# Patient Record
Sex: MALE | Race: WHITE | NOT HISPANIC OR LATINO | Employment: UNEMPLOYED | URBAN - METROPOLITAN AREA
[De-identification: names, ages, dates, MRNs, and addresses within clinical notes are randomized per-mention and may not be internally consistent; named-entity substitution may affect disease eponyms.]

---

## 2019-12-30 ENCOUNTER — HOSPITAL ENCOUNTER (INPATIENT)
Facility: CLINIC | Age: 29
LOS: 3 days | Discharge: HOME | DRG: 638 | End: 2020-01-02
Attending: EMERGENCY MEDICINE | Admitting: INTERNAL MEDICINE
Payer: MEDICARE

## 2019-12-30 DIAGNOSIS — I47.10 PAROXYSMAL SUPRAVENTRICULAR TACHYCARDIA (H): ICD-10-CM

## 2019-12-30 DIAGNOSIS — E10.10 DIABETIC KETOACIDOSIS WITHOUT COMA ASSOCIATED WITH TYPE 1 DIABETES MELLITUS (H): ICD-10-CM

## 2019-12-30 DIAGNOSIS — E86.0 DEHYDRATION: ICD-10-CM

## 2019-12-30 DIAGNOSIS — I47.10 SVT (SUPRAVENTRICULAR TACHYCARDIA) (H): Primary | ICD-10-CM

## 2019-12-30 DIAGNOSIS — E83.39 HYPERPHOSPHATEMIA: ICD-10-CM

## 2019-12-30 LAB
ALBUMIN SERPL-MCNC: 4.4 G/DL (ref 3.4–5)
ALBUMIN SERPL-MCNC: 4.7 G/DL (ref 3.4–5)
ALP SERPL-CCNC: 127 U/L (ref 40–150)
ALP SERPL-CCNC: 131 U/L (ref 40–150)
ALT SERPL W P-5'-P-CCNC: 24 U/L (ref 0–70)
ALT SERPL W P-5'-P-CCNC: 25 U/L (ref 0–70)
ANION GAP SERPL CALCULATED.3IONS-SCNC: 21 MMOL/L (ref 3–14)
ANION GAP SERPL CALCULATED.3IONS-SCNC: 24 MMOL/L (ref 3–14)
AST SERPL W P-5'-P-CCNC: 12 U/L (ref 0–45)
AST SERPL W P-5'-P-CCNC: 25 U/L (ref 0–45)
BASOPHILS # BLD AUTO: 0 10E9/L (ref 0–0.2)
BASOPHILS # BLD AUTO: 0.1 10E9/L (ref 0–0.2)
BASOPHILS NFR BLD AUTO: 0.1 %
BASOPHILS NFR BLD AUTO: 0.2 %
BILIRUB DIRECT SERPL-MCNC: 0.1 MG/DL (ref 0–0.2)
BILIRUB DIRECT SERPL-MCNC: 0.1 MG/DL (ref 0–0.2)
BILIRUB SERPL-MCNC: 0.5 MG/DL (ref 0.2–1.3)
BILIRUB SERPL-MCNC: 0.8 MG/DL (ref 0.2–1.3)
BUN SERPL-MCNC: 25 MG/DL (ref 7–30)
BUN SERPL-MCNC: 25 MG/DL (ref 7–30)
CALCIUM SERPL-MCNC: 10.2 MG/DL (ref 8.5–10.1)
CALCIUM SERPL-MCNC: 9.3 MG/DL (ref 8.5–10.1)
CHLORIDE SERPL-SCNC: 101 MMOL/L (ref 94–109)
CHLORIDE SERPL-SCNC: 107 MMOL/L (ref 94–109)
CO2 SERPL-SCNC: 9 MMOL/L (ref 20–32)
CO2 SERPL-SCNC: 9 MMOL/L (ref 20–32)
CREAT SERPL-MCNC: 0.61 MG/DL (ref 0.66–1.25)
CREAT SERPL-MCNC: 0.62 MG/DL (ref 0.66–1.25)
DIFFERENTIAL METHOD BLD: ABNORMAL
DIFFERENTIAL METHOD BLD: ABNORMAL
EOSINOPHIL # BLD AUTO: 0.1 10E9/L (ref 0–0.7)
EOSINOPHIL # BLD AUTO: 0.1 10E9/L (ref 0–0.7)
EOSINOPHIL NFR BLD AUTO: 0.4 %
EOSINOPHIL NFR BLD AUTO: 0.4 %
ERYTHROCYTE [DISTWIDTH] IN BLOOD BY AUTOMATED COUNT: 13.9 % (ref 10–15)
ERYTHROCYTE [DISTWIDTH] IN BLOOD BY AUTOMATED COUNT: 14 % (ref 10–15)
GFR SERPL CREATININE-BSD FRML MDRD: >90 ML/MIN/{1.73_M2}
GFR SERPL CREATININE-BSD FRML MDRD: >90 ML/MIN/{1.73_M2}
GLUCOSE BLDC GLUCOMTR-MCNC: 198 MG/DL (ref 70–99)
GLUCOSE BLDC GLUCOMTR-MCNC: 265 MG/DL (ref 70–99)
GLUCOSE BLDC GLUCOMTR-MCNC: 288 MG/DL (ref 70–99)
GLUCOSE BLDC GLUCOMTR-MCNC: 400 MG/DL (ref 70–99)
GLUCOSE BLDC GLUCOMTR-MCNC: 446 MG/DL (ref 70–99)
GLUCOSE BLDC GLUCOMTR-MCNC: 472 MG/DL (ref 70–99)
GLUCOSE BLDC GLUCOMTR-MCNC: 490 MG/DL (ref 70–99)
GLUCOSE BLDC GLUCOMTR-MCNC: 505 MG/DL (ref 70–99)
GLUCOSE BLDC GLUCOMTR-MCNC: 522 MG/DL (ref 70–99)
GLUCOSE BLDC GLUCOMTR-MCNC: 566 MG/DL (ref 70–99)
GLUCOSE SERPL-MCNC: 479 MG/DL (ref 70–99)
GLUCOSE SERPL-MCNC: 633 MG/DL (ref 70–99)
HBA1C MFR BLD: 12.8 % (ref 0–5.6)
HCT VFR BLD AUTO: 46.1 % (ref 40–53)
HCT VFR BLD AUTO: 48.7 % (ref 40–53)
HGB BLD-MCNC: 15 G/DL (ref 13.3–17.7)
HGB BLD-MCNC: 15.5 G/DL (ref 13.3–17.7)
IMM GRANULOCYTES # BLD: 0.3 10E9/L (ref 0–0.4)
IMM GRANULOCYTES # BLD: 0.3 10E9/L (ref 0–0.4)
IMM GRANULOCYTES NFR BLD: 1 %
IMM GRANULOCYTES NFR BLD: 1.3 %
INTERPRETATION ECG - MUSE: NORMAL
KETONES BLD-SCNC: 4.8 MMOL/L (ref 0–0.6)
KETONES BLD-SCNC: 4.9 MMOL/L (ref 0–0.6)
KETONES BLD-SCNC: 5.3 MMOL/L (ref 0–0.6)
LACTATE BLD-SCNC: 3.3 MMOL/L (ref 0.7–2)
LIPASE SERPL-CCNC: 48 U/L (ref 73–393)
LYMPHOCYTES # BLD AUTO: 1.6 10E9/L (ref 0.8–5.3)
LYMPHOCYTES # BLD AUTO: 2.1 10E9/L (ref 0.8–5.3)
LYMPHOCYTES NFR BLD AUTO: 6.4 %
LYMPHOCYTES NFR BLD AUTO: 8.2 %
MAGNESIUM SERPL-MCNC: 2.4 MG/DL (ref 1.6–2.3)
MAGNESIUM SERPL-MCNC: 2.6 MG/DL (ref 1.6–2.3)
MCH RBC QN AUTO: 26.7 PG (ref 26.5–33)
MCH RBC QN AUTO: 26.7 PG (ref 26.5–33)
MCHC RBC AUTO-ENTMCNC: 31.8 G/DL (ref 31.5–36.5)
MCHC RBC AUTO-ENTMCNC: 32.5 G/DL (ref 31.5–36.5)
MCV RBC AUTO: 82 FL (ref 78–100)
MCV RBC AUTO: 84 FL (ref 78–100)
MONOCYTES # BLD AUTO: 1.9 10E9/L (ref 0–1.3)
MONOCYTES # BLD AUTO: 2.3 10E9/L (ref 0–1.3)
MONOCYTES NFR BLD AUTO: 7.4 %
MONOCYTES NFR BLD AUTO: 9.1 %
NEUTROPHILS # BLD AUTO: 20.6 10E9/L (ref 1.6–8.3)
NEUTROPHILS # BLD AUTO: 21.3 10E9/L (ref 1.6–8.3)
NEUTROPHILS NFR BLD AUTO: 82.6 %
NEUTROPHILS NFR BLD AUTO: 82.9 %
NRBC # BLD AUTO: 0 10*3/UL
NRBC # BLD AUTO: 0 10*3/UL
NRBC BLD AUTO-RTO: 0 /100
NRBC BLD AUTO-RTO: 0 /100
OSMOLALITY SERPL: 333 MMOL/KG (ref 275–295)
OSMOLALITY SERPL: 334 MMOL/KG (ref 275–295)
PHOSPHATE SERPL-MCNC: 4.8 MG/DL (ref 2.5–4.5)
PHOSPHATE SERPL-MCNC: 7.2 MG/DL (ref 2.5–4.5)
PLATELET # BLD AUTO: 263 10E9/L (ref 150–450)
PLATELET # BLD AUTO: 320 10E9/L (ref 150–450)
POTASSIUM SERPL-SCNC: 4.6 MMOL/L (ref 3.4–5.3)
POTASSIUM SERPL-SCNC: 5 MMOL/L (ref 3.4–5.3)
PROT SERPL-MCNC: 7.8 G/DL (ref 6.8–8.8)
PROT SERPL-MCNC: 8.2 G/DL (ref 6.8–8.8)
RBC # BLD AUTO: 5.61 10E12/L (ref 4.4–5.9)
RBC # BLD AUTO: 5.81 10E12/L (ref 4.4–5.9)
SODIUM SERPL-SCNC: 134 MMOL/L (ref 133–144)
SODIUM SERPL-SCNC: 137 MMOL/L (ref 133–144)
WBC # BLD AUTO: 24.9 10E9/L (ref 4–11)
WBC # BLD AUTO: 25.7 10E9/L (ref 4–11)

## 2019-12-30 PROCEDURE — 83930 ASSAY OF BLOOD OSMOLALITY: CPT | Performed by: EMERGENCY MEDICINE

## 2019-12-30 PROCEDURE — 83735 ASSAY OF MAGNESIUM: CPT | Performed by: INTERNAL MEDICINE

## 2019-12-30 PROCEDURE — 83605 ASSAY OF LACTIC ACID: CPT | Performed by: INTERNAL MEDICINE

## 2019-12-30 PROCEDURE — 12000047 ZZH R&B IMCU

## 2019-12-30 PROCEDURE — 83036 HEMOGLOBIN GLYCOSYLATED A1C: CPT | Performed by: EMERGENCY MEDICINE

## 2019-12-30 PROCEDURE — 82010 KETONE BODYS QUAN: CPT | Performed by: EMERGENCY MEDICINE

## 2019-12-30 PROCEDURE — 99291 CRITICAL CARE FIRST HOUR: CPT

## 2019-12-30 PROCEDURE — 96375 TX/PRO/DX INJ NEW DRUG ADDON: CPT

## 2019-12-30 PROCEDURE — 25000128 H RX IP 250 OP 636: Performed by: EMERGENCY MEDICINE

## 2019-12-30 PROCEDURE — 40000141 ZZH STATISTIC PERIPHERAL IV START W/O US GUIDANCE

## 2019-12-30 PROCEDURE — 25800030 ZZH RX IP 258 OP 636: Performed by: EMERGENCY MEDICINE

## 2019-12-30 PROCEDURE — 96360 HYDRATION IV INFUSION INIT: CPT | Mod: 59

## 2019-12-30 PROCEDURE — 12000000 ZZH R&B MED SURG/OB

## 2019-12-30 PROCEDURE — 85025 COMPLETE CBC W/AUTO DIFF WBC: CPT | Performed by: EMERGENCY MEDICINE

## 2019-12-30 PROCEDURE — 84100 ASSAY OF PHOSPHORUS: CPT | Performed by: INTERNAL MEDICINE

## 2019-12-30 PROCEDURE — 80076 HEPATIC FUNCTION PANEL: CPT | Performed by: EMERGENCY MEDICINE

## 2019-12-30 PROCEDURE — 99223 1ST HOSP IP/OBS HIGH 75: CPT | Mod: AI | Performed by: INTERNAL MEDICINE

## 2019-12-30 PROCEDURE — 87040 BLOOD CULTURE FOR BACTERIA: CPT | Performed by: INTERNAL MEDICINE

## 2019-12-30 PROCEDURE — 83735 ASSAY OF MAGNESIUM: CPT | Performed by: EMERGENCY MEDICINE

## 2019-12-30 PROCEDURE — 25000128 H RX IP 250 OP 636: Performed by: INTERNAL MEDICINE

## 2019-12-30 PROCEDURE — 93010 ELECTROCARDIOGRAM REPORT: CPT | Performed by: INTERNAL MEDICINE

## 2019-12-30 PROCEDURE — 83690 ASSAY OF LIPASE: CPT | Performed by: EMERGENCY MEDICINE

## 2019-12-30 PROCEDURE — 83930 ASSAY OF BLOOD OSMOLALITY: CPT | Performed by: INTERNAL MEDICINE

## 2019-12-30 PROCEDURE — 96361 HYDRATE IV INFUSION ADD-ON: CPT

## 2019-12-30 PROCEDURE — 36415 COLL VENOUS BLD VENIPUNCTURE: CPT | Performed by: INTERNAL MEDICINE

## 2019-12-30 PROCEDURE — 93005 ELECTROCARDIOGRAM TRACING: CPT

## 2019-12-30 PROCEDURE — 85025 COMPLETE CBC W/AUTO DIFF WBC: CPT | Performed by: INTERNAL MEDICINE

## 2019-12-30 PROCEDURE — 80048 BASIC METABOLIC PNL TOTAL CA: CPT | Performed by: INTERNAL MEDICINE

## 2019-12-30 PROCEDURE — 25800030 ZZH RX IP 258 OP 636: Performed by: INTERNAL MEDICINE

## 2019-12-30 PROCEDURE — 82010 KETONE BODYS QUAN: CPT | Performed by: INTERNAL MEDICINE

## 2019-12-30 PROCEDURE — 25000131 ZZH RX MED GY IP 250 OP 636 PS 637: Mod: GY | Performed by: EMERGENCY MEDICINE

## 2019-12-30 PROCEDURE — 00000146 ZZHCL STATISTIC GLUCOSE BY METER IP

## 2019-12-30 PROCEDURE — 25000131 ZZH RX MED GY IP 250 OP 636 PS 637: Mod: GY | Performed by: INTERNAL MEDICINE

## 2019-12-30 PROCEDURE — 84100 ASSAY OF PHOSPHORUS: CPT | Performed by: EMERGENCY MEDICINE

## 2019-12-30 PROCEDURE — 80076 HEPATIC FUNCTION PANEL: CPT | Performed by: INTERNAL MEDICINE

## 2019-12-30 PROCEDURE — 96376 TX/PRO/DX INJ SAME DRUG ADON: CPT

## 2019-12-30 PROCEDURE — 80048 BASIC METABOLIC PNL TOTAL CA: CPT | Performed by: EMERGENCY MEDICINE

## 2019-12-30 PROCEDURE — 99292 CRITICAL CARE ADDL 30 MIN: CPT

## 2019-12-30 PROCEDURE — 96365 THER/PROPH/DIAG IV INF INIT: CPT

## 2019-12-30 RX ORDER — PROCHLORPERAZINE MALEATE 10 MG
10 TABLET ORAL EVERY 6 HOURS PRN
Status: DISCONTINUED | OUTPATIENT
Start: 2019-12-30 | End: 2020-01-02 | Stop reason: HOSPADM

## 2019-12-30 RX ORDER — DEXTROSE MONOHYDRATE 25 G/50ML
25-50 INJECTION, SOLUTION INTRAVENOUS
Status: DISCONTINUED | OUTPATIENT
Start: 2019-12-30 | End: 2019-12-31

## 2019-12-30 RX ORDER — SODIUM CHLORIDE AND POTASSIUM CHLORIDE 150; 450 MG/100ML; MG/100ML
INJECTION, SOLUTION INTRAVENOUS CONTINUOUS
Status: DISCONTINUED | OUTPATIENT
Start: 2019-12-30 | End: 2019-12-31

## 2019-12-30 RX ORDER — HYDROMORPHONE HYDROCHLORIDE 1 MG/ML
.3-.5 INJECTION, SOLUTION INTRAMUSCULAR; INTRAVENOUS; SUBCUTANEOUS EVERY 4 HOURS PRN
Status: DISCONTINUED | OUTPATIENT
Start: 2019-12-30 | End: 2020-01-02 | Stop reason: HOSPADM

## 2019-12-30 RX ORDER — ONDANSETRON 4 MG/1
4 TABLET, ORALLY DISINTEGRATING ORAL EVERY 6 HOURS PRN
Status: DISCONTINUED | OUTPATIENT
Start: 2019-12-30 | End: 2020-01-02 | Stop reason: HOSPADM

## 2019-12-30 RX ORDER — OLANZAPINE 10 MG/1
10 TABLET ORAL AT BEDTIME
Status: ON HOLD | COMMUNITY
End: 2020-01-22

## 2019-12-30 RX ORDER — ONDANSETRON 2 MG/ML
4 INJECTION INTRAMUSCULAR; INTRAVENOUS EVERY 6 HOURS PRN
Status: DISCONTINUED | OUTPATIENT
Start: 2019-12-30 | End: 2020-01-02 | Stop reason: HOSPADM

## 2019-12-30 RX ORDER — ACETAMINOPHEN 325 MG/1
650 TABLET ORAL EVERY 4 HOURS PRN
Status: DISCONTINUED | OUTPATIENT
Start: 2019-12-30 | End: 2020-01-02 | Stop reason: HOSPADM

## 2019-12-30 RX ORDER — NALOXONE HYDROCHLORIDE 0.4 MG/ML
.1-.4 INJECTION, SOLUTION INTRAMUSCULAR; INTRAVENOUS; SUBCUTANEOUS
Status: DISCONTINUED | OUTPATIENT
Start: 2019-12-30 | End: 2020-01-02 | Stop reason: HOSPADM

## 2019-12-30 RX ORDER — PROCHLORPERAZINE 25 MG
25 SUPPOSITORY, RECTAL RECTAL EVERY 12 HOURS PRN
Status: DISCONTINUED | OUTPATIENT
Start: 2019-12-30 | End: 2020-01-02 | Stop reason: HOSPADM

## 2019-12-30 RX ORDER — NICOTINE POLACRILEX 4 MG
15-30 LOZENGE BUCCAL
Status: DISCONTINUED | OUTPATIENT
Start: 2019-12-30 | End: 2019-12-31

## 2019-12-30 RX ORDER — FENTANYL CITRATE 50 UG/ML
25 INJECTION, SOLUTION INTRAMUSCULAR; INTRAVENOUS ONCE
Status: COMPLETED | OUTPATIENT
Start: 2019-12-30 | End: 2019-12-30

## 2019-12-30 RX ORDER — FAMOTIDINE 20 MG/1
20 TABLET, FILM COATED ORAL 2 TIMES DAILY
COMMUNITY
End: 2020-01-21

## 2019-12-30 RX ORDER — DEXTROSE MONOHYDRATE, SODIUM CHLORIDE, AND POTASSIUM CHLORIDE 50; 1.49; 4.5 G/1000ML; G/1000ML; G/1000ML
INJECTION, SOLUTION INTRAVENOUS CONTINUOUS
Status: DISCONTINUED | OUTPATIENT
Start: 2019-12-30 | End: 2019-12-31

## 2019-12-30 RX ORDER — NITROGLYCERIN 0.4 MG/1
0.4 TABLET SUBLINGUAL EVERY 5 MIN PRN
Status: DISCONTINUED | OUTPATIENT
Start: 2019-12-30 | End: 2020-01-02 | Stop reason: HOSPADM

## 2019-12-30 RX ORDER — POLYETHYLENE GLYCOL 3350 17 G/17G
17 POWDER, FOR SOLUTION ORAL DAILY PRN
Status: DISCONTINUED | OUTPATIENT
Start: 2019-12-30 | End: 2020-01-02 | Stop reason: HOSPADM

## 2019-12-30 RX ORDER — ONDANSETRON 2 MG/ML
4 INJECTION INTRAMUSCULAR; INTRAVENOUS EVERY 30 MIN PRN
Status: DISCONTINUED | OUTPATIENT
Start: 2019-12-30 | End: 2019-12-30

## 2019-12-30 RX ORDER — LIDOCAINE 40 MG/G
CREAM TOPICAL
Status: DISCONTINUED | OUTPATIENT
Start: 2019-12-30 | End: 2020-01-02 | Stop reason: HOSPADM

## 2019-12-30 RX ADMIN — SODIUM CHLORIDE: 9 INJECTION, SOLUTION INTRAVENOUS at 18:00

## 2019-12-30 RX ADMIN — SODIUM CHLORIDE: 9 INJECTION, SOLUTION INTRAVENOUS at 22:13

## 2019-12-30 RX ADMIN — POTASSIUM CHLORIDE, DEXTROSE MONOHYDRATE AND SODIUM CHLORIDE: 150; 5; 450 INJECTION, SOLUTION INTRAVENOUS at 19:45

## 2019-12-30 RX ADMIN — FENTANYL CITRATE 25 MCG: 50 INJECTION INTRAMUSCULAR; INTRAVENOUS at 15:23

## 2019-12-30 RX ADMIN — POTASSIUM CHLORIDE AND SODIUM CHLORIDE: 450; 150 INJECTION, SOLUTION INTRAVENOUS at 18:54

## 2019-12-30 RX ADMIN — HYDROMORPHONE HYDROCHLORIDE 0.5 MG: 1 INJECTION, SOLUTION INTRAMUSCULAR; INTRAVENOUS; SUBCUTANEOUS at 22:58

## 2019-12-30 RX ADMIN — SODIUM CHLORIDE 5.5 UNITS/HR: 9 INJECTION, SOLUTION INTRAVENOUS at 17:09

## 2019-12-30 RX ADMIN — ONDANSETRON 4 MG: 2 INJECTION INTRAMUSCULAR; INTRAVENOUS at 15:10

## 2019-12-30 RX ADMIN — SODIUM CHLORIDE: 9 INJECTION, SOLUTION INTRAVENOUS at 20:11

## 2019-12-30 RX ADMIN — SODIUM CHLORIDE 1000 ML: 9 INJECTION, SOLUTION INTRAVENOUS at 18:39

## 2019-12-30 RX ADMIN — ONDANSETRON 4 MG: 2 INJECTION INTRAMUSCULAR; INTRAVENOUS at 18:19

## 2019-12-30 RX ADMIN — SODIUM CHLORIDE: 9 INJECTION, SOLUTION INTRAVENOUS at 20:16

## 2019-12-30 RX ADMIN — SODIUM CHLORIDE 1000 ML: 9 INJECTION, SOLUTION INTRAVENOUS at 14:06

## 2019-12-30 RX ADMIN — PROCHLORPERAZINE EDISYLATE 10 MG: 5 INJECTION INTRAMUSCULAR; INTRAVENOUS at 20:11

## 2019-12-30 RX ADMIN — SODIUM CHLORIDE: 9 INJECTION, SOLUTION INTRAVENOUS at 23:05

## 2019-12-30 RX ADMIN — SODIUM CHLORIDE 10 UNITS: 9 INJECTION, SOLUTION INTRAVENOUS at 15:06

## 2019-12-30 RX ADMIN — HYDROMORPHONE HYDROCHLORIDE 0.5 MG: 1 INJECTION, SOLUTION INTRAMUSCULAR; INTRAVENOUS; SUBCUTANEOUS at 18:40

## 2019-12-30 ASSESSMENT — ENCOUNTER SYMPTOMS
VOMITING: 1
FATIGUE: 1
ABDOMINAL PAIN: 1

## 2019-12-30 ASSESSMENT — ACTIVITIES OF DAILY LIVING (ADL): ADLS_ACUITY_SCORE: 12

## 2019-12-30 NOTE — ED NOTES
DATE:  12/30/2019   TIME OF RECEIPT FROM LAB:  2:18 PM  LAB TEST:  Ketone: 4.8   LAB VALUE:  Ketone: 4.8   RESULTS GIVEN WITH READ-BACK TO (PROVIDER):  Kaur Gonzales MD  TIME LAB VALUE REPORTED TO PROVIDER:   2:18 PM

## 2019-12-30 NOTE — ED NOTES
Verbal report called to SHANNEN Cadena. [place] [facility] [place]

## 2019-12-30 NOTE — ED TRIAGE NOTES
Pt arrives via EMS for evaluation of hyperglycemia. Pt comes from extended stay. Per EMS, pt has had abdominal pain and vomiting for several days. Per EMS, pt had not taken insulin since last evening. Per EMS, blood sugar en route was 518. Pt reports severe abdominal pain.

## 2019-12-30 NOTE — ED NOTES
"Call light answered. Pt. stated \"it's happening again, I'm having a panic attack. I passed out, I don't know where I am.\" RN notified.  "

## 2019-12-30 NOTE — PHARMACY-ADMISSION MEDICATION HISTORY
Pharmacy Medication History  Admission medication history interview status for the 12/30/2019  admission is complete. See EPIC admission navigator for prior to admission medications     Medication history sources: Patient and Care Everywhere  Medication history source reliability: Moderate  Adherence assessment: Unknown    Significant changes made to the medication list:  Lantus increased from 35 to 38 units at bedtime and sliding scale.    Additional medication history information:   Unable to verify when last doses of medication were taken.    Medication reconciliation completed by provider prior to medication history? No    Time spent in this activity: 30 minutes      Prior to Admission medications    Medication Sig Last Dose Taking? Auth Provider   famotidine (PEPCID) 20 MG tablet Take 20 mg by mouth 2 times daily unknown Yes Unknown, Entered By History   insulin aspart (NOVOLOG FLEXPEN) 100 UNIT/ML pen Inject 5 Units Subcutaneous 4 times daily (with meals and nightly)  unknown Yes Unknown, Entered By History   insulin glargine (LANTUS PEN) 100 UNIT/ML pen Inject 38 Units Subcutaneous At Bedtime  unknown Yes Unknown, Entered By History   OLANZapine (ZYPREXA) 10 MG tablet Take 10 mg by mouth At Bedtime unknown Yes Unknown, Entered By History

## 2019-12-30 NOTE — ED PROVIDER NOTES
History     Chief Complaint:  Hyperglycemia    HPI   Reji Monahan is a 29 year old male with diabetes and schizophrenia who presents with hyperglycemia. The patient has a history of 9 episodes DKA in the past 6 months, his last episode being 12/18/19. He states that his current symptoms of fatigue, urinary frequency, abdominal pain, and vomiting feel similar to this. The patient states that he has been taking his Novolog as directed with no diet change, but suspects he is under-prescribed. The patient is staying in a hotel right now. He confirms marijuana use but denies methamphetamines.      Allergies:   No Known Allergies     Medications:    Zyprexa  Reglan  Novolog  Lantus  Pepcid    Past Medical History:    Diabetes with gastroparesis and neuropathy  Schizophrenia  ADD  Asthma  Dyslipidemia  Depression   History of DKA  History of MRSA  Homelessness  Asperger's disorder  SVT    Past Surgical History:    Upper left arm wound I&D    Family History:    Father - stroke  Mother - ovarian cancer, alcohol/ drug, bipolar, hypertension   Brother - diabetes, pulmonary disease, alcohol/ drug    Social History:  Tobacco use: former 9 year smoker  Negative for alcohol use.  Drug use: marijuana  Marital Status:  Single [1]    Review of Systems   Constitutional: Positive for fatigue.   Gastrointestinal: Positive for abdominal pain and vomiting.   All other systems reviewed and are negative.      Physical Exam     Patient Vitals for the past 24 hrs:   BP Temp Temp src Pulse Heart Rate Resp SpO2   12/30/19 1900 106/73 -- -- 128 135 22 100 %   12/30/19 1855 106/73 -- -- -- 133 22 100 %   12/30/19 1840 104/81 -- -- -- 146 25 --   12/30/19 1739 105/69 -- -- -- 136 27 100 %   12/30/19 1730 116/72 -- -- 135 131 15 99 %   12/30/19 1715 97/83 -- -- 134 134 16 100 %   12/30/19 1700 103/56 -- -- 134 134 18 100 %   12/30/19 1645 110/74 -- -- 129 -- -- --   12/30/19 1600 106/78 -- -- 145 -- -- 99 %   12/30/19 1545 109/71 -- -- 140 --  -- 100 %   12/30/19 1530 110/60 -- -- 132 -- -- 100 %   12/30/19 1515 111/70 -- -- 137 -- -- 100 %   12/30/19 1500 104/63 -- -- 140 -- -- 99 %   12/30/19 1445 114/64 -- -- 131 -- -- 100 %   12/30/19 1430 115/69 -- -- 135 -- -- 100 %   12/30/19 1415 106/72 -- -- -- -- -- 100 %   12/30/19 1400 91/71 -- -- 121 -- -- 100 %   12/30/19 1345 112/74 -- -- 131 -- -- 100 %   12/30/19 1338 108/73 97.7  F (36.5  C) Oral 135 -- 30 99 %        Physical Exam    Nursing note and vitals reviewed.    Constitutional:  Appears uncomfortable.  Moaning.   HENT:    Nose normal.  No discharge.      Oropharynx is dry     Poor dentition. Few sores on face with scabs.  Eyes:    Conjunctivae are normal without injection.     Pupils are equal.  Lymph:  No enlarged or tender cervical or submandibular lymph nodes.   Cardiovascular:  Tachycardic, regular rhythm with normal S1 and S2.      Normal heart sounds and peripheral pulses 2+ and equal.       No murmur or leona.  Pulmonary:  Very tachypneic, Kussmaul type breathing.     No stridor.     No wheezes. No rales.     GI:    Soft. No mass.      Generalized abdominal pain with some guarding, no distention.     No rebound.   Musculoskeletal:  Normal range of motion. No extremity deformity.     No edema and no tenderness.    Neurological:   Alert and oriented. No focal weakness.     Exhibits good muscle tone. Coordination normal.      GCS eye subscore is 4. GCS verbal subscore is 5.      GCS motor subscore is 6.   Skin:    Skin is warm and dry. No rash noted. No diaphoresis.      No erythema. No pallor.  Some scabbed lesions on his face.  Psychiatric:   Behavior is normal.  Patient anxious and uncomfortable.  Thought processes are normal.      Emergency Department Course     ECG:  Indication: hyperglycemia  Time: 1406  Vent. Rate 140 bpm. HI interval 140. QRS duration 76. QT/QTc 276/421. P-R-T axis 76 79 40.  Sinus tachycardia. Possible left atrial enlargement. Septal infarct, age undetermined.  Abnormal ECG. Read time: 1406     Laboratory:  Laboratory findings were communicated with the patient who voiced understanding of the findings.    CBC: WBC 24.9 (H), HGB 15.5,    BMP: carbon dioxide 9 (L), anion gap 24 (H), glucose 633 (H), creatinine 0.62 (L) o/w WNL     1739 Glucose by meter: 490 (H)  1657 Glucose by meter: 472 (H)  1608 Glucose by meter: 522 (H)  1530 Glucose by meter: 505 (H)  1419 Glucose by meter: 566 (H)    Hemoglobin A1c: 12.8 (H)  Phosphorus: 7.2 (H)  Magnesium: 2.6 (H)  Hepatic panel: WNL  Lipase: 48 (L)  Osmolality: 334 (H)  Ketone Beta-Hydroxybutyrate Quantitative: 4.8 (H)    Interventions:  1406 NS 1 L IV  1506 Regular insulin 10 units IV  1510 Zofran 4 mg IV  1523 Fentanyl 25 mcg IV  1709 Regular insulin 5.5 units/ hr IV     Emergency Department Course:  Past medical records, nursing notes, and vitals reviewed.    1405 I performed an exam of the patient as documented above.     EKG obtained in the ED, see results above.   IV was inserted and blood was drawn for laboratory testing, results above.    Patient rechecked and updated.      1618: I consulted with Dr. Soni of the hospitalist services. He was in agreement to accept the patient for admission.    Findings and plan explained to the Patient who consents to admission. Discussed the patient with Dr. Soni, who will admit the patient to a [facility] bed for further monitoring, evaluation, and treatment.    Impression & Plan     Medical Decision Making:  Reji Monahan is a 29 year old male who presents to the emergency department today with hyperglycemia and DKA.  He has been getting sicker the last few days.  No reports of fever.  An IV was placed with some difficulty, a liter of normal saline is ordered, labs obtained.  His phosphorus is high at 7.2, magnesium elevated at 2.6.  Anion gap is up to 24 with a low bicarb of 9, potassium 5.0 and sodium 134.  Renal function is normal.  Ketones are elevated at 4.8 confirming diabetic  ketoacidosis.  His glucose is 633 with a white count of 24.9.  I believe this is elevated due to his diabetic ketoacidosis and some vomiting.  He was given 25 mcg of fentanyl IV for the pain and 10 units of regular insulin was given initially.  He was then started on an insulin drip.  His blood sugar has come down in the ER to the 500s initially and then 490.  I talked with Dr. Soni and he will be admitted to an [facility] bed for further management of his DKA.  He will need frequent labs including potassiums and blood sugars along with more IV fluids as he is severely dehydrated.    Critical care time minus procedures: 60 minutes      Discharge Diagnosis:    ICD-10-CM    1. Diabetic ketoacidosis without coma associated with type 1 diabetes mellitus (H) E10.10    2. Dehydration E86.0    3. Hyperphosphatemia E83.39        Disposition:  Admitted to [facility], DKA protocol, IV fluids.    Scribe Disclosure:  Elizabeth MARTIN, am serving as a scribe at 3:09 PM on 12/30/2019 to document services personally performed by Kaur Gonzales MD based on my observations and the provider's statements to me.       Kaur Gonzales MD  12/30/19 1955

## 2019-12-30 NOTE — ED NOTES
Bed: ED14  Expected date:   Expected time:   Means of arrival:   Comments:  516  29 M type 1 diabetic/ 1545

## 2019-12-30 NOTE — PROGRESS NOTES
RECEIVING UNIT ED HANDOFF REVIEW    ED Nurse Handoff Report was reviewed by: Tammi Hopkins RN on December 30, 2019 at 5:27 PM

## 2019-12-30 NOTE — ED NOTES
DATE:  12/30/2019   TIME OF RECEIPT FROM LAB:  2:49 PM  LAB TEST:    Carbon Dioxide: 9  Glucose: 633  LAB VALUE:  See above   RESULTS GIVEN WITH READ-BACK TO (PROVIDER):  Kaur Gonzales MD  TIME LAB VALUE REPORTED TO PROVIDER:   2:50 PM

## 2019-12-31 LAB
ANION GAP SERPL CALCULATED.3IONS-SCNC: 5 MMOL/L (ref 3–14)
ANION GAP SERPL CALCULATED.3IONS-SCNC: 7 MMOL/L (ref 3–14)
ANION GAP SERPL CALCULATED.3IONS-SCNC: 8 MMOL/L (ref 3–14)
BASOPHILS # BLD AUTO: 0 10E9/L (ref 0–0.2)
BASOPHILS NFR BLD AUTO: 0.1 %
BUN SERPL-MCNC: 15 MG/DL (ref 7–30)
BUN SERPL-MCNC: 16 MG/DL (ref 7–30)
BUN SERPL-MCNC: 22 MG/DL (ref 7–30)
BUN SERPL-MCNC: 23 MG/DL (ref 7–30)
BUN SERPL-MCNC: 27 MG/DL (ref 7–30)
CALCIUM SERPL-MCNC: 7.9 MG/DL (ref 8.5–10.1)
CALCIUM SERPL-MCNC: 8 MG/DL (ref 8.5–10.1)
CALCIUM SERPL-MCNC: 8 MG/DL (ref 8.5–10.1)
CALCIUM SERPL-MCNC: 8.4 MG/DL (ref 8.5–10.1)
CALCIUM SERPL-MCNC: 8.5 MG/DL (ref 8.5–10.1)
CHLORIDE SERPL-SCNC: 109 MMOL/L (ref 94–109)
CHLORIDE SERPL-SCNC: 109 MMOL/L (ref 94–109)
CHLORIDE SERPL-SCNC: 110 MMOL/L (ref 94–109)
CHLORIDE SERPL-SCNC: 111 MMOL/L (ref 94–109)
CHLORIDE SERPL-SCNC: 111 MMOL/L (ref 94–109)
CO2 SERPL-SCNC: 17 MMOL/L (ref 20–32)
CO2 SERPL-SCNC: 18 MMOL/L (ref 20–32)
CO2 SERPL-SCNC: 20 MMOL/L (ref 20–32)
CO2 SERPL-SCNC: 20 MMOL/L (ref 20–32)
CO2 SERPL-SCNC: 23 MMOL/L (ref 20–32)
CREAT SERPL-MCNC: 0.45 MG/DL (ref 0.66–1.25)
CREAT SERPL-MCNC: 0.46 MG/DL (ref 0.66–1.25)
CREAT SERPL-MCNC: 0.48 MG/DL (ref 0.66–1.25)
CREAT SERPL-MCNC: 0.5 MG/DL (ref 0.66–1.25)
CREAT SERPL-MCNC: 0.52 MG/DL (ref 0.66–1.25)
CRP SERPL-MCNC: 17.7 MG/L (ref 0–8)
DIFFERENTIAL METHOD BLD: ABNORMAL
EOSINOPHIL # BLD AUTO: 0.1 10E9/L (ref 0–0.7)
EOSINOPHIL NFR BLD AUTO: 0.5 %
ERYTHROCYTE [DISTWIDTH] IN BLOOD BY AUTOMATED COUNT: 13.8 % (ref 10–15)
ERYTHROCYTE [DISTWIDTH] IN BLOOD BY AUTOMATED COUNT: 13.9 % (ref 10–15)
GFR SERPL CREATININE-BSD FRML MDRD: >90 ML/MIN/{1.73_M2}
GLUCOSE BLDC GLUCOMTR-MCNC: 120 MG/DL (ref 70–99)
GLUCOSE BLDC GLUCOMTR-MCNC: 133 MG/DL (ref 70–99)
GLUCOSE BLDC GLUCOMTR-MCNC: 146 MG/DL (ref 70–99)
GLUCOSE BLDC GLUCOMTR-MCNC: 159 MG/DL (ref 70–99)
GLUCOSE BLDC GLUCOMTR-MCNC: 162 MG/DL (ref 70–99)
GLUCOSE BLDC GLUCOMTR-MCNC: 163 MG/DL (ref 70–99)
GLUCOSE BLDC GLUCOMTR-MCNC: 171 MG/DL (ref 70–99)
GLUCOSE BLDC GLUCOMTR-MCNC: 172 MG/DL (ref 70–99)
GLUCOSE BLDC GLUCOMTR-MCNC: 176 MG/DL (ref 70–99)
GLUCOSE BLDC GLUCOMTR-MCNC: 180 MG/DL (ref 70–99)
GLUCOSE BLDC GLUCOMTR-MCNC: 182 MG/DL (ref 70–99)
GLUCOSE BLDC GLUCOMTR-MCNC: 186 MG/DL (ref 70–99)
GLUCOSE BLDC GLUCOMTR-MCNC: 189 MG/DL (ref 70–99)
GLUCOSE BLDC GLUCOMTR-MCNC: 189 MG/DL (ref 70–99)
GLUCOSE BLDC GLUCOMTR-MCNC: 190 MG/DL (ref 70–99)
GLUCOSE BLDC GLUCOMTR-MCNC: 194 MG/DL (ref 70–99)
GLUCOSE BLDC GLUCOMTR-MCNC: 208 MG/DL (ref 70–99)
GLUCOSE BLDC GLUCOMTR-MCNC: 212 MG/DL (ref 70–99)
GLUCOSE BLDC GLUCOMTR-MCNC: 224 MG/DL (ref 70–99)
GLUCOSE BLDC GLUCOMTR-MCNC: 230 MG/DL (ref 70–99)
GLUCOSE BLDC GLUCOMTR-MCNC: 250 MG/DL (ref 70–99)
GLUCOSE BLDC GLUCOMTR-MCNC: 78 MG/DL (ref 70–99)
GLUCOSE BLDC GLUCOMTR-MCNC: 89 MG/DL (ref 70–99)
GLUCOSE SERPL-MCNC: 174 MG/DL (ref 70–99)
GLUCOSE SERPL-MCNC: 185 MG/DL (ref 70–99)
GLUCOSE SERPL-MCNC: 197 MG/DL (ref 70–99)
GLUCOSE SERPL-MCNC: 211 MG/DL (ref 70–99)
GLUCOSE SERPL-MCNC: 223 MG/DL (ref 70–99)
HCT VFR BLD AUTO: 36.3 % (ref 40–53)
HCT VFR BLD AUTO: 40 % (ref 40–53)
HGB BLD-MCNC: 12 G/DL (ref 13.3–17.7)
HGB BLD-MCNC: 13.3 G/DL (ref 13.3–17.7)
IMM GRANULOCYTES # BLD: 0.1 10E9/L (ref 0–0.4)
IMM GRANULOCYTES NFR BLD: 0.3 %
KETONES BLD-SCNC: 0.1 MMOL/L (ref 0–0.6)
KETONES BLD-SCNC: 0.3 MMOL/L (ref 0–0.6)
KETONES BLD-SCNC: 0.7 MMOL/L (ref 0–0.6)
KETONES BLD-SCNC: 1.1 MMOL/L (ref 0–0.6)
KETONES BLD-SCNC: 1.3 MMOL/L (ref 0–0.6)
KETONES BLD-SCNC: 1.4 MMOL/L (ref 0–0.6)
KETONES BLD-SCNC: 1.5 MMOL/L (ref 0–0.6)
KETONES BLD-SCNC: 1.8 MMOL/L (ref 0–0.6)
KETONES BLD-SCNC: 3 MMOL/L (ref 0–0.6)
KETONES BLD-SCNC: 4.3 MMOL/L (ref 0–0.6)
LACTATE BLD-SCNC: 0.5 MMOL/L (ref 0.7–2)
LYMPHOCYTES # BLD AUTO: 1.4 10E9/L (ref 0.8–5.3)
LYMPHOCYTES NFR BLD AUTO: 9 %
MAGNESIUM SERPL-MCNC: 1.9 MG/DL (ref 1.6–2.3)
MCH RBC QN AUTO: 26.1 PG (ref 26.5–33)
MCH RBC QN AUTO: 26.4 PG (ref 26.5–33)
MCHC RBC AUTO-ENTMCNC: 33.1 G/DL (ref 31.5–36.5)
MCHC RBC AUTO-ENTMCNC: 33.3 G/DL (ref 31.5–36.5)
MCV RBC AUTO: 79 FL (ref 78–100)
MCV RBC AUTO: 80 FL (ref 78–100)
MONOCYTES # BLD AUTO: 1.9 10E9/L (ref 0–1.3)
MONOCYTES NFR BLD AUTO: 11.6 %
NEUTROPHILS # BLD AUTO: 12.6 10E9/L (ref 1.6–8.3)
NEUTROPHILS NFR BLD AUTO: 78.5 %
NRBC # BLD AUTO: 0 10*3/UL
NRBC BLD AUTO-RTO: 0 /100
PLATELET # BLD AUTO: 213 10E9/L (ref 150–450)
PLATELET # BLD AUTO: 223 10E9/L (ref 150–450)
POTASSIUM SERPL-SCNC: 3.7 MMOL/L (ref 3.4–5.3)
POTASSIUM SERPL-SCNC: 3.9 MMOL/L (ref 3.4–5.3)
POTASSIUM SERPL-SCNC: 3.9 MMOL/L (ref 3.4–5.3)
POTASSIUM SERPL-SCNC: 4.4 MMOL/L (ref 3.4–5.3)
POTASSIUM SERPL-SCNC: 4.5 MMOL/L (ref 3.4–5.3)
RBC # BLD AUTO: 4.6 10E12/L (ref 4.4–5.9)
RBC # BLD AUTO: 5.03 10E12/L (ref 4.4–5.9)
SODIUM SERPL-SCNC: 135 MMOL/L (ref 133–144)
SODIUM SERPL-SCNC: 135 MMOL/L (ref 133–144)
SODIUM SERPL-SCNC: 136 MMOL/L (ref 133–144)
SODIUM SERPL-SCNC: 139 MMOL/L (ref 133–144)
SODIUM SERPL-SCNC: 139 MMOL/L (ref 133–144)
WBC # BLD AUTO: 16.1 10E9/L (ref 4–11)
WBC # BLD AUTO: 18.1 10E9/L (ref 4–11)

## 2019-12-31 PROCEDURE — 25800030 ZZH RX IP 258 OP 636: Performed by: STUDENT IN AN ORGANIZED HEALTH CARE EDUCATION/TRAINING PROGRAM

## 2019-12-31 PROCEDURE — 36415 COLL VENOUS BLD VENIPUNCTURE: CPT | Performed by: STUDENT IN AN ORGANIZED HEALTH CARE EDUCATION/TRAINING PROGRAM

## 2019-12-31 PROCEDURE — 93010 ELECTROCARDIOGRAM REPORT: CPT | Mod: 76 | Performed by: INTERNAL MEDICINE

## 2019-12-31 PROCEDURE — 99233 SBSQ HOSP IP/OBS HIGH 50: CPT | Performed by: INTERNAL MEDICINE

## 2019-12-31 PROCEDURE — 86140 C-REACTIVE PROTEIN: CPT | Performed by: INTERNAL MEDICINE

## 2019-12-31 PROCEDURE — 85025 COMPLETE CBC W/AUTO DIFF WBC: CPT | Performed by: INTERNAL MEDICINE

## 2019-12-31 PROCEDURE — 12000047 ZZH R&B IMCU

## 2019-12-31 PROCEDURE — 25000128 H RX IP 250 OP 636: Performed by: STUDENT IN AN ORGANIZED HEALTH CARE EDUCATION/TRAINING PROGRAM

## 2019-12-31 PROCEDURE — 25000131 ZZH RX MED GY IP 250 OP 636 PS 637: Mod: GY | Performed by: INTERNAL MEDICINE

## 2019-12-31 PROCEDURE — 25000128 H RX IP 250 OP 636

## 2019-12-31 PROCEDURE — 25800030 ZZH RX IP 258 OP 636: Performed by: INTERNAL MEDICINE

## 2019-12-31 PROCEDURE — 93005 ELECTROCARDIOGRAM TRACING: CPT

## 2019-12-31 PROCEDURE — 00000146 ZZHCL STATISTIC GLUCOSE BY METER IP

## 2019-12-31 PROCEDURE — 83605 ASSAY OF LACTIC ACID: CPT | Performed by: INTERNAL MEDICINE

## 2019-12-31 PROCEDURE — 25000128 H RX IP 250 OP 636: Performed by: INTERNAL MEDICINE

## 2019-12-31 PROCEDURE — 80048 BASIC METABOLIC PNL TOTAL CA: CPT | Performed by: INTERNAL MEDICINE

## 2019-12-31 PROCEDURE — 85027 COMPLETE CBC AUTOMATED: CPT | Performed by: STUDENT IN AN ORGANIZED HEALTH CARE EDUCATION/TRAINING PROGRAM

## 2019-12-31 PROCEDURE — 25000125 ZZHC RX 250: Performed by: STUDENT IN AN ORGANIZED HEALTH CARE EDUCATION/TRAINING PROGRAM

## 2019-12-31 PROCEDURE — 80048 BASIC METABOLIC PNL TOTAL CA: CPT | Performed by: STUDENT IN AN ORGANIZED HEALTH CARE EDUCATION/TRAINING PROGRAM

## 2019-12-31 PROCEDURE — 82010 KETONE BODYS QUAN: CPT | Performed by: INTERNAL MEDICINE

## 2019-12-31 PROCEDURE — 83735 ASSAY OF MAGNESIUM: CPT | Performed by: STUDENT IN AN ORGANIZED HEALTH CARE EDUCATION/TRAINING PROGRAM

## 2019-12-31 PROCEDURE — 36415 COLL VENOUS BLD VENIPUNCTURE: CPT | Performed by: INTERNAL MEDICINE

## 2019-12-31 RX ORDER — DEXTROSE MONOHYDRATE 25 G/50ML
25-50 INJECTION, SOLUTION INTRAVENOUS
Status: DISCONTINUED | OUTPATIENT
Start: 2019-12-31 | End: 2020-01-02 | Stop reason: HOSPADM

## 2019-12-31 RX ORDER — NICOTINE POLACRILEX 4 MG
15-30 LOZENGE BUCCAL
Status: DISCONTINUED | OUTPATIENT
Start: 2019-12-31 | End: 2020-01-02 | Stop reason: HOSPADM

## 2019-12-31 RX ORDER — SODIUM CHLORIDE 9 MG/ML
INJECTION, SOLUTION INTRAVENOUS CONTINUOUS
Status: DISCONTINUED | OUTPATIENT
Start: 2019-12-31 | End: 2019-12-31

## 2019-12-31 RX ORDER — SODIUM CHLORIDE 9 MG/ML
INJECTION, SOLUTION INTRAVENOUS CONTINUOUS
Status: DISCONTINUED | OUTPATIENT
Start: 2019-12-31 | End: 2020-01-02 | Stop reason: HOSPADM

## 2019-12-31 RX ORDER — LORAZEPAM 2 MG/ML
2 INJECTION INTRAMUSCULAR ONCE
Status: COMPLETED | OUTPATIENT
Start: 2019-12-31 | End: 2019-12-31

## 2019-12-31 RX ORDER — LORAZEPAM 2 MG/ML
INJECTION INTRAMUSCULAR
Status: COMPLETED
Start: 2019-12-31 | End: 2019-12-31

## 2019-12-31 RX ORDER — DEXTROSE MONOHYDRATE, SODIUM CHLORIDE, AND POTASSIUM CHLORIDE 50; 1.49; 4.5 G/1000ML; G/1000ML; G/1000ML
INJECTION, SOLUTION INTRAVENOUS CONTINUOUS
Status: DISCONTINUED | OUTPATIENT
Start: 2019-12-31 | End: 2020-01-02 | Stop reason: HOSPADM

## 2019-12-31 RX ORDER — METOPROLOL TARTRATE 1 MG/ML
5 INJECTION, SOLUTION INTRAVENOUS ONCE
Status: COMPLETED | OUTPATIENT
Start: 2019-12-31 | End: 2019-12-31

## 2019-12-31 RX ADMIN — ONDANSETRON 4 MG: 2 INJECTION INTRAMUSCULAR; INTRAVENOUS at 08:17

## 2019-12-31 RX ADMIN — POTASSIUM CHLORIDE, DEXTROSE MONOHYDRATE AND SODIUM CHLORIDE: 150; 5; 450 INJECTION, SOLUTION INTRAVENOUS at 12:04

## 2019-12-31 RX ADMIN — LORAZEPAM 2 MG: 2 INJECTION INTRAMUSCULAR at 01:48

## 2019-12-31 RX ADMIN — POTASSIUM CHLORIDE, DEXTROSE MONOHYDRATE AND SODIUM CHLORIDE: 150; 5; 450 INJECTION, SOLUTION INTRAVENOUS at 10:32

## 2019-12-31 RX ADMIN — SODIUM CHLORIDE, POTASSIUM CHLORIDE, SODIUM LACTATE AND CALCIUM CHLORIDE 1000 ML: 600; 310; 30; 20 INJECTION, SOLUTION INTRAVENOUS at 02:27

## 2019-12-31 RX ADMIN — POTASSIUM CHLORIDE, DEXTROSE MONOHYDRATE AND SODIUM CHLORIDE: 150; 5; 450 INJECTION, SOLUTION INTRAVENOUS at 04:04

## 2019-12-31 RX ADMIN — ONDANSETRON 4 MG: 4 TABLET, ORALLY DISINTEGRATING ORAL at 20:43

## 2019-12-31 RX ADMIN — PROCHLORPERAZINE EDISYLATE 10 MG: 5 INJECTION INTRAMUSCULAR; INTRAVENOUS at 20:52

## 2019-12-31 RX ADMIN — LORAZEPAM 2 MG: 2 INJECTION INTRAMUSCULAR; INTRAVENOUS at 01:48

## 2019-12-31 RX ADMIN — DEXTROSE AND SODIUM CHLORIDE: 5; 900 INJECTION, SOLUTION INTRAVENOUS at 12:02

## 2019-12-31 RX ADMIN — SODIUM CHLORIDE 1000 ML: 9 INJECTION, SOLUTION INTRAVENOUS at 10:31

## 2019-12-31 RX ADMIN — SODIUM CHLORIDE: 9 INJECTION, SOLUTION INTRAVENOUS at 23:39

## 2019-12-31 RX ADMIN — SODIUM CHLORIDE: 9 INJECTION, SOLUTION INTRAVENOUS at 17:34

## 2019-12-31 RX ADMIN — MAGNESIUM SULFATE HEPTAHYDRATE 1 G: 500 INJECTION, SOLUTION INTRAMUSCULAR; INTRAVENOUS at 04:08

## 2019-12-31 RX ADMIN — SODIUM CHLORIDE: 9 INJECTION, SOLUTION INTRAVENOUS at 01:19

## 2019-12-31 RX ADMIN — SODIUM CHLORIDE 3 UNITS/HR: 9 INJECTION, SOLUTION INTRAVENOUS at 14:05

## 2019-12-31 RX ADMIN — HYDROMORPHONE HYDROCHLORIDE 0.5 MG: 1 INJECTION, SOLUTION INTRAMUSCULAR; INTRAVENOUS; SUBCUTANEOUS at 19:04

## 2019-12-31 RX ADMIN — METOPROLOL TARTRATE 2.5 MG: 5 INJECTION INTRAVENOUS at 01:50

## 2019-12-31 ASSESSMENT — ACTIVITIES OF DAILY LIVING (ADL)
ADLS_ACUITY_SCORE: 12

## 2019-12-31 NOTE — CONSULTS
"BRIEF NUTRITION ASSESSMENT      REASON FOR ASSESSMENT:  Reji Monahan is a 29 year old male assessed by Registered Dietitian for Admission Nutrition Risk Screen for new/uncontrolled diabetes    CURRENT DIET AND INTAKE:  Diet:  NPO              Chart reviewed  Stopped by to see pt, did not respond to name - currently sleeping, did not disturb further  Note that pt has had multiple admissions for DKA   Per H&P - \"He says he has been compliant with his medications, but does feel that he needs higher dosing particularly of his prandial insulin.\"      ANTHROPOMETRICS:  Height: (Care Everywhere)  6'1\"  Weight:(12/31) 81.2 kg /  179 lbs .22 oz  BMI: 23.6  Weight Status: Normal BMI  IBW:  83.6 kg  %IBW: 97%  Weight History:   Wt Readings from Last 10 Encounters:   12/31/19 81.2 kg (179 lb 0.2 oz)     Per Care Everywhere:  11/23/19 76.5 kg  10/23/19 73.6 kg    LABS:  12/30: HgbA1C 12.8    IVF (D5 containing) @ 125 mL/hr  Insulin drip    MALNUTRITION:  Patient does not meet two of the following criteria necessary for diagnosing malnutrition: significant weight loss, reduced intake, subcutaneous fat loss, muscle loss or fluid retention.     NUTRITION INTERVENTION:  Nutrition Diagnosis:  No nutrition diagnosis at this time.    Implementation:  Nutrition Education ---> Would recommend outpt DM management     FOLLOW UP/MONITORING:   Will re-evaluate in 7 - 10 days, or sooner, if re-consulted.          "

## 2019-12-31 NOTE — H&P
Admitted:     12/30/2019      PRIMARY CARE PHYSICIAN:  Dr. Bogdan Jhaveri MD, [facility].      CHIEF COMPLAINT:  Hyperglycemia.      HISTORY OF PRESENT ILLNESS:  Mr. Reji Monahan is a 29-year-old male patient with history noted below, including type 1 diabetes, asthma and depression/anxiety, who presents with the above acute issues.  The patient says that in general his diabetes control has not been optimal.  He has been seen at [facility] and in the [facility] to manage his insulin.  It is noted that he has had 9 episodes of DKA in the last 6 months, the last being in 12/18/2019.  He has felt similar symptoms, the last few days and has fatigue, urinary frequency, abdominal pain and vomiting.  No chest pain.  No complaints of fevers or chills.  He says he has been compliant with his medications, but does feel that he needs higher dosing particularly of his prandial insulin.  Overall, given his symptomatology, he came into [facility] for further evaluation.      The patient seen in the ER by Dr. Gonzales and his vitals showed temperature 97.7, heart rate 135, blood pressure 108/73, respiration rate 30, O2 saturations 99% on room air.  Laboratory evaluation revealed that his glucose level was 633.  BMP showed bicarbonate level of 9, anion gap of 24 and ketones of 4.8.  LFTs were normal and lipase was not elevated.  CBC showed white count of 24.9.  He did have EKG performed which showed sinus tachycardia with some slight T-wave inversion in leads III and aVF, otherwise nothing acutely ischemic.  The patient was given IV fluids and started on insulin infusion and given his issues, a request for admission was made.      PAST MEDICAL HISTORY:   1.  Diabetes mellitus type 1.  Hemoglobin A1c was 13.5 back in 05/2019.   2.  Asthma.   3.  Psychiatric:  History of depression/anxiety, with history of noted Asperger's, schizophrenia and ADD.   4.  Dyslipidemia.   5.  SVT history.     6.  History of MRSA infection.     7.  History of  facial cellulitis, including periorbital cellulitis and carbuncles on the face.     8.  Gastroesophageal reflux disease.        PAST SURGICAL HISTORY:   Status post left lower extremity I and D, 2016.      ALLERGIES:  NO KNOWN DRUG ALLERGIES.      HOME MEDICATIONS: (Yet to be reconciled):   1.  Lantus 38 units every evening.   2.  Aspart 2 units per carbohydrate unit and along with sliding scale 3 times a day with meals.      SOCIAL HISTORY:  The patient does not smoke cigarettes or drink alcohol.  He does smoke marijuana.  He is single.  He works as a PCA.  Currently living in a hotel.      FAMILY HISTORY:  Reviewed and not felt to be contributory.        REVIEW OF SYSTEMS:  As in HPI, otherwise 10-point systems negative.      PHYSICAL EXAMINATION:   VITAL SIGNS:  Temperature 97.7, heart rate 145, blood pressure 106/78, respiration rate 30, O2 saturations 99% on room air.   GENERAL:  This is a 29-year-old male patient lying in bed.  He was writhing uncomfortably for most of the interview.  Otherwise, he does answer questions appropriately and follows commands.   HEENT:  Pupils equal, round, and reactive.  No scleral icterus or conjunctival injection.  Oropharynx reveals no gross erythema or exudate.  Dry mucous membranes.   NECK:  No bruits, JVD or adenopathy.   HEART:  Tachycardic and regular, without murmurs, rubs, gallops.   LUNGS:  Diminished at the bases, no crackles or wheeze.   ABDOMEN:  Soft, nondistended, tender to light palpation without rebound or guarding.  Hypoactive.  No femoral bruits.   EXTREMITIES:  No edema.   NEUROLOGIC:  Reveals no gross focal motor or sensory deficits.      LABORATORIES AND IMAGING:    Results for orders placed or performed during the hospital encounter of 12/30/19   Hemoglobin A1c     Status: Abnormal   Result Value Ref Range    Hemoglobin A1C 12.8 (H) 0 - 5.6 %   CBC with platelets differential     Status: Abnormal   Result Value Ref Range    WBC 24.9 (H) 4.0 - 11.0 10e9/L     RBC Count 5.81 4.4 - 5.9 10e12/L    Hemoglobin 15.5 13.3 - 17.7 g/dL    Hematocrit 48.7 40.0 - 53.0 %    MCV 84 78 - 100 fl    MCH 26.7 26.5 - 33.0 pg    MCHC 31.8 31.5 - 36.5 g/dL    RDW 14.0 10.0 - 15.0 %    Platelet Count 320 150 - 450 10e9/L    Diff Method Automated Method     % Neutrophils 82.6 %    % Lymphocytes 6.4 %    % Monocytes 9.1 %    % Eosinophils 0.4 %    % Basophils 0.2 %    % Immature Granulocytes 1.3 %    Nucleated RBCs 0 0 /100    Absolute Neutrophil 20.6 (H) 1.6 - 8.3 10e9/L    Absolute Lymphocytes 1.6 0.8 - 5.3 10e9/L    Absolute Monocytes 2.3 (H) 0.0 - 1.3 10e9/L    Absolute Eosinophils 0.1 0.0 - 0.7 10e9/L    Absolute Basophils 0.1 0.0 - 0.2 10e9/L    Abs Immature Granulocytes 0.3 0 - 0.4 10e9/L    Absolute Nucleated RBC 0.0    Phosphorus     Status: Abnormal   Result Value Ref Range    Phosphorus 7.2 (H) 2.5 - 4.5 mg/dL   Magnesium     Status: Abnormal   Result Value Ref Range    Magnesium 2.6 (H) 1.6 - 2.3 mg/dL   Basic metabolic panel     Status: Abnormal   Result Value Ref Range    Sodium 134 133 - 144 mmol/L    Potassium 5.0 3.4 - 5.3 mmol/L    Chloride 101 94 - 109 mmol/L    Carbon Dioxide 9 (LL) 20 - 32 mmol/L    Anion Gap 24 (H) 3 - 14 mmol/L    Glucose 633 (HH) 70 - 99 mg/dL    Urea Nitrogen 25 7 - 30 mg/dL    Creatinine 0.62 (L) 0.66 - 1.25 mg/dL    GFR Estimate >90 >60 mL/min/[1.73_m2]    GFR Estimate If Black >90 >60 mL/min/[1.73_m2]    Calcium 10.2 (H) 8.5 - 10.1 mg/dL   Hepatic panel     Status: None   Result Value Ref Range    Bilirubin Direct 0.1 0.0 - 0.2 mg/dL    Bilirubin Total 0.8 0.2 - 1.3 mg/dL    Albumin 4.7 3.4 - 5.0 g/dL    Protein Total 8.2 6.8 - 8.8 g/dL    Alkaline Phosphatase 131 40 - 150 U/L    ALT 24 0 - 70 U/L    AST 25 0 - 45 U/L   Lipase     Status: Abnormal   Result Value Ref Range    Lipase 48 (L) 73 - 393 U/L   Osmolality     Status: Abnormal   Result Value Ref Range    Osmolality 334 (H) 275 - 295 mmol/kg   Ketone Beta-Hydroxybutyrate Quantitative      Status: Abnormal   Result Value Ref Range    Ketone Quantitative 4.8 (HH) 0.0 - 0.6 mmol/L   Glucose by meter     Status: Abnormal   Result Value Ref Range    Glucose 566 (HH) 70 - 99 mg/dL   Glucose by meter     Status: Abnormal   Result Value Ref Range    Glucose 505 (HH) 70 - 99 mg/dL   Glucose by meter     Status: Abnormal   Result Value Ref Range    Glucose 522 (HH) 70 - 99 mg/dL   Glucose by meter     Status: Abnormal   Result Value Ref Range    Glucose 472 (H) 70 - 99 mg/dL   Glucose by meter     Status: Abnormal   Result Value Ref Range    Glucose 490 (H) 70 - 99 mg/dL   Lactic acid level STAT     Status: Abnormal   Result Value Ref Range    Lactate for Sepsis Protocol 3.3 (H) 0.7 - 2.0 mmol/L   Basic metabolic panel     Status: Abnormal   Result Value Ref Range    Sodium 137 133 - 144 mmol/L    Potassium 4.6 3.4 - 5.3 mmol/L    Chloride 107 94 - 109 mmol/L    Carbon Dioxide 9 (LL) 20 - 32 mmol/L    Anion Gap 21 (H) 3 - 14 mmol/L    Glucose 479 (H) 70 - 99 mg/dL    Urea Nitrogen 25 7 - 30 mg/dL    Creatinine 0.61 (L) 0.66 - 1.25 mg/dL    GFR Estimate >90 >60 mL/min/[1.73_m2]    GFR Estimate If Black >90 >60 mL/min/[1.73_m2]    Calcium 9.3 8.5 - 10.1 mg/dL   CBC with platelets differential     Status: Abnormal   Result Value Ref Range    WBC 25.7 (H) 4.0 - 11.0 10e9/L    RBC Count 5.61 4.4 - 5.9 10e12/L    Hemoglobin 15.0 13.3 - 17.7 g/dL    Hematocrit 46.1 40.0 - 53.0 %    MCV 82 78 - 100 fl    MCH 26.7 26.5 - 33.0 pg    MCHC 32.5 31.5 - 36.5 g/dL    RDW 13.9 10.0 - 15.0 %    Platelet Count 263 150 - 450 10e9/L    Diff Method Automated Method     % Neutrophils 82.9 %    % Lymphocytes 8.2 %    % Monocytes 7.4 %    % Eosinophils 0.4 %    % Basophils 0.1 %    % Immature Granulocytes 1.0 %    Nucleated RBCs 0 0 /100    Absolute Neutrophil 21.3 (H) 1.6 - 8.3 10e9/L    Absolute Lymphocytes 2.1 0.8 - 5.3 10e9/L    Absolute Monocytes 1.9 (H) 0.0 - 1.3 10e9/L    Absolute Eosinophils 0.1 0.0 - 0.7 10e9/L    Absolute  Basophils 0.0 0.0 - 0.2 10e9/L    Abs Immature Granulocytes 0.3 0 - 0.4 10e9/L    Absolute Nucleated RBC 0.0    Ketone Beta-Hydroxybutyrate Quantitative     Status: Abnormal   Result Value Ref Range    Ketone Quantitative 5.3 (HH) 0.0 - 0.6 mmol/L   Osmolality     Status: Abnormal   Result Value Ref Range    Osmolality 333 (H) 275 - 295 mmol/kg   Phosphorus     Status: Abnormal   Result Value Ref Range    Phosphorus 4.8 (H) 2.5 - 4.5 mg/dL   Magnesium     Status: Abnormal   Result Value Ref Range    Magnesium 2.4 (H) 1.6 - 2.3 mg/dL   Hepatic panel     Status: None   Result Value Ref Range    Bilirubin Direct 0.1 0.0 - 0.2 mg/dL    Bilirubin Total 0.5 0.2 - 1.3 mg/dL    Albumin 4.4 3.4 - 5.0 g/dL    Protein Total 7.8 6.8 - 8.8 g/dL    Alkaline Phosphatase 127 40 - 150 U/L    ALT 25 0 - 70 U/L    AST 12 0 - 45 U/L   EKG 12-lead, tracing only     Status: None   Result Value Ref Range    Interpretation ECG Click View Image link to view waveform and result    EKG 12-lead, tracing only - DKA     Status: None (Preliminary result)   Result Value Ref Range    Interpretation ECG Click View Image link to view waveform and result        No results found for this or any previous visit (from the past 24 hour(s)).     EKG, which I personally reviewed, as described above.      ASSESSMENT AND PLAN:  Mr. Reji Monahan a 29-year-old male patient with history including diabetes mellitus type 1, asthma, and depression/anxiety, who presents with hyperglycemia and found with clinical evidence of diabetic ketoacidosis.      1.  Diabetes mellitus type 1 with diabetic ketoacidosis.  Unclear of any specific trigger for this episode, but could be related to general suboptimal control.  Presented with hyperglycemia with fatigue, urinary frequency, abdominal pain and vomiting.  On initial evaluation, he had a glucose level of 633.  Bicarbonate level of 9 and anion gap of 24.  Ketones were elevated at 4.8.  He was given IV fluids.  He was  started on an insulin drip.  We will order diabetic ketoacidosis protocol including IV fluids and insulin drip.  Monitor electrolytes and ketones and transition to subcutaneous insulin once able and when anion gap is closed.  Based on the insulin requirements, will need to determine a possibly more appropriate basal level of Lantus and adjust prandial aspart accordingly.     2.  Leukocytosis, suspect reactive secondary to above.  The patient is afebrile, although tachycardic, suspect due to the above.  We will monitor for any signs of infection.  Treat diabetic ketoacidosis as above.  Monitor CBC.     3.  Psychiatric:  Depression/anxiety, history noted of Asperger's, schizophrenia and attention-deficit disorder.  Unclear how accurate these diagnoses are.  Will need to obtain home medication list and restart any mental health medications as indicated.     4.  Gastroesophageal reflux disease.  Continue famotidine.   5.  Homelessness.  The patient is currently living in a hotel.  Will ask Social Work to see the patient to see if the patient would benefit from any assistance.   6.  Prophylaxis:  Pneumo boots and ambulation.   7.  Abdominal pain, suspect related to diabetic ketoacidosis.  The patient has abdominal pain and vomiting similar to previous episodes of diabetic ketoacidosis.  Abdomen is soft and nondistended though tender.  Although he has leukocytosis, liver function tests are unremarkable.  At this time, will treat above issues.  We will order p.r.n. acetaminophen and p.r.n. IV Dilaudid, but I did caution the patient that given gastroparesis, a significant opioid usage could make this worse and could make his symptomatology worse.      CODE STATUS:  Full code.         ROBBIE LE MD             D: 2019   T: 2019   MT: VH      Name:     MARCEL VASQUEZ   MRN:      [id number]        Account:      [id number]   :      1990        Admitted:     2019                   Document:  [id number]       cc: Bogdan Jhaveri MD

## 2019-12-31 NOTE — PROGRESS NOTES
[facility]    Medicine Progress Note - Hospitalist Service       Date of Admission:  12/30/2019  Assessment & Plan   Mr. Reji Monahan a 29-year-old male patient with history including diabetes mellitus type 1, asthma, and depression/anxiety, who presents with hyperglycemia and found with clinical evidence of diabetic ketoacidosis.      1.  Diabetes mellitus type 1 with diabetic ketoacidosis.    Unclear of any specific trigger for this episode per patient he decreased his insulin intake for 10 prandial to 5 and presented with hyperglycemia with fatigue, urinary frequency, abdominal pain and vomiting felt to secondary and not the cause for the DKA.  On initial evaluation, he had a glucose level of 633.  Bicarbonate level of 9 and anion gap of 24.  Ketones were elevated at 4.8.  He was given IV fluids and tarted on an insulin drip and diabetic ketoacidosis protocol including IV fluids and insulin drip.  Monitor electrolytes and ketones and transition to subcutaneous insulin once able and when anion gap is closed and ketosis resolved   - Hope to be able to start subcut regimen if able to eat today  - clear liquid ordered    2. Hypotension overnight with tachycardia which triggered an RRT around 2 am. EKG suggestive of SVT. Vagal maneuvers, metoprolol and ativan ineffective. Got 1L of LR and converted back to sinus rhythm. Still not negative fluid balance.  - iv fluid bolus and 225 ml / h of iv fluid  - monitor  - cardiology consultation if recurrence     2.  Leukocytosis, suspect reactive secondary to above.  The patient is afebrile,  - improved from 25.7 to 16/1  - BC negative so far  - check PCT and CRP  - Monitor for any signs of infection  - Monitor CBC.     3.  Psychiatric:  Depression/anxiety, history noted of Asperger's, schizophrenia and attention-deficit disorder.  Unclear how accurate these diagnoses are.   - obtain home medication list and restart any mental health medications as indicated.       4.  Abdominal pain, suspect related to diabetic ketoacidosis.  The patient had abdominal pain and vomiting similar to previous episodes of diabetic ketoacidosis.  Abdomen iwas soft and nondistended though tender.    - no pain this am or nausea  -monitor and reassess as DKA resolving    5.  Gastroesophageal reflux disease.    - Continue famotidine.     6.  Homelessness.  The patient is currently living in a hotel.    - Will ask Social Work to see the patient to see if the patient would benefit from any assistance.     7.  Prophylaxis:  Pneumo boots and ambulation.         Diet: NPO for Medical/Clinical Reasons Except for: Meds, Other, Ice Chips; Specify: water and ice chips OK    DVT Prophylaxis: Pneumatic Compression Devices  Wright Catheter: not present  Code Status: Full Code      Disposition Plan   Expected discharge: 2 - 3 days, recommended to TBD once DKA resolved, leukocytosis and abdominal pain resolved and social issue adressed.  Entered: Ney Olmstead MD 12/31/2019, 7:39 AM       The patient's care was discussed with the Bedside Nurse.    Ney Olmstead MD  Hospitalist Service  [facility]    ______________________________________________________________________    Interval History   No nausea or vomiting, abdominal pain resolving. Hypotensive but denies dizziness. Patient admits that he has decreased his insulin intake and that is likely the cause of his problem.  consulted.     Data reviewed today: I reviewed all medications, new labs and imaging results over the last 24 hours. I personally reviewed no images or EKG's today.    Physical Exam   Vital Signs: Temp: 98.2  F (36.8  C) Temp src: Oral BP: (!) 89/55 Pulse: 101 Heart Rate: 104 Resp: 12 SpO2: 98 % O2 Device: None (Room air)    Weight: 179 lbs .22 oz     Intake/Output Summary (Last 24 hours) at 12/31/2019 0803  Last data filed at 12/30/2019 1945  Gross per 24 hour   Intake 60 ml   Output 700 ml    Net -640 ml     Exam:  Constitutional: alert, no distress and cooperative  Head  normocephalic   Neck: Neck supple.  No JVD  ENT: Pupils symmetrical, sclera anicteric,  Cardiac: RRR. No murmurs, clicks gallops or rub,   Respiratory: Breathing comfortably. Lungs clear  Gastrointestinal: Abdomen soft, non-tender. BS normal.  : No Wright  Musculoskeletal: extremities normal- no gross deformities noted. No clubbing  Skin: no rashes, extremities edema: none.   Neurologic:oriented and appropriate, grossly non focal      Data   Recent Labs   Lab 12/31/19  0457 12/31/19  0201 12/30/19  1815 12/30/19  1402   WBC 16.1* 18.1* 25.7* 24.9*   HGB 12.0* 13.3 15.0 15.5   MCV 79 80 82 84    223 263 320    135 137 134   POTASSIUM 4.5 4.4 4.6 5.0   CHLORIDE 109 110* 107 101   CO2 18* 17* 9* 9*   BUN 23 27 25 25   CR 0.50* 0.52* 0.61* 0.62*   ANIONGAP 8 8 21* 24*   LYNN 8.4* 8.5 9.3 10.2*   * 197* 479* 633*   ALBUMIN  --   --  4.4 4.7   PROTTOTAL  --   --  7.8 8.2   BILITOTAL  --   --  0.5 0.8   ALKPHOS  --   --  127 131   ALT  --   --  25 24   AST  --   --  12 25   LIPASE  --   --   --  48*     No results found for this or any previous visit (from the past 24 hour(s)).  Medications     dextrose 5% and 0.45% NaCl + KCl 20 mEq/L       dextrose 5% and 0.9% NaCl       insulin (regular) 5 Units/hr (12/31/19 0935)       sodium chloride (PF)  3 mL Intracatheter Q8H

## 2019-12-31 NOTE — PROVIDER NOTIFICATION
Dr. Olmstead paged: Patient willing to try regular diet again. /58. On gtt alg 2, 3 units/hr for 1300 BG of 163.     1326: Spoke with Dr. Olmstead. Will continue gtt until patient is tolerating clear liquids without any nausea/vomiting. Will re-page when this occurs and follow further orders.     Re-paged @[phone]: Patient would like to eat regular diet. No nausea since lunch time.     Re-paged: Still wondering about diet. Patient needing more insulin for jello and juice intake, so having to go up algorithms per protocol.

## 2019-12-31 NOTE — PLAN OF CARE
Arrived to floor at 1745. A&Ox4. VS tachy in 100-140s at times, EKG ordered. BP WNL, on room air, RR elevated 20-26. NPO ex chips. Arrived on insulin gtt. NS bolus given x1. Endorses abdominal pain, IV dilaudid given with good relief. Zofran given, emesis x1 of 200cc. Scabbing noted on face and L arm. Tele .

## 2019-12-31 NOTE — PLAN OF CARE
Vital signs stable on room air except soft b/ps (received a bolus, MD is aware) RRT called at 0100 for heart rate in 180's (please see RRT note for update). Alert and oriented. Lung sounds diminished. Bowel sounds active, flatus present. Pain controlled with dilaudid x1. Up assist of one. Tolerating ice chips. CMS/neuros intact. Tele sinus tach post RRT. Insulin gtt continued with sugars in mid 100's.

## 2019-12-31 NOTE — CODE/RAPID RESPONSE
[facility]    RRT Note  12/31/2019   Time Called: 2:00am    RRT called for: Tachycardia     Assessment & Plan   Reji Monahan is a 30 y/o M with history of DM1, recurrent episodes of DKA, hx SVT, asthma, depression, and anxiety admitted with DKA. RRT was called early morning of 12/31 due to tachycardia with HR 170s. Per bedside RN, patient had mild sinus tachycardia with HR 100s and abruptly jumped to 170s while sleeping. Patient felt anxious but otherwise asymptomatic. Initially normotensive, BP 90s/60s (within recent baseline). EKG consistent with SVT. Patient has a known history of intermittent SVT; this acute event likely triggered by DKA .     INTERVENTIONS:  - Vagal maneuvers attempted, ineffective  -  IV metoprolol 5 mg x1 dose ineffective  - Labs: CBC, BMP, glucose. Potassium 4.4 Magnesium 1.9, 1g ordered to replete to goal Mg>2  -  IV ativan 1mg administered for anxiety, helpful in alleviating symptoms  - 1L LR fluid bolus given in addition to DKA protocol fluids     Following the above interventions, patient became briefly hypotensive (BP 78/58) although MAP remained >65 and patient was asymptomatic. Following IVF repletion with 1L LR, patient self-converted back into NSR with HR 96 and /75. EKG obtained (currently unavailable for review in Epic).     Discussed with flying squad RN. Will continue to closely monitor patient overnight.     Vannesa Arellano MD  House officer, moonlighter   Pager: [phone]    ========================================    Interval History     Reji Monahan is a 30 y/o M with history of DM1, recurrent episodes of DKA, hx SVT, asthma, depression, and anxiety admitted with DKA. RRT was called early morning of 12/31 due to tachycardia with HR 170s.     Code Status:*Full Code    Allergies   No Known Allergies    Physical Exam   Vital Signs with Ranges:  Temp:  [97.7  F (36.5  C)-99  F (37.2  C)] 99  F (37.2  C)  Pulse:  [100-145] 100  Heart Rate:   [] 96  Resp:  [10-33] 11  BP: ()/(50-89) 105/75  SpO2:  [96 %-100 %] 100 %     General: awake, alert, appears anxious   HEENT: normocephalic, anicteric sclera, mucous membranes moist  CV: tachycardic, normal S1/S2, no murmurs appreciated  Pulm: clear to auscultation bilaterally, no wheezes, comfortable on room air  Ext: warm and well perfused, no pitting LE edema  Skin: no rashes on exposed skin  Neuro: alert and oriented x3, non-focal     Data     EKG:  Interpreted by Vannesa Arellano MD  Time reviewed: 2: 23am  Symptoms at time of EKG: anxiety   Rhythm: SVT  Rate: Tachycardia  Axis: Normal  Ectopy: none  Conduction: normal  ST Segments/ T Waves: No acute ischemic changes  Q Waves: none  Comparison to prior: No old EKG available    Clinical Impression: supraventricular tachycardia    CBC with Diff:  Recent Labs   Lab Test 12/31/19  0201   WBC 18.1*   HGB 13.3   MCV 80         Comprehensive Metabolic Panel:  Recent Labs   Lab 12/31/19  0201 12/30/19  1815    137   POTASSIUM 4.4 4.6   CHLORIDE 110* 107   CO2 17* 9*   ANIONGAP 8 21*   * 479*   BUN 27 25   CR 0.52* 0.61*   GFRESTIMATED >90 >90   GFRESTBLACK >90 >90   LYNN 8.5 9.3   MAG 1.9 2.4*   PHOS  --  4.8*   PROTTOTAL  --  7.8   ALBUMIN  --  4.4   BILITOTAL  --  0.5   ALKPHOS  --  127   AST  --  12   ALT  --  25       Time Spent on this Encounter   I spent >30 minutes on the unit/floor managing the care of Reji Monahan. Over 50% of my time was spent counseling the patient and/or coordinating care regarding services listed in this note.

## 2020-01-01 LAB
ANION GAP SERPL CALCULATED.3IONS-SCNC: 4 MMOL/L (ref 3–14)
ANION GAP SERPL CALCULATED.3IONS-SCNC: 4 MMOL/L (ref 3–14)
ANION GAP SERPL CALCULATED.3IONS-SCNC: 5 MMOL/L (ref 3–14)
ANION GAP SERPL CALCULATED.3IONS-SCNC: 5 MMOL/L (ref 3–14)
ANION GAP SERPL CALCULATED.3IONS-SCNC: 9 MMOL/L (ref 3–14)
BASOPHILS # BLD AUTO: 0 10E9/L (ref 0–0.2)
BASOPHILS NFR BLD AUTO: 0.1 %
BUN SERPL-MCNC: 10 MG/DL (ref 7–30)
BUN SERPL-MCNC: 11 MG/DL (ref 7–30)
BUN SERPL-MCNC: 14 MG/DL (ref 7–30)
BUN SERPL-MCNC: 8 MG/DL (ref 7–30)
BUN SERPL-MCNC: 8 MG/DL (ref 7–30)
CALCIUM SERPL-MCNC: 8.1 MG/DL (ref 8.5–10.1)
CALCIUM SERPL-MCNC: 8.2 MG/DL (ref 8.5–10.1)
CALCIUM SERPL-MCNC: 8.3 MG/DL (ref 8.5–10.1)
CHLORIDE SERPL-SCNC: 107 MMOL/L (ref 94–109)
CHLORIDE SERPL-SCNC: 108 MMOL/L (ref 94–109)
CHLORIDE SERPL-SCNC: 108 MMOL/L (ref 94–109)
CHLORIDE SERPL-SCNC: 110 MMOL/L (ref 94–109)
CHLORIDE SERPL-SCNC: 111 MMOL/L (ref 94–109)
CO2 SERPL-SCNC: 20 MMOL/L (ref 20–32)
CO2 SERPL-SCNC: 23 MMOL/L (ref 20–32)
CO2 SERPL-SCNC: 24 MMOL/L (ref 20–32)
CREAT SERPL-MCNC: 0.39 MG/DL (ref 0.66–1.25)
CREAT SERPL-MCNC: 0.39 MG/DL (ref 0.66–1.25)
CREAT SERPL-MCNC: 0.4 MG/DL (ref 0.66–1.25)
CREAT SERPL-MCNC: 0.44 MG/DL (ref 0.66–1.25)
CREAT SERPL-MCNC: 0.45 MG/DL (ref 0.66–1.25)
CRP SERPL-MCNC: 9.2 MG/L (ref 0–8)
DIFFERENTIAL METHOD BLD: ABNORMAL
EOSINOPHIL # BLD AUTO: 0.1 10E9/L (ref 0–0.7)
EOSINOPHIL NFR BLD AUTO: 1.3 %
ERYTHROCYTE [DISTWIDTH] IN BLOOD BY AUTOMATED COUNT: 14.3 % (ref 10–15)
GFR SERPL CREATININE-BSD FRML MDRD: >90 ML/MIN/{1.73_M2}
GLUCOSE BLDC GLUCOMTR-MCNC: 118 MG/DL (ref 70–99)
GLUCOSE BLDC GLUCOMTR-MCNC: 131 MG/DL (ref 70–99)
GLUCOSE BLDC GLUCOMTR-MCNC: 140 MG/DL (ref 70–99)
GLUCOSE BLDC GLUCOMTR-MCNC: 150 MG/DL (ref 70–99)
GLUCOSE BLDC GLUCOMTR-MCNC: 153 MG/DL (ref 70–99)
GLUCOSE BLDC GLUCOMTR-MCNC: 156 MG/DL (ref 70–99)
GLUCOSE BLDC GLUCOMTR-MCNC: 172 MG/DL (ref 70–99)
GLUCOSE BLDC GLUCOMTR-MCNC: 197 MG/DL (ref 70–99)
GLUCOSE BLDC GLUCOMTR-MCNC: 197 MG/DL (ref 70–99)
GLUCOSE BLDC GLUCOMTR-MCNC: 202 MG/DL (ref 70–99)
GLUCOSE BLDC GLUCOMTR-MCNC: 209 MG/DL (ref 70–99)
GLUCOSE BLDC GLUCOMTR-MCNC: 222 MG/DL (ref 70–99)
GLUCOSE BLDC GLUCOMTR-MCNC: 249 MG/DL (ref 70–99)
GLUCOSE BLDC GLUCOMTR-MCNC: 271 MG/DL (ref 70–99)
GLUCOSE SERPL-MCNC: 148 MG/DL (ref 70–99)
GLUCOSE SERPL-MCNC: 175 MG/DL (ref 70–99)
GLUCOSE SERPL-MCNC: 243 MG/DL (ref 70–99)
GLUCOSE SERPL-MCNC: 268 MG/DL (ref 70–99)
GLUCOSE SERPL-MCNC: 284 MG/DL (ref 70–99)
HCT VFR BLD AUTO: 36.8 % (ref 40–53)
HGB BLD-MCNC: 12 G/DL (ref 13.3–17.7)
IMM GRANULOCYTES # BLD: 0 10E9/L (ref 0–0.4)
IMM GRANULOCYTES NFR BLD: 0.1 %
KETONES BLD-SCNC: 0.3 MMOL/L (ref 0–0.6)
KETONES BLD-SCNC: 0.6 MMOL/L (ref 0–0.6)
KETONES BLD-SCNC: NORMAL MMOL/L (ref 0–0.6)
LYMPHOCYTES # BLD AUTO: 1.6 10E9/L (ref 0.8–5.3)
LYMPHOCYTES NFR BLD AUTO: 23.4 %
MAGNESIUM SERPL-MCNC: 2.1 MG/DL (ref 1.6–2.3)
MCH RBC QN AUTO: 25.9 PG (ref 26.5–33)
MCHC RBC AUTO-ENTMCNC: 32.6 G/DL (ref 31.5–36.5)
MCV RBC AUTO: 80 FL (ref 78–100)
MONOCYTES # BLD AUTO: 0.4 10E9/L (ref 0–1.3)
MONOCYTES NFR BLD AUTO: 5.9 %
NEUTROPHILS # BLD AUTO: 4.8 10E9/L (ref 1.6–8.3)
NEUTROPHILS NFR BLD AUTO: 69.2 %
NRBC # BLD AUTO: 0 10*3/UL
NRBC BLD AUTO-RTO: 0 /100
PHOSPHATE SERPL-MCNC: 2.2 MG/DL (ref 2.5–4.5)
PLATELET # BLD AUTO: 143 10E9/L (ref 150–450)
POTASSIUM SERPL-SCNC: 3.7 MMOL/L (ref 3.4–5.3)
POTASSIUM SERPL-SCNC: 3.8 MMOL/L (ref 3.4–5.3)
POTASSIUM SERPL-SCNC: 3.9 MMOL/L (ref 3.4–5.3)
POTASSIUM SERPL-SCNC: 4 MMOL/L (ref 3.4–5.3)
POTASSIUM SERPL-SCNC: 4.1 MMOL/L (ref 3.4–5.3)
PROCALCITONIN SERPL-MCNC: 1.04 NG/ML
PROCALCITONIN SERPL-MCNC: 1.11 NG/ML
RBC # BLD AUTO: 4.63 10E12/L (ref 4.4–5.9)
SODIUM SERPL-SCNC: 135 MMOL/L (ref 133–144)
SODIUM SERPL-SCNC: 136 MMOL/L (ref 133–144)
SODIUM SERPL-SCNC: 137 MMOL/L (ref 133–144)
SODIUM SERPL-SCNC: 138 MMOL/L (ref 133–144)
SODIUM SERPL-SCNC: 138 MMOL/L (ref 133–144)
WBC # BLD AUTO: 6.9 10E9/L (ref 4–11)

## 2020-01-01 PROCEDURE — 25000132 ZZH RX MED GY IP 250 OP 250 PS 637: Mod: GY | Performed by: INTERNAL MEDICINE

## 2020-01-01 PROCEDURE — 83735 ASSAY OF MAGNESIUM: CPT | Performed by: INTERNAL MEDICINE

## 2020-01-01 PROCEDURE — 84145 PROCALCITONIN (PCT): CPT | Performed by: INTERNAL MEDICINE

## 2020-01-01 PROCEDURE — 12000000 ZZH R&B MED SURG/OB

## 2020-01-01 PROCEDURE — 99233 SBSQ HOSP IP/OBS HIGH 50: CPT | Performed by: INTERNAL MEDICINE

## 2020-01-01 PROCEDURE — 80048 BASIC METABOLIC PNL TOTAL CA: CPT | Performed by: INTERNAL MEDICINE

## 2020-01-01 PROCEDURE — 25000128 H RX IP 250 OP 636: Performed by: INTERNAL MEDICINE

## 2020-01-01 PROCEDURE — 00000146 ZZHCL STATISTIC GLUCOSE BY METER IP

## 2020-01-01 PROCEDURE — 25000131 ZZH RX MED GY IP 250 OP 636 PS 637: Mod: GY | Performed by: INTERNAL MEDICINE

## 2020-01-01 PROCEDURE — 36415 COLL VENOUS BLD VENIPUNCTURE: CPT | Performed by: INTERNAL MEDICINE

## 2020-01-01 PROCEDURE — 25800030 ZZH RX IP 258 OP 636: Performed by: INTERNAL MEDICINE

## 2020-01-01 PROCEDURE — 86140 C-REACTIVE PROTEIN: CPT | Performed by: INTERNAL MEDICINE

## 2020-01-01 PROCEDURE — 85025 COMPLETE CBC W/AUTO DIFF WBC: CPT | Performed by: INTERNAL MEDICINE

## 2020-01-01 PROCEDURE — 84100 ASSAY OF PHOSPHORUS: CPT | Performed by: INTERNAL MEDICINE

## 2020-01-01 PROCEDURE — 82010 KETONE BODYS QUAN: CPT | Performed by: INTERNAL MEDICINE

## 2020-01-01 PROCEDURE — 99222 1ST HOSP IP/OBS MODERATE 55: CPT | Performed by: INTERNAL MEDICINE

## 2020-01-01 RX ORDER — OLANZAPINE 10 MG/1
10 TABLET ORAL AT BEDTIME
Status: DISCONTINUED | OUTPATIENT
Start: 2020-01-01 | End: 2020-01-02 | Stop reason: HOSPADM

## 2020-01-01 RX ADMIN — ONDANSETRON 4 MG: 4 TABLET, ORALLY DISINTEGRATING ORAL at 07:40

## 2020-01-01 RX ADMIN — ONDANSETRON 4 MG: 4 TABLET, ORALLY DISINTEGRATING ORAL at 15:57

## 2020-01-01 RX ADMIN — HYDROMORPHONE HYDROCHLORIDE 0.5 MG: 1 INJECTION, SOLUTION INTRAMUSCULAR; INTRAVENOUS; SUBCUTANEOUS at 07:40

## 2020-01-01 RX ADMIN — HYDROMORPHONE HYDROCHLORIDE 0.5 MG: 1 INJECTION, SOLUTION INTRAMUSCULAR; INTRAVENOUS; SUBCUTANEOUS at 17:01

## 2020-01-01 RX ADMIN — PROCHLORPERAZINE MALEATE 10 MG: 10 TABLET ORAL at 11:40

## 2020-01-01 RX ADMIN — INSULIN GLARGINE 38 UNITS: 100 INJECTION, SOLUTION SUBCUTANEOUS at 14:34

## 2020-01-01 RX ADMIN — OLANZAPINE 10 MG: 10 TABLET, FILM COATED ORAL at 21:17

## 2020-01-01 RX ADMIN — POTASSIUM CHLORIDE, DEXTROSE MONOHYDRATE AND SODIUM CHLORIDE: 150; 5; 450 INJECTION, SOLUTION INTRAVENOUS at 00:08

## 2020-01-01 RX ADMIN — HYDROMORPHONE HYDROCHLORIDE 0.5 MG: 1 INJECTION, SOLUTION INTRAMUSCULAR; INTRAVENOUS; SUBCUTANEOUS at 21:17

## 2020-01-01 RX ADMIN — INSULIN ASPART 2 UNITS: 100 INJECTION, SOLUTION INTRAVENOUS; SUBCUTANEOUS at 17:00

## 2020-01-01 RX ADMIN — INSULIN ASPART 2 UNITS: 100 INJECTION, SOLUTION INTRAVENOUS; SUBCUTANEOUS at 11:43

## 2020-01-01 RX ADMIN — PROCHLORPERAZINE MALEATE 10 MG: 10 TABLET ORAL at 20:36

## 2020-01-01 RX ADMIN — POTASSIUM CHLORIDE, DEXTROSE MONOHYDRATE AND SODIUM CHLORIDE: 150; 5; 450 INJECTION, SOLUTION INTRAVENOUS at 14:23

## 2020-01-01 RX ADMIN — HYDROMORPHONE HYDROCHLORIDE 0.5 MG: 1 INJECTION, SOLUTION INTRAMUSCULAR; INTRAVENOUS; SUBCUTANEOUS at 02:00

## 2020-01-01 RX ADMIN — HYDROMORPHONE HYDROCHLORIDE 0.5 MG: 1 INJECTION, SOLUTION INTRAMUSCULAR; INTRAVENOUS; SUBCUTANEOUS at 12:34

## 2020-01-01 RX ADMIN — POTASSIUM CHLORIDE, DEXTROSE MONOHYDRATE AND SODIUM CHLORIDE: 150; 5; 450 INJECTION, SOLUTION INTRAVENOUS at 21:03

## 2020-01-01 ASSESSMENT — ACTIVITIES OF DAILY LIVING (ADL)
ADLS_ACUITY_SCORE: 12

## 2020-01-01 NOTE — PLAN OF CARE
A&O x4, anxious. VSS ex tachypneic and tachycardic at times on room air. Soft BPs at times. Tele NSR. CMS intact. Neuros intact Lungs clear. BS+, BM-, flatus+. Skin intact. Tolerating clear liquid, advanced to mod CHO. Zofran x1, nauseated w/movement, improving. BGs q1 hr. Insulin drip of at 173 for BGs less than 100. Voiding adequately. Denies pain. Up w/SBA. Bolus x1 per provider orders and 200 ml/hr of maintenance fluids.     Lantus to be given when wanting to try new diet/ no N/V. Then off gtt 2 hrs after.

## 2020-01-01 NOTE — PROGRESS NOTES
[facility]    Addendum: patient had order to start lantus 2 hour prior to discontinuing the iv insulin drip. Received by mistake short acting and not receive the lantus as discussed and prior to discontinuing the lantus. Has now received it and hopefully will not go into DKA    Medicine Progress Note - Hospitalist Service       Date of Admission:  12/30/2019  Assessment & Plan   Mr. Reji Monahan a 29-year-old male patient with history including diabetes mellitus type 1, asthma, and depression/anxiety, who presents with hyperglycemia and found with clinical evidence of diabetic ketoacidosis.      1.  Diabetes mellitus type 1 with diabetic ketoacidosis.    Unclear of any specific trigger for this episode per patient he decreased his insulin intake for 10 prandial to 5 and presented with hyperglycemia with fatigue, urinary frequency, abdominal pain and vomiting felt to secondary and not the cause for the DKA.  On initial evaluation, he had a glucose level of 633.  Bicarbonate level of 9 and anion gap of 24.  Ketones were elevated at 4.8.  He was given IV fluids and tarted on an insulin drip and diabetic ketoacidosis protocol including IV fluids and insulin drip.  We monitored hs electrolytes and ketones and were planning to transitioned to subcutaneous insulin as anion gap closed and patient was starting to eat. Was unable to eat much, ketosis and glucose chelsy and patient had to remain on the drip. Still some abdominal pain (ketose related?) and nausea  - Hope to be able to start subcut regimen if able to eat today  - space blood glucose q2h  - watch for infection as possible trigger as PCT slightly elevated, WBC has normalized and no fever off AB.     2. Hypotension overnight 12-30 to 31 with tachycardia which triggered an RRT around 2 am. EKG suggestive of SVT. Vagal maneuvers, metoprolol and ativan ineffective. Got 1L of LR and converted back to sinus rhythm. No recurrence since. Patient report 2  recent prior episode of SVT and would be interested to discuss with cardiology possible treatment to prevent (ablation)  - iv fluid iv fluid  - monitor  - cardiology consultation  - some of the low BP reading appear to be related to position and lying on his BP cuff  - have ask nurse to check and record BP only after verifying that accurate/     2.  Leukocytosis, suspect reactive secondary to above.  The patient is afebrile, WBC normalized to 6.9 but procal slightly elevated  - improved from 25.7 to 6.9 this am  - BC negative so far  - recheck PCT and CRP  - Monitor for any signs of infection     3.  Psychiatric:  Depression/anxiety, history noted of Asperger's, schizophrenia and attention-deficit disorder.  Unclear how accurate these diagnoses are.   - obtain home medication list and restart any mental health medications as indicated. Not available yet this am     4.  Abdominal pain, suspect related to diabetic ketoacidosis.  The patient had abdominal pain and vomiting similar to previous episodes of diabetic ketoacidosis.  Abdomen iwas soft and nondistended though tender.    - Mild pain and nausea.  - and reassess as DKA resolving  - lipase and liver E were WNL. No peritoneal sign. Consider US and/or CT if persist or unable to advance diet    5. Gastroesophageal reflux disease.    - Continue famotidine.     6.  Homelessness.  The patient is currently living in a hotel.    - I have asked Social Work to see the patient to see if the patient would benefit from any assistance.     7.  Prophylaxis:  Pneumo boots and ambulation.         Diet: Moderate Consistent CHO Diet  When able to eat  DVT Prophylaxis: Pneumatic Compression Devices  Wright Catheter: not present  Code Status: Full Code      Disposition Plan   Expected discharge: 2 - 3 days, recommended to TBD once DKA resolved, leukocytosis and abdominal pain resolved and social issue adressed.  Entered: Ney Olmstead MD 01/01/2020, 9:01 AM       The  patient's care was discussed with the Bedside Nurse.    Ney Olmstead MD  Hospitalist Service  [facility]    ______________________________________________________________________    Interval History   Mild  nausea and abdominal pain has not been able to advance diet and thus to transition to subcut insulin until now. No fever or symptom pointing to an infection. Pain per patient is typical for his DKA.     Data reviewed today: I reviewed all medications, new labs and imaging results over the last 24 hours. I personally reviewed no images or EKG's today.    Physical Exam   Vital Signs: Temp: 98.8  F (37.1  C) Temp src: Oral BP: (!) 141/91 Pulse: 65 Heart Rate: 73 Resp: 20 SpO2: 98 % O2 Device: None (Room air)    Weight: 179 lbs .22 oz     Intake/Output Summary (Last 24 hours) at 12/31/2019 0803  Last data filed at 12/30/2019 1945  Gross per 24 hour   Intake 60 ml   Output 700 ml   Net -640 ml     Exam:  Constitutional: alert, no distress and cooperative  Head  normocephalic   Neck: Neck supple.  No JVD  ENT: Pupils symmetrical, sclera anicteric,  Cardiac: RRR. No murmurs, clicks gallops or rub,   Respiratory: Breathing comfortably. Lungs clear  Gastrointestinal: Abdomen soft, non-tender except RLL.No guarding or rebound tenderness. BS normal.  : No Wright  Musculoskeletal: extremities normal- no gross deformities noted. No clubbing  Skin: no rashes, extremities edema: none.   Neurologic:oriented and appropriate, grossly non focal      Data   Recent Labs   Lab 01/01/20  0553 01/01/20  0012 12/31/19 2016 12/31/19  1613  12/31/19  0457 12/31/19  0201 12/30/19  1815 12/30/19  1402   WBC 6.9  --   --   --   --  16.1* 18.1* 25.7* 24.9*   HGB 12.0*  --   --   --   --  12.0* 13.3 15.0 15.5   MCV 80  --   --   --   --  79 80 82 84   *  --   --   --   --  213 223 263 320   NA  --  138 139 139   < > 135 135 137 134   POTASSIUM  --  3.8 3.9 3.7   < > 4.5 4.4 4.6 5.0   CHLORIDE  --  110* 111*  111*   < > 109 110* 107 101   CO2  --  24 20 23   < > 18* 17* 9* 9*   BUN  --  14 15 16   < > 23 27 25 25   CR  --  0.45* 0.45* 0.46*   < > 0.50* 0.52* 0.61* 0.62*   ANIONGAP  --  4 8 5   < > 8 8 21* 24*   LYNN  --  8.1* 7.9* 8.0*   < > 8.4* 8.5 9.3 10.2*   GLC  --  175* 211* 223*   < > 174* 197* 479* 633*   ALBUMIN  --   --   --   --   --   --   --  4.4 4.7   PROTTOTAL  --   --   --   --   --   --   --  7.8 8.2   BILITOTAL  --   --   --   --   --   --   --  0.5 0.8   ALKPHOS  --   --   --   --   --   --   --  127 131   ALT  --   --   --   --   --   --   --  25 24   AST  --   --   --   --   --   --   --  12 25   LIPASE  --   --   --   --   --   --   --   --  48*    < > = values in this interval not displayed.     No results found for this or any previous visit (from the past 24 hour(s)).  Medications     dextrose 5% and 0.45% NaCl + KCl 20 mEq/L 150 mL/hr at 01/01/20 0008     insulin (regular) 2 mL/hr at 01/01/20 0740     sodium chloride 150 mL/hr at 12/31/19 1734       insulin aspart   Subcutaneous Daily with supper     insulin aspart   Subcutaneous QAM AC     insulin aspart   Subcutaneous Daily with lunch     insulin aspart  1-7 Units Subcutaneous TID AC     insulin aspart  1-5 Units Subcutaneous At Bedtime     insulin glargine  38 Units Subcutaneous At Bedtime     sodium chloride (PF)  3 mL Intracatheter Q8H

## 2020-01-01 NOTE — CONSULTS
Electrophysiology/ Cardiology- Consult Note         H&P and Plan:     Reason for consult: SVT.      History of present illness: 29-year-old gentleman with a history of asthma, diabetes, depression/anxiety, was admitted with DKA and hospital stay was complicated by episodes of paroxysmal SVT.  Cardiology was consulted to help with management.      Patient has had several admissions for DKA in the last 6 months (a total of 9 episodes of DKA, last admission for DKA on 12/18/2019).  He was readmitted with DKA 12/30/2019 with complaints of fatigue, abdominal pain and vomiting, and was found to be in DKA.  During hospital stay, he had an episode of SVT which self terminated.    At the moment, he is doing well.  He denies any symptoms of chest pain, shortness of breath, lightheadedness, near-syncope or syncope.       EKGs were reviewed.  Most EKG shows sinus tachycardia.  However 1 EKG done 12/31 suggestive of SVT (short RP tachycardia; heart rate around 150-160 bpm).    Of note, patient informs that he has had 2 episodes of SVT in the past.  One episode required cardioversion in the ED, and in the other episode he affirms that tachycardia terminated with his special education.  However he does not know the name of medication.    EKG at baseline does not show any signs of presentation.      Plan:  1.  Paroxysmal SVT.  I had a discussion with patient regarding management of SVT.  Options are doing nothing, start pharmacotherapy or pursue a cath ablation procedure.  He does not seem to be interested in ablation at this time.  However, he would consider pharmacotherapy.  I recommend the following:    -Start diltiazem 120 mg daily.  -Follow-up in EP clinic in a month with an echocardiogram.    We will sign off.  Please call with questions.    Ravi Schroeder MD    Physical Exam:  Vitals: /63   Pulse 68   Temp 97.5  F (36.4  C) (Oral)   Resp 16   Wt 81.2 kg (179 lb 0.2 oz)   SpO2 96%       Intake/Output Summary (Last  24 hours) at 1/1/2020 1130  Last data filed at 1/1/2020 1050  Gross per 24 hour   Intake 4589.62 ml   Output 2625 ml   Net 1964.62 ml     Vitals:    12/31/19 0700   Weight: 81.2 kg (179 lb 0.2 oz)       Constitutional:  AAO x3.  Pt is in NAD.  HEAD: Normocephalic.  SKIN: Skin normal color, texture and turgor with no lesions or eruptions.  Eyes: PERRL, EOMI.  ENT:  Supple, normal JVP. No lymphadenopathy or thyroid enlargement.  Chest:  CTAB.  Cardiac:  RRR, normal  S1 and S2.  No murmurs rubs or gallop.   Abdomen:  Normal BS.  Soft, non-tender and non-distended.  No rebound or guarding.    Extremities:  Pedious pulses palpable B/L.  No LE edema noticed.   Neurological: Strength and sensation grossly symmetric and intact throughout.         Review of Systems:  Complete review of system is otherwise negative with the exception of what was described above.     CURRENT MEDICATIONS:    insulin aspart   Subcutaneous Daily with supper     insulin aspart   Subcutaneous QAM AC     insulin aspart   Subcutaneous Daily with lunch     insulin aspart  1-7 Units Subcutaneous TID AC     insulin aspart  1-5 Units Subcutaneous At Bedtime     insulin glargine  38 Units Subcutaneous At Bedtime     OLANZapine  10 mg Oral At Bedtime     sodium chloride (PF)  3 mL Intracatheter Q8H     PRN Meds: acetaminophen, glucose **OR** dextrose **OR** glucagon, HYDROmorphone, insulin aspart, lidocaine 4%, lidocaine (buffered or not buffered), melatonin, naloxone, nitroGLYcerin, ondansetron **OR** ondansetron, polyethylene glycol, prochlorperazine **OR** prochlorperazine **OR** prochlorperazine, sodium chloride (PF)    ALLERGIES   No Known Allergies    PAST MEDICAL HISTORY:  Past Medical History:   Diagnosis Date     Diabetes mellitus (H)      Schizophrenia (H)        PAST SURGICAL HISTORY:  No past surgical history on file.    FAMILY HISTORY:  No family history on file.    SOCIAL HISTORY:  Social History     Socioeconomic History     Marital status:  Single     Spouse name: Not on file     Number of children: Not on file     Years of education: Not on file     Highest education level: Not on file   Occupational History     Not on file   Social Needs     Financial resource strain: Not on file     Food insecurity:     Worry: Not on file     Inability: Not on file     Transportation needs:     Medical: Not on file     Non-medical: Not on file   Tobacco Use     Smoking status: Never Smoker   Substance and Sexual Activity     Alcohol use: No     Drug use: No     Sexual activity: Never   Lifestyle     Physical activity:     Days per week: Not on file     Minutes per session: Not on file     Stress: Not on file   Relationships     Social connections:     Talks on phone: Not on file     Gets together: Not on file     Attends Manda[employer] service: Not on file     Active member of club or organization: Not on file     Attends meetings of clubs or organizations: Not on file     Relationship status: Not on file     Intimate partner violence:     Fear of current or ex partner: Not on file     Emotionally abused: Not on file     Physically abused: Not on file     Forced sexual activity: Not on file   Other Topics Concern     Not on file   Social History Narrative     Not on file         Recent Lab Results:  Recent Labs   Lab 01/01/20  0553 01/01/20  0012 12/31/19 2016 12/31/19  1613  12/31/19  0457 12/31/19  0201 12/30/19  1815 12/30/19  1402   WBC 6.9  --   --   --   --  16.1* 18.1* 25.7* 24.9*   HGB 12.0*  --   --   --   --  12.0* 13.3 15.0 15.5   MCV 80  --   --   --   --  79 80 82 84   *  --   --   --   --  213 223 263 320   NA  --  138 139 139   < > 135 135 137 134   POTASSIUM  --  3.8 3.9 3.7   < > 4.5 4.4 4.6 5.0   CHLORIDE  --  110* 111* 111*   < > 109 110* 107 101   CO2  --  24 20 23   < > 18* 17* 9* 9*   BUN  --  14 15 16   < > 23 27 25 25   CR  --  0.45* 0.45* 0.46*   < > 0.50* 0.52* 0.61* 0.62*   ANIONGAP  --  4 8 5   < > 8 8 21* 24*   LYNN  --  8.1* 7.9* 8.0*    < > 8.4* 8.5 9.3 10.2*   GLC  --  175* 211* 223*   < > 174* 197* 479* 633*   ALBUMIN  --   --   --   --   --   --   --  4.4 4.7   PROTTOTAL  --   --   --   --   --   --   --  7.8 8.2   BILITOTAL  --   --   --   --   --   --   --  0.5 0.8   ALKPHOS  --   --   --   --   --   --   --  127 131   ALT  --   --   --   --   --   --   --  25 24   AST  --   --   --   --   --   --   --  12 25   LIPASE  --   --   --   --   --   --   --   --  48*    < > = values in this interval not displayed.

## 2020-01-01 NOTE — PROGRESS NOTES
[facility]    Medicine Progress Note - Hospitalist Service       Date of Admission:  12/30/2019  Assessment & Plan   Mr. Reji Monahan a 29-year-old male patient with history including diabetes mellitus type 1, asthma, and depression/anxiety, who presents with hyperglycemia and found with clinical evidence of diabetic ketoacidosis.      1.  Diabetes mellitus type 1 with diabetic ketoacidosis.    Unclear of any specific trigger for this episode per patient he decreased his insulin intake for 10 prandial to 5 and presented with hyperglycemia with fatigue, urinary frequency, abdominal pain and vomiting felt to secondary and not the cause for the DKA.  On initial evaluation, he had a glucose level of 633.  Bicarbonate level of 9 and anion gap of 24.  Ketones were elevated at 4.8.  He was given IV fluids and tarted on an insulin drip and diabetic ketoacidosis protocol including IV fluids and insulin drip.  Monitor electrolytes and ketones and transition to subcutaneous insulin once able and when anion gap is closed and ketosis resolved   - Hope to be able to start subcut regimen if able to eat today  - clear liquid ordered    2. Hypotension overnight with tachycardia which triggered an RRT around 2 am. EKG suggestive of SVT. Vagal maneuvers, metoprolol and ativan ineffective. Got 1L of LR and converted back to sinus rhythm. Still not negative fluid balance.  - iv fluid bolus and 225 ml / h of iv fluid  - monitor  - cardiology consultation if recurrence     2.  Leukocytosis, suspect reactive secondary to above.  The patient is afebrile,  - improved from 25.7 to 16/1  - BC negative so far  - check PCT and CRP  - Monitor for any signs of infection  - Monitor CBC.     3.  Psychiatric:  Depression/anxiety, history noted of Asperger's, schizophrenia and attention-deficit disorder.  Unclear how accurate these diagnoses are.   - obtain home medication list and restart any mental health medications as indicated.       4.  Abdominal pain, suspect related to diabetic ketoacidosis.  The patient had abdominal pain and vomiting similar to previous episodes of diabetic ketoacidosis.  Abdomen iwas soft and nondistended though tender.    - no pain this am or nausea  -monitor and reassess as DKA resolving    5.  Gastroesophageal reflux disease.    - Continue famotidine.     6.  Homelessness.  The patient is currently living in a hotel.    - Will ask Social Work to see the patient to see if the patient would benefit from any assistance.     7.  Prophylaxis:  Pneumo boots and ambulation.         Diet: Moderate Consistent CHO Diet    DVT Prophylaxis: Pneumatic Compression Devices  Wright Catheter: not present  Code Status: Full Code      Disposition Plan   Expected discharge: 2 - 3 days, recommended to TBD once DKA resolved, leukocytosis and abdominal pain resolved and social issue adressed.  Entered: Ney Olmstead MD 01/01/2020, 8:16 AM       The patient's care was discussed with the Bedside Nurse.    Ney Olmstead MD  Hospitalist Service  [facility]    ______________________________________________________________________    Interval History   No nausea or vomiting, abdominal pain resolving. Hypotensive but denies dizziness. Patient admits that he has decreased his insulin intake and that is likely the cause of his problem.  consulted.     Data reviewed today: I reviewed all medications, new labs and imaging results over the last 24 hours. I personally reviewed no images or EKG's today.    Physical Exam   Vital Signs: Temp: 98.2  F (36.8  C) Temp src: Oral BP: (!) 141/91 Pulse: 65 Heart Rate: 73 Resp: 20 SpO2: 98 % O2 Device: None (Room air)    Weight: 179 lbs .22 oz     Intake/Output Summary (Last 24 hours) at 12/31/2019 0803  Last data filed at 12/30/2019 1945  Gross per 24 hour   Intake 60 ml   Output 700 ml   Net -640 ml     Exam:  Constitutional: alert, no distress and  cooperative  Head  normocephalic   Neck: Neck supple.  No JVD  ENT: Pupils symmetrical, sclera anicteric,  Cardiac: RRR. No murmurs, clicks gallops or rub,   Respiratory: Breathing comfortably. Lungs clear  Gastrointestinal: Abdomen soft, non-tender. BS normal.  : No Wright  Musculoskeletal: extremities normal- no gross deformities noted. No clubbing  Skin: no rashes, extremities edema: none.   Neurologic:oriented and appropriate, grossly non focal      Data   Recent Labs   Lab 01/01/20  0553 01/01/20  0012 12/31/19 2016 12/31/19  1613  12/31/19  0457 12/31/19  0201 12/30/19  1815 12/30/19  1402   WBC 6.9  --   --   --   --  16.1* 18.1* 25.7* 24.9*   HGB 12.0*  --   --   --   --  12.0* 13.3 15.0 15.5   MCV 80  --   --   --   --  79 80 82 84   *  --   --   --   --  213 223 263 320   NA  --  138 139 139   < > 135 135 137 134   POTASSIUM  --  3.8 3.9 3.7   < > 4.5 4.4 4.6 5.0   CHLORIDE  --  110* 111* 111*   < > 109 110* 107 101   CO2  --  24 20 23   < > 18* 17* 9* 9*   BUN  --  14 15 16   < > 23 27 25 25   CR  --  0.45* 0.45* 0.46*   < > 0.50* 0.52* 0.61* 0.62*   ANIONGAP  --  4 8 5   < > 8 8 21* 24*   LYNN  --  8.1* 7.9* 8.0*   < > 8.4* 8.5 9.3 10.2*   GLC  --  175* 211* 223*   < > 174* 197* 479* 633*   ALBUMIN  --   --   --   --   --   --   --  4.4 4.7   PROTTOTAL  --   --   --   --   --   --   --  7.8 8.2   BILITOTAL  --   --   --   --   --   --   --  0.5 0.8   ALKPHOS  --   --   --   --   --   --   --  127 131   ALT  --   --   --   --   --   --   --  25 24   AST  --   --   --   --   --   --   --  12 25   LIPASE  --   --   --   --   --   --   --   --  48*    < > = values in this interval not displayed.     No results found for this or any previous visit (from the past 24 hour(s)).  Medications     dextrose 5% and 0.45% NaCl + KCl 20 mEq/L 150 mL/hr at 01/01/20 0008     insulin (regular) 2 mL/hr at 01/01/20 0740     sodium chloride 150 mL/hr at 12/31/19 1734       insulin aspart   Subcutaneous Daily with  supper     insulin aspart   Subcutaneous QAM AC     insulin aspart   Subcutaneous Daily with lunch     insulin aspart  1-7 Units Subcutaneous TID AC     insulin aspart  1-5 Units Subcutaneous At Bedtime     insulin glargine  38 Units Subcutaneous At Bedtime     sodium chloride (PF)  3 mL Intracatheter Q8H

## 2020-01-01 NOTE — PLAN OF CARE
Vital signs stable on room air. Alert and oriented. Lung sounds diminished. Bowel sounds active, flatus present. Pain controlled with iv dilaudid and zofran/compazine for episode of N/V. Up assist of one. Tolerating small amounts of clears, will order breakfast and try regular diet this am. CMS/neuros intact. Tele sinus rhythm.

## 2020-01-01 NOTE — PROVIDER NOTIFICATION
Spoke with Dr. Davis about possible options to aid with the Pt's digestive problems and any additional consults that might be able to help. Pt complains of persistent N&V every morning and is unable to eat a healthy variety of nutritious foods. There is concern for severely poor nutrition and I passed that along to the MD as well.

## 2020-01-01 NOTE — PROGRESS NOTES
Dr. Kennedy paged regarding ketones of 4.3. Writer received orders to stop normal saline and increase d5 k20 to 150 ml/hr.

## 2020-01-02 ENCOUNTER — APPOINTMENT (OUTPATIENT)
Dept: CARDIOLOGY | Facility: CLINIC | Age: 30
DRG: 638 | End: 2020-01-02
Attending: INTERNAL MEDICINE
Payer: MEDICARE

## 2020-01-02 VITALS
RESPIRATION RATE: 16 BRPM | DIASTOLIC BLOOD PRESSURE: 72 MMHG | OXYGEN SATURATION: 99 % | SYSTOLIC BLOOD PRESSURE: 115 MMHG | HEART RATE: 71 BPM | TEMPERATURE: 98.4 F | WEIGHT: 174.16 LBS

## 2020-01-02 LAB
ANION GAP SERPL CALCULATED.3IONS-SCNC: 7 MMOL/L (ref 3–14)
BUN SERPL-MCNC: 5 MG/DL (ref 7–30)
CALCIUM SERPL-MCNC: 8.3 MG/DL (ref 8.5–10.1)
CHLORIDE SERPL-SCNC: 107 MMOL/L (ref 94–109)
CO2 SERPL-SCNC: 22 MMOL/L (ref 20–32)
CREAT SERPL-MCNC: 0.41 MG/DL (ref 0.66–1.25)
GFR SERPL CREATININE-BSD FRML MDRD: >90 ML/MIN/{1.73_M2}
GLUCOSE BLDC GLUCOMTR-MCNC: 203 MG/DL (ref 70–99)
GLUCOSE BLDC GLUCOMTR-MCNC: 205 MG/DL (ref 70–99)
GLUCOSE BLDC GLUCOMTR-MCNC: 211 MG/DL (ref 70–99)
GLUCOSE BLDC GLUCOMTR-MCNC: 214 MG/DL (ref 70–99)
GLUCOSE BLDC GLUCOMTR-MCNC: 223 MG/DL (ref 70–99)
GLUCOSE SERPL-MCNC: 225 MG/DL (ref 70–99)
INTERPRETATION ECG - MUSE: NORMAL
POTASSIUM SERPL-SCNC: 4 MMOL/L (ref 3.4–5.3)
SODIUM SERPL-SCNC: 136 MMOL/L (ref 133–144)

## 2020-01-02 PROCEDURE — 93010 ELECTROCARDIOGRAM REPORT: CPT | Performed by: INTERNAL MEDICINE

## 2020-01-02 PROCEDURE — 93306 TTE W/DOPPLER COMPLETE: CPT

## 2020-01-02 PROCEDURE — 25000132 ZZH RX MED GY IP 250 OP 250 PS 637: Mod: GY | Performed by: INTERNAL MEDICINE

## 2020-01-02 PROCEDURE — 25800030 ZZH RX IP 258 OP 636: Performed by: INTERNAL MEDICINE

## 2020-01-02 PROCEDURE — 36415 COLL VENOUS BLD VENIPUNCTURE: CPT | Performed by: INTERNAL MEDICINE

## 2020-01-02 PROCEDURE — 80048 BASIC METABOLIC PNL TOTAL CA: CPT | Performed by: INTERNAL MEDICINE

## 2020-01-02 PROCEDURE — 93005 ELECTROCARDIOGRAM TRACING: CPT

## 2020-01-02 PROCEDURE — 00000146 ZZHCL STATISTIC GLUCOSE BY METER IP

## 2020-01-02 PROCEDURE — 93306 TTE W/DOPPLER COMPLETE: CPT | Mod: 26 | Performed by: INTERNAL MEDICINE

## 2020-01-02 PROCEDURE — 25000128 H RX IP 250 OP 636: Performed by: INTERNAL MEDICINE

## 2020-01-02 PROCEDURE — 99238 HOSP IP/OBS DSCHRG MGMT 30/<: CPT | Performed by: INTERNAL MEDICINE

## 2020-01-02 RX ORDER — CALCIUM CARBONATE 500 MG/1
1000 TABLET, CHEWABLE ORAL EVERY 4 HOURS PRN
Status: DISCONTINUED | OUTPATIENT
Start: 2020-01-02 | End: 2020-01-02 | Stop reason: HOSPADM

## 2020-01-02 RX ORDER — DILTIAZEM HYDROCHLORIDE 120 MG/1
120 CAPSULE, EXTENDED RELEASE ORAL DAILY
Qty: 30 CAPSULE | Refills: 0 | Status: SHIPPED | OUTPATIENT
Start: 2020-01-02 | End: 2020-01-21

## 2020-01-02 RX ADMIN — CALCIUM CARBONATE (ANTACID) CHEW TAB 500 MG 1000 MG: 500 CHEW TAB at 10:06

## 2020-01-02 RX ADMIN — HYDROMORPHONE HYDROCHLORIDE 0.5 MG: 1 INJECTION, SOLUTION INTRAMUSCULAR; INTRAVENOUS; SUBCUTANEOUS at 02:19

## 2020-01-02 RX ADMIN — ONDANSETRON 4 MG: 4 TABLET, ORALLY DISINTEGRATING ORAL at 10:10

## 2020-01-02 RX ADMIN — PROCHLORPERAZINE MALEATE 10 MG: 10 TABLET ORAL at 05:21

## 2020-01-02 RX ADMIN — POTASSIUM CHLORIDE, DEXTROSE MONOHYDRATE AND SODIUM CHLORIDE: 150; 5; 450 INJECTION, SOLUTION INTRAVENOUS at 10:07

## 2020-01-02 RX ADMIN — INSULIN ASPART 2 UNITS: 100 INJECTION, SOLUTION INTRAVENOUS; SUBCUTANEOUS at 09:29

## 2020-01-02 RX ADMIN — HYDROMORPHONE HYDROCHLORIDE 0.5 MG: 1 INJECTION, SOLUTION INTRAMUSCULAR; INTRAVENOUS; SUBCUTANEOUS at 06:22

## 2020-01-02 RX ADMIN — HYDROMORPHONE HYDROCHLORIDE 0.5 MG: 1 INJECTION, SOLUTION INTRAMUSCULAR; INTRAVENOUS; SUBCUTANEOUS at 10:11

## 2020-01-02 RX ADMIN — ONDANSETRON 4 MG: 4 TABLET, ORALLY DISINTEGRATING ORAL at 02:19

## 2020-01-02 ASSESSMENT — ACTIVITIES OF DAILY LIVING (ADL)
ADLS_ACUITY_SCORE: 12

## 2020-01-02 NOTE — CONSULTS
Care Coordination:    *Care Transition Initial Assessment - RN        Met with: Patient.  DATA   Active Problems:    DKA (diabetic ketoacidoses) (H)       Cognitive Status: awake, alert and oriented.        Contact information and PCP information verified: Yes. Pt has been unhappy with [facility] and would like to switch to a  clinic.  Prefers [facility], afternoon appts.  Lives With: sibling(s), other (see comments)(brother nephew)       Insurance concerns: No Insurance issues identified  ASSESSMENT  Patient currently receives the following services:  Pt lives in a hotel.  He owns a car and drives.  Was seeing MD at [facility] clinic. Reports he has insulin and glucometer        Identified issues/concerns regarding health management: Pt admitted with DKA.  Seen by cardiology. Recommend outpatient follow up with PCP and EP (1 month). Pt voices no other needs for  or resources.     PLAN  Financial costs for the patient include TBD .  Patient given options and choices for discharge yes .  Patient/family is agreeable to the plan?  Yes:   Patient anticipates discharging to hotel .        Patient anticipates needs for home equipment: No  Transportation/person available to transport on day of discharge  is self and have they been notified/set up car here  Plan/Disposition: Home   Appointments:  Jan 09, 2020  3:00 PM CST  [facility] [address] [id number] [phone]   Bring a current list of meds and any records pertaining to this visit.  For Physicals, please bring immunization records and any forms needing to be filled out. Please arrive 10 minutes early to complete paperwork.   Feb 04, 2020 10:15 AM CST  EP NEW with Ravi Schroeder MD     Above added to AVS.    Care  (CTS) will continue to follow as needed.    Miriam Cristina RN BSN  Inpatient Care Coordination  [facility]  [place]  [phone]

## 2020-01-02 NOTE — PROGRESS NOTES
Paged on call hospitalist regarding pt's bedtime lantus insulin. Pt received his normal bedtime lantus (38 units) at 2:30pm earlier today after stopping insulin gtt. Held bedtime lantus per order.

## 2020-01-02 NOTE — PLAN OF CARE
Dx: DKA, off of IMC at 1400. Hx: DM1 + recurrent DKA episodes, Aspergers, homless, SVT. VSS ex soft BP on RA. Tele: NSR. A/Ox4. Blood sugars: off Ins gtt @ 12pm, subQ started at 0730. Labs: Ketones 0.6, K+3.8. CMS intact, fidgets in the bed. Pain controlled with prn IV dilaudid. Up with SBA. Tolerating mod carb diet, appetite is poor, adequate fluid intake. Intermittent N&V resolved with zofran. +gas, +bm. Voiding adequately. LS clear. Notified Dr. Davis that the long lasting insulin was given late. Long lasting insulin was not available on the floor, He recommended giving last nights dose immediately. Plan is to monitor for an additional day. Blood glucose trending down.

## 2020-01-02 NOTE — PLAN OF CARE
Patient stable and cleared for discharge by provider. AVS printed and reviewed with patient. Educated patient on follow up diabetic care. Diltiazem prescription given to patient. Patient verbalized understanding of all instructions. Plan for patient to follow up with PCP on January 9th and with EP on February 4th. Patient left floor around noon with all personal items. Declined wheelchair escort out of hospital.

## 2020-01-02 NOTE — PLAN OF CARE
A/O x4. VSS on RA, occasional soft BP's. Up with SBA. Tolerating mod. carb. diet, poor appetite. Lung sounds clear. Bowel sounds active. Passing flatus. No BM overnight. Adequate  urine output. Pain managed with IV dilaudid. Intermittent nausea managed with zofran and compazine. Pt is currently off insulin gtt and receiving Lantus and Novalog. Pt received lantus around 2:30 pm, paged hospitalist regarding bedtime lantus and held per orders. BG's have been between 203-214. CMS/neuros intact.

## 2020-01-02 NOTE — DISCHARGE SUMMARY
[facility]  Hospitalist Discharge Summary       Date of Admission:  12/30/2019  Date of Discharge:  1/2/2020 11:58 AM  Discharging Provider: Kaden Thomas DO      Discharge Diagnoses   1. DM type I w/ DKA   2. Paroxysmal SVT  3. H/o Asperger's, schizophrenia and ADHD  4. Gastroparesis  5. GERD   6. Homelessness    Follow-ups Needed After Discharge   Follow-up Appointments     Follow-up and recommended labs and tests       Follow up with primary care provider,  within 1-2 weeks, for hospital   follow- up. No follow up labs or test are needed.  See appt below to   establish care with [facility] as desired.   Follow up with endocrinology as soon as able           Unresulted Labs Ordered in the Past 30 Days of this Admission     Date and Time Order Name Status Description    12/30/2019 1751 Blood culture Preliminary       These results will be followed up by PCP     Discharge Disposition   Discharged to home  Condition at discharge: Stable    Hospital Course   Admitted for DKA.  Given IVF and insulin drip.  Gap closed and switched to sub-q.      While here RRT was called due to tachycardia and hypotension.  EKG suggestive SVT.  Vagal maneuvers, metoprolol and ativan ineffective. Got 1L of LR and converted back to sinus rhythm. No recurrence since. Patient report 2 recent prior episode of SVT and would be interested to discuss with cardiology possible treatment to prevent (ablation).  Seen by cardiology.  Started on Diltiazem and recommended outpatient follow up with echocardiogram.     Consultations This Hospital Stay   SOCIAL WORK IP CONSULT  CARDIOLOGY IP CONSULT  CARE TRANSITION RN/SW IP CONSULT    Code Status   Prior    Time Spent on this Encounter   I, Kaden Thomas DO, personally saw the patient today and spent less than or equal to 30 minutes discharging this patient.       Kaden Thomas DO  [facility]  Hospital  ______________________________________________________________________    Physical Exam   Vital Signs: Temp: 98.4  F (36.9  C) Temp src: Oral BP: 115/72 Pulse: 71 Heart Rate: 74 Resp: 16 SpO2: 99 % O2 Device: None (Room air)    Weight: 174 lbs 2.61 oz  General Appearance: Resting comfortably.  NAD  Respiratory: Clear to auscultation.  No respiratory distress  Cardiovascular: RRR.  No murmurs  GI: Bowel sounds present.  Non-tender  Skin: No rashes.  No cyanosis  Other: No edema.  No calf tenderness         Primary Care Physician   Bogdan Jhaveri MD    Discharge Orders      Reason for your hospital stay    DKA (elevated blood sugars)     Follow-up and recommended labs and tests     Follow up with primary care provider,  within 1-2 weeks, for hospital follow- up. No follow up labs or test are needed.  See appt below to establish care with [facility] as desired.   Follow up with endocrinology as soon as able     Activity    Your activity upon discharge: activity as tolerated     Echocardiogram Complete    Administration of IV contrast will be tailored to this examination per the appropriate written protocol listed in the Echocardiography department Protocol Book, or by the supervising Cardiologist. This may result in an order change.    Use of contrast is at the discretion of the supervising Cardiologist.      Send results to cardiology clinic and PCP     Diet    Follow this diet upon discharge: Orders Placed This Encounter      Moderate Consistent CHO Diet     Most Recent 3 CBC's:  Recent Labs   Lab Test 01/01/20  0553 12/31/19  0457 12/31/19  0201   WBC 6.9 16.1* 18.1*   HGB 12.0* 12.0* 13.3   MCV 80 79 80   * 213 223     Most Recent 3 BMP's:  Recent Labs   Lab Test 01/02/20  0635 01/01/20  2132 01/01/20  1624    135 136   POTASSIUM 4.0 3.9 4.0   CHLORIDE 107 107 108   CO2 22 23 23   BUN 5* 8 8   CR 0.41* 0.40* 0.44*   ANIONGAP 7 5 5   LYNN 8.3* 8.3* 8.2*   * 243* 268*   ,   Results for  orders placed or performed during the hospital encounter of 19   Echocardiogram Complete    Narrative    [id number]  [id number]  [id number]  [id number]^NICOLE^RAMEZ^MARTIN           [facility]  Echocardiography Laboratory  [facility] [place] [address]        Name: MARCEL VASQUEZ  MRN: [id number]  : 1990  Study Date: 2020 10:28 AM  Age: 29 yrs  Gender: Male  Patient Location: [facility]  Reason For Study: PSVT  Ordering Physician: RAMEZ RIVERA  Referring Physician: Bogdan Jhaveri  Performed By: Dejon Keen     BSA: 2.1 m2  Height: 73 in  Weight: 179 lb  HR: 65  BP: 103/63 mmHg  _____________________________________________________________________________  __        Procedure  Complete Portable Echo Adult.  _____________________________________________________________________________  __        Interpretation Summary     The visual ejection fraction is estimated at 55-60%.  The right ventricle is normal in size and function.  The septum is measuring 1.3 cms in certain views. May be off axis imaging.  Could consider cardiac MRI to better assess for possibility of LVH.  _____________________________________________________________________________  __        Left Ventricle  The left ventricle is normal in size. The visual ejection fraction is  estimated at 55-60%. Diastolic Doppler findings (E/E' ratio and/or other  parameters) suggest left ventricular filling pressures are normal. Normal left  ventricular wall motion.     Right Ventricle  The right ventricle is normal in size and function.     Atria  Normal left atrial size. Borderline right atrial enlargement.     Mitral Valve  There is trace mitral regurgitation.        Tricuspid Valve  There is trace tricuspid regurgitation. The right ventricular systolic  pressure is approximated at 19.1 mmHg plus the right atrial pressure.     Aortic Valve  The aortic valve is not well visualized. No aortic regurgitation is present.  No  aortic stenosis is present.     Pulmonic Valve  The pulmonic valve is not well visualized.     Vessels  The aortic root is normal size. IVC diameter and respiratory changes fall into  an intermediate range suggesting an RA pressure of 8 mmHg.     Pericardium  There is no pericardial effusion.     _____________________________________________________________________________  __  MMode/2D Measurements & Calculations  IVSd: 1.5 cm  LVIDd: 3.9 cm  LVIDs: 2.8 cm  LVPWd: 1.3 cm  FS: 29.5 %  LV mass(C)d: 205.5 grams  LV mass(C)dI: 100.1 grams/m2     Ao root diam: 3.9 cm  asc Aorta Diam: 3.4 cm  LVOT diam: 2.3 cm  LVOT area: 4.2 cm2  RWT: 0.67        Doppler Measurements & Calculations  MV E max marty: 109.0 cm/sec  MV A max marty: 42.7 cm/sec  MV E/A: 2.6  MV dec slope: 656.5 cm/sec2  MV dec time: 0.17 sec  PA acc time: 0.14 sec  TR max marty: 218.5 cm/sec  TR max P.1 mmHg  Pulm Sys Marty: 48.0 cm/sec  Pulm Perez Marty: 57.3 cm/sec  Pulm S/D: 0.84  E/E' av.8  Lateral E/e': 6.6  Medial E/e': 9.0              _____________________________________________________________________________  __        Report approved by: Osbaldo Coello 2020 01:10 PM            Significant Results and Procedures       Discharge Medications   Discharge Medication List as of 2020 11:39 AM      START taking these medications    Details   diltiazem ER (DILT-XR) 120 MG 24 hr capsule Take 1 capsule (120 mg) by mouth daily, Disp-30 capsule, R-0, E-PrescribeFuture refills by PCP Dr. Bogdan Jhaveri MD with phone number [phone].         CONTINUE these medications which have CHANGED    Details   !! insulin aspart (NOVOLOG FLEXPEN) 100 UNIT/ML pen Inject 8 Units Subcutaneous 4 times daily (with meals and nightly), No Print Out       !! - Potential duplicate medications found. Please discuss with provider.      CONTINUE these medications which have NOT CHANGED    Details   famotidine (PEPCID) 20 MG tablet Take 20 mg by mouth 2 times daily,  Historical      !! insulin aspart (NOVOLOG FLEXPEN) 100 UNIT/ML pen Inject Subcutaneous See Admin Instructions Sliding scale: Over 200 = 1 units for every 50., Historical      insulin glargine (LANTUS PEN) 100 UNIT/ML pen Inject 38 Units Subcutaneous At Bedtime , HistoricalIf Lantus is not covered by insurance, may substitute Basaglar at same dose and frequency.        OLANZapine (ZYPREXA) 10 MG tablet Take 10 mg by mouth At Bedtime, Historical       !! - Potential duplicate medications found. Please discuss with provider.        Allergies   No Known Allergies

## 2020-01-03 LAB
INTERPRETATION ECG - MUSE: NORMAL

## 2020-01-05 LAB
BACTERIA SPEC CULT: NO GROWTH
Lab: NORMAL
SPECIMEN SOURCE: NORMAL

## 2020-01-21 ENCOUNTER — APPOINTMENT (OUTPATIENT)
Dept: CT IMAGING | Facility: CLINIC | Age: 30
End: 2020-01-21
Attending: NURSE PRACTITIONER
Payer: MEDICARE

## 2020-01-21 ENCOUNTER — HOSPITAL ENCOUNTER (OUTPATIENT)
Facility: CLINIC | Age: 30
Setting detail: OBSERVATION
Discharge: HOME | End: 2020-01-22
Attending: EMERGENCY MEDICINE | Admitting: INTERNAL MEDICINE
Payer: MEDICARE

## 2020-01-21 DIAGNOSIS — E10.10 DIABETIC KETOACIDOSIS WITHOUT COMA ASSOCIATED WITH TYPE 1 DIABETES MELLITUS (H): ICD-10-CM

## 2020-01-21 DIAGNOSIS — R10.9 ABDOMINAL PAIN, UNSPECIFIED ABDOMINAL LOCATION: ICD-10-CM

## 2020-01-21 DIAGNOSIS — F25.0 SCHIZOAFFECTIVE DISORDER, BIPOLAR TYPE (H): ICD-10-CM

## 2020-01-21 DIAGNOSIS — I47.10 PAROXYSMAL SUPRAVENTRICULAR TACHYCARDIA (H): Primary | ICD-10-CM

## 2020-01-21 LAB
ALBUMIN SERPL-MCNC: 4 G/DL (ref 3.4–5)
ALBUMIN UR-MCNC: NEGATIVE MG/DL
ALP SERPL-CCNC: 112 U/L (ref 40–150)
ALT SERPL W P-5'-P-CCNC: 21 U/L (ref 0–70)
ANION GAP SERPL CALCULATED.3IONS-SCNC: 13 MMOL/L (ref 3–14)
ANION GAP SERPL CALCULATED.3IONS-SCNC: 14 MMOL/L (ref 3–14)
ANION GAP SERPL CALCULATED.3IONS-SCNC: 14 MMOL/L (ref 3–14)
APPEARANCE UR: CLEAR
AST SERPL W P-5'-P-CCNC: 10 U/L (ref 0–45)
BASE DEFICIT BLDV-SCNC: 12.2 MMOL/L
BASE DEFICIT BLDV-SCNC: 7.8 MMOL/L
BASE DEFICIT BLDV-SCNC: 9.8 MMOL/L
BASOPHILS # BLD AUTO: 0 10E9/L (ref 0–0.2)
BASOPHILS # BLD AUTO: 0 10E9/L (ref 0–0.2)
BASOPHILS NFR BLD AUTO: 0.1 %
BASOPHILS NFR BLD AUTO: 0.4 %
BILIRUB SERPL-MCNC: 0.6 MG/DL (ref 0.2–1.3)
BILIRUB UR QL STRIP: NEGATIVE
BUN SERPL-MCNC: 13 MG/DL (ref 7–30)
BUN SERPL-MCNC: 13 MG/DL (ref 7–30)
BUN SERPL-MCNC: 14 MG/DL (ref 7–30)
CALCIUM SERPL-MCNC: 8.5 MG/DL (ref 8.5–10.1)
CALCIUM SERPL-MCNC: 8.6 MG/DL (ref 8.5–10.1)
CALCIUM SERPL-MCNC: 9.3 MG/DL (ref 8.5–10.1)
CHLORIDE SERPL-SCNC: 106 MMOL/L (ref 94–109)
CHLORIDE SERPL-SCNC: 108 MMOL/L (ref 94–109)
CHLORIDE SERPL-SCNC: 111 MMOL/L (ref 94–109)
CO2 SERPL-SCNC: 13 MMOL/L (ref 20–32)
CO2 SERPL-SCNC: 15 MMOL/L (ref 20–32)
CO2 SERPL-SCNC: 18 MMOL/L (ref 20–32)
COLOR UR AUTO: ABNORMAL
CREAT SERPL-MCNC: 0.37 MG/DL (ref 0.66–1.25)
CREAT SERPL-MCNC: 0.39 MG/DL (ref 0.66–1.25)
CREAT SERPL-MCNC: 0.41 MG/DL (ref 0.66–1.25)
DIFFERENTIAL METHOD BLD: ABNORMAL
DIFFERENTIAL METHOD BLD: ABNORMAL
EOSINOPHIL # BLD AUTO: 0 10E9/L (ref 0–0.7)
EOSINOPHIL # BLD AUTO: 0 10E9/L (ref 0–0.7)
EOSINOPHIL NFR BLD AUTO: 0.1 %
EOSINOPHIL NFR BLD AUTO: 0.4 %
ERYTHROCYTE [DISTWIDTH] IN BLOOD BY AUTOMATED COUNT: 14 % (ref 10–15)
ERYTHROCYTE [DISTWIDTH] IN BLOOD BY AUTOMATED COUNT: 14.1 % (ref 10–15)
GFR SERPL CREATININE-BSD FRML MDRD: >90 ML/MIN/{1.73_M2}
GLUCOSE BLDC GLUCOMTR-MCNC: 144 MG/DL (ref 70–99)
GLUCOSE BLDC GLUCOMTR-MCNC: 171 MG/DL (ref 70–99)
GLUCOSE BLDC GLUCOMTR-MCNC: 180 MG/DL (ref 70–99)
GLUCOSE BLDC GLUCOMTR-MCNC: 224 MG/DL (ref 70–99)
GLUCOSE SERPL-MCNC: 168 MG/DL (ref 70–99)
GLUCOSE SERPL-MCNC: 169 MG/DL (ref 70–99)
GLUCOSE SERPL-MCNC: 242 MG/DL (ref 70–99)
GLUCOSE UR STRIP-MCNC: >1000 MG/DL
HCO3 BLDV-SCNC: 13 MMOL/L (ref 21–28)
HCO3 BLDV-SCNC: 16 MMOL/L (ref 21–28)
HCO3 BLDV-SCNC: 18 MMOL/L (ref 21–28)
HCT VFR BLD AUTO: 39.3 % (ref 40–53)
HCT VFR BLD AUTO: 42.8 % (ref 40–53)
HGB BLD-MCNC: 12.8 G/DL (ref 13.3–17.7)
HGB BLD-MCNC: 13.9 G/DL (ref 13.3–17.7)
HGB UR QL STRIP: NEGATIVE
IMM GRANULOCYTES # BLD: 0 10E9/L (ref 0–0.4)
IMM GRANULOCYTES # BLD: 0 10E9/L (ref 0–0.4)
IMM GRANULOCYTES NFR BLD: 0.1 %
IMM GRANULOCYTES NFR BLD: 0.2 %
KETONES BLD-SCNC: 5.1 MMOL/L (ref 0–0.6)
KETONES UR STRIP-MCNC: >150 MG/DL
LACTATE BLD-SCNC: 0.8 MMOL/L (ref 0.7–2)
LACTATE BLD-SCNC: 0.9 MMOL/L (ref 0.7–2)
LEUKOCYTE ESTERASE UR QL STRIP: NEGATIVE
LYMPHOCYTES # BLD AUTO: 0.7 10E9/L (ref 0.8–5.3)
LYMPHOCYTES # BLD AUTO: 0.9 10E9/L (ref 0.8–5.3)
LYMPHOCYTES NFR BLD AUTO: 10.2 %
LYMPHOCYTES NFR BLD AUTO: 19 %
MCH RBC QN AUTO: 26 PG (ref 26.5–33)
MCH RBC QN AUTO: 26.2 PG (ref 26.5–33)
MCHC RBC AUTO-ENTMCNC: 32.5 G/DL (ref 31.5–36.5)
MCHC RBC AUTO-ENTMCNC: 32.6 G/DL (ref 31.5–36.5)
MCV RBC AUTO: 80 FL (ref 78–100)
MCV RBC AUTO: 80 FL (ref 78–100)
MONOCYTES # BLD AUTO: 0.2 10E9/L (ref 0–1.3)
MONOCYTES # BLD AUTO: 0.3 10E9/L (ref 0–1.3)
MONOCYTES NFR BLD AUTO: 3.1 %
MONOCYTES NFR BLD AUTO: 3.7 %
NEUTROPHILS # BLD AUTO: 3.7 10E9/L (ref 1.6–8.3)
NEUTROPHILS # BLD AUTO: 5.8 10E9/L (ref 1.6–8.3)
NEUTROPHILS NFR BLD AUTO: 76.9 %
NEUTROPHILS NFR BLD AUTO: 85.8 %
NITRATE UR QL: NEGATIVE
NRBC # BLD AUTO: 0 10*3/UL
NRBC # BLD AUTO: 0 10*3/UL
NRBC BLD AUTO-RTO: 0 /100
NRBC BLD AUTO-RTO: 0 /100
O2/TOTAL GAS SETTING VFR VENT: 0 %
O2/TOTAL GAS SETTING VFR VENT: ABNORMAL %
OXYHGB MFR BLDV: 73 %
OXYHGB MFR BLDV: 80 %
OXYHGB MFR BLDV: 81 %
PCO2 BLDV: 30 MM HG (ref 40–50)
PCO2 BLDV: 32 MM HG (ref 40–50)
PCO2 BLDV: 38 MM HG (ref 40–50)
PH BLDV: 7.27 PH (ref 7.32–7.43)
PH BLDV: 7.29 PH (ref 7.32–7.43)
PH BLDV: 7.29 PH (ref 7.32–7.43)
PH UR STRIP: 5.5 PH (ref 5–7)
PLATELET # BLD AUTO: 196 10E9/L (ref 150–450)
PLATELET # BLD AUTO: 225 10E9/L (ref 150–450)
PO2 BLDV: 41 MM HG (ref 25–47)
PO2 BLDV: 48 MM HG (ref 25–47)
PO2 BLDV: 49 MM HG (ref 25–47)
POTASSIUM SERPL-SCNC: 3.9 MMOL/L (ref 3.4–5.3)
POTASSIUM SERPL-SCNC: 4 MMOL/L (ref 3.4–5.3)
POTASSIUM SERPL-SCNC: 4.1 MMOL/L (ref 3.4–5.3)
PROT SERPL-MCNC: 7.5 G/DL (ref 6.8–8.8)
RBC # BLD AUTO: 4.89 10E12/L (ref 4.4–5.9)
RBC # BLD AUTO: 5.35 10E12/L (ref 4.4–5.9)
RBC #/AREA URNS AUTO: <1 /HPF (ref 0–2)
RETICS # AUTO: 60.6 10E9/L (ref 25–95)
RETICS/RBC NFR AUTO: 1.2 % (ref 0.5–2)
SODIUM SERPL-SCNC: 136 MMOL/L (ref 133–144)
SODIUM SERPL-SCNC: 138 MMOL/L (ref 133–144)
SODIUM SERPL-SCNC: 138 MMOL/L (ref 133–144)
SOURCE: ABNORMAL
SP GR UR STRIP: 1.03 (ref 1–1.03)
UROBILINOGEN UR STRIP-MCNC: NORMAL MG/DL (ref 0–2)
WBC # BLD AUTO: 4.8 10E9/L (ref 4–11)
WBC # BLD AUTO: 6.8 10E9/L (ref 4–11)
WBC #/AREA URNS AUTO: 1 /HPF (ref 0–5)

## 2020-01-21 PROCEDURE — 25000131 ZZH RX MED GY IP 250 OP 636 PS 637: Mod: GY | Performed by: EMERGENCY MEDICINE

## 2020-01-21 PROCEDURE — 25000132 ZZH RX MED GY IP 250 OP 250 PS 637: Mod: GY | Performed by: NURSE PRACTITIONER

## 2020-01-21 PROCEDURE — 25000131 ZZH RX MED GY IP 250 OP 636 PS 637: Mod: GY | Performed by: NURSE PRACTITIONER

## 2020-01-21 PROCEDURE — 99220 ZZC INITIAL OBSERVATION CARE,LEVL III: CPT | Performed by: NURSE PRACTITIONER

## 2020-01-21 PROCEDURE — 96374 THER/PROPH/DIAG INJ IV PUSH: CPT | Mod: 59

## 2020-01-21 PROCEDURE — 25000125 ZZHC RX 250: Performed by: INTERNAL MEDICINE

## 2020-01-21 PROCEDURE — 96375 TX/PRO/DX INJ NEW DRUG ADDON: CPT

## 2020-01-21 PROCEDURE — 85045 AUTOMATED RETICULOCYTE COUNT: CPT | Performed by: NURSE PRACTITIONER

## 2020-01-21 PROCEDURE — 25000128 H RX IP 250 OP 636: Performed by: EMERGENCY MEDICINE

## 2020-01-21 PROCEDURE — 25800030 ZZH RX IP 258 OP 636: Performed by: EMERGENCY MEDICINE

## 2020-01-21 PROCEDURE — 81001 URINALYSIS AUTO W/SCOPE: CPT | Performed by: EMERGENCY MEDICINE

## 2020-01-21 PROCEDURE — 00000146 ZZHCL STATISTIC GLUCOSE BY METER IP

## 2020-01-21 PROCEDURE — 25800030 ZZH RX IP 258 OP 636: Performed by: NURSE PRACTITIONER

## 2020-01-21 PROCEDURE — 83605 ASSAY OF LACTIC ACID: CPT | Performed by: EMERGENCY MEDICINE

## 2020-01-21 PROCEDURE — 85025 COMPLETE CBC W/AUTO DIFF WBC: CPT | Mod: 91 | Performed by: NURSE PRACTITIONER

## 2020-01-21 PROCEDURE — 80053 COMPREHEN METABOLIC PANEL: CPT | Performed by: EMERGENCY MEDICINE

## 2020-01-21 PROCEDURE — 36415 COLL VENOUS BLD VENIPUNCTURE: CPT | Performed by: NURSE PRACTITIONER

## 2020-01-21 PROCEDURE — 82805 BLOOD GASES W/O2 SATURATION: CPT | Performed by: EMERGENCY MEDICINE

## 2020-01-21 PROCEDURE — 96372 THER/PROPH/DIAG INJ SC/IM: CPT | Mod: 59

## 2020-01-21 PROCEDURE — 85060 BLOOD SMEAR INTERPRETATION: CPT | Performed by: NURSE PRACTITIONER

## 2020-01-21 PROCEDURE — 74177 CT ABD & PELVIS W/CONTRAST: CPT

## 2020-01-21 PROCEDURE — 82010 KETONE BODYS QUAN: CPT | Performed by: EMERGENCY MEDICINE

## 2020-01-21 PROCEDURE — 96361 HYDRATE IV INFUSION ADD-ON: CPT

## 2020-01-21 PROCEDURE — 96376 TX/PRO/DX INJ SAME DRUG ADON: CPT

## 2020-01-21 PROCEDURE — 83605 ASSAY OF LACTIC ACID: CPT | Mod: 91 | Performed by: NURSE PRACTITIONER

## 2020-01-21 PROCEDURE — 99285 EMERGENCY DEPT VISIT HI MDM: CPT | Mod: 25

## 2020-01-21 PROCEDURE — G0378 HOSPITAL OBSERVATION PER HR: HCPCS

## 2020-01-21 PROCEDURE — 82805 BLOOD GASES W/O2 SATURATION: CPT | Mod: 91 | Performed by: NURSE PRACTITIONER

## 2020-01-21 PROCEDURE — 87389 HIV-1 AG W/HIV-1&-2 AB AG IA: CPT | Performed by: NURSE PRACTITIONER

## 2020-01-21 PROCEDURE — 25000128 H RX IP 250 OP 636: Performed by: INTERNAL MEDICINE

## 2020-01-21 PROCEDURE — 25000128 H RX IP 250 OP 636: Performed by: NURSE PRACTITIONER

## 2020-01-21 PROCEDURE — 85025 COMPLETE CBC W/AUTO DIFF WBC: CPT | Performed by: EMERGENCY MEDICINE

## 2020-01-21 PROCEDURE — 80048 BASIC METABOLIC PNL TOTAL CA: CPT | Performed by: NURSE PRACTITIONER

## 2020-01-21 PROCEDURE — 96372 THER/PROPH/DIAG INJ SC/IM: CPT

## 2020-01-21 PROCEDURE — 40000847 ZZHCL STATISTIC MORPHOLOGY W/INTERP HISTOLOGY TC 85060: Performed by: NURSE PRACTITIONER

## 2020-01-21 RX ORDER — POTASSIUM CL/LIDO/0.9 % NACL 10MEQ/0.1L
10 INTRAVENOUS SOLUTION, PIGGYBACK (ML) INTRAVENOUS
Status: DISCONTINUED | OUTPATIENT
Start: 2020-01-21 | End: 2020-01-22 | Stop reason: HOSPADM

## 2020-01-21 RX ORDER — POTASSIUM CHLORIDE 7.45 MG/ML
10 INJECTION INTRAVENOUS
Status: DISCONTINUED | OUTPATIENT
Start: 2020-01-21 | End: 2020-01-22 | Stop reason: HOSPADM

## 2020-01-21 RX ORDER — HYDROMORPHONE HYDROCHLORIDE 1 MG/ML
0.3 INJECTION, SOLUTION INTRAMUSCULAR; INTRAVENOUS; SUBCUTANEOUS EVERY 6 HOURS PRN
Status: DISCONTINUED | OUTPATIENT
Start: 2020-01-21 | End: 2020-01-22 | Stop reason: HOSPADM

## 2020-01-21 RX ORDER — SODIUM CHLORIDE, SODIUM LACTATE, POTASSIUM CHLORIDE, CALCIUM CHLORIDE 600; 310; 30; 20 MG/100ML; MG/100ML; MG/100ML; MG/100ML
INJECTION, SOLUTION INTRAVENOUS CONTINUOUS
Status: DISCONTINUED | OUTPATIENT
Start: 2020-01-21 | End: 2020-01-22 | Stop reason: HOSPADM

## 2020-01-21 RX ORDER — POLYETHYLENE GLYCOL 3350 17 G/17G
17 POWDER, FOR SOLUTION ORAL DAILY PRN
Status: DISCONTINUED | OUTPATIENT
Start: 2020-01-21 | End: 2020-01-21

## 2020-01-21 RX ORDER — ONDANSETRON 2 MG/ML
4 INJECTION INTRAMUSCULAR; INTRAVENOUS ONCE
Status: COMPLETED | OUTPATIENT
Start: 2020-01-21 | End: 2020-01-21

## 2020-01-21 RX ORDER — KETOROLAC TROMETHAMINE 30 MG/ML
30 INJECTION, SOLUTION INTRAMUSCULAR; INTRAVENOUS EVERY 6 HOURS PRN
Status: DISCONTINUED | OUTPATIENT
Start: 2020-01-21 | End: 2020-01-22 | Stop reason: HOSPADM

## 2020-01-21 RX ORDER — DILTIAZEM HYDROCHLORIDE 120 MG/1
120 CAPSULE, EXTENDED RELEASE ORAL DAILY
Status: ON HOLD | COMMUNITY
End: 2020-01-22

## 2020-01-21 RX ORDER — POTASSIUM CHLORIDE 1500 MG/1
20-40 TABLET, EXTENDED RELEASE ORAL
Status: DISCONTINUED | OUTPATIENT
Start: 2020-01-21 | End: 2020-01-22 | Stop reason: HOSPADM

## 2020-01-21 RX ORDER — SODIUM CHLORIDE 9 MG/ML
1000 INJECTION, SOLUTION INTRAVENOUS CONTINUOUS
Status: DISCONTINUED | OUTPATIENT
Start: 2020-01-21 | End: 2020-01-21

## 2020-01-21 RX ORDER — IOPAMIDOL 755 MG/ML
81 INJECTION, SOLUTION INTRAVASCULAR ONCE
Status: COMPLETED | OUTPATIENT
Start: 2020-01-21 | End: 2020-01-21

## 2020-01-21 RX ORDER — SODIUM CHLORIDE 9 MG/ML
INJECTION, SOLUTION INTRAVENOUS CONTINUOUS
Status: DISCONTINUED | OUTPATIENT
Start: 2020-01-21 | End: 2020-01-21

## 2020-01-21 RX ORDER — NALOXONE HYDROCHLORIDE 0.4 MG/ML
.1-.4 INJECTION, SOLUTION INTRAMUSCULAR; INTRAVENOUS; SUBCUTANEOUS
Status: DISCONTINUED | OUTPATIENT
Start: 2020-01-21 | End: 2020-01-22 | Stop reason: HOSPADM

## 2020-01-21 RX ORDER — ONDANSETRON 2 MG/ML
4 INJECTION INTRAMUSCULAR; INTRAVENOUS EVERY 6 HOURS PRN
Status: DISCONTINUED | OUTPATIENT
Start: 2020-01-21 | End: 2020-01-22 | Stop reason: HOSPADM

## 2020-01-21 RX ORDER — ONDANSETRON 4 MG/1
4 TABLET, ORALLY DISINTEGRATING ORAL EVERY 6 HOURS PRN
Status: DISCONTINUED | OUTPATIENT
Start: 2020-01-21 | End: 2020-01-22 | Stop reason: HOSPADM

## 2020-01-21 RX ORDER — DEXTROSE MONOHYDRATE 25 G/50ML
25-50 INJECTION, SOLUTION INTRAVENOUS
Status: DISCONTINUED | OUTPATIENT
Start: 2020-01-21 | End: 2020-01-22 | Stop reason: HOSPADM

## 2020-01-21 RX ORDER — LIDOCAINE 40 MG/G
CREAM TOPICAL
Status: DISCONTINUED | OUTPATIENT
Start: 2020-01-21 | End: 2020-01-22 | Stop reason: HOSPADM

## 2020-01-21 RX ORDER — BISACODYL 10 MG
10 SUPPOSITORY, RECTAL RECTAL DAILY
Status: DISCONTINUED | OUTPATIENT
Start: 2020-01-21 | End: 2020-01-22 | Stop reason: HOSPADM

## 2020-01-21 RX ORDER — POTASSIUM CHLORIDE 1.5 G/1.58G
20-40 POWDER, FOR SOLUTION ORAL
Status: DISCONTINUED | OUTPATIENT
Start: 2020-01-21 | End: 2020-01-22 | Stop reason: HOSPADM

## 2020-01-21 RX ORDER — ACETAMINOPHEN 325 MG/1
650 TABLET ORAL EVERY 4 HOURS PRN
Status: DISCONTINUED | OUTPATIENT
Start: 2020-01-21 | End: 2020-01-22 | Stop reason: HOSPADM

## 2020-01-21 RX ORDER — NICOTINE POLACRILEX 4 MG
15-30 LOZENGE BUCCAL
Status: DISCONTINUED | OUTPATIENT
Start: 2020-01-21 | End: 2020-01-22 | Stop reason: HOSPADM

## 2020-01-21 RX ORDER — OLANZAPINE 5 MG/1
10 TABLET ORAL AT BEDTIME
Status: DISCONTINUED | OUTPATIENT
Start: 2020-01-21 | End: 2020-01-22 | Stop reason: HOSPADM

## 2020-01-21 RX ORDER — POTASSIUM CHLORIDE 29.8 MG/ML
20 INJECTION INTRAVENOUS
Status: DISCONTINUED | OUTPATIENT
Start: 2020-01-21 | End: 2020-01-22 | Stop reason: HOSPADM

## 2020-01-21 RX ORDER — POLYETHYLENE GLYCOL 3350 17 G/17G
17 POWDER, FOR SOLUTION ORAL 2 TIMES DAILY
Status: DISCONTINUED | OUTPATIENT
Start: 2020-01-21 | End: 2020-01-22 | Stop reason: HOSPADM

## 2020-01-21 RX ORDER — KETOROLAC TROMETHAMINE 30 MG/ML
30 INJECTION, SOLUTION INTRAMUSCULAR; INTRAVENOUS EVERY 6 HOURS PRN
Status: DISCONTINUED | OUTPATIENT
Start: 2020-01-21 | End: 2020-01-21

## 2020-01-21 RX ADMIN — KETOROLAC TROMETHAMINE 30 MG: 30 INJECTION, SOLUTION INTRAMUSCULAR at 22:32

## 2020-01-21 RX ADMIN — SODIUM CHLORIDE 1000 ML: 9 INJECTION, SOLUTION INTRAVENOUS at 08:29

## 2020-01-21 RX ADMIN — SODIUM CHLORIDE 65 ML: 9 INJECTION, SOLUTION INTRAVENOUS at 15:32

## 2020-01-21 RX ADMIN — IOPAMIDOL 81 ML: 755 INJECTION, SOLUTION INTRAVENOUS at 15:32

## 2020-01-21 RX ADMIN — BISACODYL 10 MG: 10 SUPPOSITORY RECTAL at 16:34

## 2020-01-21 RX ADMIN — SODIUM CHLORIDE 1000 ML: 9 INJECTION, SOLUTION INTRAVENOUS at 16:37

## 2020-01-21 RX ADMIN — POTASSIUM CHLORIDE 20 MEQ: 1.5 POWDER, FOR SOLUTION ORAL at 22:48

## 2020-01-21 RX ADMIN — ONDANSETRON 4 MG: 2 INJECTION INTRAMUSCULAR; INTRAVENOUS at 09:59

## 2020-01-21 RX ADMIN — INSULIN GLARGINE 38 UNITS: 100 INJECTION, SOLUTION SUBCUTANEOUS at 14:35

## 2020-01-21 RX ADMIN — SODIUM CHLORIDE, POTASSIUM CHLORIDE, SODIUM LACTATE AND CALCIUM CHLORIDE: 600; 310; 30; 20 INJECTION, SOLUTION INTRAVENOUS at 19:10

## 2020-01-21 RX ADMIN — KETOROLAC TROMETHAMINE 30 MG: 30 INJECTION, SOLUTION INTRAMUSCULAR at 16:34

## 2020-01-21 RX ADMIN — INSULIN ASPART 8 UNITS: 100 INJECTION, SOLUTION INTRAVENOUS; SUBCUTANEOUS at 12:02

## 2020-01-21 RX ADMIN — ONDANSETRON 4 MG: 2 INJECTION INTRAMUSCULAR; INTRAVENOUS at 20:33

## 2020-01-21 RX ADMIN — SODIUM CHLORIDE: 9 INJECTION, SOLUTION INTRAVENOUS at 16:37

## 2020-01-21 RX ADMIN — SODIUM CHLORIDE 1000 ML: 9 INJECTION, SOLUTION INTRAVENOUS at 09:59

## 2020-01-21 RX ADMIN — ONDANSETRON 4 MG: 2 INJECTION INTRAMUSCULAR; INTRAVENOUS at 14:34

## 2020-01-21 RX ADMIN — SODIUM CHLORIDE 1000 ML: 9 INJECTION, SOLUTION INTRAVENOUS at 09:32

## 2020-01-21 RX ADMIN — SODIUM CHLORIDE, POTASSIUM CHLORIDE, SODIUM LACTATE AND CALCIUM CHLORIDE 1000 ML: 600; 310; 30; 20 INJECTION, SOLUTION INTRAVENOUS at 17:05

## 2020-01-21 RX ADMIN — OLANZAPINE 10 MG: 5 TABLET, FILM COATED ORAL at 22:33

## 2020-01-21 ASSESSMENT — ENCOUNTER SYMPTOMS
VOMITING: 1
ABDOMINAL PAIN: 1

## 2020-01-21 ASSESSMENT — MIFFLIN-ST. JEOR: SCORE: 1739.17

## 2020-01-21 NOTE — PLAN OF CARE
A/O x 4, vss, a-febrile, c/o abdominal pain, refused tylenol, no indication for narcotics, blood sugars improving, will check BMP and blood gases, up independently to the bathroom, will continue to monitor.

## 2020-01-21 NOTE — ED NOTES
DATE:  1/21/2020   TIME OF RECEIPT FROM LAB:  0956  LAB TEST:  ketones  LAB VALUE:  5.1  RESULTS GIVEN WITH READ-BACK TO (PROVIDER):  Shanique Myers MD  TIME LAB VALUE REPORTED TO PROVIDER:   0957

## 2020-01-21 NOTE — ED NOTES
[facility]  ED Nurse Handoff Report    ED Chief complaint: Nausea & Vomiting      ED Diagnosis:   Final diagnoses:   None       Code Status: Full Code    Allergies: No Known Allergies    Patient Story: Patient living in [facility] hasn't had novolog since Friday since it froze in his car.  Reports was still taking his lantus.  Called medics this AM with N/V and diffuse abdominal pain reporting he felt like he was in DKA.  Patient had multiple visits in this past year with same complaints due to medication noncompliance.  Hx schizophrenia, taking zyprexa.  Focused Assessment:  Patient a/o x3.  Patient's blood sugar 220s.  Vomited x2 in ED, given zofran.  IVF x 2L given.  VSS.  Patient agitated and wanting pain medications.  Would not let MD examine patient, asking for pain meds.  No pain meds given in ED.  Giving 8u novolog in ED.  VSS.  Treatments and/or interventions provided: IVF/zofran/insulin  Patient's response to treatments and/or interventions:     To be done/followed up on inpatient unit:  na    Does this patient have any cognitive concerns?: na    Activity level - Baseline/Home:  Independent  Activity Level - Current:   Independent    Patient's Preferred language: English   Needed?: No    Isolation: None  Infection: Not Applicable  Bariatric?: No    Vital Signs:   Vitals:    01/21/20 0915 01/21/20 0930 01/21/20 1000 01/21/20 1030   BP: (!) 123/92      Pulse:       Resp:       Temp:       TempSrc:       SpO2: 98% 98% 98% 98%   Weight:       Height:           Cardiac Rhythm:     Was the PSS-3 completed:   Yes  What interventions are required if any?               Family Comments: none  OBS brochure/video discussed/provided to patient/family: Yes              Name of person given brochure if not patient:               Relationship to patient:     For the majority of the shift this patient's behavior was Green.   Behavioral interventions performed were .    ED NURSE PHONE NUMBER:  *[phone]

## 2020-01-21 NOTE — PROGRESS NOTES
Brief Hospitalist Note:  CT read with splenomegaly, constipation. I spoke with reading Radiologist who states there is nothing on CT to cause his significant pain. He notes there is chronic appearing mesenteric edema. Per EHR review had radiographic concern for gastroparesis in 2016.     - consult to GI, greatly appreciate their input  - will push CT from [facility] and [facility] from last fall for spleen comparison   - add peripheral smear and HIV  - may use Toradol IV for pain x 6- doses  - with significant constipation strongly avoid opiates  - daily dulcolax and BID miralax until stooling regularly and then PRN     The above assessment and plan has been discussed with Dr. De Anda, who is in agreement with the above assessment and plan. Dr. De Anda will evaluate Mr. Monahan.     PORTILLO Molina, CNP  Hospitalist Service, House Officer  [facility]     Text Page  Pager: [phone]

## 2020-01-21 NOTE — PROGRESS NOTES
Brief Hospitalist Note:  Call received from staff that Mr. Monahan is now agreeable to CT. CT ordered. He is vomiting Powerade and will now be NPO.     - will evaluate CT and determine pain treatment as appropriate     PORTILLO Molina, CNP  Hospitalist Service, House Officer  [facility]     Text Page  Pager: [phone]

## 2020-01-21 NOTE — ED PROVIDER NOTES
History     Chief Complaint:  Abdominal Pain    HPI   Reji Monahan is a 29 year old male with a history of type I diabetes and schizophrenia who presents with abdominal pain. Per the patient, he went to bed and forgot his insulin in his car, which froze. He endorses his stomach hurts all over and he's throwing up. He states it feels like DKA and is concerned about it. His last DKA episode was the 30th of last month. He normally regularly takes his insulin, and is regularly taking his schizophrenia medication, Zyprexa.     Allergies:  No known drug allergies    Medications:    Dilt-XR  Pepcid  Insulin  Zyprexa    Past Medical History:    Diabetes mellitus  DKA  Schizophrenia  Diabetic neuropathy  Asthma  Paroxysmal SVT  Marijuana abuse  Asperger's disorder  ADD    Past Surgical History:    Wound irrigation and debridement, upper left arm  Insert picc    Family History:    Brother: type I diabetes, alcohol/drug, pulmonary diseases  Father: stroke  Mother: high blood pressure, bipolar disorder, ovarian cancer, alcohol/drug    Social History:  Smoking Status: Former Smoker (Quit 5/25/2014)  Smokeless Tobacco: Never Used  Alcohol Use: Positive  PCP: No primary care provider on file.  Marital Status:  Single    Review of Systems   Gastrointestinal: Positive for abdominal pain and vomiting.   10 point review of systems performed and is negative except as above and in HPI.      Physical Exam     Patient Vitals for the past 24 hrs:   BP Temp Temp src Pulse Heart Rate Resp SpO2 Height Weight   01/21/20 1030 -- -- -- -- -- -- 98 % -- --   01/21/20 1000 -- -- -- -- -- -- 98 % -- --   01/21/20 0930 -- -- -- -- -- -- 98 % -- --   01/21/20 0915 (!) 123/92 -- -- -- -- -- 98 % -- --   01/21/20 0900 -- -- -- -- -- -- 98 % -- --   01/21/20 0845 -- -- -- -- -- -- 98 % -- --   01/21/20 0830 -- -- -- 91 -- -- 99 % -- --   01/21/20 0822 (!) 131/96 98  F (36.7  C) Temporal -- -- -- -- -- --   01/21/20 0821 -- -- -- -- 108 24 99 % 1.854  "m (6' 1\") 73 kg (161 lb)       Physical Exam  General: Resting on the gurney, appears somewhat uncomfortable. Quiet when no one in the room. Screams in pain when anybody enters the room. Uncooperative with exam.  Head:  The scalp, face, and head appear normal  Mouth/Throat: Mucus membranes are slightly dry. Extremely poor dentition.  CV:  Regular rate    Normal S1 and S2  No pathological murmur   Resp:  Breath sounds clear and equal bilaterally    Non-labored, no retractions or accessory muscle use    No coarseness    No wheezing   GI:  Does not allow abdominal exam.   MS:  Normal motor assessment of all extremities.    Good capillary refill noted.  Skin:  No rash or lesions noted.  Neuro:   Speech is normal and fluent. No apparent deficit.  Psych: Awake. Alert.  Normal affect.      Appropriate interactions.    Emergency Department Course     Laboratory:  Laboratory findings were communicated with the patient who voiced understanding of the findings.    CBC: WBC 4.8, HGB 13.9,   CMP: Carbon dioxide 18(L), Glucose 242(H), Creatinine 0.39(L) o/w WNL  Glucose by meter: 224(H)  Lactic Acid (Resulted 0838): 0.9  Ketone Beta-Hydroxybutyrate Quantitative: 5.1(HH)    UA with micro: Glucose >1000(H), Ketones >150(H) o/w WNL    Interventions:  0829: Normal Saline, 1 liter, IV bolus  0932: Normal Saline, 1 liter, IV bolus   0959: Normal Saline, 1 liter, IV bolus   0959: Zofran, 4 mg, IV    Emergency Department Course:    0820    Glucose by meter tested, results above.    0823 Nursing notes and vitals reviewed.    0827 I performed an exam of the patient as documented above. IV was inserted and blood was drawn for laboratory testing, results above.    0911 Blood drawn for blood gas venous and oxyhgb test, results above.    0939 Blood drawn for ketone beta-hydroxybutyrate quantitative test, results above. The patient provided a urine sample here in the emergency department. This was sent for laboratory testing, findings " above.    1034 I spoke with Dr. De Anda of the hospitalist service from [facility] regarding patient's presentation, findings, and plan of care.    1038    Patient rechecked and updated.     1040 Findings and plan explained to the patient who consents to admission. Discussed the patient with Dr. De Anda, who will admit the patient to an obs bed for further monitoring, evaluation, and treatment.    Impression & Plan      Medical Decision Making:  Reji Monahan is a 29 year old male who comes today with complaint of abdominal pain and is found to have hyperglycemia and is in mild DKA.      There was no evidence on history, examination or labs and imaging of an infectious etiology.   The patient has no headache or concerning neurologic symptoms. I suspect the inciting cause is poor compliance.      We treated with cautious IV fluids and insulin.  For nausea and vomiting, the patient received multiple doses of zofran.    I spoke with the hospitalist service who graciously agreed to admit the patient  for further management and care.    Diagnosis:    Hyperglycemia    Disposition:   Patient was admitted to an obs bed.    Scribe Disclosure:  VERONICA, Brian Tatum, am serving as a scribe at 8:27 AM on 1/21/2020 to document services personally performed by Shanique Myers MD based on my observations and the provider's statements to me.     EMERGENCY DEPARTMENT       Shanique Myers MD  01/21/20 8812

## 2020-01-21 NOTE — ED NOTES
"Patient reports abdomen pain. Denied to take any Tylenol or Ibuprofen. States \"It never work for him\"  "

## 2020-01-21 NOTE — PROGRESS NOTES
RECEIVING UNIT ED HANDOFF REVIEW    ED Nurse Handoff Report was reviewed by: Douglas Antunez RN on January 21, 2020 at 12:42 PM

## 2020-01-21 NOTE — H&P
[employer]    History and Physical  Hospitalist       Date of Admission:  1/21/2020    Assessment & Plan   Reji Monahan is a 29 year old male with pertinent past medical history including frequent ED visits, homelessness, poorly controlled Type I diabetes, and schizophrenia who presented to [facility] ED on 1/21/2020 for evaluation and management of abdominal pain with onset last evening and vomiting with reported 6- episodes since last evening. He endorses pain is similar to prior episodes of DKA. Lab work is not extremely convincing for DKA, but serum ketones are elevated. He is being admitted for observation.     Abdominal pain, refusing exam and imaging   Vomiting, by patient report  Type I diabetes, poorly controlled   History of DKA  Ketonuria  Glucosuria  Elevated serum ketones, 5.1  Mild metabolic acidosis   Last hemoglobin A1c 12.8% 12/30/2019. Mr. Monahan states his abdominal pain and vomiting started last night. He tells nursing and pharmacy his last dose of Lantus was 1/16/2020 and 1/17/2020, however he tells me his last dose was last night, 1/20/2020. He states he has been unable to take his insulin as it is frozen in his car. Mr. Monahan is requesting Dilaudid by name. He has had numerous ED visits in the past 6- months with visits at [facility], [facility], and [facility]  without significant controlled prescriptions over the past one year. Mr. Monahan refuses abdominal exam. Since he is endorsing significant pain I offered imaging, which he declined. At this time I do not have a dangerous cause for his pain or a medical condition in which opiates are warranted. When I entered room patient screaming and writhing in pain. He is observed by me resting without writhing and screaming when I was not in room.   - register to observation   - NS at 125- ml/hour  - give his daily Lantus now  - BMP, CBC in AM  - moderate correction scale dosing  - hold home short acting insulin  - diet as tolerated-  moderate CHO   - serial abdominal exams and consider imaging or further evaluation if indicted or he is agreeable   - no obvious indication for opiates  - may use Tylenol for fever or discomfort   - ED Care Coordinator establishing Care Plan for frequent ED visits     Marijuana use, current  Mr. Monahan admits to smoking marijuana with last use yesterday. Given onset of vomiting after smoking cyclic vomiting is included in the differentials.  - highly recommend cessation of substance use     Schizophrenia, chronic  Mr. Monahan tells me he has Zyprexa filled at the [facility] on [address]. I called the Pharmacy and [facility] has not filled a prescription for Zyprexa since October 2019. He is awake, alert, oriented. By his report he makes his own medical and legal decisions.   - continue home Zyprexa 10- mg at HS  - psychiatry consultation, please comment on capacity to make decisions  - consult to Social Work to evaluate for mental health OP provider resources     Supraventricular tachycardia, history of  During hospital stay 12/30/2019- 1/2/2020 he developed SVT. He was discharged on Cardizem, but has not filled the medication. He was evaluated by Cardiology with recommendation for OP appointment. Echocardiogram on 1/2/2020 with normal EF, normal RV, and possible LVH with recommendation for consideration of cardiac MRI. It does not appear he has followed up.   - will hold home Cardizem for now when lack of compliance     Homelessness  Mr. Monahan states he is staying at [facility] in [place]. He is unkept with very poor dentition. He states he is unable to take his insulin as it froze in his car. It is unclear if he is living in a hotel or his car as he is not forthcoming with information.   - consult to Social Work for resources     DVT Prophylaxis: Low Risk/Ambulatory with no VTE prophylaxis indicated  Code Status: Full Code    Disposition: Likely tomorrow    The above assessment and plan has been discussed with   Adilson, who is in agreement with the above assessment and plan.     PORTILLO Molina, CNP  Hospitalist Service, House Officer  [facility]     Text Page  Pager: [phone]    Primary Care Physician   No Physician No Ref-Primary    Chief Complaint   Abdominal pain    History is obtained from the patient. He appears to be not forthcoming with information and provides differing details when asked by separate people.     History of Present Illness   Reji Monahan is a 29 year old male with pertinent past medical history including frequent ED visits, homelessness, poorly controlled Type I diabetes, and schizophrenia who presented to [facility] ED on 1/21/2020 for evaluation and management of abdominal pain with onset last evening and vomiting with reported 6- episodes since last evening. He endorses pain is similar to prior episodes of DKA. He specifically denies other acute concerns. He had a bowel movement this morning. He is refusing a full exam at present and requesting Dilaudid.     Emergency Department plan of care included lab work notable for bicarb 18, serum ketones 5.1, glucose 242, and venous gas 7.29, 38, 49, 18. Urinalysis reveals >1000 glucose and > 150 ketones. He received NS 1- liter bolus, IV Zofran, and 8- units subcutaneous Aspart. He is being registered to observation.      Past Medical History    I personally reviewed history with patient:  Past Medical History:   Diagnosis Date     Diabetes mellitus (H)      Schizophrenia (H)    - diabetic neuropathy  - asthma  - SVT  - Asperger's disease  - ADD  - MRSA  - facial cellulitis and other skin abscesses     Past Surgical History   I personally reviewed history with patient:  - I&D multiple skin abscesses     Prior to Admission Medications   Prior to Admission Medications   Prescriptions Last Dose Informant Patient Reported? Taking?   OLANZapine (ZYPREXA) 10 MG tablet 1/20/2020 at PM Self Yes Yes   Sig: Take 10 mg by mouth At  Bedtime   diltiazem ER (DILT-XR) 120 MG 24 hr capsule   Yes No   Sig: Take 120 mg by mouth daily   insulin aspart (NOVOLOG FLEXPEN) 100 UNIT/ML pen 1/17/2020 at PM Self Yes Yes   Sig: Inject Subcutaneous See Admin Instructions Sliding scale: Over 200 = 1 units for every 50.   insulin aspart (NOVOLOG FLEXPEN) 100 UNIT/ML pen 1/17/2020 at PM Self No No   Sig: Inject 8 Units Subcutaneous 4 times daily (with meals and nightly)   insulin glargine (LANTUS PEN) 100 UNIT/ML pen 1/16/2020 at PM Self Yes Yes   Sig: Inject 38 Units Subcutaneous At Bedtime       Facility-Administered Medications: None     Allergies   No Known Allergies    Social History   I personally reviewed history with patient:  - quit smoking in 2014  - smokes marijuana with last use yesterday   - denies other drugs and alcohol  - staying at [place], [place]  - refuses to answer if he has a job     Family History    I personally reviewed history with patient:  - type I DM  - substance use  - hypertension  - bipolar disorder     Review of Systems   The 10 point Review of Systems is negative other than noted in the HPI or here.     Physical Exam   Temp: 98  F (36.7  C) Temp src: Temporal BP: (!) 123/92 Pulse: 91 Heart Rate: 108 Resp: 24 SpO2: 98 % O2 Device: None (Room air)    Vital Signs with Ranges  Temp:  [98  F (36.7  C)] 98  F (36.7  C)  Pulse:  [91] 91  Heart Rate:  [108] 108  Resp:  [24] 24  BP: (123-131)/(92-96) 123/92  SpO2:  [98 %-99 %] 98 %  161 lbs 0 oz    General: Appears stated age, no acute distress.  Skin:  Warm, dry. No rashes or lesions on exposed skin.  HEENT:  Normocephalic, atraumatic.Very poor dentition with visualized teeth broken off to near gum-line.   Chest:  Bilateral anterior and posterior lung fields clear to auscultation. No increased work of breathing. Does not require supplemental oxygen.  Cardiovascular:  Regular rate and rhythm, without murmur, rub, or gallop. Bilateral upper and lower distal pulses palpable.    Abdomen:  Refuses exam.   Musculoskeletal:  Moves all four extremities.   Neurological:  Alert and oriented x 4. Cranial nerves II-XII grossly intact.   Psychiatric:  Agitated with exam and questions. Escalates very easily.     Data   Data reviewed today:  I personally reviewed no images or EKG's today.    Recent Labs   Lab 01/21/20  0827   WBC 4.8   HGB 13.9   MCV 80         POTASSIUM 4.1   CHLORIDE 106   CO2 18*   BUN 14   CR 0.39*   ANIONGAP 14   LYNN 9.3   *   ALBUMIN 4.0   PROTTOTAL 7.5   BILITOTAL 0.6   ALKPHOS 112   ALT 21   AST 10

## 2020-01-21 NOTE — PHARMACY-ADMISSION MEDICATION HISTORY
Pharmacy Medication History  Admission medication history interview status for the 1/21/2020  admission is complete. See EPIC admission navigator for prior to admission medications     Medication history sources: Patient and Surescripts  Medication history source reliability: Moderate  Adherence assessment: Moderate    Significant changes made to the medication list:  Discontinued famotidine and diltiazem.      Additional medication history information:   Patient reports no longer taking diltiazem (SVT episode at [facility] last admission end of December.) Upon interviewing patient, he reported that he was not taking this medication.    Medication reconciliation completed by provider prior to medication history? No    Time spent in this activity: 15 minutes      Prior to Admission medications    Medication Sig Last Dose Taking? Auth Provider   insulin aspart (NOVOLOG FLEXPEN) 100 UNIT/ML pen Inject Subcutaneous See Admin Instructions Sliding scale: Over 200 = 1 units for every 50. 1/17/2020 at PM Yes Unknown, Entered By History   insulin glargine (LANTUS PEN) 100 UNIT/ML pen Inject 38 Units Subcutaneous At Bedtime  1/16/2020 at PM Yes Unknown, Entered By History   OLANZapine (ZYPREXA) 10 MG tablet Take 10 mg by mouth At Bedtime 1/20/2020 at PM Yes Unknown, Entered By History   insulin aspart (NOVOLOG FLEXPEN) 100 UNIT/ML pen Inject 8 Units Subcutaneous 4 times daily (with meals and nightly) 1/17/2020 at PM  Kaden Thomas,         patient

## 2020-01-21 NOTE — PROGRESS NOTES
"Writer was continuously hearing patient from the nevarez crying and screaming in his bed. When entered room, he was all curled up in a ball and complaining of abdominal pain. He kept saying \"it's my DKA\", \"I get this stomache pain when I get DKA.\" Explained that it was important for him to agree to the imaging. He then began vomiting. He did then agree to do the imaging. Paged Kimberly Matute, PORTILLO, CNP. She ordered patient to be NPO and to get him down for a CT asap. Informed bedside nurse Douglas. Will continue to follow.  "

## 2020-01-21 NOTE — PLAN OF CARE
Observation goals PRIOR TO DISCHARGE    Comments:   -diagnostic tests and consults completed and resulted : Not met    -vital signs normal or at patient baseline : Met    -tolerating oral intake to maintain hydration : Not met    -returns to baseline functional status : Not met    -safe disposition plan has been identified : No

## 2020-01-21 NOTE — PROGRESS NOTES
Brief Hospitalist Note:  Paged by RN for pain medication. He has refused Tylenol. Blood glucose 180, Lantus has been given. Will repeat VBG and BMP. No obvious indication for opiates at this time.     PORTILLO Molina, CNP  Hospitalist Service, House Officer  [facility]     Text Page  Pager: [phone]

## 2020-01-21 NOTE — CONSULTS
Care Transition Initial Assessment - SW     Met with:chart review-not appropriate to assess at this time  Active Problems:    Abdominal pain       DATA  Lives With: alone   Quality of Family Relationships: unable to assess  Who is your support system?: Sibling(s)  Identified issues/concerns regarding health management: SW following for discharge needs, community resources, mental health issues, housing/lodging needs.   Quality of Family Relationships: unable to assess     ASSESSMENT  Cognitive Status:  Alert and oriented  Concerns to be addressed: Patient is a 29 year old male who was admitted to observation for abdominal pain. Patient has a past medical history of poorly controlled Type I diabetes, and schizophrenia.  Prior to hospitalization patient was living at a hotel/motel where he was managing well. Patient reports that his insulin was left in his vehicle and froze overnight. Patient is now reporting that he is having DKA. Patient has been in significant pain for most of the day and began vomiting upon arrival. SW will need to follow up with patient once pain is better controlled.     PLAN  Financial costs for the patient includes   Patient given options and choices for discharge   Patient/family is agreeable to the plan?    Transportation/person available to transport on day of discharge  is  and have they been notified/set up   Patient Goals and Preferences: TBD  Patient anticipates discharging to:  TBLEXY Dumas, LGSW  [phone]  [facility]

## 2020-01-22 VITALS
SYSTOLIC BLOOD PRESSURE: 94 MMHG | TEMPERATURE: 97.7 F | OXYGEN SATURATION: 95 % | RESPIRATION RATE: 17 BRPM | HEIGHT: 73 IN | BODY MASS INDEX: 21.34 KG/M2 | WEIGHT: 161 LBS | DIASTOLIC BLOOD PRESSURE: 60 MMHG | HEART RATE: 101 BPM

## 2020-01-22 PROBLEM — R10.9 ABDOMINAL PAIN: Status: RESOLVED | Noted: 2020-01-21 | Resolved: 2020-01-22

## 2020-01-22 PROBLEM — R16.1 SPLENOMEGALY: Status: ACTIVE | Noted: 2020-01-22

## 2020-01-22 LAB
ANION GAP SERPL CALCULATED.3IONS-SCNC: 13 MMOL/L (ref 3–14)
BUN SERPL-MCNC: 13 MG/DL (ref 7–30)
CALCIUM SERPL-MCNC: 8.6 MG/DL (ref 8.5–10.1)
CHLORIDE SERPL-SCNC: 105 MMOL/L (ref 94–109)
CO2 SERPL-SCNC: 14 MMOL/L (ref 20–32)
COPATH REPORT: NORMAL
CREAT SERPL-MCNC: 0.41 MG/DL (ref 0.66–1.25)
ERYTHROCYTE [DISTWIDTH] IN BLOOD BY AUTOMATED COUNT: 14.2 % (ref 10–15)
GFR SERPL CREATININE-BSD FRML MDRD: >90 ML/MIN/{1.73_M2}
GLUCOSE BLDC GLUCOMTR-MCNC: 156 MG/DL (ref 70–99)
GLUCOSE BLDC GLUCOMTR-MCNC: 188 MG/DL (ref 70–99)
GLUCOSE SERPL-MCNC: 171 MG/DL (ref 70–99)
HCT VFR BLD AUTO: 40.2 % (ref 40–53)
HGB BLD-MCNC: 13.3 G/DL (ref 13.3–17.7)
HIV 1+2 AB+HIV1 P24 AG SERPL QL IA: NONREACTIVE
MCH RBC QN AUTO: 26.5 PG (ref 26.5–33)
MCHC RBC AUTO-ENTMCNC: 33.1 G/DL (ref 31.5–36.5)
MCV RBC AUTO: 80 FL (ref 78–100)
PLATELET # BLD AUTO: 197 10E9/L (ref 150–450)
POTASSIUM SERPL-SCNC: 3.7 MMOL/L (ref 3.4–5.3)
RBC # BLD AUTO: 5.02 10E12/L (ref 4.4–5.9)
SODIUM SERPL-SCNC: 132 MMOL/L (ref 133–144)
WBC # BLD AUTO: 6.5 10E9/L (ref 4–11)

## 2020-01-22 PROCEDURE — 99217 ZZC OBSERVATION CARE DISCHARGE: CPT | Performed by: PHYSICIAN ASSISTANT

## 2020-01-22 PROCEDURE — 00000146 ZZHCL STATISTIC GLUCOSE BY METER IP

## 2020-01-22 PROCEDURE — 80048 BASIC METABOLIC PNL TOTAL CA: CPT | Performed by: NURSE PRACTITIONER

## 2020-01-22 PROCEDURE — G0378 HOSPITAL OBSERVATION PER HR: HCPCS

## 2020-01-22 PROCEDURE — 25000128 H RX IP 250 OP 636: Performed by: NURSE PRACTITIONER

## 2020-01-22 PROCEDURE — 36415 COLL VENOUS BLD VENIPUNCTURE: CPT | Performed by: NURSE PRACTITIONER

## 2020-01-22 PROCEDURE — 25800030 ZZH RX IP 258 OP 636: Performed by: NURSE PRACTITIONER

## 2020-01-22 PROCEDURE — 85027 COMPLETE CBC AUTOMATED: CPT | Performed by: NURSE PRACTITIONER

## 2020-01-22 PROCEDURE — 99203 OFFICE O/P NEW LOW 30 MIN: CPT | Performed by: PSYCHIATRY & NEUROLOGY

## 2020-01-22 PROCEDURE — 96376 TX/PRO/DX INJ SAME DRUG ADON: CPT

## 2020-01-22 RX ORDER — INSULIN LISPRO 100 U/ML
8 INJECTION, SOLUTION SUBCUTANEOUS
Qty: 3 ML | Refills: 3 | Status: ON HOLD | OUTPATIENT
Start: 2020-01-22 | End: 2020-01-26

## 2020-01-22 RX ORDER — OLANZAPINE 10 MG/1
10 TABLET, ORALLY DISINTEGRATING ORAL EVERY 6 HOURS PRN
Status: DISCONTINUED | OUTPATIENT
Start: 2020-01-22 | End: 2020-01-22 | Stop reason: HOSPADM

## 2020-01-22 RX ORDER — OLANZAPINE 10 MG/1
10 TABLET ORAL EVERY 6 HOURS PRN
Qty: 30 TABLET | Refills: 0 | Status: SHIPPED | OUTPATIENT
Start: 2020-01-22 | End: 2021-08-02

## 2020-01-22 RX ORDER — DILTIAZEM HYDROCHLORIDE 120 MG/1
120 CAPSULE, EXTENDED RELEASE ORAL DAILY
Qty: 30 CAPSULE | Refills: 0 | Status: ON HOLD | OUTPATIENT
Start: 2020-01-22 | End: 2020-01-26

## 2020-01-22 RX ORDER — POLYETHYLENE GLYCOL 3350 17 G/17G
1 POWDER, FOR SOLUTION ORAL DAILY
Qty: 507 G | Refills: 0 | Status: ON HOLD | OUTPATIENT
Start: 2020-01-22 | End: 2020-01-26

## 2020-01-22 RX ORDER — OLANZAPINE 10 MG/1
10 TABLET ORAL EVERY 6 HOURS PRN
Start: 2020-01-22 | End: 2020-01-22

## 2020-01-22 RX ORDER — INSULIN LISPRO 100 [IU]/ML
8 INJECTION, SOLUTION INTRAVENOUS; SUBCUTANEOUS
Qty: 3 ML | Refills: 3 | Status: SHIPPED | OUTPATIENT
Start: 2020-01-22 | End: 2020-01-22

## 2020-01-22 RX ORDER — INSULIN LISPRO 100 U/ML
INJECTION, SOLUTION SUBCUTANEOUS
Qty: 3 ML | Status: ON HOLD
Start: 2020-01-22 | End: 2020-01-26

## 2020-01-22 RX ORDER — INSULIN LISPRO 100 [IU]/ML
INJECTION, SOLUTION INTRAVENOUS; SUBCUTANEOUS
Start: 2020-01-22 | End: 2020-01-22

## 2020-01-22 RX ADMIN — ONDANSETRON 4 MG: 4 TABLET, ORALLY DISINTEGRATING ORAL at 11:08

## 2020-01-22 RX ADMIN — SODIUM CHLORIDE, POTASSIUM CHLORIDE, SODIUM LACTATE AND CALCIUM CHLORIDE: 600; 310; 30; 20 INJECTION, SOLUTION INTRAVENOUS at 01:01

## 2020-01-22 RX ADMIN — KETOROLAC TROMETHAMINE 30 MG: 30 INJECTION, SOLUTION INTRAMUSCULAR at 04:35

## 2020-01-22 NOTE — PROGRESS NOTES
[facility] Note    Discussed case with Aamir Salgado PA-C Hospitalist service  Pt declined gastroenterology consultation  Appears to have chronic GI issues likely related to other medical co-morbidities  Offered my card in case pt would like to follow up as an outpatient  Will cancel consult for now. Please call with any questions.    Raj Allen PA-C  [facility]  [phone] Cell ([phone])  [phone] Office (after 1300)

## 2020-01-22 NOTE — CONSULTS
"Consult Date:  01/22/2020      PSYCHIATRY CONSULTATION      REQUESTED PROVIDER:  PORTILLO Beard      REASON FOR CONSULTATION:  Assess competency, history of schizophrenia, probably off of medications.      IDENTIFYING DATA:  Reji is a 29-year-old gentleman with a diagnosis of schizophrenia, admitted to the Observation Unit irritable with nausea, vomiting, daily marijuana use.  He has known type 1 diabetes, which is poorly controlled, and came in with complaints of abdominal pain and obstipation.      CHIEF COMPLAINT:  \"Leave me alone, didn't they tell you I was having pain?\"      HISTORY OF PRESENT ILLNESS:  The patient is a 29-year-old gentleman with the above history presenting to the hospital with complaints of abdominal pain and what he describes as constipation.  He was reporting multiple episodes of vomiting and abdominal pain.  He has had DKA, poorly managed diabetes, and lives in a hotel.  His care has been scattered amongst various healthcare systems.  Records indicate that he typically takes Zyprexa, but his compliance is suspect.  On urinalysis, there was greater than 1000 glucose and greater than 150 ketones.  He is receiving supportive care in the Observation Unit.  When I tried to interview him this morning, he was curled up on his right side, would not make eye contact, but was verbally combative.  He is not making threats to self or others, but was dismissive and did not want to talk to me.  He has a paranoid flavor, perturbed by questions that I asked regarding his current living situation, psychiatric care, etc.  He says he has not been hospitalized \"for many years\" and claims that he has been taking his Zyprexa, despite evidence that this may not be the case.  He denies hearing voices, he denies wanting to hurt self or others, he seems to understand that he has diabetes, focused on complaints of abdominal pain and constipation.  He is frustrated because he wants pain medications.  He " "acknowledges using marijuana, which is a routine habit for him.  I was unable to extract additional history from him, other than the fact that he has been staying in a hotel, telling me \"I have a place to go, so leave me alone.\"      PAST PSYCHIATRIC HISTORY:  As above, tentative diagnosis of paranoid schizophrenia.      PAST CHEMICAL DEPENDENCY HISTORY:  Positive for what appears to be a cannabis use disorder, treatment history is unknown.      PAST MEDICAL HISTORY:  Per chart review includes poorly managed, type 1 diabetes with previous DKA, possible cyclical vomiting, and history of supraventricular tachycardia.      PRIOR TO ADMISSION MEDICATIONS:   1.  Zyprexa 10 mg at bedtime.   2.  Diltiazem  mg daily.   3.  NovoLog insulin.   4.  Lantus insulin.      FAMILY HISTORY/SOCIAL HISTORY:  I do not have a lot of details.  I know that he is homeless and has been staying in a hotel.  I believe that he has at least 1 sibling and is from the [facility].  He has had many emergency room visits throughout the entire health care system in the [facility], no controlled prescriptions on the  monitoring system within the last year.      REVIEW OF SYSTEMS:  A 10-point review of systems is notable for complaints of abdominal pain on gastrointestinal exam, which appeared to be subjective.        MOST RECENT VITAL SIGNS:  Temperature 97.7, pulse 101, respiratory rate 17, blood pressure 94/60, oxygen saturation 95%.      MENTAL STATUS EXAMINATION:  Appearance:  The patient is disheveled, lying on his right side with his eyes closed.  He is uncooperative.  Speech is irritable in tone, slightly pressured.  Use of language appropriate.  Motor exam:  Fidgety.  He seems to be writhing a bit in the bed.  Muscle strength and tone adequate.  Coordination, station and gait not tested.  Affect is irritable.  Mood irritable.  Thought process is logical, coherent and goal directed, though somewhat perseverative on his physical " "complaints and assertion he is not getting appropriate care.  Thought content negative for specific hallucinations, delusions, suicidal or homicidal ideation.  He appears to have some paranoia in that he is very guarded and resistant to answering my question, asserting \"it's none of my business.\"  Insight and judgment mildly impaired.  Cognitive exam:  The patient is alert and oriented x 3.  Concentration intact.  Recent and remote memory intact.  General fund of knowledge average.      IMPRESSION:  The patient is a 29-year-old gentleman with probable schizophrenia, presenting with some paranoia, noncompliance with medical care.  I do not have any particular reason to believe that he is unable to provide informed consent.  He appears to have sufficient intellectual capacity, awareness of his medical condition, though his paranoia and underlying schizophrenic illness certainly influences his level of cooperation with medical care and his ability to interact smoothly with his providers.  He is not an imminent danger to self or others, he does not appear to be \"unable to meet his basic needs,\" and thus, I do not think he meets criteria for a 72-hour hold or commitment.  Obviously, we should restart his Zyprexa as we have done.  I will write for some p.r.n. Zyprexa Zydis for agitation and encourage cooperation.      DIAGNOSES:   1.  Paranoid schizophrenia by history.   2.  Cannabis use disorder.   3.  Medical noncompliance.   4.  Complaints of abdominal pain and constipation this admission.      PLAN:   1.  Medical management as you are.   2.  If he demands to leave, it would be at the discretion of the Hospitalist Service as to whether that is AMA.   3.  I do think he can provide informed consent, but his paranoia certainly makes interactions with providers difficult.  As such, I will add some p.r.n. Zyprexa.   4.  Reconsult Psychiatry if deemed necessary.  At this point, I cannot justify holding him or forcing " antipsychotic medication, but we will offer his usual Zyprexa dose.         CARIE ROMAN MD             D: 2020   T: 2020   MT: SHAZIA      Name:     MARCEL VASQUEZ   MRN:      [id number]        Account:       [id number]   :      1990           Consult Date:  2020      Document: [id number]

## 2020-01-22 NOTE — DISCHARGE SUMMARY
[facility]    Discharge Summary  Hospitalist    Date of Admission:  1/21/2020  Date of Discharge:  1/22/2020  Discharging Provider: Aamir Salgado PA-C    Discharge Diagnoses      Diabetic ketoacidosis without coma associated with type 1 diabetes mellitus (H)  Paroxysmal supraventricular tachycardia (H)  Schizoaffective disorder, bipolar type (H)    History of Present Illness   Reji Monahan is an 29 year old male with pertinent past medical history including frequent ED visits, homelessness, poorly controlled Type I diabetes, and schizophrenia who presented to [facility] ED on 1/21/2020 for evaluation and management of abdominal pain with onset last evening and vomiting with reported 6- episodes.     HPI from admission H&P:  Reji Monahan is a 29 year old male with pertinent past medical history including frequent ED visits, homelessness, poorly controlled Type I diabetes, and schizophrenia who presented to [facility] ED on 1/21/2020 for evaluation and management of abdominal pain with onset last evening and vomiting with reported 6- episodes since last evening. He endorses pain is similar to prior episodes of DKA. He specifically denies other acute concerns. He had a bowel movement this morning. He is refusing a full exam at present and requesting Dilaudid.      Emergency Department plan of care included lab work notable for bicarb 18, serum ketones 5.1, glucose 242, and venous gas 7.29, 38, 49, 18. Urinalysis reveals >1000 glucose and > 150 ketones. He received NS 1- liter bolus, IV Zofran, and 8- units subcutaneous Aspart. He is being registered to observation.      Hospital Course   Reji Monahan was admitted on 1/21/2020.  The following problems were addressed during his hospitalization:     Abdominal pain, refusing exam and imaging   Vomiting, by patient report  Type I diabetes, poorly controlled   History of DKA  Elevated serum ketones, 5.1  NAGMA  Last hemoglobin A1c 12.8% 12/30/2019. presented with  reports of abdominal pain, vomiting and refusal of exam or imaging. Unclear most recent dose of insulin; states it had frozen in his car yet later indicates he has Lantus but not Novolog. Laboratory studies not suggestive of DKA, however with elevated serum ketones and NAGMA, thus he was admitted under observation care. He was resumed on home regimen of insulin and hydrated with IV fluids. He was eventually agreeable to CT imaging which indicated chronic appearing mesenteric ischemia, possibly new splenomegaly. Pain was treated with IV hydration, IV toradol x limited doses. By the following morning he requested to be discharged without seeing GI provider for his chronic probable gastroparesis. I offered him Reglan (which he stated having taken prior), which he declined. GI business card provided should he desire to follow-up in future. Refills of pt's prandial insulin were sent to pharmacy, however patient left prior to obtaining and signing discharge paperwork. Attempts made to call patient and discuss, however did not answer, voicemail left.    Splenomegaly  Noted on CT A/P on admission. Attempted to push outside images to radiology to compare to prior CT to determine acute v chronic, however unsuccessful. HIV rapid negative. Peripheral smear pending at time of discharge; CBC is without abnormalities.     Marijuana use, current  Mr. Monahan admits to smoking marijuana with last use yesterday. Given onset of vomiting after smoking cyclic vomiting is included in the differentials.  - highly recommend cessation of substance use      Schizophrenia  Mr. Monahan tells me he has Zyprexa filled at the [facility] on [address]. I called the Pharmacy and [facility] has not filled a prescription for Zyprexa since October 2019. He is awake, alert, oriented. By his report he makes his own medical and legal decisions. He was seen by psychiatry who felt he is capable of providing informed consent, recommended continued use of zyprexa, increased  to q6h PRN. Prescription offered to patient at discharge, who declined and indicated he had this medication at home. As per admission H&P, this remains unclear, prescription sent to discharge pharmacy should patient require.     Supraventricular tachycardia, history of  During hospital stay 12/30/2019- 1/2/2020 he developed SVT. He was discharged on Cardizem, but has not filled the medication. He was evaluated by Cardiology with recommendation for OP appointment. Echocardiogram on 1/2/2020 with normal EF, normal RV, and possible LVH with recommendation for consideration of cardiac MRI. It does not appear he has followed up. New prescription sent to discharge pharmacy.     Homelessness  Mr. Monahan states he is staying at [facility] in [place]. He is unkept with very poor dentition. He states he is unable to take his insulin as it froze in his car. It is unclear if he is living in a hotel or his car as he is not forthcoming with information. Pt declined social work assistance with community resources, shelter options.    Aamir Salgado PA-C    Significant Results and Procedures   As noted    Pending Results   These results will be followed up by myself or complex care clinic via [facility]  Unresulted Labs Ordered in the Past 30 Days of this Admission     No orders found for last 31 day(s).          Code Status   Full Code       Primary Care Physician   Physician No Ref-Primary    Physical Exam   Temp: 97.7  F (36.5  C) Temp src: Oral BP: 94/60 Pulse: 101 Heart Rate: 113 Resp: 17 SpO2: 95 % O2 Device: None (Room air)    Vitals:    01/21/20 0821   Weight: 73 kg (161 lb)     Vital Signs with Ranges  Temp:  [95.8  F (35.4  C)-97.7  F (36.5  C)] 97.7  F (36.5  C)  Pulse:  [101-114] 101  Heart Rate:  [113] 113  Resp:  [16-20] 17  BP: ()/(59-77) 94/60  SpO2:  [95 %-99 %] 95 %  I/O last 3 completed shifts:  In: 1474.17 [P.O.:120; I.V.:1354.17]  Out: 2 [Emesis/NG output:2]    GEN: well-developed, disheveled, appears  comfortable  PULM: lungs CTA bilaterally, no increased work of breathing, no wheeze, rales, rhonchi  CV: RRR, S1 & S2, no murmur  GI: soft, nontender, nondistended, no guarding or rigidity, +BS in all 4 quadrants  SKIN: warm & dry without rash, wound, or pedal edema    Discharge Disposition   Discharged to home  Condition at discharge: Stable    Consultations This Hospital Stay   SOCIAL WORK IP CONSULT  PSYCHIATRY IP CONSULT  GASTROENTEROLOGY IP CONSULT    Time Spent on this Encounter   IAamir PA-C, personally saw the patient today and spent less than or equal to 30 minutes discharging this patient.    Discharge Orders      Reason for your hospital stay    Abdominal pain, mild DKA     Follow-up and recommended labs and tests     Follow up with complex care clinic through [facility] as scheduled next week     Activity    Your activity upon discharge: activity as tolerated     Diet    Follow this diet upon discharge: Orders Placed This Encounter      Moderate Consistent CHO Diet     Discharge Medications   Discharge Medication List as of 1/22/2020 10:19 AM      START taking these medications    Details   polyethylene glycol (MIRALAX/GLYCOLAX) powder Take 17 g (1 capful) by mouth daily, Disp-507 g, R-0, E-Prescribe         CONTINUE these medications which have CHANGED    Details   diltiazem ER (DILT-XR) 120 MG 24 hr capsule Take 1 capsule (120 mg) by mouth daily, Disp-30 capsule, R-0, E-Prescribe      !! insulin aspart (NOVOLOG FLEXPEN) 100 UNIT/ML pen Inject 8 Units Subcutaneous 4 times daily (with meals and nightly), Disp-3 mL, R-3, E-Prescribe      OLANZapine (ZYPREXA) 10 MG tablet Take 1 tablet (10 mg) by mouth every 6 hours as needed (agitation), No Print Out       !! - Potential duplicate medications found. Please discuss with provider.      CONTINUE these medications which have NOT CHANGED    Details   insulin glargine (LANTUS PEN) 100 UNIT/ML pen Inject 38 Units Subcutaneous At Bedtime , HistoricalIf  Lantus is not covered by insurance, may substitute Basaglar at same dose and frequency.        !! insulin aspart (NOVOLOG FLEXPEN) 100 UNIT/ML pen Inject Subcutaneous See Admin Instructions Sliding scale: Over 200 = 1 units for every 50., Historical       !! - Potential duplicate medications found. Please discuss with provider.        Allergies   No Known Allergies  Data   Most Recent 3 CBC's:  Recent Labs   Lab Test 01/22/20  0708 01/21/20  1655 01/21/20  0827   WBC 6.5 6.8 4.8   HGB 13.3 12.8* 13.9   MCV 80 80 80    225 196      Most Recent 3 BMP's:  Recent Labs   Lab Test 01/22/20  0708 01/21/20  2042 01/21/20  1552   * 136 138   POTASSIUM 3.7 3.9 4.0   CHLORIDE 105 108 111*   CO2 14* 15* 13*   BUN 13 13 13   CR 0.41* 0.41* 0.37*   ANIONGAP 13 13 14   LYNN 8.6 8.5 8.6   * 168* 169*     Most Recent 2 LFT's:  Recent Labs   Lab Test 01/21/20  0827 12/30/19  1815   AST 10 12   ALT 21 25   ALKPHOS 112 127   BILITOTAL 0.6 0.5     Most Recent INR's and Anticoagulation Dosing History:  Anticoagulation Dose History     There is no flowsheet data to display.        Most Recent 3 Troponin's:No lab results found.  Most Recent Cholesterol Panel:No lab results found.  Most Recent 6 Bacteria Isolates From Any Culture (See EPIC Reports for Culture Details):  Recent Labs   Lab Test 12/30/19  1814   CULT No growth     Most Recent TSH, T4 and A1c Labs:  Recent Labs   Lab Test 12/30/19  1402   A1C 12.8*     Results for orders placed or performed during the hospital encounter of 01/21/20   CT Abdomen Pelvis w Contrast    Narrative    CT ABDOMEN AND PELVIS WITH CONTRAST   1/21/2020 3:38 PM     HISTORY: Abdominal pain, acute, generalized.    TECHNIQUE:  CT abdomen and pelvis with 81 mL Isovue-370 IV. Radiation  dose for this scan was reduced using automated exposure control,  adjustment of the mA and/or kV according to patient size, or iterative  reconstruction technique.    COMPARISON: None available.    FINDINGS:    Lower chest: Lung bases are clear.    Abdomen/pelvis: No suspicious focal hepatic lesion. The gallbladder is  unremarkable. The spleen is enlarged measuring 15.6 cm in craniocaudal  dimension. No adrenal nodules. No radiodense kidney stones or  hydronephrosis. No main pancreatic ductal dilatation or solid  pancreatic mass.    No abnormally dilated bowel loops. No significant free fluid in the  abdomen or pelvis. Large amount of stool throughout the colon. The  appendix is visualized and appears normal.    Bones and soft tissue: No suspicious osseous lesion.      Impression    IMPRESSION:  1. No acute pathology in the abdomen or pelvis.  2. Splenomegaly.  3. Large amount of stool throughout the colon.    DONNELL COWART MD

## 2020-01-22 NOTE — PROGRESS NOTES
Observation Goals:    -diagnostic tests and consults completed and resulted - not met  -vital signs normal or at patient baseline - partially met  -tolerating oral intake to maintain hydration - not met  -returns to baseline functional status - not met

## 2020-01-22 NOTE — PROGRESS NOTES
Observation goals PRIOR TO DISCHARGE    Comments:   -diagnostic tests and consults completed and resulted : Partially met.  GI and Psych consulted.  Due to see.       -vital signs normal or at patient baseline : Met.       -tolerating oral intake to maintain hydration : Partially met.  NPO at this time for poss. GI procedure.      -returns to baseline functional status : Met.  Up IND.      -safe disposition plan has been identified : Partially met.  Patient identifies he lives in a motel and has a bus pass to take a bus ride home once discharged.

## 2020-01-22 NOTE — PROGRESS NOTES
Observation goals PRIOR TO DISCHARGE    Comments:   -diagnostic tests and consults completed and resulted : met.  GI and Psych consulted.  Psych consult completed.  See GI note.       -vital signs normal or at patient baseline : Met.       -tolerating oral intake to maintain hydration : Partially met.  Patient c/o nausea.  PRN zofran  ODT provided.  Patient states he is not hungry.       -returns to baseline functional status : Met.  Up IND.      -safe disposition plan has been identified : Met.  Patient identifies he lives in a motel and has a bus pass to take a bus ride home once discharged.

## 2020-01-22 NOTE — PROGRESS NOTES
Patient left the unit without his discharge medications.  He was provided a copy of the AVS earlier but did not sign the signature page.  Writer of this note will attempt to call him to let him know that his medications will need to be picked up from the discharge family.

## 2020-01-22 NOTE — PROGRESS NOTES
Care Coordination:    Per Kimberly, Pharmacy Liaison, Pt is unable to get another insulin Pen through insurance at this time.  Kimberly had been working with [employer] (Medicare Pharmacy provider) to get approval for Insulin but this was not available.  Kimberly is recommending Admelog be filled because it is more reasonable.  Kimberly is wondering if [facility] could fund this.  Spoke with Amy Segura, Manager who approved [facility] care funding request.  Was then updated by the bedside nurse who informed CC that patient had left.  Amy Rodriguez, Bedside RN called and left message for patient, hoping he would return.  Amy updated and still approved ermelinda care if patient does return to discharge pharmacy.  Kimberly updated that [facility] had been approved.  Medication will be waiting for patient.      Miriam Cristina RN BSN  Inpatient Care Coordination  [facility]  [phone]

## 2020-01-22 NOTE — PLAN OF CARE
Observation goals PRIOR TO DISCHARGE    Comments:   -diagnostic tests and consults completed and resulted : Partially met     -vital signs normal or at patient baseline : Met     -tolerating oral intake to maintain hydration : Not met     -returns to baseline functional status : Not met     -safe disposition plan has been identified : No

## 2020-01-22 NOTE — PLAN OF CARE
AVSS except pt tachy with  down to 101; abdominal pain controlled with toradol x 1; pt stated he had a small BM overnight, and voiding in adequate amounts; LR at 125/hr; refusing bed alarm; denied nausea; NPO except ice chips pending GI consult.

## 2020-01-22 NOTE — PROGRESS NOTES
Care Coordination:    Per Nursing/rounds Patient is ready for discharge today.  Needs follow up, no listed PCP.  Per notes Pt has been seen at [facility] coordinated care.  Met with patient and explained role. Pt states he wants to go today.  Per patient he has appointment set up with clinic within the week but did not share date/time.  Pt plans to discharge to extended stay hotel today.  Has a bus pass.  Voices no other needs for discharge.   Bedside nurse/PA updated.        Miriam Cristina RN BSN  Inpatient Care Coordination  [facility]  [phone]

## 2020-01-22 NOTE — PROVIDER NOTIFICATION
Writer of this note left a message for the patient that his medications would be available for pick-up today from the discharge pharmacy at 1400.

## 2020-01-25 ENCOUNTER — HOSPITAL ENCOUNTER (INPATIENT)
Facility: CLINIC | Age: 30
LOS: 1 days | Discharge: HOME | DRG: 638 | End: 2020-01-27
Attending: EMERGENCY MEDICINE | Admitting: HOSPITALIST
Payer: MEDICARE

## 2020-01-25 DIAGNOSIS — R07.9 CHEST PAIN, UNSPECIFIED TYPE: ICD-10-CM

## 2020-01-25 DIAGNOSIS — E10.10 DIABETIC KETOACIDOSIS WITHOUT COMA ASSOCIATED WITH TYPE 1 DIABETES MELLITUS (H): ICD-10-CM

## 2020-01-25 LAB
BASOPHILS # BLD AUTO: 0 10E9/L (ref 0–0.2)
BASOPHILS NFR BLD AUTO: 0.3 %
DIFFERENTIAL METHOD BLD: ABNORMAL
EOSINOPHIL # BLD AUTO: 0.1 10E9/L (ref 0–0.7)
EOSINOPHIL NFR BLD AUTO: 1.2 %
ERYTHROCYTE [DISTWIDTH] IN BLOOD BY AUTOMATED COUNT: 14.4 % (ref 10–15)
HCT VFR BLD AUTO: 42.9 % (ref 40–53)
HGB BLD-MCNC: 14.9 G/DL (ref 13.3–17.7)
IMM GRANULOCYTES # BLD: 0 10E9/L (ref 0–0.4)
IMM GRANULOCYTES NFR BLD: 0.6 %
LYMPHOCYTES # BLD AUTO: 1.5 10E9/L (ref 0.8–5.3)
LYMPHOCYTES NFR BLD AUTO: 21.7 %
MCH RBC QN AUTO: 26.7 PG (ref 26.5–33)
MCHC RBC AUTO-ENTMCNC: 34.7 G/DL (ref 31.5–36.5)
MCV RBC AUTO: 77 FL (ref 78–100)
MONOCYTES # BLD AUTO: 0.7 10E9/L (ref 0–1.3)
MONOCYTES NFR BLD AUTO: 9.5 %
NEUTROPHILS # BLD AUTO: 4.6 10E9/L (ref 1.6–8.3)
NEUTROPHILS NFR BLD AUTO: 66.7 %
NRBC # BLD AUTO: 0 10*3/UL
NRBC BLD AUTO-RTO: 0 /100
PLATELET # BLD AUTO: 254 10E9/L (ref 150–450)
RBC # BLD AUTO: 5.59 10E12/L (ref 4.4–5.9)
WBC # BLD AUTO: 7 10E9/L (ref 4–11)

## 2020-01-25 PROCEDURE — 83880 ASSAY OF NATRIURETIC PEPTIDE: CPT | Performed by: EMERGENCY MEDICINE

## 2020-01-25 PROCEDURE — 84484 ASSAY OF TROPONIN QUANT: CPT | Performed by: EMERGENCY MEDICINE

## 2020-01-25 PROCEDURE — 99285 EMERGENCY DEPT VISIT HI MDM: CPT | Mod: 25

## 2020-01-25 PROCEDURE — 83735 ASSAY OF MAGNESIUM: CPT | Performed by: EMERGENCY MEDICINE

## 2020-01-25 PROCEDURE — 85025 COMPLETE CBC W/AUTO DIFF WBC: CPT | Performed by: EMERGENCY MEDICINE

## 2020-01-25 PROCEDURE — 96360 HYDRATION IV INFUSION INIT: CPT

## 2020-01-25 PROCEDURE — 83690 ASSAY OF LIPASE: CPT | Performed by: EMERGENCY MEDICINE

## 2020-01-25 PROCEDURE — 93005 ELECTROCARDIOGRAM TRACING: CPT

## 2020-01-25 PROCEDURE — 80053 COMPREHEN METABOLIC PANEL: CPT | Performed by: EMERGENCY MEDICINE

## 2020-01-25 PROCEDURE — 85379 FIBRIN DEGRADATION QUANT: CPT | Performed by: EMERGENCY MEDICINE

## 2020-01-25 PROCEDURE — 96372 THER/PROPH/DIAG INJ SC/IM: CPT

## 2020-01-25 ASSESSMENT — ENCOUNTER SYMPTOMS
WEAKNESS: 1
SHORTNESS OF BREATH: 1
FEVER: 0
COUGH: 0
CHILLS: 1
LIGHT-HEADEDNESS: 1
NAUSEA: 1
DIAPHORESIS: 1

## 2020-01-25 ASSESSMENT — MIFFLIN-ST. JEOR: SCORE: 1734.64

## 2020-01-26 ENCOUNTER — APPOINTMENT (OUTPATIENT)
Dept: ULTRASOUND IMAGING | Facility: CLINIC | Age: 30
DRG: 638 | End: 2020-01-26
Attending: HOSPITALIST
Payer: MEDICARE

## 2020-01-26 ENCOUNTER — APPOINTMENT (OUTPATIENT)
Dept: CT IMAGING | Facility: CLINIC | Age: 30
DRG: 638 | End: 2020-01-26
Attending: EMERGENCY MEDICINE
Payer: MEDICARE

## 2020-01-26 LAB
ALBUMIN SERPL-MCNC: 3.7 G/DL (ref 3.4–5)
ALP SERPL-CCNC: 112 U/L (ref 40–150)
ALT SERPL W P-5'-P-CCNC: 21 U/L (ref 0–70)
ANION GAP SERPL CALCULATED.3IONS-SCNC: 13 MMOL/L (ref 3–14)
ANION GAP SERPL CALCULATED.3IONS-SCNC: 14 MMOL/L (ref 3–14)
ANION GAP SERPL CALCULATED.3IONS-SCNC: 6 MMOL/L (ref 3–14)
ANION GAP SERPL CALCULATED.3IONS-SCNC: 7 MMOL/L (ref 3–14)
ANION GAP SERPL CALCULATED.3IONS-SCNC: NORMAL MMOL/L (ref 6–17)
AST SERPL W P-5'-P-CCNC: 12 U/L (ref 0–45)
BASE DEFICIT BLDV-SCNC: 1.4 MMOL/L
BASE DEFICIT BLDV-SCNC: 10.3 MMOL/L
BILIRUB SERPL-MCNC: 0.5 MG/DL (ref 0.2–1.3)
BUN SERPL-MCNC: 10 MG/DL (ref 7–30)
BUN SERPL-MCNC: 8 MG/DL (ref 7–30)
BUN SERPL-MCNC: 9 MG/DL (ref 7–30)
BUN SERPL-MCNC: 9 MG/DL (ref 7–30)
BUN SERPL-MCNC: NORMAL MG/DL (ref 7–30)
CALCIUM SERPL-MCNC: 7.9 MG/DL (ref 8.5–10.1)
CALCIUM SERPL-MCNC: 8 MG/DL (ref 8.5–10.1)
CALCIUM SERPL-MCNC: 8 MG/DL (ref 8.5–10.1)
CALCIUM SERPL-MCNC: 8.8 MG/DL (ref 8.5–10.1)
CALCIUM SERPL-MCNC: NORMAL MG/DL (ref 8.5–10.1)
CHLORIDE SERPL-SCNC: 100 MMOL/L (ref 94–109)
CHLORIDE SERPL-SCNC: 103 MMOL/L (ref 94–109)
CHLORIDE SERPL-SCNC: 108 MMOL/L (ref 94–109)
CHLORIDE SERPL-SCNC: 108 MMOL/L (ref 94–109)
CHLORIDE SERPL-SCNC: NORMAL MMOL/L (ref 94–109)
CO2 SERPL-SCNC: 15 MMOL/L (ref 20–32)
CO2 SERPL-SCNC: 16 MMOL/L (ref 20–32)
CO2 SERPL-SCNC: 22 MMOL/L (ref 20–32)
CO2 SERPL-SCNC: 24 MMOL/L (ref 20–32)
CO2 SERPL-SCNC: NORMAL MMOL/L (ref 20–32)
CREAT SERPL-MCNC: 0.4 MG/DL (ref 0.66–1.25)
CREAT SERPL-MCNC: 0.4 MG/DL (ref 0.66–1.25)
CREAT SERPL-MCNC: 0.43 MG/DL (ref 0.66–1.25)
CREAT SERPL-MCNC: 0.55 MG/DL (ref 0.66–1.25)
CREAT SERPL-MCNC: NORMAL MG/DL (ref 0.66–1.25)
D DIMER PPP FEU-MCNC: 2.5 UG/ML FEU (ref 0–0.5)
ERYTHROCYTE [DISTWIDTH] IN BLOOD BY AUTOMATED COUNT: 14.5 % (ref 10–15)
GFR SERPL CREATININE-BSD FRML MDRD: >90 ML/MIN/{1.73_M2}
GFR SERPL CREATININE-BSD FRML MDRD: NORMAL ML/MIN/{1.73_M2}
GLUCOSE BLDC GLUCOMTR-MCNC: 128 MG/DL (ref 70–99)
GLUCOSE BLDC GLUCOMTR-MCNC: 129 MG/DL (ref 70–99)
GLUCOSE BLDC GLUCOMTR-MCNC: 138 MG/DL (ref 70–99)
GLUCOSE BLDC GLUCOMTR-MCNC: 143 MG/DL (ref 70–99)
GLUCOSE BLDC GLUCOMTR-MCNC: 144 MG/DL (ref 70–99)
GLUCOSE BLDC GLUCOMTR-MCNC: 176 MG/DL (ref 70–99)
GLUCOSE BLDC GLUCOMTR-MCNC: 180 MG/DL (ref 70–99)
GLUCOSE BLDC GLUCOMTR-MCNC: 217 MG/DL (ref 70–99)
GLUCOSE BLDC GLUCOMTR-MCNC: 220 MG/DL (ref 70–99)
GLUCOSE BLDC GLUCOMTR-MCNC: 239 MG/DL (ref 70–99)
GLUCOSE BLDC GLUCOMTR-MCNC: 247 MG/DL (ref 70–99)
GLUCOSE BLDC GLUCOMTR-MCNC: 248 MG/DL (ref 70–99)
GLUCOSE BLDC GLUCOMTR-MCNC: 249 MG/DL (ref 70–99)
GLUCOSE BLDC GLUCOMTR-MCNC: 273 MG/DL (ref 70–99)
GLUCOSE BLDC GLUCOMTR-MCNC: 274 MG/DL (ref 70–99)
GLUCOSE BLDC GLUCOMTR-MCNC: 295 MG/DL (ref 70–99)
GLUCOSE SERPL-MCNC: 133 MG/DL (ref 70–99)
GLUCOSE SERPL-MCNC: 245 MG/DL (ref 70–99)
GLUCOSE SERPL-MCNC: 259 MG/DL (ref 70–99)
GLUCOSE SERPL-MCNC: 271 MG/DL (ref 70–99)
GLUCOSE SERPL-MCNC: NORMAL MG/DL (ref 70–99)
HBA1C MFR BLD: 11.9 % (ref 0–5.6)
HCO3 BLDV-SCNC: 15 MMOL/L (ref 21–28)
HCO3 BLDV-SCNC: 24 MMOL/L (ref 21–28)
HCT VFR BLD AUTO: 40.8 % (ref 40–53)
HGB BLD-MCNC: 14.3 G/DL (ref 13.3–17.7)
INTERPRETATION ECG - MUSE: NORMAL
KETONES BLD-SCNC: 4.9 MMOL/L (ref 0–0.6)
KETONES BLD-SCNC: 5.4 MMOL/L (ref 0–0.6)
LIPASE SERPL-CCNC: 31 U/L (ref 73–393)
MAGNESIUM SERPL-MCNC: 2.2 MG/DL (ref 1.6–2.3)
MCH RBC QN AUTO: 26.8 PG (ref 26.5–33)
MCHC RBC AUTO-ENTMCNC: 35 G/DL (ref 31.5–36.5)
MCV RBC AUTO: 76 FL (ref 78–100)
NT-PROBNP SERPL-MCNC: 7 PG/ML (ref 0–450)
O2/TOTAL GAS SETTING VFR VENT: ABNORMAL %
O2/TOTAL GAS SETTING VFR VENT: NORMAL %
OXYHGB MFR BLDV: 74 %
PCO2 BLDV: 32 MM HG (ref 40–50)
PCO2 BLDV: 40 MM HG (ref 40–50)
PH BLDV: 7.28 PH (ref 7.32–7.43)
PH BLDV: 7.38 PH (ref 7.32–7.43)
PLATELET # BLD AUTO: 220 10E9/L (ref 150–450)
PO2 BLDV: 32 MM HG (ref 25–47)
PO2 BLDV: 36 MM HG (ref 25–47)
POTASSIUM SERPL-SCNC: 2.9 MMOL/L (ref 3.4–5.3)
POTASSIUM SERPL-SCNC: 3 MMOL/L (ref 3.4–5.3)
POTASSIUM SERPL-SCNC: 3.2 MMOL/L (ref 3.4–5.3)
POTASSIUM SERPL-SCNC: 3.5 MMOL/L (ref 3.4–5.3)
POTASSIUM SERPL-SCNC: 3.6 MMOL/L (ref 3.4–5.3)
POTASSIUM SERPL-SCNC: NORMAL MMOL/L (ref 3.4–5.3)
PROT SERPL-MCNC: 7.1 G/DL (ref 6.8–8.8)
RBC # BLD AUTO: 5.34 10E12/L (ref 4.4–5.9)
SODIUM SERPL-SCNC: 129 MMOL/L (ref 133–144)
SODIUM SERPL-SCNC: 132 MMOL/L (ref 133–144)
SODIUM SERPL-SCNC: 137 MMOL/L (ref 133–144)
SODIUM SERPL-SCNC: 138 MMOL/L (ref 133–144)
SODIUM SERPL-SCNC: NORMAL MMOL/L (ref 133–144)
TROPONIN I SERPL-MCNC: <0.015 UG/L (ref 0–0.04)
WBC # BLD AUTO: 6.1 10E9/L (ref 4–11)

## 2020-01-26 PROCEDURE — 25000132 ZZH RX MED GY IP 250 OP 250 PS 637: Mod: GY | Performed by: INTERNAL MEDICINE

## 2020-01-26 PROCEDURE — 25000131 ZZH RX MED GY IP 250 OP 636 PS 637: Mod: GY | Performed by: INTERNAL MEDICINE

## 2020-01-26 PROCEDURE — 21000000 ZZH R&B IMCU HEART CARE

## 2020-01-26 PROCEDURE — 82010 KETONE BODYS QUAN: CPT | Performed by: EMERGENCY MEDICINE

## 2020-01-26 PROCEDURE — 25000131 ZZH RX MED GY IP 250 OP 636 PS 637: Mod: GY | Performed by: EMERGENCY MEDICINE

## 2020-01-26 PROCEDURE — 80048 BASIC METABOLIC PNL TOTAL CA: CPT | Performed by: INTERNAL MEDICINE

## 2020-01-26 PROCEDURE — 00000146 ZZHCL STATISTIC GLUCOSE BY METER IP

## 2020-01-26 PROCEDURE — 99223 1ST HOSP IP/OBS HIGH 75: CPT | Mod: AI | Performed by: HOSPITALIST

## 2020-01-26 PROCEDURE — 71275 CT ANGIOGRAPHY CHEST: CPT

## 2020-01-26 PROCEDURE — 82010 KETONE BODYS QUAN: CPT | Performed by: HOSPITALIST

## 2020-01-26 PROCEDURE — 25800030 ZZH RX IP 258 OP 636: Performed by: EMERGENCY MEDICINE

## 2020-01-26 PROCEDURE — 25000125 ZZHC RX 250: Performed by: EMERGENCY MEDICINE

## 2020-01-26 PROCEDURE — 25000128 H RX IP 250 OP 636: Performed by: INTERNAL MEDICINE

## 2020-01-26 PROCEDURE — 83036 HEMOGLOBIN GLYCOSYLATED A1C: CPT | Performed by: HOSPITALIST

## 2020-01-26 PROCEDURE — 99207 ZZC NON-BILLABLE SERV PER CHARTING: CPT | Performed by: INTERNAL MEDICINE

## 2020-01-26 PROCEDURE — 85027 COMPLETE CBC AUTOMATED: CPT | Performed by: HOSPITALIST

## 2020-01-26 PROCEDURE — 36415 COLL VENOUS BLD VENIPUNCTURE: CPT | Performed by: INTERNAL MEDICINE

## 2020-01-26 PROCEDURE — 25800030 ZZH RX IP 258 OP 636: Performed by: INTERNAL MEDICINE

## 2020-01-26 PROCEDURE — 84484 ASSAY OF TROPONIN QUANT: CPT | Performed by: HOSPITALIST

## 2020-01-26 PROCEDURE — 93970 EXTREMITY STUDY: CPT

## 2020-01-26 PROCEDURE — 25800030 ZZH RX IP 258 OP 636: Performed by: HOSPITALIST

## 2020-01-26 PROCEDURE — 82803 BLOOD GASES ANY COMBINATION: CPT | Performed by: EMERGENCY MEDICINE

## 2020-01-26 PROCEDURE — 82805 BLOOD GASES W/O2 SATURATION: CPT | Performed by: INTERNAL MEDICINE

## 2020-01-26 PROCEDURE — 25000128 H RX IP 250 OP 636: Performed by: EMERGENCY MEDICINE

## 2020-01-26 PROCEDURE — 36415 COLL VENOUS BLD VENIPUNCTURE: CPT | Performed by: HOSPITALIST

## 2020-01-26 PROCEDURE — 80048 BASIC METABOLIC PNL TOTAL CA: CPT | Performed by: HOSPITALIST

## 2020-01-26 PROCEDURE — 25800025 ZZH RX 258: Performed by: INTERNAL MEDICINE

## 2020-01-26 PROCEDURE — 84132 ASSAY OF SERUM POTASSIUM: CPT | Performed by: INTERNAL MEDICINE

## 2020-01-26 PROCEDURE — 25000131 ZZH RX MED GY IP 250 OP 636 PS 637: Mod: GY | Performed by: HOSPITALIST

## 2020-01-26 RX ORDER — DEXTROSE MONOHYDRATE 25 G/50ML
25-50 INJECTION, SOLUTION INTRAVENOUS
Status: DISCONTINUED | OUTPATIENT
Start: 2020-01-26 | End: 2020-01-27

## 2020-01-26 RX ORDER — ACETAMINOPHEN 325 MG/1
650 TABLET ORAL EVERY 4 HOURS PRN
Status: DISCONTINUED | OUTPATIENT
Start: 2020-01-26 | End: 2020-01-27 | Stop reason: HOSPADM

## 2020-01-26 RX ORDER — LIDOCAINE 40 MG/G
CREAM TOPICAL
Status: DISCONTINUED | OUTPATIENT
Start: 2020-01-26 | End: 2020-01-27

## 2020-01-26 RX ORDER — AMOXICILLIN 250 MG
2 CAPSULE ORAL 2 TIMES DAILY PRN
Status: DISCONTINUED | OUTPATIENT
Start: 2020-01-26 | End: 2020-01-27 | Stop reason: HOSPADM

## 2020-01-26 RX ORDER — POLYETHYLENE GLYCOL 3350 17 G/17G
17 POWDER, FOR SOLUTION ORAL DAILY PRN
Status: DISCONTINUED | OUTPATIENT
Start: 2020-01-26 | End: 2020-01-27 | Stop reason: HOSPADM

## 2020-01-26 RX ORDER — OLANZAPINE 10 MG/1
10 TABLET ORAL EVERY 6 HOURS PRN
Status: DISCONTINUED | OUTPATIENT
Start: 2020-01-26 | End: 2020-01-27 | Stop reason: HOSPADM

## 2020-01-26 RX ORDER — IOPAMIDOL 755 MG/ML
63 INJECTION, SOLUTION INTRAVASCULAR ONCE
Status: COMPLETED | OUTPATIENT
Start: 2020-01-26 | End: 2020-01-26

## 2020-01-26 RX ORDER — DILTIAZEM HYDROCHLORIDE 120 MG/1
120 CAPSULE, EXTENDED RELEASE ORAL DAILY
Status: ON HOLD | COMMUNITY
End: 2020-12-23

## 2020-01-26 RX ORDER — DEXTROSE MONOHYDRATE 25 G/50ML
25-50 INJECTION, SOLUTION INTRAVENOUS
Status: DISCONTINUED | OUTPATIENT
Start: 2020-01-26 | End: 2020-01-27 | Stop reason: HOSPADM

## 2020-01-26 RX ORDER — ACETAMINOPHEN 650 MG/1
650 SUPPOSITORY RECTAL EVERY 4 HOURS PRN
Status: DISCONTINUED | OUTPATIENT
Start: 2020-01-26 | End: 2020-01-27 | Stop reason: HOSPADM

## 2020-01-26 RX ORDER — POTASSIUM CHLORIDE 1500 MG/1
20-40 TABLET, EXTENDED RELEASE ORAL
Status: DISCONTINUED | OUTPATIENT
Start: 2020-01-26 | End: 2020-01-27 | Stop reason: HOSPADM

## 2020-01-26 RX ORDER — POTASSIUM CHLORIDE 7.45 MG/ML
10 INJECTION INTRAVENOUS
Status: DISCONTINUED | OUTPATIENT
Start: 2020-01-26 | End: 2020-01-27 | Stop reason: HOSPADM

## 2020-01-26 RX ORDER — ONDANSETRON 4 MG/1
4 TABLET, ORALLY DISINTEGRATING ORAL EVERY 6 HOURS PRN
Status: DISCONTINUED | OUTPATIENT
Start: 2020-01-26 | End: 2020-01-27 | Stop reason: HOSPADM

## 2020-01-26 RX ORDER — NICOTINE POLACRILEX 4 MG
15-30 LOZENGE BUCCAL
Status: DISCONTINUED | OUTPATIENT
Start: 2020-01-26 | End: 2020-01-26

## 2020-01-26 RX ORDER — MAGNESIUM SULFATE HEPTAHYDRATE 40 MG/ML
4 INJECTION, SOLUTION INTRAVENOUS EVERY 4 HOURS PRN
Status: DISCONTINUED | OUTPATIENT
Start: 2020-01-26 | End: 2020-01-27 | Stop reason: HOSPADM

## 2020-01-26 RX ORDER — SODIUM CHLORIDE 9 MG/ML
INJECTION, SOLUTION INTRAVENOUS CONTINUOUS
Status: DISCONTINUED | OUTPATIENT
Start: 2020-01-26 | End: 2020-01-27 | Stop reason: HOSPADM

## 2020-01-26 RX ORDER — ONDANSETRON 2 MG/ML
4 INJECTION INTRAMUSCULAR; INTRAVENOUS EVERY 6 HOURS PRN
Status: DISCONTINUED | OUTPATIENT
Start: 2020-01-26 | End: 2020-01-27 | Stop reason: HOSPADM

## 2020-01-26 RX ORDER — POTASSIUM CHLORIDE 29.8 MG/ML
20 INJECTION INTRAVENOUS
Status: DISCONTINUED | OUTPATIENT
Start: 2020-01-26 | End: 2020-01-27 | Stop reason: HOSPADM

## 2020-01-26 RX ORDER — AMOXICILLIN 250 MG
1 CAPSULE ORAL 2 TIMES DAILY PRN
Status: DISCONTINUED | OUTPATIENT
Start: 2020-01-26 | End: 2020-01-27 | Stop reason: HOSPADM

## 2020-01-26 RX ORDER — NALOXONE HYDROCHLORIDE 0.4 MG/ML
.1-.4 INJECTION, SOLUTION INTRAMUSCULAR; INTRAVENOUS; SUBCUTANEOUS
Status: DISCONTINUED | OUTPATIENT
Start: 2020-01-26 | End: 2020-01-27 | Stop reason: HOSPADM

## 2020-01-26 RX ORDER — POTASSIUM CL/LIDO/0.9 % NACL 10MEQ/0.1L
10 INTRAVENOUS SOLUTION, PIGGYBACK (ML) INTRAVENOUS
Status: DISCONTINUED | OUTPATIENT
Start: 2020-01-26 | End: 2020-01-27 | Stop reason: HOSPADM

## 2020-01-26 RX ORDER — POTASSIUM CHLORIDE 1.5 G/1.58G
20-40 POWDER, FOR SOLUTION ORAL
Status: DISCONTINUED | OUTPATIENT
Start: 2020-01-26 | End: 2020-01-27 | Stop reason: HOSPADM

## 2020-01-26 RX ORDER — LIDOCAINE 40 MG/G
CREAM TOPICAL
Status: DISCONTINUED | OUTPATIENT
Start: 2020-01-26 | End: 2020-01-27 | Stop reason: HOSPADM

## 2020-01-26 RX ORDER — DEXTROSE MONOHYDRATE 25 G/50ML
25-50 INJECTION, SOLUTION INTRAVENOUS
Status: DISCONTINUED | OUTPATIENT
Start: 2020-01-26 | End: 2020-01-26

## 2020-01-26 RX ORDER — NICOTINE POLACRILEX 4 MG
15-30 LOZENGE BUCCAL
Status: DISCONTINUED | OUTPATIENT
Start: 2020-01-26 | End: 2020-01-27 | Stop reason: HOSPADM

## 2020-01-26 RX ORDER — BISACODYL 10 MG
10 SUPPOSITORY, RECTAL RECTAL DAILY PRN
Status: DISCONTINUED | OUTPATIENT
Start: 2020-01-26 | End: 2020-01-27 | Stop reason: HOSPADM

## 2020-01-26 RX ADMIN — SODIUM CHLORIDE 88 ML: 9 INJECTION, SOLUTION INTRAVENOUS at 01:12

## 2020-01-26 RX ADMIN — INSULIN ASPART 6 UNITS: 100 INJECTION, SOLUTION INTRAVENOUS; SUBCUTANEOUS at 03:38

## 2020-01-26 RX ADMIN — SODIUM CHLORIDE: 9 INJECTION, SOLUTION INTRAVENOUS at 12:26

## 2020-01-26 RX ADMIN — SODIUM CHLORIDE 1000 ML: 9 INJECTION, SOLUTION INTRAVENOUS at 02:26

## 2020-01-26 RX ADMIN — SODIUM CHLORIDE: 9 INJECTION, SOLUTION INTRAVENOUS at 21:19

## 2020-01-26 RX ADMIN — SODIUM CHLORIDE: 9 INJECTION, SOLUTION INTRAVENOUS at 15:43

## 2020-01-26 RX ADMIN — POTASSIUM CHLORIDE 40 MEQ: 1.5 POWDER, FOR SOLUTION ORAL at 17:29

## 2020-01-26 RX ADMIN — POTASSIUM CHLORIDE: 2 INJECTION, SOLUTION, CONCENTRATE INTRAVENOUS at 14:33

## 2020-01-26 RX ADMIN — POTASSIUM CHLORIDE 40 MEQ: 1.5 POWDER, FOR SOLUTION ORAL at 19:29

## 2020-01-26 RX ADMIN — OLANZAPINE 10 MG: 10 TABLET, FILM COATED ORAL at 17:18

## 2020-01-26 RX ADMIN — POTASSIUM CHLORIDE: 2 INJECTION, SOLUTION, CONCENTRATE INTRAVENOUS at 12:25

## 2020-01-26 RX ADMIN — INSULIN ASPART 6 UNITS: 100 INJECTION, SOLUTION INTRAVENOUS; SUBCUTANEOUS at 07:44

## 2020-01-26 RX ADMIN — SODIUM CHLORIDE: 9 INJECTION, SOLUTION INTRAVENOUS at 16:38

## 2020-01-26 RX ADMIN — SODIUM CHLORIDE, PRESERVATIVE FREE: 5 INJECTION INTRAVENOUS at 04:58

## 2020-01-26 RX ADMIN — SODIUM CHLORIDE: 9 INJECTION, SOLUTION INTRAVENOUS at 13:34

## 2020-01-26 RX ADMIN — SODIUM CHLORIDE 1000 ML: 9 INJECTION, SOLUTION INTRAVENOUS at 10:12

## 2020-01-26 RX ADMIN — INSULIN GLARGINE 20 UNITS: 100 INJECTION, SOLUTION SUBCUTANEOUS at 04:57

## 2020-01-26 RX ADMIN — SODIUM CHLORIDE: 9 INJECTION, SOLUTION INTRAVENOUS at 14:29

## 2020-01-26 RX ADMIN — POTASSIUM CHLORIDE: 2 INJECTION, SOLUTION, CONCENTRATE INTRAVENOUS at 22:46

## 2020-01-26 RX ADMIN — IOPAMIDOL 63 ML: 755 INJECTION, SOLUTION INTRAVENOUS at 01:12

## 2020-01-26 ASSESSMENT — MIFFLIN-ST. JEOR: SCORE: 1709.88

## 2020-01-26 ASSESSMENT — ACTIVITIES OF DAILY LIVING (ADL): ADLS_ACUITY_SCORE: 10

## 2020-01-26 NOTE — ED NOTES
"[facility]  ED Nurse Handoff Report    ED Chief complaint: Chest Pain (Intermittant CP through the day today=sharp, worse withy deep insp. No cough. AV=904 for paramedics. Pt. forgot to take Lantus earlier tonight. Given 324 ASA enroute. )      ED Diagnosis:   Final diagnoses:   Chest pain, unspecified type   Diabetic ketoacidosis without coma associated with type 1 diabetes mellitus (H)       Code Status: Full Code    Allergies: No Known Allergies    Patient Story: Reji Monahan is a 29 year old male with history of diabetes, DKA, SVT who presents today with chest pain. The patient states an intermittent sharp chest pain and palpitations that stared yesterday with a few 10-15 minutes episodes. Today he states the pain is more constant with frequent episodes.  Focused Assessment:  Pt. Is alert and orient. Times 3, currently not having CP discomfort or SOB. Episodes earlier in day lasted no more than 15 minutes. Cooperative and pleasant.     Treatments and/or interventions provided: NaCl 1000cc bolus.   Patient's response to treatments and/or interventions: Pt. Is currently CP free.     To be done/followed up on inpatient unit:  None    Does this patient have any cognitive concerns?: None    Activity level - Baseline/Home:  Independent  Activity Level - Current:   Independent    Patient's Preferred language: English   Needed?: No    Isolation: None  Infection: Not Applicable  Bariatric?: No    Vital Signs:   Vitals:    01/25/20 2309 01/26/20 0100   BP: 106/86 100/79   Pulse:  93   Resp: 18 16   Temp: 97.8  F (36.6  C)    SpO2: 99% 99%   Weight: 72.6 kg (160 lb)    Height: 1.854 m (6' 1\")        Cardiac Rhythm:     Was the PSS-3 completed:   Yes  What interventions are required if any?               Family Comments: None present  OBS brochure/video discussed/provided to patient/family: Yes              Name of person given brochure if not patient:                Relationship to patient:  "     For the majority of the shift this patient's behavior was Green.   Behavioral interventions performed were None.    ED NURSE PHONE NUMBER: [phone]

## 2020-01-26 NOTE — PROGRESS NOTES
.RECEIVING UNIT ED HANDOFF REVIEW    ED Nurse Handoff Report was reviewed by: Veena Negron RN on January 26, 2020 at 3:39 AM

## 2020-01-26 NOTE — ED NOTES
Bed: ED19  Expected date: 1/25/20  Expected time: 11:00 PM  Means of arrival: Ambulance  Comments:  TALYA 425 29M CP

## 2020-01-26 NOTE — PLAN OF CARE
A&O. VSS, RA.  CMS intact.  Denies chest pain.  Up Independent.  US done, ECHO pending.  BG check, per MD pt is still in DKA.  Pt transfer to [facility].  Report given to [facility] RN.  Pt transported to CICU with step force with meds.

## 2020-01-26 NOTE — ED PROVIDER NOTES
"History     Chief Complaint:  Chest pain    HPI  Reji Monahan is a 29 year old male with history of diabetes, DKA, SVT who presents today with chest pain. The patient states an intermittent sharp chest pain and palpitations that stared yesterday with a few 10-15 minutes episodes. Today he states the pain is more constant with frequent episodes. He states associated shortness of breath, weakness, lightheadedness, chills, diaphoresis, nausea and issues walking he describes as \"walking drunk\". He states the pain is exacerbated with deep inspirations and his shortness of breath worsens with exertion. He states his sugars have been fine and has yet to take his lantus tonight. He states he was recently hospitalized here for hyperglycemia. He denies a cough, fever, leg swelling/pain, coughing up blood. He denies a history of MI or blood clots. He [place] [facility] [place]. He has had an echo but not a stress test.    Patient's cardiac risk factors include:   CAD/CVA/TIA/PAD: no  Hypertension:   no  Hyperlipidemia:  no  Diabetes:   yes  Obesity (BMI>30): no  Hx tobacco use:  no  Male Sex:   yes  Age >45:  no  Familial Hx of CAD:  yes    Allergies:  No Known Allergies    Medications:    Diltiazem  Lantus  Miralax  Admelog    Past Medical History:    Diabetes mellitus  DKA  Schizophrenia  Diabetic neuropathy  Asthma  Paroxysmal SVT  Marijuana abuse  Asperger's disorder  ADD  Cellulitis  UTI     Past Surgical History:    Wound irrigation and debridement, upper left arm  Insert picc     Family History:    Brother - type I diabetes, alcohol/drug, pulmonary diseases  Father - stroke  Mother - high blood pressure, bipolar disorder, ovarian cancer, alcohol/drug    Social History:  The patient was accompanied to the ED alone.  Smoking Status: Never  Smokeless Tobacco: Never  Alcohol Use: No   Drug Use: No   Marital Status: Single    Review of Systems   Constitutional: Positive for chills and " "diaphoresis. Negative for fever.   Respiratory: Positive for shortness of breath. Negative for cough.    Cardiovascular: Positive for chest pain. Negative for leg swelling.   Gastrointestinal: Positive for nausea.   Neurological: Positive for weakness and light-headedness.   All other systems reviewed and are negative.     Physical Exam     Patient Vitals for the past 24 hrs:   BP Temp Pulse Heart Rate Resp SpO2 Height Weight   01/26/20 0300 125/89 -- 95 85 15 100 % -- --   01/26/20 0100 100/79 -- 93 97 16 99 % -- --   01/25/20 2309 106/86 97.8  F (36.6  C) -- 98 18 99 % 1.854 m (6' 1\") 72.6 kg (160 lb)       Physical Exam  General: Thin, appears to be in pain  Eyes: PERRL, conjunctivae pink no scleral icterus or conjunctival injection  ENT:  Moist mucus membranes, posterior oropharynx clear without erythema or exudates  Respiratory:  Lungs clear to auscultation bilaterally, no crackles/rubs/wheezes.  Good air movement  CV: Normal rate and rhythm, no murmurs/rubs/gallops  GI:  Abdomen soft and non-distended.  Normoactive BS.  No tenderness, guarding or rebound  Skin: Warm, dry.  No rashes or petechiae  Musculoskeletal: No peripheral edema or calf tenderness  Neuro: Alert and oriented to person/place/time  Psychiatric: Normal affect      Emergency Department Course     ECG:  Indication: chest pain  Time: 2334  Vent. Rate 86 bpm. WY interval 146. QRS duration 76. QT/QTc 360/430. P-R-T axis 66 67 63.  Normal sinus rhythm. Possible left atrial enlargement. Septal infarct, age undetermined. Abnormal ECG.  No significant change compared to EKG dated 1/2/2020.   Read time: 2335    Imaging:  Radiology results were communicated with the patient who voiced understanding of the findings.    CT Chest Pulmonary embolism w/ contrast   IMPRESSION:  1.  Negative for pulmonary embolism, as per radiology.     Laboratory:  Laboratory results were communicated with the patient who voiced understanding of the findings.    ISTAT VBG: pH " 7.28 (L) / PCO2 32 (L) / PO2 32 / Bicarb 15 (L)     Ketone Beta-Hydroxybutyrate Quantitative: 5.4 (H)     CBC: WBC: 7.0, HGB: 14.9, PLT: 254  CMP: Glucose: 259 (H), NA: 129 (L), CO2: 15 (L), Creatinine: 0.55 (L), o/w WNL      2320 Troponin: <0.015  Lipase: 31 (L)  BNP: 7  D-dimer: 2.5 (H)  Magnesium: 2.2    Interventions:  0226 NS 1,000 mLs IV  0338 Novolog 6 units subcutaneous    Emergency Department Course:  Nursing notes and vitals reviewed. (11:20 PM) I performed an exam of the patient as documented above.     IV inserted, blood sent to the lab for further testing, results above.     Medicine administered as documented above.    The patient was sent for CT while in the emergency department, results above.     0250 I rechecked the patient and discussed the results of their workup thus far.     0254 I consulted with Dr. Joy of the hospitalist services. They are in agreement to accept the patient for admission.    Findings and plan explained to the Patient who consents to admission. Discussed the patient with Dr. Joy, who will admit the patient to a med/surg bed for further monitoring, evaluation, and treatment.    Impression & Plan       Medical Decision Making:  Reji Monahan is a 29 year old male who comes today with complaint of chest pain and palpitations.  I was concerned about the possibility of a pulmonary embolism as he is recently been hospitalized and was tachycardic on my examination.  A d-dimer was used for risk stratification and was elevated.  A CT scan was thus obtained and showed no evidence of pneumothorax, pneumonia nor pulmonary embolism.  The EKG showed no acute ST segment changes.  The troponin was negative. Abdomen is benign. He is is found to have hyperglycemia and be in DKA.      We treated with cautious IV fluids and subcutaneous insulin to avoid ICU admission.  Although the ketones are elevated, his anion gap is essentially normal, suggesting that he may be able to go home  sooner rather than later and will not require ICU admission.He Repeat K showed that it is below 5 and so potassium replacement is being initiated as well as phosphorus replacement as needed.    I spoke with the hospitalist service who graciously agreed to admit the patient to the ICU for further management and care.      Diagnosis:    ICD-10-CM    1. Chest pain, unspecified type R07.9    2. Diabetic ketoacidosis without coma associated with type 1 diabetes mellitus (H) E10.10      Disposition:  Admitted to Dr. Joy.    Scribe Disclosure:  I, Russell Floyd, am serving as a scribe at 11:20 PM on 1/25/2020 to document services personally performed by Ellie Dan MD based on my observations and the provider's statements to me.      1/25/2020    EMERGENCY DEPARTMENT       Ellie Dan MD  01/26/20 5412

## 2020-01-26 NOTE — PHARMACY-ADMISSION MEDICATION HISTORY
Pharmacy Medication History  Admission medication history interview status for the 1/25/2020  admission is complete. See EPIC admission navigator for prior to admission medications     Medication history sources: Patient  Medication history source reliability: Good  Adherence assessment: Moderate    Significant changes made to the medication list:  Deleted Miralax  Adjusted insulin doses.        Additional medication history information:   Patient states even though he was discharged and is aware of the SIG on his insulin he takes 10 units before meals or 2 units per carb when he chooses to do carb counting. His sliding scale is 2 units (changed from 1) for every 50 increment over . He also states he ran out of his Diltiazem, but has an upcoming appointment with his cardiologist.    Medication reconciliation completed by provider prior to medication history? Yes    Time spent in this activity: 30min      Medications Prior to Admission   Medication Sig Dispense Refill Last Dose     diltiazem ER (DILT-XR) 120 MG 24 hr capsule Take 120 mg by mouth daily   1/25/2020 at Unknown time     insulin aspart (NOVOLOG PEN) 100 UNIT/ML pen Inject 10 Units Subcutaneous 3 times daily (before meals)   1/25/2020 at Unknown time     insulin aspart (NOVOLOG PEN) 100 UNIT/ML pen Inject 2 Units Subcutaneous Mealtime correction for BF greater than 200: 2 units for every 50 increment   1/25/2020 at Unknown time     insulin glargine (LANTUS PEN) 100 UNIT/ML pen Inject 38 Units Subcutaneous At Bedtime    1/25/2020 at Unknown time     OLANZapine (ZYPREXA) 10 MG tablet Take 1 tablet (10 mg) by mouth every 6 hours as needed (agitation) 30 tablet 0 1/25/2020 at Unknown time

## 2020-01-26 NOTE — H&P
[facility]    History and Physical  Hospitalist       Date of Admission:  1/25/2020  Date of Service (when I saw the patient): 01/26/20    Assessment & Plan   Reji Monahna is a 29 year old male who presents with elevated blood glucose and chest pain.  Admitted for further evaluation and treatment.    Elevated glucose and chest pain: Patient presenting to the emergency department with sharp intermittent chest pain and palpitations that last about 10 to 15 minutes, are frequently, is associated shortness of breath, weakness, lightheadedness, chills, diaphoresis, nausea, and difficulty walking, patient states that he has worsened pain with deep inspiration, and he is not taking his Lantus so the emergency department feels warranted to admit for elevated glucose.  Patient also states that he has been admitted for hyperglycemia in the past.  Patient does have chest pain this evening presenting to the emergency department with chest pain and intermittent chest pain worsened with deep breathing and associated lightheadedness.  Patient is on Lantus at home but has not taken it yet this evening.  Patient denies fever, headache, ear pain, sore throat, cough, dysuria, blood in the stool or urine, lower extremity edema, rash.  Initial level is elevated at 5.4, with a BNP of 7, and a troponin of less than 0.015.  Lipase is 31.  Anion gap is normal at 14.  Venous blood gas shows 7.2 8/32/32/15.  KG is normal sinus rhythm at 86 bpm.  -Serial troponin.  -Echocardiogram.  -Close hemodynamic monitoring.  -IV fluids.  -Supportive care.  -Of note patient has a care plan note entered, see note for further details.     Diabetes: Patient maintained on Lantus at home.  -Insulin sliding scale.  -Hold prior to admission lispro  -Resume prior to admission Lantus at reduced dose of 20 units at bedtime.  Home Lantus dose will be verified by pharmacy.    Hyponatremia: Patient with sodium level of 129 on admission.  -IV  fluids.    D-dimer elevated: Patient with d-dimer 2.5 on admission.  CTA is negative for pulmonary embolism.  -Bilateral lower extremity ultrasound to rule out DVT.    Cardiac, hypertension: Patient maintained on diltiazem prior to admission.  -Continue prior to admission diltiazem when verified by pharmacy.    Schizophrenia: Stable.  -Continue prior to admission Zyprexa.    DVT Prophylaxis: Pneumatic Compression Devices  Code Status: Full Code    Disposition: Inpatient.    Dr. Oz Joy D.O.  [facility] Hospitalist  Pager [phone]    Primary Care Physician   Physician No Ref-Primary    Chief Complaint   Chest pain and elevated glucose    History is obtained from the patient and medical records.     History of Present Illness   Reji Monahan is a 29 year old male who presents with elevated blood glucose and chest pain.  Admitted for further evaluation and treatment. Patient presenting to the emergency department with sharp intermittent chest pain and palpitations that last about 10 to 15 minutes, are frequently, is associated shortness of breath, weakness, lightheadedness, chills, diaphoresis, nausea, and difficulty walking, patient states that he has worsened pain with deep inspiration, and he is not taking his Lantus so the emergency department feels warranted to admit for elevated glucose.  Patient also states that he has been admitted for hyperglycemia in the past.  Patient does have chest pain this evening presenting to the emergency department with chest pain and intermittent chest pain worsened with deep breathing and associated lightheadedness.  Patient is on Lantus at home but has not taken it yet this evening.  Patient denies fever, headache, ear pain, sore throat, cough, dysuria, blood in the stool or urine, lower extremity edema, rash.  Initial level is elevated at 5.4, with a BNP of 7, and a troponin of less than 0.015.  Lipase is 31.  Anion gap is normal at 14.  Venous blood gas  shows 7.2 8/32/32/15.  KG is normal sinus rhythm at 86 bpm.    Past Medical History    I have reviewed this patient's medical history and updated it with pertinent information if needed.   Past Medical History:   Diagnosis Date     Diabetes mellitus (H)      Schizophrenia (H)        Past Surgical History   I have reviewed this patient's surgical history and updated it with pertinent information if needed.  No past surgical history on file.    Prior to Admission Medications   Prior to Admission Medications   Prescriptions Last Dose Informant Patient Reported? Taking?   OLANZapine (ZYPREXA) 10 MG tablet   No No   Sig: Take 1 tablet (10 mg) by mouth every 6 hours as needed (agitation)   diltiazem ER (DILT-XR) 120 MG 24 hr capsule   No No   Sig: Take 1 capsule (120 mg) by mouth daily   insulin glargine (LANTUS PEN) 100 UNIT/ML pen  Self Yes No   Sig: Inject 38 Units Subcutaneous At Bedtime    insulin lispro (ADMELOG SOLOSTAR) 100 UNIT/ML pen   No No   Sig: Inject 8 Units Subcutaneous 3 times daily (before meals)   insulin lispro (ADMELOG SOLOSTAR) 100 UNIT/ML pen   No No   Sig: Mealtime correction for BG greater than 200: 1u per each value over 50   polyethylene glycol (MIRALAX/GLYCOLAX) powder   No No   Sig: Take 17 g (1 capful) by mouth daily      Facility-Administered Medications: None     Allergies   No Known Allergies    Social History   I have reviewed this patient's social history and updated it with pertinent information if needed. Reji Monahan  reports that he has never smoked. He does not have any smokeless tobacco history on file. He reports that he does not drink alcohol or use drugs.    Family History   I have reviewed this patient's family history and updated it with pertinent information if needed.   Family History    Medical History Relation Name Comments   Diabetes Brother       Stroke Father       Cancer Mother   ovarian      Relation Name Status Comments   Brother         Father         Mother             Review of Systems   The 10 point Review of Systems is negative other than noted in the HPI or here.     Physical Exam   Temp: 97.8  F (36.6  C)   BP: 100/79 Pulse: 93 Heart Rate: 97 Resp: 16 SpO2: 99 % O2 Device: None (Room air)    Vital Signs with Ranges  Temp:  [97.8  F (36.6  C)] 97.8  F (36.6  C)  Pulse:  [93] 93  Heart Rate:  [97-98] 97  Resp:  [16-18] 16  BP: (100-106)/(79-86) 100/79  SpO2:  [99 %] 99 %  160 lbs 0 oz    GENERAL: Alert and oriented. NAD. Conversational, appropriate.   HEENT: Normocephalic. EOMI. No icterus or injection. Nares normal.   LUNGS: Clear to auscultation. No dyspnea at rest.   HEART: Regular rate. Extremities perfused.   ABDOMEN: Soft, nontender, and nondistended. Positive bowel sounds.   EXTREMITIES: No LE edema noted.   NEUROLOGIC: Moves extremities x4 on command. No acute focal neurologic abnormalities noted.     Data   Data reviewed today:  I personally reviewed both laboratory and imaging data.   Recent Labs   Lab 20  2320 20  0708 20  2042 20  1655  20  0827   WBC 7.0 6.5  --  6.8  --  4.8   HGB 14.9 13.3  --  12.8*  --  13.9   MCV 77* 80  --  80  --  80    197  --  225  --  196   * 132* 136  --    < > 138   POTASSIUM 3.5 3.7 3.9  --    < > 4.1   CHLORIDE 100 105 108  --    < > 106   CO2 15* 14* 15*  --    < > 18*   BUN 9 13 13  --    < > 14   CR 0.55* 0.41* 0.41*  --    < > 0.39*   ANIONGAP 14 13 13  --    < > 14   LYNN 8.8 8.6 8.5  --    < > 9.3   * 171* 168*  --    < > 242*   ALBUMIN 3.7  --   --   --   --  4.0   PROTTOTAL 7.1  --   --   --   --  7.5   BILITOTAL 0.5  --   --   --   --  0.6   ALKPHOS 112  --   --   --   --  112   ALT 21  --   --   --   --  21   AST 12  --   --   --   --  10   LIPASE 31*  --   --   --   --   --    TROPI <0.015  --   --   --   --   --     < > = values in this interval not displayed.       Recent Results (from the past 24 hour(s))   CT Chest Pulmonary Embolism w  Contrast    Narrative    EXAM: CTA CHEST PULMONARY EMBOLISM W CONTRAST  LOCATION: [facility]  DATE/TIME: 1/26/2020 1:03 AM    INDICATION: Shortness of breath elevated d-dimer  COMPARISON: None.  TECHNIQUE: CT angiogram chest during arterial phase injection IV contrast. 2D and 3D MIP reconstructions were performed by the CT technologist. Dose reduction techniques were used.   CONTRAST: 63mL Isovue-370    FINDINGS:  ANGIOGRAM CHEST: Pulmonary arteries are normal caliber and negative for pulmonary emboli. Thoracic aorta is negative for dissection. No CT evidence of right heart strain.    LUNGS AND PLEURA: Normal.    MEDIASTINUM/AXILLAE: Normal.    UPPER ABDOMEN: Normal.    MUSCULOSKELETAL: Normal.      Impression    IMPRESSION:  1.  Negative for pulmonary embolism

## 2020-01-26 NOTE — PROGRESS NOTES
"[facility] Internal Medicine Progress Note     Date of Service (when I saw the patient): 01/26/2020    REASON FOR ADMISSION / INTERVAL HISTORY:   elevated blood glucose and chest pain. Pt angry/ upset when told about starting insulin gtt. No cp/abd pain.     ASSESSMENT/PLAN:   DKA  Presenting with chest pain/ weakness. H/o non compliance with insulin. Pt had 2 recent admissions this month -last admission 1/21-22. Labs on 1/22 on discharge showed persistent metabolic acidosis with bicarb of 13 and AG 13. He also had elevated ketones at that time.  Pt says he was taking his insulin. No sn/sx of infection. Now presented again in DKA with bicarb of 15, AG 14. BS >250s. Appeared dry/ tachycardic. Was admitted on lantus/ iss. Continues to be acidotic with elevated BS.  -moved to [facility] [place] for insulin gtt. Would need to continue insulin gtt until co2 atleast 22 and above. If BS low, may need to add d10 unti acidosis resolves. Will ck labs at 3pm and further eval.    CHEST PAIN  ? Etiology. May be epigastric/abd discomfort from DKA. Ekg/ trop unremarkable. Could be gastritis/ oesophagitis.  Pain resolved. No further intervention. If re-current, consider protonix    HYPO NA  Due to hyperglycemia. Correct DKA as above and follow    H/O HTN  Stable BP here. Will not start diltiazem(home med)    SCHIZOPHRENIA  Continue meds    DISPO  Will be in hospital 1-2 days.      SKIP CERDA MD   Pg [phone]    DVT Prhylaxis: Low Risk/Ambulatory with no VTE prophylaxis indicated  Code Status: Full Code    ROS:  As described in A/P and Exam.  Otherwise ALL are  negative.    PHYSICAL EXAM:  All vitals have been reviewed    Blood pressure 112/84, pulse 103, temperature 98.2  F (36.8  C), temperature source Oral, resp. rate 15, height 1.854 m (6' 1\"), weight 70.1 kg (154 lb 8.7 oz), SpO2 100 %.    I/O this shift:  In: 1480 [P.O.:480; IV Piggyback:1000]  Out: -     GENERAL APPEARANCE: healthy, alert and no distress  EYES: conjunctiva " clear, eyes grossly normal  HENT: external ears and nose normal   NECK: supple, no masses or adenopathy  RESP: lungs clear to auscultation - no rales, rhonchi or wheezes  CV: regular rate and rhythm, normal S1 S2, no S3 or S4 and no murmur, click or rub   ABDOMEN: soft, nontender, no HSM or masses and bowel sounds normal  MS: no clubbing, cyanosis; no edema  SKIN: clear without significant rashes or lesions  NEURO: -non-focal moves all 4 extr    ROUTINE  LABS (Last four results)  CMP  Recent Labs   Lab 01/26/20  0457 01/25/20  2320 01/22/20  0708 01/21/20  2042  01/21/20  0827   *  Canceled, Test credited 129* 132* 136   < > 138   POTASSIUM 3.6  Canceled, Test credited 3.5 3.7 3.9   < > 4.1   CHLORIDE 103  Canceled, Test credited 100 105 108   < > 106   CO2 16*  Canceled, Test credited 15* 14* 15*   < > 18*   ANIONGAP 13  Canceled, Test credited 14 13 13   < > 14   *  Canceled, Test credited 259* 171* 168*   < > 242*   BUN 8  Canceled, Test credited 9 13 13   < > 14   CR 0.40*  Canceled, Test credited 0.55* 0.41* 0.41*   < > 0.39*   GFRESTIMATED >90  Canceled, Test credited >90 >90 >90   < > >90   GFRESTBLACK >90  Canceled, Test credited >90 >90 >90   < > >90   LYNN 8.0*  Canceled, Test credited 8.8 8.6 8.5   < > 9.3   MAG  --  2.2  --   --   --   --    PROTTOTAL  --  7.1  --   --   --  7.5   ALBUMIN  --  3.7  --   --   --  4.0   BILITOTAL  --  0.5  --   --   --  0.6   ALKPHOS  --  112  --   --   --  112   AST  --  12  --   --   --  10   ALT  --  21  --   --   --  21    < > = values in this interval not displayed.     CBC  Recent Labs   Lab 01/26/20  0457 01/25/20  2320 01/22/20  0708 01/21/20  1655   WBC 6.1 7.0 6.5 6.8   RBC 5.34 5.59 5.02 4.89   HGB 14.3 14.9 13.3 12.8*   HCT 40.8 42.9 40.2 39.3*   MCV 76* 77* 80 80   MCH 26.8 26.7 26.5 26.2*   MCHC 35.0 34.7 33.1 32.6   RDW 14.5 14.4 14.2 14.1    254 197 225     INRNo lab results found in last 7 days.  Arterial Blood Gas  Recent  Labs   Lab 01/26/20  0223 01/21/20  1552 01/21/20  0911   O2PER Room Air Room air 0       Recent Results (from the past 24 hour(s))   CT Chest Pulmonary Embolism w Contrast    Narrative    EXAM: CTA CHEST PULMONARY EMBOLISM W CONTRAST  LOCATION: [facility]  DATE/TIME: 1/26/2020 1:03 AM    INDICATION: Shortness of breath elevated d-dimer  COMPARISON: None.  TECHNIQUE: CT angiogram chest during arterial phase injection IV contrast. 2D and 3D MIP reconstructions were performed by the CT technologist. Dose reduction techniques were used.   CONTRAST: 63mL Isovue-370    FINDINGS:  ANGIOGRAM CHEST: Pulmonary arteries are normal caliber and negative for pulmonary emboli. Thoracic aorta is negative for dissection. No CT evidence of right heart strain.    LUNGS AND PLEURA: Normal.    MEDIASTINUM/AXILLAE: Normal.    UPPER ABDOMEN: Normal.    MUSCULOSKELETAL: Normal.      Impression    IMPRESSION:  1.  Negative for pulmonary embolism   US Lower Extremity Venous Duplex Bilateral    Narrative    VENOUS ULTRASOUND BILATERAL LOWER EXTREMITY  1/26/2020 8:41 AM     HISTORY: Rule out deep vein thrombosis, elevated D-dimer.    COMPARISON: None.    FINDINGS:  Examination of the deep veins with graded compression and  color flow Doppler with spectral wave form analysis shows no evidence  of thrombus in the common femoral vein, femoral vein, popliteal vein  or calf veins.  There is no venous insufficiency.      Impression    IMPRESSION: No evidence of deep venous thrombosis.    ARELY WELCH MD

## 2020-01-26 NOTE — PROVIDER NOTIFICATION
1634: Dr. Gonzalez paged regarding BMP and blood gas results. K+ is now 2.9.  Lab recheck in progress. Waiting for call back.    1720: callback received. Continue insulin drip. TORB for BMP lab to be drawn at 2000h, if Co2 is >22, order to stop insulin gtt, start PTA lantus (suspects 35units), and high dose sliding dose.

## 2020-01-26 NOTE — PLAN OF CARE
Patient alert and oriented, independent in room, here with chest pain, blood sugar check,274 at 0710. Staff will continue to monitor.

## 2020-01-27 VITALS
WEIGHT: 161.6 LBS | HEART RATE: 85 BPM | SYSTOLIC BLOOD PRESSURE: 119 MMHG | BODY MASS INDEX: 21.42 KG/M2 | TEMPERATURE: 96.7 F | DIASTOLIC BLOOD PRESSURE: 84 MMHG | HEIGHT: 73 IN | RESPIRATION RATE: 14 BRPM | OXYGEN SATURATION: 99 %

## 2020-01-27 LAB
ANION GAP SERPL CALCULATED.3IONS-SCNC: 3 MMOL/L (ref 3–14)
BUN SERPL-MCNC: 9 MG/DL (ref 7–30)
CALCIUM SERPL-MCNC: 8.5 MG/DL (ref 8.5–10.1)
CHLORIDE SERPL-SCNC: 109 MMOL/L (ref 94–109)
CO2 SERPL-SCNC: 26 MMOL/L (ref 20–32)
CREAT SERPL-MCNC: 0.43 MG/DL (ref 0.66–1.25)
GFR SERPL CREATININE-BSD FRML MDRD: >90 ML/MIN/{1.73_M2}
GLUCOSE BLDC GLUCOMTR-MCNC: 128 MG/DL (ref 70–99)
GLUCOSE BLDC GLUCOMTR-MCNC: 138 MG/DL (ref 70–99)
GLUCOSE BLDC GLUCOMTR-MCNC: 152 MG/DL (ref 70–99)
GLUCOSE BLDC GLUCOMTR-MCNC: 161 MG/DL (ref 70–99)
GLUCOSE BLDC GLUCOMTR-MCNC: 184 MG/DL (ref 70–99)
GLUCOSE BLDC GLUCOMTR-MCNC: 234 MG/DL (ref 70–99)
GLUCOSE BLDC GLUCOMTR-MCNC: 240 MG/DL (ref 70–99)
GLUCOSE SERPL-MCNC: 220 MG/DL (ref 70–99)
POTASSIUM SERPL-SCNC: 3.6 MMOL/L (ref 3.4–5.3)
POTASSIUM SERPL-SCNC: 3.7 MMOL/L (ref 3.4–5.3)
SODIUM SERPL-SCNC: 138 MMOL/L (ref 133–144)

## 2020-01-27 PROCEDURE — 80048 BASIC METABOLIC PNL TOTAL CA: CPT | Performed by: HOSPITALIST

## 2020-01-27 PROCEDURE — 80048 BASIC METABOLIC PNL TOTAL CA: CPT | Performed by: INTERNAL MEDICINE

## 2020-01-27 PROCEDURE — 36415 COLL VENOUS BLD VENIPUNCTURE: CPT | Performed by: HOSPITALIST

## 2020-01-27 PROCEDURE — 36415 COLL VENOUS BLD VENIPUNCTURE: CPT | Performed by: INTERNAL MEDICINE

## 2020-01-27 PROCEDURE — 99239 HOSP IP/OBS DSCHRG MGMT >30: CPT | Performed by: HOSPITALIST

## 2020-01-27 PROCEDURE — 84132 ASSAY OF SERUM POTASSIUM: CPT | Performed by: HOSPITALIST

## 2020-01-27 PROCEDURE — 25000131 ZZH RX MED GY IP 250 OP 636 PS 637: Mod: GY | Performed by: HOSPITALIST

## 2020-01-27 PROCEDURE — 00000146 ZZHCL STATISTIC GLUCOSE BY METER IP

## 2020-01-27 RX ORDER — DEXTROSE MONOHYDRATE 25 G/50ML
25-50 INJECTION, SOLUTION INTRAVENOUS
Status: DISCONTINUED | OUTPATIENT
Start: 2020-01-27 | End: 2020-01-27

## 2020-01-27 RX ORDER — ONDANSETRON 4 MG/1
4 TABLET, ORALLY DISINTEGRATING ORAL EVERY 6 HOURS PRN
Qty: 20 TABLET | Refills: 0 | Status: SHIPPED | OUTPATIENT
Start: 2020-01-27 | End: 2020-06-23

## 2020-01-27 RX ORDER — NICOTINE POLACRILEX 4 MG
15-30 LOZENGE BUCCAL
Status: DISCONTINUED | OUTPATIENT
Start: 2020-01-27 | End: 2020-01-27

## 2020-01-27 RX ADMIN — INSULIN GLARGINE 30 UNITS: 100 INJECTION, SOLUTION SUBCUTANEOUS at 10:14

## 2020-01-27 ASSESSMENT — MIFFLIN-ST. JEOR: SCORE: 1741.89

## 2020-01-27 NOTE — PROGRESS NOTES
Called by nursing staff with report of patient adamantly declining insulin gtt, switched to insulin sliding scale.

## 2020-01-27 NOTE — DISCHARGE SUMMARY
[facility]  Hospitalist Discharge Summary       Date of Admission:  1/25/2020  Date of Discharge:  1/27/2020  Discharging Provider: Elodia Damon MD      Discharge Diagnoses   DKA    Follow-ups Needed After Discharge   Follow-up Appointments     Follow-up and recommended labs and tests       Follow up with primary care provider, Bogdan Jhaveri MD,as scheduled to   evaluate medication change and for hospital follow- up.               Unresulted Labs Ordered in the Past 30 Days of this Admission     No orders found from 12/26/2019 to 1/26/2020.      These results will be followed up by none    Discharge Disposition   Discharged to home  Condition at discharge: Stable    Hospital Course   DKA  Presenting with chest pain/ weakness. H/o non compliance with insulin. Pt had 2 recent admissions this month, he was adamant about leaving hospital, and he left hospital without even picking up insulin last time but claims he was using insulin PTA. No sn/sx of infection. Now presented again with low bicarb of 15 and acidosis though AG 14 which is just high normal. BS >250s. Appeared dry/ tachycardic. Was admitted on lantus/ iss. Continues to be acidotic with elevated BS.  -treated per DKA protocol with insulin drip and IVF. Resolved. Transitioned to Lantus and pre meal novolog. Discussed regarding medication compliance.      CHEST PAIN  ? Etiology. May be epigastric/abd discomfort from DKA. Ekg/ trop unremarkable. Dimer was positive, CT PE negative and US negative for DVT. Could be gastritis/ oesophagitis.  Pain resolved. No further intervention. If re-current, consider protonix. Patient has recurrent admissions for DKA due to non compliance but he presents with pain issues. As above, work up negative and no pain today.     Pseudohyponatremia  Due to hyperglycemia     H/O HTN  PTA diltiazem resumed     SCHIZOPHRENIA  Continued with PTA  Zyprexa    Consultations This Hospital Stay   None    Code Status   Full  Code    Time Spent on this Encounter   I, Elodia Damon MD, personally saw the patient today and spent greater than 30 minutes discharging this patient.       Elodia Damon MD  [facility]  ______________________________________________________________________    Physical Exam   Vital Signs: Temp: 96.7  F (35.9  C) Temp src: Axillary BP: 119/84 Pulse: 85 Heart Rate: 86 Resp: 14 SpO2: 99 % O2 Device: None (Room air)    Weight: 161 lbs 9.6 oz    General: AAOx3, appears comfortable.  HEENT: PERRLA EOMI. Mucosa moist.   Lungs: Bilateral equal air entry. Clear to auscultation, normal work of breathing.   CVS: S1S2 regular, no tachycardia or murmur.   Abdomen: Soft, NT, ND. BS heard.  MSK: No edema or deformities.  Neuro: AAOX3. CN 2-12 normal. Strength symmetrical.  Skin: No rash.        Primary Care Physician   Bogdan Jhaveri MD    Discharge Orders      Reason for your hospital stay    Diabetic ketosis.     Follow-up and recommended labs and tests     Follow up with primary care provider, Bogdan Jhaveri MD,as scheduled to evaluate medication change and for hospital follow- up.     Activity    Your activity upon discharge: activity as tolerated     Full Code     Diet    Follow this diet upon discharge: Orders Placed This Encounter      Moderate Carbohydrate Diet       Significant Results and Procedures   Most Recent 3 CBC's:  Recent Labs   Lab Test 01/26/20  0457 01/25/20  2320 01/22/20  0708   WBC 6.1 7.0 6.5   HGB 14.3 14.9 13.3   MCV 76* 77* 80    254 197     Most Recent 3 BMP's:  Recent Labs   Lab Test 01/27/20  0527 01/27/20  0052 01/26/20 2021 01/26/20  1538     --  137  --  138   POTASSIUM 3.6 3.7 3.2*   < > 2.9*   CHLORIDE 109  --  108  --  108   CO2 26  --  22  --  24   BUN 9  --  10  --  9   CR 0.43*  --  0.43*  --  0.40*   ANIONGAP 3  --  7  --  6   LYNN 8.5  --  8.0*  --  7.9*   *  --  271*  --  133*    < > = values in this interval not displayed.     Most Recent  2 LFT's:  Recent Labs   Lab Test 01/25/20  2320 01/21/20  0827   AST 12 10   ALT 21 21   ALKPHOS 112 112   BILITOTAL 0.5 0.6     Most Recent 3 INR's:No lab results found.  Most Recent 6 glucoses:  Recent Labs   Lab Test 01/27/20  0527 01/26/20  2021 01/26/20  1538 01/26/20  0457 01/25/20  2320 01/22/20  0708   * 271* 133* 245*  Canceled, Test credited 259* 171*     Most Recent Urinalysis:  Recent Labs   Lab Test 01/21/20  0939   COLOR Light Yellow   APPEARANCE Clear   URINEGLC >1000*   URINEBILI Negative   URINEKETONE >150*   SG 1.028   UBLD Negative   URINEPH 5.5   PROTEIN Negative   NITRITE Negative   LEUKEST Negative   RBCU <1   WBCU 1     Most Recent ABG:No lab results found.,   Results for orders placed or performed during the hospital encounter of 01/25/20   CT Chest Pulmonary Embolism w Contrast    Narrative    EXAM: CTA CHEST PULMONARY EMBOLISM W CONTRAST  LOCATION: [facility]  DATE/TIME: 1/26/2020 1:03 AM    INDICATION: Shortness of breath elevated d-dimer  COMPARISON: None.  TECHNIQUE: CT angiogram chest during arterial phase injection IV contrast. 2D and 3D MIP reconstructions were performed by the CT technologist. Dose reduction techniques were used.   CONTRAST: 63mL Isovue-370    FINDINGS:  ANGIOGRAM CHEST: Pulmonary arteries are normal caliber and negative for pulmonary emboli. Thoracic aorta is negative for dissection. No CT evidence of right heart strain.    LUNGS AND PLEURA: Normal.    MEDIASTINUM/AXILLAE: Normal.    UPPER ABDOMEN: Normal.    MUSCULOSKELETAL: Normal.      Impression    IMPRESSION:  1.  Negative for pulmonary embolism   US Lower Extremity Venous Duplex Bilateral    Narrative    VENOUS ULTRASOUND BILATERAL LOWER EXTREMITY  1/26/2020 8:41 AM     HISTORY: Rule out deep vein thrombosis, elevated D-dimer.    COMPARISON: None.    FINDINGS:  Examination of the deep veins with graded compression and  color flow Doppler with spectral wave form analysis shows no  evidence  of thrombus in the common femoral vein, femoral vein, popliteal vein  or calf veins.  There is no venous insufficiency.      Impression    IMPRESSION: No evidence of deep venous thrombosis.    ARELY WELCH MD       Discharge Medications   Current Discharge Medication List      START taking these medications    Details   ondansetron (ZOFRAN-ODT) 4 MG ODT tab Take 1 tablet (4 mg) by mouth every 6 hours as needed for nausea or vomiting  Qty: 20 tablet, Refills: 0    Associated Diagnoses: Diabetic ketoacidosis without coma associated with type 1 diabetes mellitus (H)         CONTINUE these medications which have CHANGED    Details   insulin glargine (LANTUS PEN) 100 UNIT/ML pen Inject 38 Units Subcutaneous every morning    Comments: If Lantus is not covered by insurance, may substitute Basaglar at same dose and frequency.    Associated Diagnoses: Diabetic ketoacidosis without coma associated with type 1 diabetes mellitus (H)         CONTINUE these medications which have NOT CHANGED    Details   diltiazem ER (DILT-XR) 120 MG 24 hr capsule Take 120 mg by mouth daily      !! insulin aspart (NOVOLOG PEN) 100 UNIT/ML pen Inject 10 Units Subcutaneous 3 times daily (before meals)      !! insulin aspart (NOVOLOG PEN) 100 UNIT/ML pen Inject 2 Units Subcutaneous Mealtime correction for BF greater than 200: 2 units for every 50 increment      OLANZapine (ZYPREXA) 10 MG tablet Take 1 tablet (10 mg) by mouth every 6 hours as needed (agitation)  Qty: 30 tablet, Refills: 0    Associated Diagnoses: Schizoaffective disorder, bipolar type (H)       !! - Potential duplicate medications found. Please discuss with provider.        Allergies   No Known Allergies

## 2020-01-27 NOTE — PLAN OF CARE
Neuro- A&O  Most Recent Vitals- Temp: 98.4  F (36.9  C) Temp src: Oral BP: 108/77 Pulse: 85 Heart Rate: 78 Resp: 18 SpO2: 96 % O2 Device: None (Room air)    Tele/Cardiac- SR  Resp- WNL  Activity-  Ind  Pain- Denies  Drips- Insulin gtt until 0545. NS @ 75  Drains/Tubes- PIV x1  Skin- WNL  GI/- WNL  Aggression Color- Green  Plan- DKA protocol, now sliding scale.   Misc- Pt states he needs to discharge this AM.    Michael Novak RN

## 2020-01-27 NOTE — PLAN OF CARE
Pt is VSS on RA. LS clear, tele NSR. Denies chest pain. Up IND. BMP rechecked, K+ dropped, first half of replacement given, next due 2030. PRN zyprexa given for anxiety, pt reported effective.  Insulin gtt started at 1230, BG monitored q 1h. C/o L IV pain, warm compress given. Attempting to replace new IV.     Problem: Adult Inpatient Plan of Care  Goal: Patient-Specific Goal (Individualization)  1/26/2020 1816 by Fatimah Silver RN  Outcome: Improving     Problem: Adult Inpatient Plan of Care  Goal: Absence of Hospital-Acquired Illness or Injury  1/26/2020 1816 by Fatimah Silver RN  Outcome: Improving     Problem: Adult Inpatient Plan of Care  Goal: Optimal Comfort and Wellbeing  1/26/2020 1816 by Fatimah Silver RN  Outcome: Improving     Problem: Arrhythmia (Acute Coronary Syndrome)  Goal: Normalized Cardiac Rhythm  1/26/2020 1816 by Fatimah Silver RN  Outcome: Improving     Problem: Pain (Acute Coronary Syndrome)  Goal: Absence of Cardiac-Related Pain  1/26/2020 1816 by Fatimah Silver RN  Outcome: Improving     Problem: Hemodynamic Instability (Acute Coronary Syndrome)  Goal: Effective Cardiac Pump Function  1/26/2020 1816 by Fatimah Silver RN  Outcome: Improving     Problem: Tissue Perfusion (Acute Coronary Syndrome)  Goal: Adequate Tissue Perfusion  1/26/2020 1816 by Fatimah Silver RN  Outcome: Improving

## 2020-01-27 NOTE — PROGRESS NOTES
AM lantus given. Scripts from previous AMA hospitalization given to pt in agreement with care coordinator (as meds were covered for pt). Ok with Dr Damon. Diabetes education provided. All follow up appointments and medications reviewed with pt. Denies pain. Ambulatory at discharge. Refused wheel chair transport on discharge. PIV dc'd intact. Pt to take bus home from [facility].

## 2020-01-27 NOTE — PROVIDER NOTIFICATION
MD Notification    Notified Person: MD    Notified Person Name: Dr. Joy    Notification Date/Time: 1/27/2020 0269    Notification Interaction: Paged/phone    Purpose of Notification: Pt is adamant about coming off of the insulin gtt. C02 is improved. Pt also stating he needs to discharge this AM.     Orders Received: sliding scale insulin, NS @ 75, rounding provider will have to discharge pt.    Comments:

## 2020-01-27 NOTE — PROGRESS NOTES
"Full code. Insulin gtt. D5 1/2 NS 30 K at 150. Patient algorithm 2 at end of shift. Pt agitated, frustrated, and angry when awoken for blood sugar checks. Patient shouts out in frustrated, venting \"I'm tired... I'm crabby!\" to RN. CO2 22, on-call paged, continue insulin gtt as ordered. Continue to monitor.  "

## 2020-01-27 NOTE — DISCHARGE INSTRUCTIONS
Please remember to attend your upcoming appointments previously scheduled (call for time if needed):            Your [facility] insurance covers cab rides to/from your appointments.  You must call [phone] (or [phone]) at least two days prior to your appointment.  Max distance is 30 miles to Primary Care doctor, 60 miles to Specialty Care doctor.

## 2020-06-23 ENCOUNTER — APPOINTMENT (OUTPATIENT)
Dept: GENERAL RADIOLOGY | Facility: CLINIC | Age: 30
End: 2020-06-23
Attending: EMERGENCY MEDICINE
Payer: MEDICARE

## 2020-06-23 ENCOUNTER — HOSPITAL ENCOUNTER (EMERGENCY)
Facility: CLINIC | Age: 30
Discharge: LEFT BEFORE DISCHARGE | End: 2020-06-23
Attending: EMERGENCY MEDICINE | Admitting: EMERGENCY MEDICINE
Payer: MEDICARE

## 2020-06-23 VITALS
TEMPERATURE: 98.2 F | SYSTOLIC BLOOD PRESSURE: 105 MMHG | HEIGHT: 73 IN | DIASTOLIC BLOOD PRESSURE: 80 MMHG | BODY MASS INDEX: 21.32 KG/M2 | RESPIRATION RATE: 16 BRPM | OXYGEN SATURATION: 100 % | HEART RATE: 90 BPM

## 2020-06-23 DIAGNOSIS — R11.2 NAUSEA AND VOMITING, INTRACTABILITY OF VOMITING NOT SPECIFIED, UNSPECIFIED VOMITING TYPE: ICD-10-CM

## 2020-06-23 DIAGNOSIS — R10.84 ABDOMINAL PAIN, GENERALIZED: ICD-10-CM

## 2020-06-23 DIAGNOSIS — E10.10 DIABETIC KETOACIDOSIS WITHOUT COMA ASSOCIATED WITH TYPE 1 DIABETES MELLITUS (H): ICD-10-CM

## 2020-06-23 LAB
ALBUMIN SERPL-MCNC: 3.7 G/DL (ref 3.4–5)
ALBUMIN UR-MCNC: 30 MG/DL
ALP SERPL-CCNC: 96 U/L (ref 40–150)
ALT SERPL W P-5'-P-CCNC: 18 U/L (ref 0–70)
AMPHETAMINES UR QL SCN: NEGATIVE
ANION GAP SERPL CALCULATED.3IONS-SCNC: 19 MMOL/L (ref 3–14)
APPEARANCE UR: CLEAR
AST SERPL W P-5'-P-CCNC: 13 U/L (ref 0–45)
BARBITURATES UR QL: NEGATIVE
BASOPHILS # BLD AUTO: 0 10E9/L (ref 0–0.2)
BASOPHILS NFR BLD AUTO: 0.3 %
BENZODIAZ UR QL: NEGATIVE
BILIRUB SERPL-MCNC: 1.7 MG/DL (ref 0.2–1.3)
BILIRUB UR QL STRIP: NEGATIVE
BUN SERPL-MCNC: 17 MG/DL (ref 7–30)
CALCIUM SERPL-MCNC: 8.5 MG/DL (ref 8.5–10.1)
CANNABINOIDS UR QL SCN: POSITIVE
CHLORIDE SERPL-SCNC: 102 MMOL/L (ref 94–109)
CO2 SERPL-SCNC: 9 MMOL/L (ref 20–32)
COCAINE UR QL: NEGATIVE
COLOR UR AUTO: ABNORMAL
CREAT SERPL-MCNC: 0.44 MG/DL (ref 0.66–1.25)
DIFFERENTIAL METHOD BLD: ABNORMAL
EOSINOPHIL # BLD AUTO: 0 10E9/L (ref 0–0.7)
EOSINOPHIL NFR BLD AUTO: 0.4 %
ERYTHROCYTE [DISTWIDTH] IN BLOOD BY AUTOMATED COUNT: 14.5 % (ref 10–15)
GFR SERPL CREATININE-BSD FRML MDRD: >90 ML/MIN/{1.73_M2}
GLUCOSE BLDC GLUCOMTR-MCNC: 117 MG/DL (ref 70–99)
GLUCOSE BLDC GLUCOMTR-MCNC: 144 MG/DL (ref 70–99)
GLUCOSE BLDC GLUCOMTR-MCNC: 169 MG/DL (ref 70–99)
GLUCOSE BLDC GLUCOMTR-MCNC: 190 MG/DL (ref 70–99)
GLUCOSE BLDC GLUCOMTR-MCNC: 265 MG/DL (ref 70–99)
GLUCOSE SERPL-MCNC: 254 MG/DL (ref 70–99)
GLUCOSE UR STRIP-MCNC: >1000 MG/DL
HBA1C MFR BLD: 11.9 % (ref 0–5.6)
HCT VFR BLD AUTO: 44.9 % (ref 40–53)
HGB BLD-MCNC: 14.5 G/DL (ref 13.3–17.7)
HGB UR QL STRIP: NEGATIVE
IMM GRANULOCYTES # BLD: 0 10E9/L (ref 0–0.4)
IMM GRANULOCYTES NFR BLD: 0.4 %
KETONES BLD-SCNC: 4.3 MMOL/L (ref 0–0.6)
KETONES UR STRIP-MCNC: >150 MG/DL
LEUKOCYTE ESTERASE UR QL STRIP: NEGATIVE
LIPASE SERPL-CCNC: 40 U/L (ref 73–393)
LYMPHOCYTES # BLD AUTO: 1.6 10E9/L (ref 0.8–5.3)
LYMPHOCYTES NFR BLD AUTO: 19.4 %
MCH RBC QN AUTO: 26 PG (ref 26.5–33)
MCHC RBC AUTO-ENTMCNC: 32.3 G/DL (ref 31.5–36.5)
MCV RBC AUTO: 81 FL (ref 78–100)
MONOCYTES # BLD AUTO: 0.4 10E9/L (ref 0–1.3)
MONOCYTES NFR BLD AUTO: 4.4 %
MUCOUS THREADS #/AREA URNS LPF: PRESENT /LPF
NEUTROPHILS # BLD AUTO: 6 10E9/L (ref 1.6–8.3)
NEUTROPHILS NFR BLD AUTO: 75.1 %
NITRATE UR QL: NEGATIVE
NRBC # BLD AUTO: 0 10*3/UL
NRBC BLD AUTO-RTO: 0 /100
OPIATES UR QL SCN: NEGATIVE
PCP UR QL SCN: NEGATIVE
PH UR STRIP: 5.5 PH (ref 5–7)
PLATELET # BLD AUTO: 226 10E9/L (ref 150–450)
POTASSIUM SERPL-SCNC: 4.1 MMOL/L (ref 3.4–5.3)
PROT SERPL-MCNC: 7 G/DL (ref 6.8–8.8)
RBC # BLD AUTO: 5.57 10E12/L (ref 4.4–5.9)
RBC #/AREA URNS AUTO: 0 /HPF (ref 0–2)
SODIUM SERPL-SCNC: 130 MMOL/L (ref 133–144)
SOURCE: ABNORMAL
SP GR UR STRIP: 1.03 (ref 1–1.03)
TROPONIN I BLD-MCNC: 0 UG/L (ref 0–0.08)
UROBILINOGEN UR STRIP-MCNC: NORMAL MG/DL (ref 0–2)
WBC # BLD AUTO: 8 10E9/L (ref 4–11)
WBC #/AREA URNS AUTO: 1 /HPF (ref 0–5)

## 2020-06-23 PROCEDURE — 82805 BLOOD GASES W/O2 SATURATION: CPT | Performed by: EMERGENCY MEDICINE

## 2020-06-23 PROCEDURE — 87040 BLOOD CULTURE FOR BACTERIA: CPT | Performed by: EMERGENCY MEDICINE

## 2020-06-23 PROCEDURE — 85025 COMPLETE CBC W/AUTO DIFF WBC: CPT | Performed by: EMERGENCY MEDICINE

## 2020-06-23 PROCEDURE — 84484 ASSAY OF TROPONIN QUANT: CPT

## 2020-06-23 PROCEDURE — 83690 ASSAY OF LIPASE: CPT | Performed by: EMERGENCY MEDICINE

## 2020-06-23 PROCEDURE — 96376 TX/PRO/DX INJ SAME DRUG ADON: CPT

## 2020-06-23 PROCEDURE — 71045 X-RAY EXAM CHEST 1 VIEW: CPT

## 2020-06-23 PROCEDURE — 80053 COMPREHEN METABOLIC PANEL: CPT | Performed by: EMERGENCY MEDICINE

## 2020-06-23 PROCEDURE — 25000125 ZZHC RX 250: Performed by: EMERGENCY MEDICINE

## 2020-06-23 PROCEDURE — 96361 HYDRATE IV INFUSION ADD-ON: CPT

## 2020-06-23 PROCEDURE — 25800030 ZZH RX IP 258 OP 636: Performed by: EMERGENCY MEDICINE

## 2020-06-23 PROCEDURE — C9803 HOPD COVID-19 SPEC COLLECT: HCPCS

## 2020-06-23 PROCEDURE — 99284 EMERGENCY DEPT VISIT MOD MDM: CPT | Mod: 25

## 2020-06-23 PROCEDURE — 82010 KETONE BODYS QUAN: CPT | Performed by: EMERGENCY MEDICINE

## 2020-06-23 PROCEDURE — 25000128 H RX IP 250 OP 636

## 2020-06-23 PROCEDURE — 80307 DRUG TEST PRSMV CHEM ANLYZR: CPT | Performed by: EMERGENCY MEDICINE

## 2020-06-23 PROCEDURE — 25000128 H RX IP 250 OP 636: Performed by: EMERGENCY MEDICINE

## 2020-06-23 PROCEDURE — 96375 TX/PRO/DX INJ NEW DRUG ADDON: CPT

## 2020-06-23 PROCEDURE — 25800025 ZZH RX 258: Performed by: EMERGENCY MEDICINE

## 2020-06-23 PROCEDURE — 00000146 ZZHCL STATISTIC GLUCOSE BY METER IP

## 2020-06-23 PROCEDURE — 81001 URINALYSIS AUTO W/SCOPE: CPT | Mod: XU | Performed by: EMERGENCY MEDICINE

## 2020-06-23 PROCEDURE — 83036 HEMOGLOBIN GLYCOSYLATED A1C: CPT | Performed by: EMERGENCY MEDICINE

## 2020-06-23 PROCEDURE — 25000132 ZZH RX MED GY IP 250 OP 250 PS 637: Performed by: EMERGENCY MEDICINE

## 2020-06-23 PROCEDURE — 96365 THER/PROPH/DIAG IV INF INIT: CPT

## 2020-06-23 PROCEDURE — U0003 INFECTIOUS AGENT DETECTION BY NUCLEIC ACID (DNA OR RNA); SEVERE ACUTE RESPIRATORY SYNDROME CORONAVIRUS 2 (SARS-COV-2) (CORONAVIRUS DISEASE [COVID-19]), AMPLIFIED PROBE TECHNIQUE, MAKING USE OF HIGH THROUGHPUT TECHNOLOGIES AS DESCRIBED BY CMS-2020-01-R: HCPCS | Performed by: EMERGENCY MEDICINE

## 2020-06-23 RX ORDER — ONDANSETRON 2 MG/ML
INJECTION INTRAMUSCULAR; INTRAVENOUS
Status: COMPLETED
Start: 2020-06-23 | End: 2020-06-23

## 2020-06-23 RX ORDER — ONDANSETRON 2 MG/ML
4 INJECTION INTRAMUSCULAR; INTRAVENOUS
Status: COMPLETED | OUTPATIENT
Start: 2020-06-23 | End: 2020-06-23

## 2020-06-23 RX ORDER — PROCHLORPERAZINE 25 MG
25 SUPPOSITORY, RECTAL RECTAL EVERY 12 HOURS PRN
Status: CANCELLED | OUTPATIENT
Start: 2020-06-23

## 2020-06-23 RX ORDER — DEXTROSE MONOHYDRATE 25 G/50ML
25-50 INJECTION, SOLUTION INTRAVENOUS
Status: DISCONTINUED | OUTPATIENT
Start: 2020-06-23 | End: 2020-06-23 | Stop reason: HOSPADM

## 2020-06-23 RX ORDER — KETOROLAC TROMETHAMINE 15 MG/ML
15 INJECTION, SOLUTION INTRAMUSCULAR; INTRAVENOUS ONCE
Status: COMPLETED | OUTPATIENT
Start: 2020-06-23 | End: 2020-06-23

## 2020-06-23 RX ORDER — ONDANSETRON 4 MG/1
4 TABLET, ORALLY DISINTEGRATING ORAL EVERY 6 HOURS PRN
Status: CANCELLED | OUTPATIENT
Start: 2020-06-23

## 2020-06-23 RX ORDER — PROCHLORPERAZINE MALEATE 10 MG
10 TABLET ORAL EVERY 6 HOURS PRN
Status: CANCELLED | OUTPATIENT
Start: 2020-06-23

## 2020-06-23 RX ORDER — ONDANSETRON 2 MG/ML
4 INJECTION INTRAMUSCULAR; INTRAVENOUS EVERY 6 HOURS PRN
Status: CANCELLED | OUTPATIENT
Start: 2020-06-23

## 2020-06-23 RX ORDER — SODIUM CHLORIDE 9 MG/ML
1000 INJECTION, SOLUTION INTRAVENOUS CONTINUOUS
Status: DISCONTINUED | OUTPATIENT
Start: 2020-06-23 | End: 2020-06-23 | Stop reason: HOSPADM

## 2020-06-23 RX ORDER — DEXTROSE MONOHYDRATE 25 G/50ML
25-50 INJECTION, SOLUTION INTRAVENOUS
Status: CANCELLED | OUTPATIENT
Start: 2020-06-23

## 2020-06-23 RX ORDER — DEXTROSE MONOHYDRATE, SODIUM CHLORIDE, AND POTASSIUM CHLORIDE 50; 1.49; 4.5 G/1000ML; G/1000ML; G/1000ML
INJECTION, SOLUTION INTRAVENOUS CONTINUOUS
Status: CANCELLED | OUTPATIENT
Start: 2020-06-23

## 2020-06-23 RX ORDER — NICOTINE POLACRILEX 4 MG
15-30 LOZENGE BUCCAL
Status: CANCELLED | OUTPATIENT
Start: 2020-06-23

## 2020-06-23 RX ORDER — ONDANSETRON 4 MG/1
4 TABLET, ORALLY DISINTEGRATING ORAL EVERY 8 HOURS PRN
Qty: 9 TABLET | Refills: 0 | Status: ON HOLD | OUTPATIENT
Start: 2020-06-23 | End: 2020-08-04

## 2020-06-23 RX ORDER — NALOXONE HYDROCHLORIDE 0.4 MG/ML
.1-.4 INJECTION, SOLUTION INTRAMUSCULAR; INTRAVENOUS; SUBCUTANEOUS
Status: CANCELLED | OUTPATIENT
Start: 2020-06-23

## 2020-06-23 RX ADMIN — KETOROLAC TROMETHAMINE 15 MG: 15 INJECTION, SOLUTION INTRAMUSCULAR; INTRAVENOUS at 13:35

## 2020-06-23 RX ADMIN — FAMOTIDINE 20 MG: 10 INJECTION, SOLUTION INTRAVENOUS at 11:14

## 2020-06-23 RX ADMIN — ONDANSETRON 4 MG: 2 INJECTION INTRAMUSCULAR; INTRAVENOUS at 11:18

## 2020-06-23 RX ADMIN — SODIUM CHLORIDE: 9 INJECTION, SOLUTION INTRAVENOUS at 13:17

## 2020-06-23 RX ADMIN — SODIUM CHLORIDE 1000 ML: 9 INJECTION, SOLUTION INTRAVENOUS at 11:12

## 2020-06-23 RX ADMIN — ONDANSETRON 4 MG: 2 INJECTION INTRAMUSCULAR; INTRAVENOUS at 13:39

## 2020-06-23 RX ADMIN — KETOROLAC TROMETHAMINE 15 MG: 15 INJECTION, SOLUTION INTRAMUSCULAR; INTRAVENOUS at 11:12

## 2020-06-23 RX ADMIN — DEXTROSE AND SODIUM CHLORIDE 1000 ML: 5; 450 INJECTION, SOLUTION INTRAVENOUS at 13:23

## 2020-06-23 RX ADMIN — SODIUM CHLORIDE 6 UNITS: 9 INJECTION, SOLUTION INTRAVENOUS at 12:06

## 2020-06-23 ASSESSMENT — ENCOUNTER SYMPTOMS
NAUSEA: 1
DIARRHEA: 0
ABDOMINAL PAIN: 1
VOMITING: 1

## 2020-06-23 NOTE — ED NOTES
"Requesting \"AMA form\" to leave AMA.  States he can manage at home. No emesis since arrival.  Nausea and pain have improved per patient. Had refused second set of blood cultures.  Will allow UA to be sent down. MD notified and in room to speak with patient.   "

## 2020-06-23 NOTE — LETTER
June 25, 2020        Reji Monahan  [address]  [place] [address]    This letter provides a written record that you were tested for COVID-19 on 6/23/20.       Your result was negative. This means that we didn t find the virus that causes COVID-19 in your sample. A test may show negative when you do actually have the virus. This can happen when the virus is in the early stages of infection, before you feel illness symptoms.    If you have symptoms   Stay home and away from others (self-isolate) until you meet ALL of the guidelines below:    You ve had no fever--and no medicine that reduces fever--for 3 full days (72 hours). And      Your other symptoms have gotten better. For example, your cough or breathing has improved. And     At least 10 days have passed since your symptoms started.    During this time:    Stay home. Don t go to work, school or anywhere else.     Stay in your own room, including for meals. Use your own bathroom if you can.    Stay away from others in your home. No hugging, kissing or shaking hands. No visitors.    Clean  high touch  surfaces often (doorknobs, counters, handles, etc.). Use a household cleaning spray or wipes. You can find a full list on the EPA website at www.epa.gov/pesticide-registration/list-n-disinfectants-use-against-sars-cov-2.    Cover your mouth and nose with a mask, tissue or washcloth to avoid spreading germs.    Wash your hands and face often with soap and water.    Going back to work  Check with your employer for any guidelines to follow for going back to work.    Employers: This document serves as formal notice that your employee tested negative for COVID-19, as of the testing date shown above.

## 2020-06-23 NOTE — ED PROVIDER NOTES
"  History     Chief Complaint:  Abdominal pain    HPI   Reji Monahan is a 30 year old male with a history of type I diabetes with previous diabetic ketoacidosis who presents to the emergency department for evaluation of generalized abdominal pain. The patient reports that he has had vomiting, nausea, and stabbing abdominal pain. The patient reports that a few days ago he began vomiting and soon after developed intermittent abdominal pain. He states that his vomit has been \"gatorade color.\" He also states that he has been having increased shortness of breath. He has a history of diabetes and reports that he has been taking his insulin medication. The patient states that he has had similar abdominal pain in the past when he has been in DKA and was recently admitted on 06/02/2020 for DKA. He also has a history of marijuana use and denies that he has used drugs or alcohol today. He denies any diarrhea.     Allergies:  No Known Drug Allergies    Medications:    Diltiazem  Novolog  Lantus  Zyprexa  Zofran    Past Medical History:    Diabetes type I  Schizophrenia  Diabetic ketoacidosis   Splenomegaly    Past Surgical History:    The patient does not have any pertinent past surgical history.    Family History:    No past pertinent family history.    Social History:  The patient presents alone  Smoking Status: Never  Smokeless Tobacco: No  Alcohol Use: No  Drug Use: No  Marital Status:  Single      Review of Systems   Gastrointestinal: Positive for abdominal pain, nausea and vomiting. Negative for diarrhea.   All other systems reviewed and are negative.        Physical Exam   Vitals:  BP: (unable to obtain at this time.  Has been uncooperative)  Pulse: 96  Heart Rate: 103  Temp: 98.2  F (36.8  C)  Resp: (!) 36  Height: 185.4 cm (6' 1\")  SpO2: 100 %      Physical Exam  General: The patient is alert, in no respiratory distress. Intermittently rhythmic moving then holds still     HENT: Mucous membranes dry. Poor dentition. "     Cardiovascular: Regular rate and rhythm. Good pulses in all four extremities. Normal capillary refill and skin turgor.     Respiratory: Lungs are clear. No nasal flaring. No retractions. No wheezing, no crackles. Intermittently breaths fast, but when speaking goes to a normal rate     Gastrointestinal: Abdomen soft. No guarding, no rebound. No palpable hernias. No focal tenderness to palpation.    Musculoskeletal: No gross deformity.     Skin: No rashes or petechiae.     Neurologic: The patient is alert and oriented x3. GCS 15. No testable cranial nerve deficit. Follows commands with clear and appropriate speech. Gives appropriate answers. Good strength in all extremities. No gross neurologic deficit. Gross sensation intact. Pupils are round and reactive. No meningismus.     Lymphatic: No cervical adenopathy. No lower extremity swelling.    Psychiatric: The patient is non-tearful.     Emergency Department Course   ECG:  Patient refused EKG    Imaging:  Radiographic findings were communicated with the patient who voiced understanding of the findings.    XR Chest Port 1 View  The cardiac silhouette and pulmonary vasculature are  within normal limits. No focal pulmonary consolidations. No pleural  effusion or pneumothorax.  Reading per radiology.    Laboratory:  CBC: WNL. (WBC 8.0, HGB 14.5, )   CMP: Sodium 130 (L), Carbon Dioxide 9 (L), Anion Gap 19 (H), Glucose 254 (H), Creatinine 0.44 I(L), Bilirubin 1.7 (H) o/w AWNL    Troponin (Collected 1103): 0.00   Ketone Beta-Hydroxybutyrate: 4.3 (H)    Glucose by Meter (Collected 1102): 265 (H)   Glucose by Meter (Collected 1256): 190 (H)   Glucose by Meter (Collected 1434): 169 (H)     Hemoglobin A1c: 11.9 (H)  Lipase: 40 (L)    ISTAT blood gas and oxyhgb: Pending  BMP: Pending  Drug abuse screen 77 urine: Pending  Blood Culture x2: Pending  Asymptomatic COVID-19 Virus by PCR: Pending     Interventions:  1112 NS, 1 L, IV bolus  1112 Toradol 15 mg IV  1114 Pepcid  20 mg IV  1118 Zofran 4 mg   1206 Insulin 6 units IV  1308 Insulin 1.5 units/hr IV  1310 Dextrose 5%1000 mL IV    Emergency Department Course:  Past medical records, nursing notes, and vitals reviewed.    1044 I performed an exam of the patient as documented above.     1138 pt was rechecked. He reports nausea is better. No vomiting. Resting in dark room without rocking.  BS noted and awaiting other labs.    EKG obtained in the ED, see results above.   IV was inserted and blood was drawn for laboratory testing, results above.  The patient provided a urine sample here in the emergency department. This was sent for laboratory testing, findings above.  The patient was sent for a chest XR while in the emergency department, results above.     1150 I rechecked the patient and discussed the results of his workup thus far. Pt reports that he has been taking his lantus regularly. His previous records were reviewed.    1250 I spoke with Dr. Garcia, hospitalist, who agreed to admit the patient.     Findings and plan explained to the Patient who consents to admission. Discussed the patient with Dr. Garcia, who will admit the patient to a ICU bed for further monitoring, evaluation, and treatment.    I personally reviewed the laboratory results with the Patient and answered all related questions prior to admission.    1435 I was called to the room by the nurse the patient wants to leave AMA.  He was currently standing up putting his shirt on sink he would not stay.  I discussed the risk to his health threats to his life permanent damage and disability.  He said he is aware of all that he is sick of diabetes.  I asked that there is any echo due to change his mind.  He said no that he would use insulin at his hotel room to manage it the same way as we would here.  He is aware the risks is a capable decision maker and was discharged before all labs have been returned.  Patient is signed out AGAINST MEDICAL ADVICE and accepts all  risk to his health.    Impression & Plan      Medical Decision Making:  The patient presented complaining of significant abdominal pain with a right was writhing on the bed and had an inconsistent exam and that he appeared to attempt to be breathing faster somewhat intentionally.  I did realize this could be clue small breathing but did not look classic for could also be secondary to pain as well.  Thankfully his abdominal exam was benign and did not suggest bowel obstruction perforation diverticulitis or other life-threatening condition.  I did hold on CT due to the risk of radiation.  He did say I feel like I am in DKA I therefore did check his blood sugar is not grossly elevated I held on insulin at that point however when his beta hydroxybutyrate came back elevated I was suspicious about DKA.  Patient was started on a D5 infusion and insulin infusion.  I did tell his blood sugars rechecked his BMP as well.  I did consider the medication noncompliance might be an issue however he reports he is been taking his Lantus.  The patient reports he presented with vomiting and nausea.  He said that he had been some darker color but it did not appear to be more Gatorade that he been drinking.  The patient did not comfortable.  He did show some significant acidosis and required an insulin infusion and admission to the ICU.    Critical Care time:  was 45 minutes for this patient excluding procedures.    Diagnosis:    ICD-10-CM    1. Diabetic ketoacidosis without coma associated with type 1 diabetes mellitus (H)  E10.10 Blood culture     Hemoglobin A1c     Hemoglobin A1c     CANCELED: Hemoglobin A1c   2. Abdominal pain, generalized  R10.84    3. Nausea and vomiting, intractability of vomiting not specified, unspecified vomiting type  R11.2        Disposition:  Admitted to the ICU under the care of Dr. Garcia  Pt is leaving [facility] GUEVARA, am serving as a scribe on 6/23/2020 at 10:54 AM to personally document services  performed by Jay Blair MD based on my observations and the provider's statements to me.   Khalif Bautista  6/23/2020   [facility] EMERGENCY DEPARTMENT       Jay Blair MD  06/23/20 1545

## 2020-06-23 NOTE — ED NOTES
Bed: ED01  Expected date: 6/23/20  Expected time: 10:28 AM  Means of arrival: Ambulance  Comments:  BVM1: 30 yr M, SOB, ABD pain, Vomiting

## 2020-06-23 NOTE — ED TRIAGE NOTES
"Arrives via EMS shouting \"I need pain meds\" also shouting about abd pain.  Diffuse abd pain reported, along with n/v past few days.  Admits to daily marijuana use, with none today.  No alcohol today.  Rates pain 8/10. 20 ga in left AC en route. No BS taken en route. Has been boisterious and rude to MD and other staff. ABCD's intact; A/O x 4.   "

## 2020-06-24 LAB
SARS-COV-2 RNA SPEC QL NAA+PROBE: NOT DETECTED
SPECIMEN SOURCE: NORMAL

## 2020-06-29 LAB
BACTERIA SPEC CULT: NO GROWTH
SPECIMEN SOURCE: NORMAL

## 2020-08-03 ENCOUNTER — APPOINTMENT (OUTPATIENT)
Dept: GENERAL RADIOLOGY | Facility: CLINIC | Age: 30
End: 2020-08-03
Attending: PHYSICIAN ASSISTANT
Payer: MEDICARE

## 2020-08-03 ENCOUNTER — HOSPITAL ENCOUNTER (OUTPATIENT)
Facility: CLINIC | Age: 30
Setting detail: OBSERVATION
Discharge: HOME | End: 2020-08-04
Attending: PHYSICIAN ASSISTANT | Admitting: INTERNAL MEDICINE
Payer: MEDICARE

## 2020-08-03 DIAGNOSIS — E10.10 DIABETIC KETOACIDOSIS WITHOUT COMA ASSOCIATED WITH TYPE 1 DIABETES MELLITUS (H): ICD-10-CM

## 2020-08-03 DIAGNOSIS — E11.10 DKA (DIABETIC KETOACIDOSES): ICD-10-CM

## 2020-08-03 DIAGNOSIS — S21.209A: ICD-10-CM

## 2020-08-03 LAB
ALBUMIN SERPL-MCNC: 3.6 G/DL (ref 3.4–5)
ALP SERPL-CCNC: 101 U/L (ref 40–150)
ALT SERPL W P-5'-P-CCNC: 11 U/L (ref 0–70)
ANION GAP SERPL CALCULATED.3IONS-SCNC: 17 MMOL/L (ref 3–14)
AST SERPL W P-5'-P-CCNC: 14 U/L (ref 0–45)
BASE DEFICIT BLDV-SCNC: 6.4 MMOL/L
BASOPHILS # BLD AUTO: 0.1 10E9/L (ref 0–0.2)
BASOPHILS NFR BLD AUTO: 0.6 %
BILIRUB SERPL-MCNC: 1 MG/DL (ref 0.2–1.3)
BUN SERPL-MCNC: 16 MG/DL (ref 7–30)
CALCIUM SERPL-MCNC: 9 MG/DL (ref 8.5–10.1)
CHLORIDE SERPL-SCNC: 99 MMOL/L (ref 94–109)
CO2 SERPL-SCNC: 17 MMOL/L (ref 20–32)
CREAT SERPL-MCNC: 0.58 MG/DL (ref 0.66–1.25)
DIFFERENTIAL METHOD BLD: ABNORMAL
EOSINOPHIL # BLD AUTO: 0.3 10E9/L (ref 0–0.7)
EOSINOPHIL NFR BLD AUTO: 3.2 %
ERYTHROCYTE [DISTWIDTH] IN BLOOD BY AUTOMATED COUNT: 13.6 % (ref 10–15)
GFR SERPL CREATININE-BSD FRML MDRD: >90 ML/MIN/{1.73_M2}
GLUCOSE BLDC GLUCOMTR-MCNC: 336 MG/DL (ref 70–99)
GLUCOSE SERPL-MCNC: 288 MG/DL (ref 70–99)
HCO3 BLDV-SCNC: 17 MMOL/L (ref 21–28)
HCT VFR BLD AUTO: 45.2 % (ref 40–53)
HGB BLD-MCNC: 14.5 G/DL (ref 13.3–17.7)
IMM GRANULOCYTES # BLD: 0.1 10E9/L (ref 0–0.4)
IMM GRANULOCYTES NFR BLD: 0.6 %
KETONES BLD-SCNC: 5.9 MMOL/L (ref 0–0.6)
LIPASE SERPL-CCNC: 47 U/L (ref 73–393)
LYMPHOCYTES # BLD AUTO: 2.1 10E9/L (ref 0.8–5.3)
LYMPHOCYTES NFR BLD AUTO: 21.9 %
MCH RBC QN AUTO: 26.1 PG (ref 26.5–33)
MCHC RBC AUTO-ENTMCNC: 32.1 G/DL (ref 31.5–36.5)
MCV RBC AUTO: 81 FL (ref 78–100)
MONOCYTES # BLD AUTO: 0.5 10E9/L (ref 0–1.3)
MONOCYTES NFR BLD AUTO: 5.2 %
NEUTROPHILS # BLD AUTO: 6.5 10E9/L (ref 1.6–8.3)
NEUTROPHILS NFR BLD AUTO: 68.5 %
NRBC # BLD AUTO: 0 10*3/UL
NRBC BLD AUTO-RTO: 0 /100
O2/TOTAL GAS SETTING VFR VENT: ABNORMAL %
OXYHGB MFR BLDV: 57 %
PCO2 BLDV: 28 MM HG (ref 40–50)
PH BLDV: 7.39 PH (ref 7.32–7.43)
PLATELET # BLD AUTO: 276 10E9/L (ref 150–450)
PO2 BLDV: 28 MM HG (ref 25–47)
POTASSIUM SERPL-SCNC: 4 MMOL/L (ref 3.4–5.3)
PROT SERPL-MCNC: 7 G/DL (ref 6.8–8.8)
RBC # BLD AUTO: 5.56 10E12/L (ref 4.4–5.9)
SODIUM SERPL-SCNC: 133 MMOL/L (ref 133–144)
WBC # BLD AUTO: 9.5 10E9/L (ref 4–11)

## 2020-08-03 PROCEDURE — C9803 HOPD COVID-19 SPEC COLLECT: HCPCS

## 2020-08-03 PROCEDURE — 99285 EMERGENCY DEPT VISIT HI MDM: CPT | Mod: 25

## 2020-08-03 PROCEDURE — 25800030 ZZH RX IP 258 OP 636: Performed by: PHYSICIAN ASSISTANT

## 2020-08-03 PROCEDURE — 85025 COMPLETE CBC W/AUTO DIFF WBC: CPT | Performed by: PHYSICIAN ASSISTANT

## 2020-08-03 PROCEDURE — 82805 BLOOD GASES W/O2 SATURATION: CPT | Performed by: PHYSICIAN ASSISTANT

## 2020-08-03 PROCEDURE — 25000131 ZZH RX MED GY IP 250 OP 636 PS 637: Mod: GY | Performed by: PHYSICIAN ASSISTANT

## 2020-08-03 PROCEDURE — 83690 ASSAY OF LIPASE: CPT | Performed by: PHYSICIAN ASSISTANT

## 2020-08-03 PROCEDURE — 00000146 ZZHCL STATISTIC GLUCOSE BY METER IP

## 2020-08-03 PROCEDURE — 25000128 H RX IP 250 OP 636: Performed by: PHYSICIAN ASSISTANT

## 2020-08-03 PROCEDURE — 93005 ELECTROCARDIOGRAM TRACING: CPT

## 2020-08-03 PROCEDURE — 82010 KETONE BODYS QUAN: CPT | Performed by: PHYSICIAN ASSISTANT

## 2020-08-03 PROCEDURE — 96365 THER/PROPH/DIAG IV INF INIT: CPT

## 2020-08-03 PROCEDURE — 80053 COMPREHEN METABOLIC PANEL: CPT | Performed by: PHYSICIAN ASSISTANT

## 2020-08-03 PROCEDURE — U0003 INFECTIOUS AGENT DETECTION BY NUCLEIC ACID (DNA OR RNA); SEVERE ACUTE RESPIRATORY SYNDROME CORONAVIRUS 2 (SARS-COV-2) (CORONAVIRUS DISEASE [COVID-19]), AMPLIFIED PROBE TECHNIQUE, MAKING USE OF HIGH THROUGHPUT TECHNOLOGIES AS DESCRIBED BY CMS-2020-01-R: HCPCS | Performed by: PHYSICIAN ASSISTANT

## 2020-08-03 PROCEDURE — 96372 THER/PROPH/DIAG INJ SC/IM: CPT | Mod: XS

## 2020-08-03 PROCEDURE — 96375 TX/PRO/DX INJ NEW DRUG ADDON: CPT

## 2020-08-03 PROCEDURE — 71045 X-RAY EXAM CHEST 1 VIEW: CPT

## 2020-08-03 RX ORDER — POTASSIUM CL/LIDO/0.9 % NACL 10MEQ/0.1L
10 INTRAVENOUS SOLUTION, PIGGYBACK (ML) INTRAVENOUS ONCE
Status: COMPLETED | OUTPATIENT
Start: 2020-08-03 | End: 2020-08-04

## 2020-08-03 RX ORDER — SODIUM CHLORIDE 9 MG/ML
INJECTION, SOLUTION INTRAVENOUS CONTINUOUS
Status: DISCONTINUED | OUTPATIENT
Start: 2020-08-03 | End: 2020-08-04

## 2020-08-03 RX ORDER — HALOPERIDOL 5 MG/ML
2.5 INJECTION INTRAMUSCULAR ONCE
Status: COMPLETED | OUTPATIENT
Start: 2020-08-03 | End: 2020-08-03

## 2020-08-03 RX ORDER — DIPHENHYDRAMINE HYDROCHLORIDE 50 MG/ML
25 INJECTION INTRAMUSCULAR; INTRAVENOUS ONCE
Status: COMPLETED | OUTPATIENT
Start: 2020-08-03 | End: 2020-08-03

## 2020-08-03 RX ORDER — ONDANSETRON 2 MG/ML
4 INJECTION INTRAMUSCULAR; INTRAVENOUS ONCE
Status: COMPLETED | OUTPATIENT
Start: 2020-08-03 | End: 2020-08-03

## 2020-08-03 RX ORDER — KETOROLAC TROMETHAMINE 15 MG/ML
15 INJECTION, SOLUTION INTRAMUSCULAR; INTRAVENOUS ONCE
Status: COMPLETED | OUTPATIENT
Start: 2020-08-03 | End: 2020-08-03

## 2020-08-03 RX ADMIN — ONDANSETRON 4 MG: 2 INJECTION INTRAMUSCULAR; INTRAVENOUS at 22:13

## 2020-08-03 RX ADMIN — INSULIN ASPART 4 UNITS: 100 INJECTION, SOLUTION INTRAVENOUS; SUBCUTANEOUS at 23:39

## 2020-08-03 RX ADMIN — Medication 10 MEQ: at 23:39

## 2020-08-03 RX ADMIN — SODIUM CHLORIDE 1000 ML: 9 INJECTION, SOLUTION INTRAVENOUS at 22:13

## 2020-08-03 RX ADMIN — DIPHENHYDRAMINE HYDROCHLORIDE 25 MG: 50 INJECTION, SOLUTION INTRAMUSCULAR; INTRAVENOUS at 22:45

## 2020-08-03 RX ADMIN — HALOPERIDOL LACTATE 2.5 MG: 5 INJECTION, SOLUTION INTRAMUSCULAR at 22:44

## 2020-08-03 RX ADMIN — KETOROLAC TROMETHAMINE 15 MG: 15 INJECTION, SOLUTION INTRAMUSCULAR; INTRAVENOUS at 22:13

## 2020-08-03 RX ADMIN — SODIUM CHLORIDE, POTASSIUM CHLORIDE, SODIUM LACTATE AND CALCIUM CHLORIDE 1000 ML: 600; 310; 30; 20 INJECTION, SOLUTION INTRAVENOUS at 23:14

## 2020-08-03 ASSESSMENT — ENCOUNTER SYMPTOMS
DIFFICULTY URINATING: 0
APPETITE CHANGE: 0
ABDOMINAL PAIN: 1
DIARRHEA: 0
FEVER: 0

## 2020-08-04 VITALS
DIASTOLIC BLOOD PRESSURE: 81 MMHG | TEMPERATURE: 95.3 F | BODY MASS INDEX: 21.07 KG/M2 | HEART RATE: 69 BPM | WEIGHT: 159 LBS | RESPIRATION RATE: 20 BRPM | SYSTOLIC BLOOD PRESSURE: 129 MMHG | HEIGHT: 73 IN | OXYGEN SATURATION: 100 %

## 2020-08-04 PROBLEM — E11.9 DM (DIABETES MELLITUS) (H): Status: ACTIVE | Noted: 2020-08-04

## 2020-08-04 LAB
ALBUMIN UR-MCNC: NEGATIVE MG/DL
ANION GAP SERPL CALCULATED.3IONS-SCNC: 7 MMOL/L (ref 3–14)
APPEARANCE UR: CLEAR
BILIRUB UR QL STRIP: NEGATIVE
BUN SERPL-MCNC: 15 MG/DL (ref 7–30)
CALCIUM SERPL-MCNC: 8.9 MG/DL (ref 8.5–10.1)
CHLORIDE SERPL-SCNC: 106 MMOL/L (ref 94–109)
CO2 SERPL-SCNC: 25 MMOL/L (ref 20–32)
COLOR UR AUTO: ABNORMAL
CREAT SERPL-MCNC: 0.68 MG/DL (ref 0.66–1.25)
GFR SERPL CREATININE-BSD FRML MDRD: >90 ML/MIN/{1.73_M2}
GLUCOSE BLDC GLUCOMTR-MCNC: 160 MG/DL (ref 70–99)
GLUCOSE BLDC GLUCOMTR-MCNC: 181 MG/DL (ref 70–99)
GLUCOSE BLDC GLUCOMTR-MCNC: 235 MG/DL (ref 70–99)
GLUCOSE SERPL-MCNC: 174 MG/DL (ref 70–99)
GLUCOSE UR STRIP-MCNC: NEGATIVE MG/DL
HGB UR QL STRIP: NEGATIVE
INTERPRETATION ECG - MUSE: NORMAL
KETONES BLD-SCNC: 1.7 MMOL/L (ref 0–0.6)
KETONES BLD-SCNC: 2.7 MMOL/L (ref 0–0.6)
KETONES UR STRIP-MCNC: 10 MG/DL
LABORATORY COMMENT REPORT: NORMAL
LEUKOCYTE ESTERASE UR QL STRIP: ABNORMAL
MUCOUS THREADS #/AREA URNS LPF: PRESENT /LPF
NITRATE UR QL: NEGATIVE
PH UR STRIP: 6 PH (ref 5–7)
POTASSIUM SERPL-SCNC: 3.5 MMOL/L (ref 3.4–5.3)
RBC #/AREA URNS AUTO: 0 /HPF (ref 0–2)
SARS-COV-2 RNA SPEC QL NAA+PROBE: NEGATIVE
SARS-COV-2 RNA SPEC QL NAA+PROBE: NORMAL
SODIUM SERPL-SCNC: 138 MMOL/L (ref 133–144)
SOURCE: ABNORMAL
SP GR UR STRIP: 1 (ref 1–1.03)
SPECIMEN SOURCE: NORMAL
SPECIMEN SOURCE: NORMAL
SQUAMOUS #/AREA URNS AUTO: <1 /HPF (ref 0–1)
UROBILINOGEN UR STRIP-MCNC: NORMAL MG/DL (ref 0–2)
WBC #/AREA URNS AUTO: 10 /HPF (ref 0–5)

## 2020-08-04 PROCEDURE — G0378 HOSPITAL OBSERVATION PER HR: HCPCS

## 2020-08-04 PROCEDURE — 96372 THER/PROPH/DIAG INJ SC/IM: CPT

## 2020-08-04 PROCEDURE — 99226 ZZC SUBSEQUENT OBSERVATION CARE,LEVEL III: CPT | Performed by: INTERNAL MEDICINE

## 2020-08-04 PROCEDURE — 25000131 ZZH RX MED GY IP 250 OP 636 PS 637: Mod: GY | Performed by: INTERNAL MEDICINE

## 2020-08-04 PROCEDURE — 00000146 ZZHCL STATISTIC GLUCOSE BY METER IP

## 2020-08-04 PROCEDURE — 82010 KETONE BODYS QUAN: CPT | Mod: 91 | Performed by: INTERNAL MEDICINE

## 2020-08-04 PROCEDURE — 96376 TX/PRO/DX INJ SAME DRUG ADON: CPT

## 2020-08-04 PROCEDURE — 99220 ZZC INITIAL OBSERVATION CARE,LEVL III: CPT | Performed by: INTERNAL MEDICINE

## 2020-08-04 PROCEDURE — 25000128 H RX IP 250 OP 636: Performed by: INTERNAL MEDICINE

## 2020-08-04 PROCEDURE — 99207 ZZC CDG-CODE CATEGORY CHANGED: CPT | Performed by: INTERNAL MEDICINE

## 2020-08-04 PROCEDURE — 81001 URINALYSIS AUTO W/SCOPE: CPT | Performed by: PHYSICIAN ASSISTANT

## 2020-08-04 PROCEDURE — 25000132 ZZH RX MED GY IP 250 OP 250 PS 637: Mod: GY | Performed by: INTERNAL MEDICINE

## 2020-08-04 PROCEDURE — 25800030 ZZH RX IP 258 OP 636: Performed by: PHYSICIAN ASSISTANT

## 2020-08-04 PROCEDURE — 99207 ZZC APP CREDIT; MD BILLING SHARED VISIT: CPT | Performed by: INTERNAL MEDICINE

## 2020-08-04 PROCEDURE — 80048 BASIC METABOLIC PNL TOTAL CA: CPT | Performed by: INTERNAL MEDICINE

## 2020-08-04 PROCEDURE — 36415 COLL VENOUS BLD VENIPUNCTURE: CPT | Performed by: INTERNAL MEDICINE

## 2020-08-04 RX ORDER — OLANZAPINE 10 MG/1
10 TABLET ORAL EVERY 6 HOURS PRN
Status: DISCONTINUED | OUTPATIENT
Start: 2020-08-04 | End: 2020-08-04 | Stop reason: HOSPADM

## 2020-08-04 RX ORDER — HYDROXYZINE HYDROCHLORIDE 25 MG/1
25 TABLET, FILM COATED ORAL EVERY 6 HOURS PRN
Status: DISCONTINUED | OUTPATIENT
Start: 2020-08-04 | End: 2020-08-04 | Stop reason: HOSPADM

## 2020-08-04 RX ORDER — DEXTROSE MONOHYDRATE 25 G/50ML
25-50 INJECTION, SOLUTION INTRAVENOUS
Status: DISCONTINUED | OUTPATIENT
Start: 2020-08-04 | End: 2020-08-04 | Stop reason: HOSPADM

## 2020-08-04 RX ORDER — ONDANSETRON 4 MG/1
4 TABLET, ORALLY DISINTEGRATING ORAL EVERY 6 HOURS PRN
Status: DISCONTINUED | OUTPATIENT
Start: 2020-08-04 | End: 2020-08-04 | Stop reason: HOSPADM

## 2020-08-04 RX ORDER — KETOROLAC TROMETHAMINE 15 MG/ML
15 INJECTION, SOLUTION INTRAMUSCULAR; INTRAVENOUS EVERY 6 HOURS PRN
Status: DISCONTINUED | OUTPATIENT
Start: 2020-08-04 | End: 2020-08-04

## 2020-08-04 RX ORDER — DILTIAZEM HYDROCHLORIDE 120 MG/1
120 CAPSULE, EXTENDED RELEASE ORAL DAILY
Status: DISCONTINUED | OUTPATIENT
Start: 2020-08-04 | End: 2020-08-04 | Stop reason: HOSPADM

## 2020-08-04 RX ORDER — ACETAMINOPHEN 650 MG/1
650 SUPPOSITORY RECTAL EVERY 4 HOURS PRN
Status: DISCONTINUED | OUTPATIENT
Start: 2020-08-04 | End: 2020-08-04 | Stop reason: HOSPADM

## 2020-08-04 RX ORDER — KETOROLAC TROMETHAMINE 15 MG/ML
15 INJECTION, SOLUTION INTRAMUSCULAR; INTRAVENOUS EVERY 6 HOURS PRN
Status: DISCONTINUED | OUTPATIENT
Start: 2020-08-04 | End: 2020-08-04 | Stop reason: HOSPADM

## 2020-08-04 RX ORDER — ONDANSETRON 2 MG/ML
4 INJECTION INTRAMUSCULAR; INTRAVENOUS EVERY 6 HOURS PRN
Status: DISCONTINUED | OUTPATIENT
Start: 2020-08-04 | End: 2020-08-04 | Stop reason: HOSPADM

## 2020-08-04 RX ORDER — ACETAMINOPHEN 325 MG/1
650 TABLET ORAL EVERY 4 HOURS PRN
Status: DISCONTINUED | OUTPATIENT
Start: 2020-08-04 | End: 2020-08-04 | Stop reason: HOSPADM

## 2020-08-04 RX ORDER — HALOPERIDOL 1 MG/1
1-2 TABLET ORAL EVERY 6 HOURS PRN
Status: DISCONTINUED | OUTPATIENT
Start: 2020-08-04 | End: 2020-08-04 | Stop reason: HOSPADM

## 2020-08-04 RX ORDER — NICOTINE POLACRILEX 4 MG
15-30 LOZENGE BUCCAL
Status: DISCONTINUED | OUTPATIENT
Start: 2020-08-04 | End: 2020-08-04 | Stop reason: HOSPADM

## 2020-08-04 RX ORDER — HALOPERIDOL 5 MG/ML
5 INJECTION INTRAMUSCULAR EVERY 6 HOURS PRN
Status: DISCONTINUED | OUTPATIENT
Start: 2020-08-04 | End: 2020-08-04 | Stop reason: HOSPADM

## 2020-08-04 RX ORDER — NALOXONE HYDROCHLORIDE 0.4 MG/ML
.1-.4 INJECTION, SOLUTION INTRAMUSCULAR; INTRAVENOUS; SUBCUTANEOUS
Status: DISCONTINUED | OUTPATIENT
Start: 2020-08-04 | End: 2020-08-04 | Stop reason: HOSPADM

## 2020-08-04 RX ADMIN — ONDANSETRON 4 MG: 4 TABLET, ORALLY DISINTEGRATING ORAL at 10:42

## 2020-08-04 RX ADMIN — SODIUM CHLORIDE: 9 INJECTION, SOLUTION INTRAVENOUS at 01:15

## 2020-08-04 RX ADMIN — HALOPERIDOL 2 MG: 1 TABLET ORAL at 08:12

## 2020-08-04 RX ADMIN — HYDROXYZINE HYDROCHLORIDE 25 MG: 25 TABLET, FILM COATED ORAL at 07:31

## 2020-08-04 RX ADMIN — KETOROLAC TROMETHAMINE 15 MG: 15 INJECTION, SOLUTION INTRAMUSCULAR; INTRAVENOUS at 10:42

## 2020-08-04 RX ADMIN — INSULIN HUMAN 10 UNITS: 100 INJECTION, SUSPENSION SUBCUTANEOUS at 02:53

## 2020-08-04 RX ADMIN — HALOPERIDOL LACTATE 5 MG: 5 INJECTION INTRAMUSCULAR at 02:53

## 2020-08-04 ASSESSMENT — MIFFLIN-ST. JEOR: SCORE: 1725.1

## 2020-08-04 NOTE — ED TRIAGE NOTES
Pt presents to ED via EMS due to lower abdominal pain.  States last time he felt like this he was in DKA.   for EMS, states he hasn't been taking his blood sugar at home because he's been sleeping a lot for 3 days, but has been taking his insulin.  ABCs intact    Also reports new dry cough

## 2020-08-04 NOTE — ED NOTES
"[facility]  ED Nurse Handoff Report    Reji Monahan is a 30 year old male   ED Chief complaint: Abdominal Pain  . ED Diagnosis:   Final diagnoses:   DKA (diabetic ketoacidoses) (H)     Allergies: No Known Allergies    Code Status: Full Code  Activity level - Baseline/Home:  Independent. Activity Level - Current:   Stand by Assist. Lift room needed: No. Bariatric: No   Needed: No   Isolation: Yes. Infection: Not Applicable  MRSA.     Vital Signs:   Vitals:    08/03/20 2215 08/03/20 2230 08/03/20 2245 08/03/20 2300   BP: 103/77  99/63 92/57   Pulse: 85 59 91 85   Resp:       Temp:       TempSrc:       SpO2: 99% 98% 99% 99%       Cardiac Rhythm:  ,      Pain level:    Patient confused: No. Patient Falls Risk: Yes.   Elimination Status: Has voided   Patient Report - Initial Complaint: abdominal pain Focused Assessment:   00:25 Gastrointestinal Gastrointestinal - GI WDL: -WDL except (pt c/o lower abd pain x2-3 days. pt states that he has felt \"unwell\" for the past couple of days and has been sleeping more than normal and not taking his insulin. pt states that he also hasnt been eating anything x3 days. pt states \"this feels like DKA)      00:30 Skin Color/Condition Skin - Skin WDL: -WDL except (pt c/o multiple little sores on his back that are \"infected with mrsa they popped today and liquid came out.\") Skin Integrity: blister      Tests Performed: blood work, xray Abnormal Results:   Labs Ordered and Resulted from Time of ED Arrival Up to the Time of Departure from the ED   CBC WITH PLATELETS DIFFERENTIAL - Abnormal; Notable for the following components:       Result Value    MCH 26.1 (*)     All other components within normal limits   COMPREHENSIVE METABOLIC PANEL - Abnormal; Notable for the following components:    Carbon Dioxide 17 (*)     Anion Gap 17 (*)     Glucose 288 (*)     Creatinine 0.58 (*)     All other components within normal limits   LIPASE - Abnormal; Notable for the following " components:    Lipase 47 (*)     All other components within normal limits   KETONE BETA-HYDROXYBUTYRATE QUANTITATIVE - Abnormal; Notable for the following components:    Ketone Quantitative 5.9 (*)     All other components within normal limits   GLUCOSE BY METER - Abnormal; Notable for the following components:    Glucose 336 (*)     All other components within normal limits   BLOOD GAS VENOUS AND OXYHGB - Abnormal; Notable for the following components:    PCO2 Venous 28 (*)     Bicarbonate Venous 17 (*)     All other components within normal limits   GLUCOSE BY METER - Abnormal; Notable for the following components:    Glucose 235 (*)     All other components within normal limits   COVID-19 VIRUS (CORONAVIRUS) BY PCR   GLUCOSE MONITOR NURSING POCT   ROUTINE UA WITH MICROSCOPIC     XR Chest Port 1 View   Final Result   IMPRESSION: Negative chest.        .   Treatments provided: fluids, insulin  Family Comments: no family present  OBS brochure/video discussed/provided to patient:  N/A  ED Medications:   Medications   potassium chloride 10 mEq in 100 mL intermittent infusion with 10 mg lidocaine (10 mEq Intravenous New Bag 8/3/20 2339)   insulin aspart (NovoLOG) injection (RAPID ACTING) (has no administration in time range)   sodium chloride 0.9% infusion (has no administration in time range)   ketorolac (TORADOL) injection 15 mg (15 mg Intravenous Given 8/3/20 2213)   0.9% sodium chloride BOLUS (0 mLs Intravenous Stopped 8/3/20 2255)   ondansetron (ZOFRAN) injection 4 mg (4 mg Intravenous Given 8/3/20 2213)   haloperidol lactate (HALDOL) injection 2.5 mg (2.5 mg Intravenous Given 8/3/20 2244)   diphenhydrAMINE (BENADRYL) injection 25 mg (25 mg Intravenous Given 8/3/20 2245)   lactated ringers BOLUS 1,000 mL (1,000 mLs Intravenous New Bag 8/3/20 2314)   insulin aspart (NovoLOG) injection (RAPID ACTING) (4 Units Subcutaneous Given 8/3/20 2339)     Drips infusing:  Yes  For the majority of the shift, the patient's  behavior Green. Interventions performed were frequent rounding.    Sepsis treatment initiated: No       ED Nurse Name/Phone Number: Mercedes Harrington RN,   12:28 AM  RECEIVING UNIT ED HANDOFF REVIEW    Above ED Nurse Handoff Report was reviewed: Yes  Reviewed by: Jillian Hull on August 4, 2020 at 1:42 AM

## 2020-08-04 NOTE — PROGRESS NOTES
Pt's Covid resulted Negative.   Hospitalist does not want to remove special precautions at this time and says to let rounding MD reassess today.

## 2020-08-04 NOTE — PLAN OF CARE
End of Shift Summary  For vital signs and complete assessments, please see documentation flowsheets.     Pertinent assessments: COVID NEGATIVE. Irritable demeanor but cooperative, AOx4.   Major Shift Events: Gave prn IV Haldol for agitation. Pt sleeping comfortably since.   Treatment Plan: Monitor blood sugars, Insulin, DM education.     Discharge Readiness: Medically active  Expected Discharge Date: TBD  Discharge Disposition: Home with Self care  Barriers/Criteria for discharge: Variable blood sugars

## 2020-08-04 NOTE — PROVIDER NOTIFICATION
505- Fyi critical ketone of 1.7, please advise if intervention needed. Also pt experiencing anxiety, feeling like hes crawling out of skin, Haldol worked overnight requesting additional prn, and covid back negative overnight can iso be removed.

## 2020-08-04 NOTE — PROGRESS NOTES
Critical lab. [facility] Hospitalist:   MILAGROS: Critical lab result. Blood Ketones: 2.7. Down from 5.9 from four hours ago.

## 2020-08-04 NOTE — ED NOTES
Emergency Department Attending Supervision Note  8/3/2020  11:12 PM      I evaluated this patient in conjunction with SUKHWINDER Dunlap        Briefly, the patient presented with hyperglycemia, nausea and poor intake. He states that he has not had his insulin for an unknown period of time. Denies abdominal pain to me.       On my exam, lean habitus. Healing wound left flank.     My impression is hyperglycemia. He does have elevated ketones but is not acidotic. Similar presentations in past. Plan IVF rehydration, will restart his insulin and admit for observation. NO CT abdomen at this time. Does have some tenderness but denies pain. Has had CT abdomen x 6 without concerning findings. Patient agrees with this plan will notify if he develops pain.       Diagnosis:     ICD-10-CM    1. DKA (diabetic ketoacidoses) (H)  E11.10 UA with Microscopic     Symptomatic COVID-19 Virus (Coronavirus) by PCR     Glucose by meter     Glucose by meter     Basic metabolic panel     Ketone Beta-Hydroxybutyrate Quantitative     Ketone Beta-Hydroxybutyrate Quantitative     SARS-CoV-2 COVID-19 Virus (Coronavirus) RT-PCR     SARS-CoV-2 COVID-19 Virus (Coronavirus) RT-PCR Nasopharyngeal     Glucose by meter     Glucose by meter     Glucose by meter     Glucose by meter   2. Wound of back  S21.209A    3. Diabetic ketoacidosis without coma associated with type 1 diabetes mellitus (H)  E10.10 insulin glargine (LANTUS PEN) 100 UNIT/ML pen       MD Macarena Herbert, Ning Healy MD  08/04/20 3381

## 2020-08-04 NOTE — PROGRESS NOTES
[facility]  Hospitalist Progress Note  Yonny Leiva MD 08/04/2020    Reason for Stay        Diabetic ketoacidosis abdominal pain    Abdominal pain         Assessment and Plan:        Summary of Stay:     Reji Monahan is a 60-year-old gentleman with history of diabetes mellitus type I requiring insulin, diabetic ketoacidosis, schizophrenia, splenomegaly, cannabinoid hyperemesis syndrome, who presented to the emergency room with abdominal pain, decreased oral intake and also decreasing his insulin requirement as he was not eating for the last 3 days and found to be in uncontrolled diabetes with elevated ketones without significant acidosis, likely secondary to decreased oral intake and mild diabetic ketoacidosis, and being admitted to the hospital.  The patient was in this hospital on 06/23/2020; at that time, he had diabetic ketoacidosis but signed against medical advice and left the hospital        Patient progress during stay:    Patient was admitted and was treated with insulin, IV fluids and was closely monitored.  His diabetic ketoacidosis resolved but he continues to have generalized weakness and abdominal pain for which pain medications were given.        Problem List with Assessment and Plan      1. Diabetes ketoacidosis    Patient's acidosis resolved.  Currently was able to eat a sandwich.  Continue subcutaneous insulin and monitor blood sugar.    Continue oral hydration    2. Uncontrolled diabetes: Recent hemoglobin A1c of 11.9 in June of this year.  Patient is caring NovoLog 10 units 3 times a day before meal, sliding scale insulin NovoLog, and to 38 units in the morning.      Blood sugars are 160 this morning.  Patient received NPH 10 units this morning, 6 units of NovoLog given as well    3. Abdominal pain: Abdomen is soft, nontender on exam.  Continue pain medication, may need GI evaluation if abdominal pain persists  4.  Medical noncompliance: Patient has been threatening to  leave AGAINST MEDICAL ADVICE.  I discussed with him about the need to stay for treatment for another day for control of his abdominal pain as well as his diabetes management.  He agreed to stay for treatment    5.  Schizophrenia: Reported agitation but currently calm       Plan for today :    Continue to treat pain    Plans diet as tolerated, hydration    Continue sliding scale insulin, hold morning Lantus and administer in the evening        LDA     Access : Peripheral     Wright Catheter: not present        FEN :    Orders Placed This Encounter      Moderate Consistent CHO Diet           Intake/Output Summary (Last 24 hours) at 8/4/2020 1106  Last data filed at 8/4/2020 0821  Gross per 24 hour   Intake 1200 ml   Output --   Net 1200 ml          DVT Prophylaxis: Pneumatic Compression Devices    Code Status:  Full Code    Estimated discharge  disposition and plan: May discharge tomorrow if he remains stable      Addendum  Patient called me back to talk to him after I examined him earlier.  He is up in bed and denies any pain.  He is alert and oriented x3.  Appears to be appropriate.  He understands risk of uncontrolled diabetes and recurrent admission to the hospital as well as life-threatening complications.  I do not see any indication of acute psychiatric crisis .I offered him to stay another day for blood sugar control but he would rather go home since he has all the supplies and treatments.  Advised him not to drive or operate machinery until he sees his primary care provider.  I think he is stable for discharge and close follow-up with his primary care provider.      Interval History (Subjective):        Patient is seen and examined by me today and medical record reviewed.Overnight events noted and care discussed with nursing staff.  He is complaining of severe epigastric abdominal pain.  Denies any nausea or vomiting this morning.  No fever chills.  Discussed with him about the need for staying for  "treatment.                          Physical Exam:        Last Vital Signs:  /81 (BP Location: Left arm)   Pulse 69   Temp 95.3  F (35.2  C) (Oral)   Resp 20   Ht 1.854 m (6' 1\")   Wt 72.1 kg (159 lb)   SpO2 100%   BMI 20.98 kg/m          Wt Readings from Last 5 Encounters:   08/04/20 72.1 kg (159 lb)   01/27/20 73.3 kg (161 lb 9.6 oz)   01/21/20 73 kg (161 lb)   01/02/20 79 kg (174 lb 2.6 oz)        Constitutional: Awake, alert, no apparent distress     Respiratory: Clear to auscultation bilaterally, no crackles or wheezing   Cardiovascular: Regular rate and rhythm, normal S1 and S2, and no murmur noted   Abdomen: Normal bowel sounds, soft, non-distended, non-tender   Skin: No rashes, no cyanosis, dry to touch   Neuro: Alert with  no weakness, numbness, memory loss   Extremities: No edema, normal range of motion   Other(s):        All other systems: Negative          Medications:        All current medications were reviewed with changes reflected in problem list.         Data:      All new lab and imaging data was reviewed.      Data reviewed today: I reviewed all new labs and imaging results over the last 24 hours. I personally reviewed no images or EKG's today      Recent Labs   Lab 08/03/20 2206   WBC 9.5   HGB 14.5   HCT 45.2   MCV 81        No results for input(s): CULT in the last 168 hours.  Recent Labs   Lab 08/04/20  0729 08/03/20  2206    133   POTASSIUM 3.5 4.0   CHLORIDE 106 99   CO2 25 17*   ANIONGAP 7 17*   * 288*   BUN 15 16   CR 0.68 0.58*   GFRESTIMATED >90 >90   GFRESTBLACK >90 >90   LYNN 8.9 9.0   PROTTOTAL  --  7.0   ALBUMIN  --  3.6   BILITOTAL  --  1.0   ALKPHOS  --  101   AST  --  14   ALT  --  11       Recent Labs   Lab 08/04/20  0735 08/04/20  0729 08/04/20  0208 08/04/20  0024 08/03/20  2206 08/03/20  2200   GLC  --  174*  --   --  288*  --    *  --  181* 235*  --  336*       No results for input(s): INR in the last 168 hours.      No results for " input(s): TROPONIN, TROPI, TROPR in the last 168 hours.    Invalid input(s): TROP, TROPONINIES    Recent Results (from the past 48 hour(s))   XR Chest Port 1 View    Narrative    EXAM: XR CHEST PORT 1 VW  LOCATION: [facility]  DATE/TIME: 8/3/2020 10:27 PM    INDICATION: Cough.  COMPARISON: None.      Impression    IMPRESSION: Negative chest.         Disclaimer: This note consists of symbols derived from keyboarding, dictation and/or voice recognition software. As a result, there may be errors in the script that have gone undetected. Please consider this when interpreting information found in this chart.

## 2020-08-04 NOTE — ED PROVIDER NOTES
History     Chief Complaint:  Abdominal Pain     The history is provided by the patient. The history is limited by the condition of the patient.     Reji Monahan is a 30 year old year old male with a history of DKA and schizophrenia who presents via EMS for evaluation of abdominal pain. The patient is struggling to get his words about but tells us that he has been having pain all around his stomach for 3 days. The patient notes that the pain typically feels this kind of abdominal pain when he goes into DKA. Patient also says that he vomited a few days ago but not since then. Patient denies fever, pain while urinating, diarrhea or changing his diet.  He has had a cough over the last several days.  He denies any chest pain or shortness of breath.  He notes that he does not always take his insulin like he is supposed to.    Allergies:  No Known Allergies    Medications:   Diltiazem ER  Insulin aspart  Insulin glargine  Zyprexa     Medical History:   Diabetes mellitus   Schizophrenia  Chest pain   Splenomegaly  DKA  Cannabinoid hyperemesis syndrome   Paroxysmal SVT  Balanitis  Marijuana abuse    Surgical History:  The patient does not have any pertinent past surgical history.      Family History:   Diabetes  Stroke   Ovarian cancer    Social History:  Smoke: No  Alcohol: No  Drug: No      Review of Systems   Constitutional: Negative for appetite change and fever.   Gastrointestinal: Positive for abdominal pain. Negative for diarrhea.   Genitourinary: Negative for difficulty urinating.   All other systems reviewed and are negative.    Physical Exam     Patient Vitals for the past 24 hrs:   BP Temp Temp src Heart Rate Resp SpO2   08/03/20 2157 126/81 98  F (36.7  C) Oral 108 26 100 %       Physical Exam  General: Alert and cooperative with exam. Anxious appearing.  Head:  Scalp is NC/AT  Eyes:  No scleral icterus, PERRL  ENT:  The external nose and ears are normal.   Neck:  Normal range of motion without  rigidity.  CV:  Regular rate and rhythm    No pathologic murmur, rubs, or gallops.  Resp:  Breath sounds are clear bilaterally.  No crackles, wheezes, rhonchi, stridor.    Non-labored, no retractions or accessory muscle use  GI:  Abdomen is soft, no distension, mild diffuse tenderness without focal tenderness, no masses. No peritoneal signs.  Bowel sounds present in all quadrants  :  No suprapubic or flank tenderness  MS:  No lower extremity edema or asymmetric calf swelling. Normal ROM in all joints without effusions.    No midline cervical, thoracic, or lumbar tenderness  Skin:  Several boils present of back without fluctuance or expressible drainage (Have been there for some time per patient) Warm and dry,. 2+ peripheral pulses in all extremities  Neuro: Oriented. No gross motor deficits. GCS 15  Psych: Awake. Alert. Normal affect. Appropriate interactions.      Emergency Department Course     ECG:  Indication: Abdominal pain  Time: 2219  Vent. Rate 82 bpm. CT interval 144. QRS duration 78. QT/QTc 384/448. P-R-T axis 74 79 79. Normal sinus rhythm Read time: Normal ECG.    Imaging:  Radiology findings were communicated with the patient who voiced understanding of the findings.    XR Chest, Port 1 view:  Negative chest. Reading per radiology.    Laboratory:  Laboratory findings were communicated with the patient who voiced understanding of the findings.    CBC: WBC: 9.5, HGB: 14.5, PLT: 276    CMP: Glucose 288(H), Carbon Dioxide 17(H); Anion Gap 17(H); Creatinine 0.58 (L), o/w WNL    Ketone Beta- Hydroxybutyrate - 5.9(H)    Glucose by meter - 336(H)    Lipase - 47(H)    UA with Microscopic:  o/w Negative    Blood gas venous and oxyhgb - PCO2 Venous 28(L); Bicarbonate 17(L); o/w WNL    Symptomatic COVID-19: Pending     Interventions:  2213 NS 1L IV  2213 Toradol 15 mg IV  2213 Zofran 4 mg IV  2244 Haldol 2.5 mg IV  2245 Benadryl 25 mg IV  2255 NS 1L IV  2339 NovoLOG 4 units subcutaneous     Medications   lactated  ringers BOLUS 1,000 mL (1,000 mLs Intravenous New Bag 8/3/20 2314)   potassium chloride 10 mEq in 100 mL intermittent infusion with 10 mg lidocaine (10 mEq Intravenous New Bag 8/3/20 2339)   insulin aspart (NovoLOG) injection (RAPID ACTING) (has no administration in time range)   sodium chloride 0.9% infusion (has no administration in time range)   ketorolac (TORADOL) injection 15 mg (15 mg Intravenous Given 8/3/20 2213)   0.9% sodium chloride BOLUS (0 mLs Intravenous Stopped 8/3/20 2255)   ondansetron (ZOFRAN) injection 4 mg (4 mg Intravenous Given 8/3/20 2213)   haloperidol lactate (HALDOL) injection 2.5 mg (2.5 mg Intravenous Given 8/3/20 2244)   diphenhydrAMINE (BENADRYL) injection 25 mg (25 mg Intravenous Given 8/3/20 2245)   insulin aspart (NovoLOG) injection (RAPID ACTING) (4 Units Subcutaneous Given 8/3/20 2339)       Emergency Department Course:  Past medical records, nursing notes, and vitals reviewed.    9:58 PM I performed an exam of the patient as documented above.     EKG obtained in the ED, see results above.   IV was inserted and blood was drawn for laboratory testing, results above.  The patient was sent for a chest xray while in the emergency department, results above.     2236 I rechecked the patient and discussed the results of his workup thus far.     2335  I rechecked the patient and discussed the findings and plan.     Findings and plan explained to the Patient who consents to admission. Discussed the patient with Dr. Blackman, who will admit the patient to a [facility] bed for further monitoring, evaluation, and treatment.    I personally reviewed the laboratory and imaging results with the Patient and answered all related questions prior to admission.     Impression & Plan   Covid-19  Reji Monahan was evaluated during a global COVID-19 pandemic, which necessitated consideration that the patient might be at risk for infection with the SARS-CoV-2 virus that causes COVID-19.   Applicable protocols for  evaluation were followed during the patient's care.   COVID-19 was considered as part of the patient's evaluation. The plan for testing is:  a test was obtained during this visit.    Medical Decision Making:  Reji Monahan is a 30 year old male who presents today for  abdominal pain.  Work-up here notable for what appears to be mild DKA with ketosis, anion gap metabolic acidosis but compensated and pH normal.  Initial glucose 338.  Does have some mild abdominal tenderness, but normal white blood cell count, states symptoms feel exactly like other prior DKA episodes, and has undergone 6 CT scans of the abdomen and pelvis in the last year without any emergent/acute findings.  Discussed risk versus benefit of obtaining CT scan at this time, and the patient prefers to forego at present but will alert us if pain worsens.  Episode appears to be due to insulin noncompliance.  Does have a mild cough though chest x-ray clear.  COVID pending.  No other evidence of infectious process.  No chest pain and EKG normal.  Remainder of work-up shows normal lipase, LFTs, electrolytes, kidney function, hemoglobin, platelets.  Given fluids and home insulin and will admit to medicine.  Discussed with hospitalist who agrees to accept the patient.    Diagnosis:    ICD-10-CM    1. DKA (diabetic ketoacidoses) (H)  E11.10 Symptomatic COVID-19 Virus (Coronavirus) by PCR       Disposition:  Admitted to Dr. Blackman.    Scribe Disclosure:  I, Chilo Puentes, am serving as a scribe at 9:58 PM on 8/3/2020 to document services personally performed by Reynold Dubois PA, based on my observations and the provider's statements to me.      Reynold Dubois PA-C  08/04/20 0153

## 2020-08-04 NOTE — H&P
Admitted:     08/03/2020      CHIEF COMPLAINT:  Abdominal pain, decreased oral intake.      HISTORY OF PRESENT ILLNESS:  Reji Monahan is a 30-year-old gentleman with past medical history of diabetes mellitus type I, diabetic ketoacidosis, schizophrenia who presented to the emergency room with a complaint of on-and-off abdominal pain for 3 days.  The patient is not a good historian and does not give detailed history, but stated he took his Lantus 25 units yesterday prior to presentation to the emergency room.  Normally, he takes over 35.  He did not eat for the last 3 days.  He decreased the dosage, as he was not taking oral intake.  He also had associated abdominal pain and generalized on-and-off associated with nausea.  He also vomited a few times 2 days ago.  No fever or chills, no cough, no runny nose, no sore throat.  He denied any diarrhea or constipation.  In the emergency room, he was evaluated.  Ketones came back 5.9.  Anion gap 17.  Bicarbonate was 17.  Other electrolytes were normal.  Lipase was 47.  Liver enzyme was normal.  Glucose was 235.  In the emergency room, he was given Zofran, NovoLog total of 10 units, doses of Haldol and Benadryl, and the patient is mostly sleepy.  The patient stated he does not want to be on a drip, and he does not want to be poked every hour.      PAST MEDICAL HISTORY:   1. Diabetes mellitus type I, insulin-requiring.   2. Schizophrenia.   3. History of DKA.   4. Cannabinoid hyperemesis syndrome.   5. Paroxysmal SVT.   6. Marijuana abuse.      PAST SURGICAL HISTORY:  None.      FAMILY HISTORY:  History of diabetes, stroke in the family.      SOCIAL HISTORY:  Details not available, as patient is not giving a detailed history.  He stated he does not drink, and he does not use drugs.      REVIEW OF SYSTEMS:  Reviewed.  All are negative except those mentioned in history of present illness.      HOME MEDICATIONS:  Reviewed.   Prior to Admission Medications   Prescriptions Last  Dose Informant Patient Reported? Taking?   OLANZapine (ZYPREXA) 10 MG tablet   No No   Sig: Take 1 tablet (10 mg) by mouth every 6 hours as needed (agitation)   diltiazem ER (DILT-XR) 120 MG 24 hr capsule   Yes No   Sig: Take 120 mg by mouth daily   insulin aspart (NOVOLOG PEN) 100 UNIT/ML pen   Yes No   Sig: Inject 10 Units Subcutaneous 3 times daily (before meals)   insulin aspart (NOVOLOG PEN) 100 UNIT/ML pen   Yes No   Sig: Inject 2 Units Subcutaneous Mealtime correction for BF greater than 200: 2 units for every 50 increment   insulin glargine (LANTUS PEN) 100 UNIT/ML pen   No No   Sig: Inject 38 Units Subcutaneous every morning   insulin glargine (LANTUS PEN) 100 UNIT/ML pen   Yes Yes   Sig: Inject 30 Units Subcutaneous every morning   ondansetron (ZOFRAN ODT) 4 MG ODT tab   No No   Sig: Take 1 tablet (4 mg) by mouth every 8 hours as needed      Facility-Administered Medications: None     ALLERGIES:  NO KNOWN DRUG ALLERGIES.      PHYSICAL EXAMINATION:   GENERAL:  The patient is awake, does not keep eye contact, turned to his side and closing his eyes, not in distress.  Stated his abdominal pain is resolved.   VITAL SIGNS:  Blood pressure 98/69, pulse rate 66, temperature 98, oxygen saturation 98%.   HEENT:  Pink, nonicteric.   CHEST:  Good air entry bilateral.  No wheezing, crackles or rales.   CARDIOVASCULAR:  S1, S2 regular, no gallop or murmur.   ABDOMEN:  Soft, nondistended, mild tenderness on deep palpation and diffuse.  No guarding.   EXTREMITIES:  No edema, cyanosis or clubbing.  Rather dry and poor skin turgor.   NEUROLOGIC:  No focal neurologic deficit.  Moves all his extremities, but does not follow instruction, as the patient is turning to one side and trying to sleep.      DIAGNOSTIC TESTS OF INTEREST:  Sodium 133, potassium 4, BUN 16, creatinine 0.5, calcium 9, anion gap 17, ketones 5.9, lipase 47, glucose 235.  Venous pH 7.39, pCO2 of 28, pO2 of 28, bicarbonate 17.  Glucose on presentation was  336, now is 235.  Chest x-ray negative.  The patient had multiple CTs of abdomen and pelvis in the past, all turning out to be negative for any acute pathology other than splenomegaly.      ASSESSMENT:  Reji Monahan is a 60-year-old gentleman with history of diabetes mellitus type I requiring insulin, diabetic ketoacidosis, schizophrenia, splenomegaly, cannabinoid hyperemesis syndrome, who presented to the emergency room with abdominal pain, decreased oral intake and also decreasing his insulin requirement as he was not eating for the last 3 days and found to be in uncontrolled diabetes with elevated ketones without significant acidosis, likely secondary to decreased oral intake and mild diabetic ketoacidosis, and being admitted to the hospital.  The patient was in this hospital on 06/23/2020; at that time, he had diabetic ketoacidosis but signed against medical advice and left the hospital.  At this time, the patient is willing to stay in the hospital.   1. Diabetes mellitus type I, uncontrolled with mild diabetic ketoacidosis.   2. Abdominal pain.   3. Schizophrenia.      PLAN:  The patient is being admitted under observation.  He has slightly elevated ketones with mild acidosis.  He got 2 liters of bolus of IV fluid.  I will maintain at 125 mL per hour.  He also got 10 units of NovoLog in the emergency room.  The patient will get Lantus in the morning.  We will cover him with NPH until morning.  I advised him to eat.  The patient is unwilling to eat at this point, as he stated he has abdominal pain when he eats.  In the meantime, we will continue close monitoring.  The patient is not willing to be on a drip, as he does not want to be poked every hour.  In any case, this is a mild form of DKA, partly due to starvation ketosis given his glucose was on the low side and no significant acidosis.  Expect to improve just with IV fluids and subcutaneous insulin.  We will check his anion gap, bicarbonate and ketones  again in the morning.  If it opens up and his acid worsens, it probably needs to be discussed with the patient to start him on insulin drip.  For now, he will be on subcutaneous insulin and IV fluid hydration.  Last time he was in the hospital, his bicarbonate was 9 and anion gap was 19, and the patient left.  I discussed with the patient.  He is not involved in conversation.  At this point, he has abdominal pain and nausea and vomiting is better.  I expect him to improve without additional interventions.  Admitted under observation; expect to be discharged in 24 hours.            ODALYS BHAT MD             D: 2020   T: 2020   MT: JV      Name:     MARCEL VASQUEZ   MRN:      [id number]        Account:      [id number]   :      1990        Admitted:     2020                   Document: [id number]       cc: Bogdan Jhaveri MD

## 2020-08-04 NOTE — PLAN OF CARE
Pt discharged back to [facility], taking taxi transportation. IV removed, discharge instructions provided, all questions answered. All belongings w/ pt

## 2020-08-14 NOTE — DISCHARGE SUMMARY
[facility]  Hospitalist Discharge Summary      Date of Admission:  8/3/2020  Date of Discharge:  8/4/2020 11:57 AM  Discharging Provider: Yonny Leiva MD      Discharge Diagnoses     Diabetic ketoacidosis   Abdominal pain    Follow-ups Needed After Discharge   Follow-up Appointments     Follow-up and recommended labs and tests       Follow up with primary care provider, Bogdan Jhaveri MD, within 7 days   to evaluate treatment change and for hospital follow- up.  No follow up   labs or test are needed.               Discharge Disposition   Discharged to home  Condition at discharge: Stable    Hospital Course   Reji Monahan is a 60-year-old gentleman with history of diabetes mellitus type I requiring insulin, diabetic ketoacidosis, schizophrenia, splenomegaly, cannabinoid hyperemesis syndrome, who presented to the emergency room with abdominal pain, decreased oral intake and also decreasing his insulin requirement as he was not eating for the last 3 days and found to be in uncontrolled diabetes with elevated ketones without significant acidosis, likely secondary to decreased oral intake and mild diabetic ketoacidosis, and being admitted to the hospital.  The patient was in this hospital on 06/23/2020; at that time, he had diabetic ketoacidosis but signed against medical advice and left the hospital  Patient was admitted and was treated with insulin, IV fluids and was closely monitored.  His diabetic ketoacidosis resolved but he continues to have generalized weakness and abdominal pain for which pain medications were given.    Consultations This Hospital Stay   None    Code Status   Full Code    Time Spent on this Encounter   I, Yonny Leiva MD, personally saw the patient today and spent greater than 30 minutes discharging this patient.       Yonny Leiva MD  [facility]  ______________________________________________________________________    Physical Exam   Vital  Signs:                    Weight: 159 lbs 0 oz       Primary Care Physician   Bogdan Jhaveri MD    Discharge Orders      Reason for your hospital stay    DKA     Follow-up and recommended labs and tests     Follow up with primary care provider, Bogdan Jhaveri MD, within 7 days to evaluate treatment change and for hospital follow- up.  No follow up labs or test are needed.     Activity    Your activity upon discharge: activity as tolerated and no driving for today     Monitor and record    blood glucose 4 times a day, before meals and at bedtime     When to contact your care team    Call your primary doctor if you have any of the following: increased pain.     Full Code     Diet    Follow this diet upon discharge: Orders Placed This Encounter      Moderate Consistent CHO Diet       Significant Results and Procedures   Results for orders placed or performed during the hospital encounter of 08/03/20   XR Chest Port 1 View    Narrative    EXAM: XR CHEST PORT 1 VW  LOCATION: [facility]  DATE/TIME: 8/3/2020 10:27 PM    INDICATION: Cough.  COMPARISON: None.      Impression    IMPRESSION: Negative chest.       Discharge Medications   Discharge Medication List as of 8/4/2020 11:45 AM      CONTINUE these medications which have CHANGED    Details   insulin glargine (LANTUS PEN) 100 UNIT/ML pen Inject 30 Units Subcutaneous every morning, HistoricalIf Lantus is not covered by insurance, may substitute Basaglar at same dose and frequency.           CONTINUE these medications which have NOT CHANGED    Details   diltiazem ER (DILT-XR) 120 MG 24 hr capsule Take 120 mg by mouth daily, Historical      !! insulin aspart (NOVOLOG PEN) 100 UNIT/ML pen Inject 10 Units Subcutaneous 3 times daily (before meals), Historical      !! insulin aspart (NOVOLOG PEN) 100 UNIT/ML pen Inject 2 Units Subcutaneous Mealtime correction for BF greater than 200: 2 units for every 50 increment, Historical      OLANZapine (ZYPREXA) 10 MG  tablet Take 1 tablet (10 mg) by mouth every 6 hours as needed (agitation), Disp-30 tablet, R-0, E-Prescribe       !! - Potential duplicate medications found. Please discuss with provider.      STOP taking these medications       ondansetron (ZOFRAN ODT) 4 MG ODT tab Comments:   Reason for Stopping:             Allergies   No Known Allergies

## 2020-12-23 ENCOUNTER — HOSPITAL ENCOUNTER (INPATIENT)
Facility: CLINIC | Age: 30
LOS: 1 days | Discharge: LEFT BEFORE DISCHARGE | DRG: 637 | End: 2020-12-24
Attending: EMERGENCY MEDICINE | Admitting: HOSPITALIST
Payer: MEDICARE

## 2020-12-23 ENCOUNTER — APPOINTMENT (OUTPATIENT)
Dept: CT IMAGING | Facility: CLINIC | Age: 30
DRG: 637 | End: 2020-12-23
Attending: EMERGENCY MEDICINE
Payer: MEDICARE

## 2020-12-23 DIAGNOSIS — E10.10 DIABETIC KETOACIDOSIS WITHOUT COMA ASSOCIATED WITH TYPE 1 DIABETES MELLITUS (H): ICD-10-CM

## 2020-12-23 DIAGNOSIS — N10 PYELONEPHRITIS, ACUTE: Primary | ICD-10-CM

## 2020-12-23 LAB
ALBUMIN SERPL-MCNC: 4.1 G/DL (ref 3.4–5)
ALBUMIN UR-MCNC: NEGATIVE MG/DL
ALP SERPL-CCNC: 148 U/L (ref 40–150)
ALT SERPL W P-5'-P-CCNC: 22 U/L (ref 0–70)
ANION GAP SERPL CALCULATED.3IONS-SCNC: 10 MMOL/L (ref 3–14)
ANION GAP SERPL CALCULATED.3IONS-SCNC: 17 MMOL/L (ref 3–14)
ANION GAP SERPL CALCULATED.3IONS-SCNC: 3 MMOL/L (ref 3–14)
ANION GAP SERPL CALCULATED.3IONS-SCNC: 4 MMOL/L (ref 3–14)
ANION GAP SERPL CALCULATED.3IONS-SCNC: 7 MMOL/L (ref 3–14)
APPEARANCE UR: CLEAR
AST SERPL W P-5'-P-CCNC: 16 U/L (ref 0–45)
BASE DEFICIT BLDV-SCNC: 3.4 MMOL/L
BASOPHILS # BLD AUTO: 0 10E9/L (ref 0–0.2)
BASOPHILS NFR BLD AUTO: 0.3 %
BILIRUB DIRECT SERPL-MCNC: 0.1 MG/DL (ref 0–0.2)
BILIRUB SERPL-MCNC: 0.6 MG/DL (ref 0.2–1.3)
BILIRUB UR QL STRIP: NEGATIVE
BUN SERPL-MCNC: 14 MG/DL (ref 7–30)
BUN SERPL-MCNC: 15 MG/DL (ref 7–30)
CALCIUM SERPL-MCNC: 8.3 MG/DL (ref 8.5–10.1)
CALCIUM SERPL-MCNC: 8.4 MG/DL (ref 8.5–10.1)
CALCIUM SERPL-MCNC: 8.5 MG/DL (ref 8.5–10.1)
CALCIUM SERPL-MCNC: 8.6 MG/DL (ref 8.5–10.1)
CALCIUM SERPL-MCNC: 9.9 MG/DL (ref 8.5–10.1)
CHLORIDE SERPL-SCNC: 101 MMOL/L (ref 94–109)
CHLORIDE SERPL-SCNC: 107 MMOL/L (ref 94–109)
CHLORIDE SERPL-SCNC: 108 MMOL/L (ref 94–109)
CHLORIDE SERPL-SCNC: 109 MMOL/L (ref 94–109)
CHLORIDE SERPL-SCNC: 109 MMOL/L (ref 94–109)
CO2 SERPL-SCNC: 18 MMOL/L (ref 20–32)
CO2 SERPL-SCNC: 22 MMOL/L (ref 20–32)
CO2 SERPL-SCNC: 23 MMOL/L (ref 20–32)
CO2 SERPL-SCNC: 26 MMOL/L (ref 20–32)
CO2 SERPL-SCNC: 27 MMOL/L (ref 20–32)
COLOR UR AUTO: ABNORMAL
CREAT SERPL-MCNC: 0.42 MG/DL (ref 0.66–1.25)
CREAT SERPL-MCNC: 0.43 MG/DL (ref 0.66–1.25)
CREAT SERPL-MCNC: 0.44 MG/DL (ref 0.66–1.25)
CREAT SERPL-MCNC: 0.46 MG/DL (ref 0.66–1.25)
CREAT SERPL-MCNC: 0.52 MG/DL (ref 0.66–1.25)
D DIMER PPP FEU-MCNC: <0.3 UG/ML FEU (ref 0–0.5)
DIFFERENTIAL METHOD BLD: ABNORMAL
EOSINOPHIL # BLD AUTO: 0 10E9/L (ref 0–0.7)
EOSINOPHIL NFR BLD AUTO: 0.2 %
ERYTHROCYTE [DISTWIDTH] IN BLOOD BY AUTOMATED COUNT: 13.5 % (ref 10–15)
FLUABV+SARS-COV-2+RSV PNL RESP NAA+PROBE: NEGATIVE
FLUABV+SARS-COV-2+RSV PNL RESP NAA+PROBE: NEGATIVE
GFR SERPL CREATININE-BSD FRML MDRD: >90 ML/MIN/{1.73_M2}
GLUCOSE BLDC GLUCOMTR-MCNC: 195 MG/DL (ref 70–99)
GLUCOSE BLDC GLUCOMTR-MCNC: 202 MG/DL (ref 70–99)
GLUCOSE BLDC GLUCOMTR-MCNC: 214 MG/DL (ref 70–99)
GLUCOSE BLDC GLUCOMTR-MCNC: 276 MG/DL (ref 70–99)
GLUCOSE BLDC GLUCOMTR-MCNC: 285 MG/DL (ref 70–99)
GLUCOSE BLDC GLUCOMTR-MCNC: 299 MG/DL (ref 70–99)
GLUCOSE BLDC GLUCOMTR-MCNC: 380 MG/DL (ref 70–99)
GLUCOSE BLDC GLUCOMTR-MCNC: 443 MG/DL (ref 70–99)
GLUCOSE SERPL-MCNC: 192 MG/DL (ref 70–99)
GLUCOSE SERPL-MCNC: 250 MG/DL (ref 70–99)
GLUCOSE SERPL-MCNC: 349 MG/DL (ref 70–99)
GLUCOSE SERPL-MCNC: 419 MG/DL (ref 70–99)
GLUCOSE SERPL-MCNC: 438 MG/DL (ref 70–99)
GLUCOSE UR STRIP-MCNC: >1000 MG/DL
HBA1C MFR BLD: 12.3 % (ref 0–5.6)
HCO3 BLDV-SCNC: 20 MMOL/L (ref 21–28)
HCT VFR BLD AUTO: 44.1 % (ref 40–53)
HGB BLD-MCNC: 14.2 G/DL (ref 13.3–17.7)
HGB UR QL STRIP: NEGATIVE
IMM GRANULOCYTES # BLD: 0 10E9/L (ref 0–0.4)
IMM GRANULOCYTES NFR BLD: 0.3 %
INTERPRETATION ECG - MUSE: NORMAL
KETONES BLD-SCNC: 0.3 MMOL/L (ref 0–0.6)
KETONES BLD-SCNC: 0.9 MMOL/L (ref 0–0.6)
KETONES BLD-SCNC: 1.6 MMOL/L (ref 0–0.6)
KETONES BLD-SCNC: 3.1 MMOL/L (ref 0–0.6)
KETONES UR STRIP-MCNC: >150 MG/DL
LABORATORY COMMENT REPORT: ABNORMAL
LACTATE BLD-SCNC: 2.5 MMOL/L (ref 0.7–2)
LACTATE BLD-SCNC: 5.4 MMOL/L (ref 0.7–2)
LEUKOCYTE ESTERASE UR QL STRIP: ABNORMAL
LYMPHOCYTES # BLD AUTO: 1.1 10E9/L (ref 0.8–5.3)
LYMPHOCYTES NFR BLD AUTO: 16.7 %
MAGNESIUM SERPL-MCNC: 2 MG/DL (ref 1.6–2.3)
MCH RBC QN AUTO: 25 PG (ref 26.5–33)
MCHC RBC AUTO-ENTMCNC: 32.2 G/DL (ref 31.5–36.5)
MCV RBC AUTO: 78 FL (ref 78–100)
MONOCYTES # BLD AUTO: 0.2 10E9/L (ref 0–1.3)
MONOCYTES NFR BLD AUTO: 2.8 %
NEUTROPHILS # BLD AUTO: 5.2 10E9/L (ref 1.6–8.3)
NEUTROPHILS NFR BLD AUTO: 79.7 %
NITRATE UR QL: NEGATIVE
NRBC # BLD AUTO: 0 10*3/UL
NRBC BLD AUTO-RTO: 0 /100
O2/TOTAL GAS SETTING VFR VENT: 100 %
OXYHGB MFR BLDV: 78 %
PCO2 BLDV: 30 MM HG (ref 40–50)
PH BLDV: 7.43 PH (ref 7.32–7.43)
PH UR STRIP: 6 PH (ref 5–7)
PLATELET # BLD AUTO: 220 10E9/L (ref 150–450)
PO2 BLDV: 38 MM HG (ref 25–47)
POTASSIUM SERPL-SCNC: 3.7 MMOL/L (ref 3.4–5.3)
POTASSIUM SERPL-SCNC: 3.9 MMOL/L (ref 3.4–5.3)
POTASSIUM SERPL-SCNC: 3.9 MMOL/L (ref 3.4–5.3)
POTASSIUM SERPL-SCNC: 4.1 MMOL/L (ref 3.4–5.3)
POTASSIUM SERPL-SCNC: 4.2 MMOL/L (ref 3.4–5.3)
PROT SERPL-MCNC: 8.1 G/DL (ref 6.8–8.8)
RBC # BLD AUTO: 5.68 10E12/L (ref 4.4–5.9)
RBC #/AREA URNS AUTO: 2 /HPF (ref 0–2)
RSV RNA SPEC QL NAA+PROBE: NEGATIVE
SARS-COV-2 RNA SPEC QL NAA+PROBE: POSITIVE
SODIUM SERPL-SCNC: 136 MMOL/L (ref 133–144)
SODIUM SERPL-SCNC: 138 MMOL/L (ref 133–144)
SODIUM SERPL-SCNC: 139 MMOL/L (ref 133–144)
SOURCE: ABNORMAL
SP GR UR STRIP: 1.01 (ref 1–1.03)
SPECIMEN SOURCE: ABNORMAL
TROPONIN I SERPL-MCNC: <0.015 UG/L (ref 0–0.04)
UROBILINOGEN UR STRIP-MCNC: NORMAL MG/DL (ref 0–2)
WBC # BLD AUTO: 6.5 10E9/L (ref 4–11)
WBC #/AREA URNS AUTO: 32 /HPF (ref 0–5)

## 2020-12-23 PROCEDURE — 99223 1ST HOSP IP/OBS HIGH 75: CPT | Mod: AI | Performed by: HOSPITALIST

## 2020-12-23 PROCEDURE — 36415 COLL VENOUS BLD VENIPUNCTURE: CPT

## 2020-12-23 PROCEDURE — 96361 HYDRATE IV INFUSION ADD-ON: CPT

## 2020-12-23 PROCEDURE — 81001 URINALYSIS AUTO W/SCOPE: CPT | Performed by: EMERGENCY MEDICINE

## 2020-12-23 PROCEDURE — 250N000013 HC RX MED GY IP 250 OP 250 PS 637: Performed by: INTERNAL MEDICINE

## 2020-12-23 PROCEDURE — 87086 URINE CULTURE/COLONY COUNT: CPT | Performed by: HOSPITALIST

## 2020-12-23 PROCEDURE — 120N000001 HC R&B MED SURG/OB

## 2020-12-23 PROCEDURE — 250N000011 HC RX IP 250 OP 636: Performed by: EMERGENCY MEDICINE

## 2020-12-23 PROCEDURE — 87088 URINE BACTERIA CULTURE: CPT | Performed by: HOSPITALIST

## 2020-12-23 PROCEDURE — 87636 SARSCOV2 & INF A&B AMP PRB: CPT | Performed by: EMERGENCY MEDICINE

## 2020-12-23 PROCEDURE — 99292 CRITICAL CARE ADDL 30 MIN: CPT

## 2020-12-23 PROCEDURE — 85025 COMPLETE CBC W/AUTO DIFF WBC: CPT | Performed by: EMERGENCY MEDICINE

## 2020-12-23 PROCEDURE — 93010 ELECTROCARDIOGRAM REPORT: CPT | Performed by: INTERNAL MEDICINE

## 2020-12-23 PROCEDURE — 87186 SC STD MICRODIL/AGAR DIL: CPT | Performed by: HOSPITALIST

## 2020-12-23 PROCEDURE — 250N000012 HC RX MED GY IP 250 OP 636 PS 637: Performed by: EMERGENCY MEDICINE

## 2020-12-23 PROCEDURE — 83036 HEMOGLOBIN GLYCOSYLATED A1C: CPT | Performed by: EMERGENCY MEDICINE

## 2020-12-23 PROCEDURE — 87040 BLOOD CULTURE FOR BACTERIA: CPT | Performed by: EMERGENCY MEDICINE

## 2020-12-23 PROCEDURE — 93005 ELECTROCARDIOGRAM TRACING: CPT

## 2020-12-23 PROCEDURE — 80048 BASIC METABOLIC PNL TOTAL CA: CPT | Performed by: HOSPITALIST

## 2020-12-23 PROCEDURE — 999N001017 HC STATISTIC GLUCOSE BY METER IP

## 2020-12-23 PROCEDURE — 36415 COLL VENOUS BLD VENIPUNCTURE: CPT | Performed by: HOSPITALIST

## 2020-12-23 PROCEDURE — 83605 ASSAY OF LACTIC ACID: CPT | Performed by: EMERGENCY MEDICINE

## 2020-12-23 PROCEDURE — 250N000009 HC RX 250: Performed by: EMERGENCY MEDICINE

## 2020-12-23 PROCEDURE — 83735 ASSAY OF MAGNESIUM: CPT | Performed by: EMERGENCY MEDICINE

## 2020-12-23 PROCEDURE — 258N000003 HC RX IP 258 OP 636: Performed by: EMERGENCY MEDICINE

## 2020-12-23 PROCEDURE — 96365 THER/PROPH/DIAG IV INF INIT: CPT | Mod: 59

## 2020-12-23 PROCEDURE — 82010 KETONE BODYS QUAN: CPT | Performed by: EMERGENCY MEDICINE

## 2020-12-23 PROCEDURE — 258N000002 HC RX IP 258 OP 250: Performed by: EMERGENCY MEDICINE

## 2020-12-23 PROCEDURE — 99291 CRITICAL CARE FIRST HOUR: CPT | Mod: 25

## 2020-12-23 PROCEDURE — 250N000011 HC RX IP 250 OP 636: Performed by: HOSPITALIST

## 2020-12-23 PROCEDURE — 84484 ASSAY OF TROPONIN QUANT: CPT | Performed by: EMERGENCY MEDICINE

## 2020-12-23 PROCEDURE — 74177 CT ABD & PELVIS W/CONTRAST: CPT

## 2020-12-23 PROCEDURE — 96375 TX/PRO/DX INJ NEW DRUG ADDON: CPT

## 2020-12-23 PROCEDURE — C9803 HOPD COVID-19 SPEC COLLECT: HCPCS

## 2020-12-23 PROCEDURE — 85379 FIBRIN DEGRADATION QUANT: CPT | Performed by: HOSPITALIST

## 2020-12-23 PROCEDURE — 96368 THER/DIAG CONCURRENT INF: CPT

## 2020-12-23 PROCEDURE — 80076 HEPATIC FUNCTION PANEL: CPT | Performed by: EMERGENCY MEDICINE

## 2020-12-23 PROCEDURE — 82805 BLOOD GASES W/O2 SATURATION: CPT | Performed by: EMERGENCY MEDICINE

## 2020-12-23 PROCEDURE — 80048 BASIC METABOLIC PNL TOTAL CA: CPT | Performed by: EMERGENCY MEDICINE

## 2020-12-23 PROCEDURE — 82010 KETONE BODYS QUAN: CPT | Performed by: HOSPITALIST

## 2020-12-23 RX ORDER — NALOXONE HYDROCHLORIDE 0.4 MG/ML
0.2 INJECTION, SOLUTION INTRAMUSCULAR; INTRAVENOUS; SUBCUTANEOUS
Status: DISCONTINUED | OUTPATIENT
Start: 2020-12-23 | End: 2020-12-24 | Stop reason: HOSPADM

## 2020-12-23 RX ORDER — SODIUM CHLORIDE 450 MG/100ML
1000 INJECTION, SOLUTION INTRAVENOUS CONTINUOUS
Status: DISCONTINUED | OUTPATIENT
Start: 2020-12-23 | End: 2020-12-23

## 2020-12-23 RX ORDER — CEFTRIAXONE 1 G/1
1 INJECTION, POWDER, FOR SOLUTION INTRAMUSCULAR; INTRAVENOUS EVERY 24 HOURS
Status: DISCONTINUED | OUTPATIENT
Start: 2020-12-24 | End: 2020-12-24 | Stop reason: HOSPADM

## 2020-12-23 RX ORDER — OXYCODONE HYDROCHLORIDE 5 MG/1
5 TABLET ORAL EVERY 4 HOURS PRN
Status: DISCONTINUED | OUTPATIENT
Start: 2020-12-23 | End: 2020-12-24 | Stop reason: HOSPADM

## 2020-12-23 RX ORDER — DEXTROSE MONOHYDRATE 25 G/50ML
25-50 INJECTION, SOLUTION INTRAVENOUS
Status: DISCONTINUED | OUTPATIENT
Start: 2020-12-23 | End: 2020-12-23

## 2020-12-23 RX ORDER — HYDROMORPHONE HYDROCHLORIDE 1 MG/ML
0.5 SOLUTION ORAL EVERY 4 HOURS PRN
Status: DISCONTINUED | OUTPATIENT
Start: 2020-12-23 | End: 2020-12-24 | Stop reason: HOSPADM

## 2020-12-23 RX ORDER — NALOXONE HYDROCHLORIDE 0.4 MG/ML
0.4 INJECTION, SOLUTION INTRAMUSCULAR; INTRAVENOUS; SUBCUTANEOUS
Status: DISCONTINUED | OUTPATIENT
Start: 2020-12-23 | End: 2020-12-24 | Stop reason: HOSPADM

## 2020-12-23 RX ORDER — DEXTROSE MONOHYDRATE 25 G/50ML
25-50 INJECTION, SOLUTION INTRAVENOUS
Status: DISCONTINUED | OUTPATIENT
Start: 2020-12-23 | End: 2020-12-24

## 2020-12-23 RX ORDER — CEFAZOLIN SODIUM 1 G/50ML
1750 SOLUTION INTRAVENOUS ONCE
Status: DISCONTINUED | OUTPATIENT
Start: 2020-12-23 | End: 2020-12-23

## 2020-12-23 RX ORDER — PIPERACILLIN SODIUM, TAZOBACTAM SODIUM 4; .5 G/20ML; G/20ML
4.5 INJECTION, POWDER, LYOPHILIZED, FOR SOLUTION INTRAVENOUS ONCE
Status: DISCONTINUED | OUTPATIENT
Start: 2020-12-23 | End: 2020-12-23

## 2020-12-23 RX ORDER — LIDOCAINE 40 MG/G
CREAM TOPICAL
Status: DISCONTINUED | OUTPATIENT
Start: 2020-12-23 | End: 2020-12-24

## 2020-12-23 RX ORDER — DEXTROSE MONOHYDRATE, SODIUM CHLORIDE, AND POTASSIUM CHLORIDE 50; 1.49; 4.5 G/1000ML; G/1000ML; G/1000ML
INJECTION, SOLUTION INTRAVENOUS CONTINUOUS
Status: DISCONTINUED | OUTPATIENT
Start: 2020-12-23 | End: 2020-12-24

## 2020-12-23 RX ORDER — ACETAMINOPHEN 650 MG/1
650 SUPPOSITORY RECTAL EVERY 4 HOURS PRN
Status: DISCONTINUED | OUTPATIENT
Start: 2020-12-23 | End: 2020-12-24 | Stop reason: HOSPADM

## 2020-12-23 RX ORDER — PROCHLORPERAZINE 25 MG
25 SUPPOSITORY, RECTAL RECTAL EVERY 12 HOURS PRN
Status: DISCONTINUED | OUTPATIENT
Start: 2020-12-23 | End: 2020-12-24 | Stop reason: HOSPADM

## 2020-12-23 RX ORDER — POTASSIUM CHLORIDE 7.45 MG/ML
10 INJECTION INTRAVENOUS ONCE
Status: DISCONTINUED | OUTPATIENT
Start: 2020-12-23 | End: 2020-12-23

## 2020-12-23 RX ORDER — VANCOMYCIN HYDROCHLORIDE 1 G/200ML
1000 INJECTION, SOLUTION INTRAVENOUS ONCE
Status: COMPLETED | OUTPATIENT
Start: 2020-12-23 | End: 2020-12-23

## 2020-12-23 RX ORDER — IOPAMIDOL 755 MG/ML
80 INJECTION, SOLUTION INTRAVASCULAR ONCE
Status: COMPLETED | OUTPATIENT
Start: 2020-12-23 | End: 2020-12-23

## 2020-12-23 RX ORDER — ONDANSETRON 4 MG/1
4 TABLET, ORALLY DISINTEGRATING ORAL EVERY 6 HOURS PRN
Status: DISCONTINUED | OUTPATIENT
Start: 2020-12-23 | End: 2020-12-24 | Stop reason: HOSPADM

## 2020-12-23 RX ORDER — HALOPERIDOL 5 MG/ML
5 INJECTION INTRAMUSCULAR ONCE
Status: COMPLETED | OUTPATIENT
Start: 2020-12-23 | End: 2020-12-23

## 2020-12-23 RX ORDER — PROCHLORPERAZINE MALEATE 5 MG
5 TABLET ORAL EVERY 6 HOURS PRN
Status: DISCONTINUED | OUTPATIENT
Start: 2020-12-23 | End: 2020-12-24 | Stop reason: HOSPADM

## 2020-12-23 RX ORDER — CEFTRIAXONE 2 G/1
2 INJECTION, POWDER, FOR SOLUTION INTRAMUSCULAR; INTRAVENOUS ONCE
Status: COMPLETED | OUTPATIENT
Start: 2020-12-23 | End: 2020-12-23

## 2020-12-23 RX ORDER — ACETAMINOPHEN 325 MG/1
650 TABLET ORAL EVERY 4 HOURS PRN
Status: DISCONTINUED | OUTPATIENT
Start: 2020-12-23 | End: 2020-12-24 | Stop reason: HOSPADM

## 2020-12-23 RX ORDER — HYDRALAZINE HYDROCHLORIDE 20 MG/ML
10 INJECTION INTRAMUSCULAR; INTRAVENOUS EVERY 4 HOURS PRN
Status: DISCONTINUED | OUTPATIENT
Start: 2020-12-23 | End: 2020-12-24 | Stop reason: HOSPADM

## 2020-12-23 RX ORDER — ONDANSETRON 2 MG/ML
4 INJECTION INTRAMUSCULAR; INTRAVENOUS EVERY 30 MIN PRN
Status: DISCONTINUED | OUTPATIENT
Start: 2020-12-23 | End: 2020-12-23

## 2020-12-23 RX ORDER — SODIUM CHLORIDE AND POTASSIUM CHLORIDE 150; 450 MG/100ML; MG/100ML
INJECTION, SOLUTION INTRAVENOUS CONTINUOUS
Status: DISCONTINUED | OUTPATIENT
Start: 2020-12-23 | End: 2020-12-24

## 2020-12-23 RX ORDER — NICOTINE POLACRILEX 4 MG
15-30 LOZENGE BUCCAL
Status: DISCONTINUED | OUTPATIENT
Start: 2020-12-23 | End: 2020-12-24

## 2020-12-23 RX ORDER — ONDANSETRON 2 MG/ML
4 INJECTION INTRAMUSCULAR; INTRAVENOUS EVERY 6 HOURS PRN
Status: DISCONTINUED | OUTPATIENT
Start: 2020-12-23 | End: 2020-12-24 | Stop reason: HOSPADM

## 2020-12-23 RX ORDER — NITROGLYCERIN 0.4 MG/1
0.4 TABLET SUBLINGUAL EVERY 5 MIN PRN
Status: DISCONTINUED | OUTPATIENT
Start: 2020-12-23 | End: 2020-12-24

## 2020-12-23 RX ORDER — HYDROMORPHONE HYDROCHLORIDE 1 MG/ML
0.5 INJECTION, SOLUTION INTRAMUSCULAR; INTRAVENOUS; SUBCUTANEOUS ONCE
Status: COMPLETED | OUTPATIENT
Start: 2020-12-23 | End: 2020-12-23

## 2020-12-23 RX ADMIN — IOPAMIDOL 80 ML: 755 INJECTION, SOLUTION INTRAVENOUS at 16:00

## 2020-12-23 RX ADMIN — SODIUM CHLORIDE 1000 ML: 9 INJECTION, SOLUTION INTRAVENOUS at 15:18

## 2020-12-23 RX ADMIN — CEFTRIAXONE SODIUM 2 G: 2 INJECTION, POWDER, FOR SOLUTION INTRAMUSCULAR; INTRAVENOUS at 16:32

## 2020-12-23 RX ADMIN — POTASSIUM CHLORIDE AND SODIUM CHLORIDE: 450; 150 INJECTION, SOLUTION INTRAVENOUS at 18:40

## 2020-12-23 RX ADMIN — VANCOMYCIN HYDROCHLORIDE 1000 MG: 1 INJECTION, SOLUTION INTRAVENOUS at 17:06

## 2020-12-23 RX ADMIN — SODIUM CHLORIDE 5.5 UNITS/HR: 9 INJECTION, SOLUTION INTRAVENOUS at 16:44

## 2020-12-23 RX ADMIN — HYDROMORPHONE HYDROCHLORIDE 0.5 MG: 1 INJECTION, SOLUTION INTRAMUSCULAR; INTRAVENOUS; SUBCUTANEOUS at 16:13

## 2020-12-23 RX ADMIN — OXYCODONE HYDROCHLORIDE 5 MG: 5 TABLET ORAL at 22:04

## 2020-12-23 RX ADMIN — SODIUM CHLORIDE 1000 ML: 4.5 INJECTION, SOLUTION INTRAVENOUS at 17:03

## 2020-12-23 RX ADMIN — HALOPERIDOL LACTATE 5 MG: 5 INJECTION, SOLUTION INTRAMUSCULAR at 15:00

## 2020-12-23 RX ADMIN — SODIUM CHLORIDE 63 ML: 9 INJECTION, SOLUTION INTRAVENOUS at 16:00

## 2020-12-23 RX ADMIN — SODIUM CHLORIDE 1000 ML: 9 INJECTION, SOLUTION INTRAVENOUS at 14:22

## 2020-12-23 RX ADMIN — POTASSIUM CHLORIDE 10 MEQ: 7.46 INJECTION, SOLUTION INTRAVENOUS at 16:42

## 2020-12-23 RX ADMIN — ONDANSETRON 4 MG: 2 INJECTION INTRAMUSCULAR; INTRAVENOUS at 16:13

## 2020-12-23 ASSESSMENT — ENCOUNTER SYMPTOMS
FEVER: 0
COUGH: 0
VOMITING: 1
NAUSEA: 1
SHORTNESS OF BREATH: 0
BLOOD IN STOOL: 0
DIARRHEA: 1

## 2020-12-23 ASSESSMENT — ACTIVITIES OF DAILY LIVING (ADL): ADLS_ACUITY_SCORE: 11

## 2020-12-23 NOTE — ED NOTES
Bed: ED04  Expected date:   Expected time:   Means of arrival:   Comments:  Anil Mejia6 Hyperglycemia in the 400s 30 m

## 2020-12-23 NOTE — H&P
"[facility]  History and Physical   Hospitalist  Abel Nravaez MD       Reji Monahan MRN# [id number]   YOB: 1990 Age: 30 year old      Date of Admission:  12/23/2020         Assessment and Plan:   Reji Monahan is a 30 year old male with PMH significant for type I diabetes mellitus with history of DKA, schizophrenia, hypertension, splenomegaly, history of cannabinoid hyperemesis syndrome who presented to ED with abdominal pain, nausea and vomiting and noted with DKA. He reports that time she has been taking his Lantus \"late\".    Uncontrolled type I diabetes mellitus with DKA  [place] [facility] [place]on admission lactate 5.4, A1c 4.3, blood glucose in 400s, bicarb 18, anion gap 17  -DKA likely secondary to medication noncompliance and also likely pyelonephritis  -PTA regimen include NovoLog 10 units 3 times a day and Lantus 30 units daily  -will start insulin drip per DKA protocol, monitor BMP Q2 hours until normalized  -will keep NPO for now; IV hydration per DKA protocol    Pain abdomen  likely pyelonephritis  history of cannabinoid hyper emesis syndrome  -been having some concerns about chronic pain abdomen in charts; patient poor historian  -notes diffuse pain abdomen, CT abdomen with note of likely focal pyelonephritis left medial kidney and questionable small renal infarct which might be contributing to the pain abdomen  -UA abnormal with 32 WBC  -will start Rocephin and follow urine culture  -PRN pain and nausea medications available; nausea and vomiting likely associated with DKA    Hypertension  -resume PTA diltiazem when verified by pharmacy; hydralazine IV PRN    Schizophrenia  -resume PTA Zyprexa    DVT prophylaxis: Lovenox    Code status: low risk ; pending, can discontinue isolation if results negative    Code status: full code         Primary Care Physician:   Bogdan Jhaveri         Chief Complaint:     Pain abdomen, nausea and vomiting    History is " "obtained from the patient but limited as he is a poor historian and not very cooperative         History of Present Illness:     Reji Monahan is a 30 year old male with PMH significant for type I diabetes mellitus with history of DKA, schizophrenia, hypertension, splenomegaly, history of cannabinoid hyperemesis syndrome who presented to ED with abdominal pain, nausea and vomiting and noted with DKA. He reports that time she has been taking his Lantus \"late\". He has had several admissions in past related to DKA and has oftentimes left AMA. He states for past couple days he has been feeling sick and has had 10 to 15 episodes of vomiting and today started having pain abdomen.    The patient denies any fever, chills, rigors, chest pain or shortness of breath.   No bowel or bladder disturbances.    In ED patient was seen by Dr Velez; initial workup was noted with DKA, abnormal UA; he was given IV hydration, started on insulin drip and hospitalist was requested admission for further evaluation.               Past Medical History:     type I diabetes mellitus with history of DKA  Schizophrenia  Hypertension  splenomegaly  history of cannabinoid hyperemesis syndrome            Past Surgical History:   No past surgical history on file.           Home Medications:     Prior to Admission Medications   Prescriptions Last Dose Informant Patient Reported? Taking?   OLANZapine (ZYPREXA) 10 MG tablet   No No   Sig: Take 1 tablet (10 mg) by mouth every 6 hours as needed (agitation)   diltiazem ER (DILT-XR) 120 MG 24 hr capsule   Yes No   Sig: Take 120 mg by mouth daily   insulin aspart (NOVOLOG PEN) 100 UNIT/ML pen   Yes No   Sig: Inject 10 Units Subcutaneous 3 times daily (before meals)   insulin aspart (NOVOLOG PEN) 100 UNIT/ML pen   Yes No   Sig: Inject 2 Units Subcutaneous Mealtime correction for BF greater than 200: 2 units for every 50 increment   insulin glargine (LANTUS PEN) 100 UNIT/ML pen   Yes No   Sig: Inject 30 Units " Subcutaneous every morning      Facility-Administered Medications: None            Allergies:   No Known Allergies         Social History:   Reji Monahan  reports that he has never smoked. He does not have any smokeless tobacco history on file. He reports that he does not drink alcohol or use drugs. per chart review he has history of cannabinoids use              Family History:   Reji Monahan family history is not on file.    Family history was reviewed by myself and not pertinent to current presentation.           Review of Systems:   A10 point Review of Systems was done and were negative other than noted in the HPI.             Physical Exam:   Blood pressure (!) 148/97, pulse 112, temperature 98.5  F (36.9  C), temperature source Tympanic, resp. rate 20, SpO2 98 %.  0 lbs 0 oz        Constitutional: Alert, awake and orienetd X 3; noncooperative , seems uncomfortable due to pain abdomen    HEENT: Pupils equal and reactive to light and accomodation, EOMI intact; neck supple no raised JVD or rigidity    Oral cavity: Dry oral mucosa   Cardiovascular: Normal s1 s2, slightly tachycardic , no murmur   Lungs: B/l clear to auscultation, no wheezes or crepitations   Abdomen: Soft, mild diffuse tenderness, no guarding, rigidity or rebound; BS +   LE : No edema   Musculoskeletal: Power 5/5 in all extremities   Neuro: No focal neurological deficits noted, CN II to XII grossly intact   Psychiatry: normal mood and affect  Skin: No obvious skin rashes or ulcers             Data:   All new lab and imaging data was reviewed in Epic.   Significant labs and imagings include:    As noted in HPI; left consistent with DKA  EKG reviewed by me shows sinus rhythm  Abnormal UA  Blood cultures pending  CT abdomen:  IMPRESSION:   1.  Area of apparent decreased perfusion in the medial left kidney  could represent a focal pyelonephritis. A small renal infarct could  have a similar appearance. Correlate with urinalysis.  2.  Nonspecific  nodular density in the left lower lobe possibly  related to early infection.             Abel Narvaez MD  Hospitalist

## 2020-12-23 NOTE — ED PROVIDER NOTES
History   Chief Complaint:  Hyperglycemia, Vomiting, and Abdominal Pain     The history is provided by the patient and the EMS personnel.      Reji Monahan is a type 1 diabetic 30 year old male with history of recurrent DKA, pyelonephritis, cannabinoid hyperemesis syndrome, and schizophrenia who presents via EMS with high blood sugar, vomiting and abdominal pain.  He uses Lantus and NovoLog for treatment of his diabetes.  He has a history of schizophrenia.  He is currently living at the [facility] in.  He also has a history of cannabinoid hyperemesis syndrome.  On arrival here he complains of diffuse abdominal pain, nausea and persistent vomiting.  He reports that he still uses marijuana.  He is tachycardic on arrival with normal temperature, oxygenation and slightly elevated blood pressure.  He notes no known exposures to COVID-19 but is staying at the hotel.  He denies any cough, shortness of breath or fever.  He does note that he has had regular vomiting and has had some diarrhea today.  Symptoms all started today.    Review of Systems   Constitutional: Negative for fever.   Respiratory: Negative for cough and shortness of breath.    Cardiovascular: Negative for chest pain.   Gastrointestinal: Positive for diarrhea, nausea and vomiting. Negative for blood in stool.   All other systems reviewed and are negative.      Allergies:  No Known Drug Allergies    Medications:  Diltiazem  Olanzapine  Albuterol inhaler    Past Medical History:    Type 1 diabetes mellitus  Recurrent DKA  Schizophrenia  Dyslipidemia  Asperger's disorder  ADD  GERD without esophagitis  Cannabinoid hyperemesis syndrome  Paroxysmal SVT  Asthma    Past Surgical History:    I & D of wound    Family History:    Father - stroke  Mother - ovarian cancer  Brother - diabetes    Social History:  The patient was accompanied to the ED by EMS.  Drug Use: Positive for marijuana    Physical Exam     Patient Vitals for the past 24 hrs:   BP Temp Temp src Pulse  Resp SpO2   12/23/20 1445 (!) 148/97 98.5  F (36.9  C) Tympanic 112 20 98 %       Physical Exam  Eyes:  The pupils are equal and round    Conjunctivae and sclerae are normal  ENT:    The nose is normal    Pinnae are normal    The oropharynx is dry  Neck:  Normal range of motion    There is no rigidity noted  CV:  Tachycardic and regular    No edema  Resp:  Lungs are clear    Non-labored    No rales    No wheezing   GI:  Does not allow examination of abdomen, reports severe, generalized pain    No distension    No rebound tenderness   MS:  Normal muscular tone    No asymmetric leg swelling  Skin:  No rash or acute skin lesions noted  Neuro:   Awake, alert.      Speech is normal and fluent.    Face is symmetric.     Moves all extremities      Emergency Department Course     ECG:  Completed at 1415.  Read at 1443.   Normal sinus rhythm  Possible Left atrial enlargement  Septal infarct, age undetermined   Rate 98 bpm. NJ interval 154. QRS duration 82. QT/QTc 362/462. P-R-T axes 75 75 70.   Agree with computer interpretation.    Imaging:  CT Abdomen Pelvis w Contrast:  CT Abdomen Pelvis w Contrast   Final Result   IMPRESSION:    1.  Area of apparent decreased perfusion in the medial left kidney   could represent a focal pyelonephritis. A small renal infarct could   have a similar appearance. Correlate with urinalysis.   2.  Nonspecific nodular density in the left lower lobe possibly   related to early infection.      NEIL MOSHER MD      Report per radiology.    Laboratory:  CBC: WBC 6.5, HGB 14.2,   (1408) Hemoglobin A1c: 12.3 (H)  Ketone Beta-Hydroxybutyrate Quantitative: 3.1 (HH)    Blood gas venous: pH Venous 7.43, PCO2 Venous 30 (L), PO2 Venous 38, Bicarbonate 20 (L), Oxyhemoglobin 78, Base Deficit 3.4, FIO2 100   (1408) Lactic acid: 5.4 (HH)    Magnesium: 2.0  (1408) Troponin I ES: <0.015    Blood Culture x2: Pending    UA with micro: urine glc >1000 (A), urine ketone >150 (A), leukocyte esterase small  (A), WBC/HPF 32 (H), o/w negative  Urine Culture Aerobic Bacterial: Pending    Symptomatic Influenza A/B & SARS-CoV2 (COVID-19) Virus PCR Multiplex: pending     Hepatic panel:  wnl    BMP: Glucose 438 (H), CO2 18 (L), anion gap 17 (H), o/w WNL (Creatinine: 0.52 (L))     Recent Labs   Lab 12/23/20  1508 12/23/20  1408   GLC  --  438*   *  --         Emergency Department Course:    Reviewed:  I reviewed nursing notes, past medical history, and care everywhere.    Assessments:  1358 I initially evaluated the patient in ED room 04.  1438 Patient reassessed.    Consults:   1550 I consulted with Dr. Goodrich of the hospitalist service regarding patient. He agrees to admit patient to hospital in monitored IMC bed.    Interventions:    Medications   dextrose 50 % injection 25-50 mL (has no administration in time range)   0.9% sodium chloride BOLUS (1,000 mLs Intravenous New Bag 12/23/20 1518)     Followed by   0.9% sodium chloride BOLUS (1,000 mLs Intravenous New Bag 12/23/20 1422)     Followed by   0.45% sodium chloride infusion (has no administration in time range)   vancomycin (VANCOCIN) 1,750 mg in sodium chloride 0.9 % 500 mL intermittent infusion (has no administration in time range)   dextrose 5% and 0.45% NaCl infusion (has no administration in time range)   dextrose 50 % injection 25-50 mL (has no administration in time range)   insulin 1 unit/1 mL in NS (NovoLIN, HumuLIN Regular) drip -ED DKA algorithm (has no administration in time range)   potassium chloride 10 mEq in 100 mL sterile water intermittent infusion (premix) (has no administration in time range)   ondansetron (ZOFRAN) injection 4 mg (4 mg Intravenous Given 12/23/20 1613)   cefTRIAXone (ROCEPHIN) 2 g vial to attach to  ml bag for ADULTS or NS 50 ml bag for PEDS (has no administration in time range)   haloperidol lactate (HALDOL) injection 5 mg (5 mg Intravenous Given 12/23/20 1500)   iopamidol (ISOVUE-370) solution 80 mL (80 mLs  Intravenous Given 12/23/20 1600)   Saline Flush (63 mLs Intravenous Given 12/23/20 1600)   HYDROmorphone (PF) (DILAUDID) injection 0.5 mg (0.5 mg Intravenous Given 12/23/20 1613)     Disposition:  The patient was admitted to the hospital under the care of Dr. Chan.     Impression & Plan   Covid-19  Reji Monahan was evaluated during a global COVID-19 pandemic, which necessitated consideration that the patient might be at risk for infection with the SARS-CoV-2 virus that causes COVID-19.   Applicable protocols for evaluation were followed during the patient's care.   COVID-19 was considered as part of the patient's evaluation. The plan for testing is:  a test was obtained during this visit.     Medical Decision Making:  Reji Monahan is a 30 year old male with history of type 1 diabetes with recurrent DKA, poor insulin compliance, cannabinoid hyperemesis syndrome who presents with hyperglycemia.  Laboratory work-up is indicative of possible early DKA.  pH is normal but he is hyperglycemic, has elevated lactic acid levels, and elevated ketones.  Here he complains of vomiting.  Was initially given haloperidol to help control vomiting and abdominal pain.  The haloperidol did seem to help.  Work-up for cause of his DKA showed that there was a possible urinary tract infection.  Urinalysis showed 32 white cells in his urine and CT scan of his abdomen pelvis showed a possible area of focal pyelonephritis versus renal infarct.  Overall, I think his elevated lactic acid is more secondary to his diabetic ketoacidosis rather than infection.  He was given 2 L of lactated Ringer's on arrival and was switched to half-normal saline.  Prior to initiating insulin drip which was discussed with the hospitalist, he was also given potassium replacement.  Patient was signed out to my partner.  He was awaiting repeat BMP and lactic acid checks.  Plan for admission to [facility].    Critical Care Time: 40 minutes    Diagnosis:    ICD-10-CM     1. Diabetic ketoacidosis without coma associated with type 1 diabetes mellitus (H)  E10.10        Scribe Disclosure:  I, Judie Simeon, am serving as a scribe at 1:58 PM on 12/23/2020 to document services personally performed by Aubrey Velez MD based on my observations and the provider's statements to me.     Judie Simeon  12/23/2020   [facility] EMERGENCY DEPARTMENT     Aubrey Velez MD  12/23/20 3345

## 2020-12-23 NOTE — PROGRESS NOTES
RECEIVING UNIT ED HANDOFF REVIEW    ED Nurse Handoff Report was reviewed by: Elsa Pace RN on December 23, 2020 at 4:50 PM

## 2020-12-23 NOTE — ED NOTES
DATE:  12/23/2020   TIME OF RECEIPT FROM LAB:  3479  LAB TEST:  Lactic acid 5.4  LAB VALUE:  Ketones 3.1  RESULTS GIVEN WITH READ-BACK TO Dr. Velez  TIME LAB VALUE REPORTED TO PROVIDER:   9545

## 2020-12-24 VITALS
DIASTOLIC BLOOD PRESSURE: 71 MMHG | RESPIRATION RATE: 10 BRPM | SYSTOLIC BLOOD PRESSURE: 101 MMHG | OXYGEN SATURATION: 97 % | HEART RATE: 78 BPM | TEMPERATURE: 98.1 F

## 2020-12-24 LAB
GLUCOSE BLDC GLUCOMTR-MCNC: 150 MG/DL (ref 70–99)
GLUCOSE BLDC GLUCOMTR-MCNC: 153 MG/DL (ref 70–99)
GLUCOSE BLDC GLUCOMTR-MCNC: 160 MG/DL (ref 70–99)
GLUCOSE BLDC GLUCOMTR-MCNC: 181 MG/DL (ref 70–99)

## 2020-12-24 PROCEDURE — 250N000012 HC RX MED GY IP 250 OP 636 PS 637: Performed by: INTERNAL MEDICINE

## 2020-12-24 PROCEDURE — 80048 BASIC METABOLIC PNL TOTAL CA: CPT | Performed by: HOSPITALIST

## 2020-12-24 PROCEDURE — 99238 HOSP IP/OBS DSCHRG MGMT 30/<: CPT | Performed by: INTERNAL MEDICINE

## 2020-12-24 PROCEDURE — 250N000012 HC RX MED GY IP 250 OP 636 PS 637: Performed by: HOSPITALIST

## 2020-12-24 PROCEDURE — 258N000003 HC RX IP 258 OP 636: Performed by: HOSPITALIST

## 2020-12-24 PROCEDURE — 999N001017 HC STATISTIC GLUCOSE BY METER IP

## 2020-12-24 RX ORDER — CEPHALEXIN 500 MG/1
500 CAPSULE ORAL 2 TIMES DAILY
Qty: 20 CAPSULE | Refills: 0 | Status: SHIPPED | OUTPATIENT
Start: 2020-12-24 | End: 2021-01-16

## 2020-12-24 RX ORDER — DEXTROSE MONOHYDRATE 25 G/50ML
25-50 INJECTION, SOLUTION INTRAVENOUS
Status: DISCONTINUED | OUTPATIENT
Start: 2020-12-24 | End: 2020-12-24 | Stop reason: HOSPADM

## 2020-12-24 RX ORDER — NICOTINE POLACRILEX 4 MG
15-30 LOZENGE BUCCAL
Status: DISCONTINUED | OUTPATIENT
Start: 2020-12-24 | End: 2020-12-24 | Stop reason: HOSPADM

## 2020-12-24 RX ADMIN — INSULIN GLARGINE 25 UNITS: 100 INJECTION, SOLUTION SUBCUTANEOUS at 05:21

## 2020-12-24 RX ADMIN — SODIUM CHLORIDE: 9 INJECTION, SOLUTION INTRAVENOUS at 04:19

## 2020-12-24 RX ADMIN — POTASSIUM CHLORIDE, DEXTROSE MONOHYDRATE AND SODIUM CHLORIDE: 150; 5; 450 INJECTION, SOLUTION INTRAVENOUS at 00:13

## 2020-12-24 ASSESSMENT — ACTIVITIES OF DAILY LIVING (ADL)
ADLS_ACUITY_SCORE: 11
ADLS_ACUITY_SCORE: 11

## 2020-12-24 NOTE — PROGRESS NOTES
Despite talking with nursing staff and MD, pt has decided to leave against medical advice. IVs have been removed. Pt was escorted to door 6. Given 25 units Lantus prior to leaving. See MD note.

## 2020-12-24 NOTE — DISCHARGE SUMMARY
[facility]  Hospitalist Discharge Summary      Date of Admission:  12/23/2020  Date of Discharge:  12/24/2020  5:34 AM  Discharging Provider: Michael Rodriguez MD      Discharge Diagnoses   Uncontrolled type 1 diabetes with diabetic ketoacidosis  Marijuana use disorder with cannabinoid hyperemesis syndrome  COVID-19  Poor dentition  Focal pyelonephritis of medial left kidney with question of severe sepsis; lactic acid elevated in the 5 range, though also with diabetic ketoacidosis  Medication nonadherence  Homelessness  Hypertension  Schizophrenia      Follow-ups Needed After Discharge   Follow-up Appointments     Follow-up and recommended labs and tests       Follow up with your primary provider within 1 wk.  Complete your antibiotic for urinary tract infection             Unresulted Labs Ordered in the Past 30 Days of this Admission     Date and Time Order Name Status Description    12/23/2020 1737 Urine Culture Aerobic Bacterial Preliminary     12/23/2020 1408 Blood culture Preliminary     12/23/2020 1408 Blood culture Preliminary       These results will be followed up by hospitalist service    Discharge Disposition   Discharged to prior living situation, currently comfort in hotel  Condition at discharge: Stable      Hospital Course   Patient admitted with nausea, vomiting, abdominal pain, dysuria in the setting of known marijuana use with cannabinoid hyperemesis syndrome, uncontrolled type 1 diabetes.  He is found to have a lactic acid elevated in the 5 range, laboratory studies consistent with recurrence of diabetic ketoacidosis.    A CT of the abdomen pelvis was performed with concern for focal pyelonephritis of the left kidney versus small renal infarct.  Urinalysis was obtained and consistent with urinary tract infection.  Patient initially received vancomycin and ceftriaxone, though switched to ceftriaxone alone on admission.    Patient was admitted and placed on an insulin drip  with subsequent closure of his anion gap.  Earlier this morning, patient took out his IV and demanded to leave AGAINST MEDICAL ADVICE as he was feeling better.  He has a history of doing the same in the past.    Discussed with nursing staff and subsequently with patient.  I have concerned that he will not fill his antibiotic prescription as he has a history of medication nonadherence in the past, encouraged patient to stay until discharge pharmacy is open so he can leave with medications in hand.  Patient was initially agreeable to this, though minutes later, decided again to leave AGAINST MEDICAL ADVICE.    Met with patient.  He has no flank pain, no nausea or emesis.  No abdominal pain.  He feels better, and tells me that he needs to take care of his brother, who is blind.  He reassures me that he will be able to get his antibiotic prescription at [facility] on [address] and that he does not need additional insulin at home.    He continues to smoke marijuana, has no intention of quitting.    Prior to my arrival, patient has already signed AMA form.    Patient is aware of his COVID-19 diagnosis, should continue to isolate.  I have concerns that this will not be the case, however, given his homelessness and desire to help with his brother's cares.     Consultations This Hospital Stay   PHARMACY TO DOSE VANCO    Code Status   Full Code    Time Spent on this Encounter   I, Michael Rodriguez MD, personally saw the patient today and spent less than or equal to 30 minutes discharging this patient.       Michael Rodriguez MD  [place] [address] [facility]  [facility] [place] [address]  Phone: [phone]  ______________________________________________________________________    Physical Exam   Vital Signs: Temp: 98.1  F (36.7  C) Temp src: Oral BP: 101/71 Pulse: 78   Resp: 10 SpO2: 97 % O2 Device: None (Room air)    Weight: 0 lbs 0 oz  General Appearance: Tall thin 30-year-old male.  Slightly  disheveled.  HEENT: Normocephalic and atraumatic.  Poor dentition with multiple broken and rotting teeth.  He essentially needs the remainder of his teeth pulled.  Tells me that he has a plan to follow-up with dentistry at some point  Respiratory: Breath sounds clear bilaterally to auscultation with no wheezes or crackles.  No increased work of breathing.  Genitourinary: No CVA tenderness to percussion on either left or right.  Cardiovascular: *Regular rate rhythm, no murmur.  Heart rate currently in the 80 range.  GI: Abdomen soft, nontender to palpation.  No palpable mass.  Bowel sounds present and normoactive.  Skin: Abdominal striae are present.  No jaundice       Primary Care Physician   Bogdan Jhaveri MD    Discharge Orders      Reason for your hospital stay    DKA, pyelonephritis     Follow-up and recommended labs and tests     Follow up with your primary provider within 1 wk.  Complete your antibiotic for urinary tract infection     Full Code    On admit     Diet    Follow this diet upon discharge: moderate consistent carb diet       Significant Results and Procedures   Most Recent 3 BMP's:  Recent Labs   Lab Test 12/23/20  2256 12/23/20  2044 12/23/20  1834    139 138   POTASSIUM 3.9 3.9 3.7   CHLORIDE 109 109 108   CO2 27 26 23   BUN 15 14 14   CR 0.43* 0.46* 0.44*   ANIONGAP 3 4 7   LYNN 8.4* 8.5 8.3*   * 250* 349*       Discharge Medications   Discharge Medication List as of 12/24/2020  5:35 AM      START taking these medications    Details   cephALEXin (KEFLEX) 500 MG capsule Take 1 capsule (500 mg) by mouth 2 times daily, Disp-20 capsule, R-0, E-Prescribe         CONTINUE these medications which have NOT CHANGED    Details   !! insulin aspart (NOVOLOG PEN) 100 UNIT/ML pen Inject 2 Units Subcutaneous Mealtime correction for BF greater than 200: 2 units for every 50 increment, Historical      insulin glargine (LANTUS PEN) 100 UNIT/ML pen Inject 35 Units Subcutaneous every morning ,  HistoricalIf Lantus is not covered by insurance, may substitute Basaglar at same dose and frequency.        OLANZapine (ZYPREXA) 10 MG tablet Take 1 tablet (10 mg) by mouth every 6 hours as needed (agitation), Disp-30 tablet, R-0, E-Prescribe      !! insulin aspart (NOVOLOG PEN) 100 UNIT/ML pen Inject 10 Units Subcutaneous 3 times daily (before meals), Historical       !! - Potential duplicate medications found. Please discuss with provider.        Allergies   No Known Allergies

## 2020-12-24 NOTE — PROVIDER NOTIFICATION
Notified Dr Thomas of pt nauseous. No nausea medications ordered.     1630 Dr Houston called back and will order some nausea medications. During the call I notified Dr Thomas of positive COVID result.

## 2020-12-24 NOTE — PROVIDER NOTIFICATION
MD Notification     Notified Person: Rodriguez MD     Notification Date/Time: 12/24/20 @ 0440     Notification Interaction: Paged     Purpose of Notification: Pt wants to leave AMA      Orders Received: 25 units lantus now and see if pt will stay until 0830.    Update: Pt refusing to stay but will take 25 units lantus prior to leaving.      Comments:

## 2020-12-24 NOTE — PHARMACY-ADMISSION MEDICATION HISTORY
"Pharmacy Medication History  Admission medication history interview status for the 12/23/2020  admission is complete. See EPIC admission navigator for prior to admission medications       Medication history sources: Patient and Surescripts  Location of interview: Phone  Adherence Assessment: unable to determine.     Pt was very agitated and refused to answer several questions regarding his medications.  Last doses unknown.     Significant changes made to the medication list:  Removed diltiazem  Changed olanzapine to at bedtime      Additional medication history information:   Pt stated he is using aspart insulin, dosing based on carbs.  He was previously on 10 units with meals in addition but states it's \"wrong\" now.    Medication reconciliation completed by provider prior to medication history? No    Time spent in this activity: 10 min      Prior to Admission medications    Medication Sig Last Dose Taking? Auth Provider   insulin aspart (NOVOLOG PEN) 100 UNIT/ML pen Inject 2 Units Subcutaneous Mealtime correction for BF greater than 200: 2 units for every 50 increment  Yes Unknown, Entered By History   insulin glargine (LANTUS PEN) 100 UNIT/ML pen Inject 35 Units Subcutaneous every morning   Yes Yonny Leiva MD   OLANZapine (ZYPREXA) 10 MG tablet Take 1 tablet (10 mg) by mouth every 6 hours as needed (agitation)  Patient taking differently: Take 10 mg by mouth At Bedtime   Yes Aamir Salgado PA-C   insulin aspart (NOVOLOG PEN) 100 UNIT/ML pen Inject 10 Units Subcutaneous 3 times daily (before meals)  at not using  Unknown, Entered By History       The information provided in this note is only as accurate as the sources available at the time of the update(s).     "

## 2020-12-24 NOTE — PLAN OF CARE
Up to the floor at 1730. IMC. VSS ex soft bp at times. LS clear. A&Ox4. On insulin gtt at 3.5unit/hr algorithm 1. Voiding. NPO ex ice chips.

## 2020-12-24 NOTE — PROGRESS NOTES
Notified bedside RN of critical lab (+ COVID result)    1830 Notified Dr Thomas of COVID + result

## 2020-12-24 NOTE — PROVIDER NOTIFICATION
"Brief update:    Paged regarding patient pulling IV, wanting to leave AGAINST MEDICAL ADVICE    History of uncontrolled type 1 diabetes, schizophrenia, cannabinoid hyperemesis syndrome here with DKA, though gap has closed    There is also concern that patient has pyelonephritis on imaging with abnormal urinalysis.  He received ceftriaxone and is due this afternoon for a second dose    Discussed with nursing staff.  I have concerned that patient may need a prescription for insulin as well as oral anti-infectives.  Would also like to provide subcutaneous long-acting insulin at this time prior to him discharging.    Nursing staff discussed with patient.  He is willing to remain in the hospital until 830, when discharge pharmacy opens in order to receive his medications.  He does not want anything \"hooked up to him\" while he is here, however.    He is homeless and has a history of medication nonadherence; I suspect that a prescription sent to an outside pharmacy would be unsuccessful in terms of patient adherence with additional step of picking up medication.    I have ordered 25 units Lantus to be given now.  I have discontinued his insulin drip and IMC orders.    We will defer oral anti-infective to rounding provider; at this point I am not certain if patient requires a prescription for insulin at discharge, though I would anticipate so.  Again, this should be provided to patient at discharge even his history of nonadherence and social stressors.    Michael Rodriguez MD  5:01 AM    Addendum: Shortly after this discussion, patient changed his mind and decided to leave the hospital.    We will provide with discharge prescription, administer long-acting insulin prior to discharge.  He signed AMA paperwork with nursing staff, though with anion gap closed, likely only needed minimal additional stay to determine appropriate insulin regimen.    He tells me that he has insulin at home still and does not need a prescription.  " He reassures me that he will  his antibiotic at [facility] on [address].

## 2020-12-26 LAB
BACTERIA SPEC CULT: ABNORMAL
BACTERIA SPEC CULT: ABNORMAL
Lab: ABNORMAL
SPECIMEN SOURCE: ABNORMAL

## 2020-12-29 LAB
BACTERIA SPEC CULT: NO GROWTH
BACTERIA SPEC CULT: NO GROWTH
SPECIMEN SOURCE: NORMAL
SPECIMEN SOURCE: NORMAL

## 2020-12-31 LAB — INTERPRETATION ECG - MUSE: NORMAL

## 2021-01-16 ENCOUNTER — HOSPITAL ENCOUNTER (INPATIENT)
Facility: CLINIC | Age: 31
LOS: 1 days | Discharge: LEFT BEFORE DISCHARGE | DRG: 638 | End: 2021-01-17
Attending: PHYSICIAN ASSISTANT | Admitting: HOSPITALIST
Payer: MEDICARE

## 2021-01-16 ENCOUNTER — APPOINTMENT (OUTPATIENT)
Dept: GENERAL RADIOLOGY | Facility: CLINIC | Age: 31
DRG: 638 | End: 2021-01-16
Attending: PHYSICIAN ASSISTANT
Payer: MEDICARE

## 2021-01-16 DIAGNOSIS — R07.9 CHEST PAIN: ICD-10-CM

## 2021-01-16 DIAGNOSIS — N39.0 URINARY TRACT INFECTION: ICD-10-CM

## 2021-01-16 DIAGNOSIS — E87.20 LACTIC ACIDOSIS: ICD-10-CM

## 2021-01-16 DIAGNOSIS — E11.10 DKA (DIABETIC KETOACIDOSES): ICD-10-CM

## 2021-01-16 LAB
ALBUMIN SERPL-MCNC: 3.3 G/DL (ref 3.4–5)
ALBUMIN SERPL-MCNC: 3.9 G/DL (ref 3.4–5)
ALBUMIN UR-MCNC: NEGATIVE MG/DL
ALP SERPL-CCNC: 134 U/L (ref 40–150)
ALT SERPL W P-5'-P-CCNC: 22 U/L (ref 0–70)
ANION GAP SERPL CALCULATED.3IONS-SCNC: 13 MMOL/L (ref 3–14)
ANION GAP SERPL CALCULATED.3IONS-SCNC: 19 MMOL/L (ref 3–14)
APPEARANCE UR: CLEAR
AST SERPL W P-5'-P-CCNC: 17 U/L (ref 0–45)
BACTERIA #/AREA URNS HPF: ABNORMAL /HPF
BASE DEFICIT BLDV-SCNC: 6.3 MMOL/L
BASOPHILS # BLD AUTO: 0 10E9/L (ref 0–0.2)
BASOPHILS NFR BLD AUTO: 0.2 %
BILIRUB SERPL-MCNC: 0.7 MG/DL (ref 0.2–1.3)
BILIRUB UR QL STRIP: NEGATIVE
BUN SERPL-MCNC: 13 MG/DL (ref 7–30)
BUN SERPL-MCNC: 18 MG/DL (ref 7–30)
CALCIUM SERPL-MCNC: 8.2 MG/DL (ref 8.5–10.1)
CALCIUM SERPL-MCNC: 9.5 MG/DL (ref 8.5–10.1)
CHLORIDE SERPL-SCNC: 105 MMOL/L (ref 94–109)
CHLORIDE SERPL-SCNC: 99 MMOL/L (ref 94–109)
CO2 SERPL-SCNC: 15 MMOL/L (ref 20–32)
CO2 SERPL-SCNC: 19 MMOL/L (ref 20–32)
COLOR UR AUTO: ABNORMAL
CREAT SERPL-MCNC: 0.42 MG/DL (ref 0.66–1.25)
CREAT SERPL-MCNC: 0.59 MG/DL (ref 0.66–1.25)
DIFFERENTIAL METHOD BLD: ABNORMAL
EOSINOPHIL # BLD AUTO: 0 10E9/L (ref 0–0.7)
EOSINOPHIL NFR BLD AUTO: 0.1 %
ERYTHROCYTE [DISTWIDTH] IN BLOOD BY AUTOMATED COUNT: 14.5 % (ref 10–15)
GFR SERPL CREATININE-BSD FRML MDRD: >90 ML/MIN/{1.73_M2}
GFR SERPL CREATININE-BSD FRML MDRD: >90 ML/MIN/{1.73_M2}
GLUCOSE BLDC GLUCOMTR-MCNC: 179 MG/DL (ref 70–99)
GLUCOSE BLDC GLUCOMTR-MCNC: 202 MG/DL (ref 70–99)
GLUCOSE BLDC GLUCOMTR-MCNC: 260 MG/DL (ref 70–99)
GLUCOSE BLDC GLUCOMTR-MCNC: 347 MG/DL (ref 70–99)
GLUCOSE BLDC GLUCOMTR-MCNC: 369 MG/DL (ref 70–99)
GLUCOSE SERPL-MCNC: 335 MG/DL (ref 70–99)
GLUCOSE SERPL-MCNC: 379 MG/DL (ref 70–99)
GLUCOSE UR STRIP-MCNC: >1000 MG/DL
HBA1C MFR BLD: 12 % (ref 0–5.6)
HCO3 BLDV-SCNC: 16 MMOL/L (ref 21–28)
HCT VFR BLD AUTO: 46 % (ref 40–53)
HGB BLD-MCNC: 14.7 G/DL (ref 13.3–17.7)
HGB UR QL STRIP: NEGATIVE
IMM GRANULOCYTES # BLD: 0 10E9/L (ref 0–0.4)
IMM GRANULOCYTES NFR BLD: 0.5 %
INTERPRETATION ECG - MUSE: NORMAL
KETONES BLD-SCNC: 5.5 MMOL/L (ref 0–0.6)
KETONES UR STRIP-MCNC: >150 MG/DL
LACTATE BLD-SCNC: 2.3 MMOL/L (ref 0.7–2)
LACTATE BLD-SCNC: 3 MMOL/L (ref 0.7–2)
LEUKOCYTE ESTERASE UR QL STRIP: ABNORMAL
LIPASE SERPL-CCNC: 91 U/L (ref 73–393)
LYMPHOCYTES # BLD AUTO: 1 10E9/L (ref 0.8–5.3)
LYMPHOCYTES NFR BLD AUTO: 11.9 %
MAGNESIUM SERPL-MCNC: 2.1 MG/DL (ref 1.6–2.3)
MCH RBC QN AUTO: 25.4 PG (ref 26.5–33)
MCHC RBC AUTO-ENTMCNC: 32 G/DL (ref 31.5–36.5)
MCV RBC AUTO: 80 FL (ref 78–100)
MONOCYTES # BLD AUTO: 0.3 10E9/L (ref 0–1.3)
MONOCYTES NFR BLD AUTO: 3.7 %
MUCOUS THREADS #/AREA URNS LPF: PRESENT /LPF
NEUTROPHILS # BLD AUTO: 7 10E9/L (ref 1.6–8.3)
NEUTROPHILS NFR BLD AUTO: 83.6 %
NITRATE UR QL: POSITIVE
NRBC # BLD AUTO: 0 10*3/UL
NRBC BLD AUTO-RTO: 0 /100
O2/TOTAL GAS SETTING VFR VENT: ABNORMAL %
PCO2 BLDV: 23 MM HG (ref 40–50)
PH BLDV: 7.44 PH (ref 7.32–7.43)
PH UR STRIP: 5.5 PH (ref 5–7)
PHOSPHATE SERPL-MCNC: 3.3 MG/DL (ref 2.5–4.5)
PLATELET # BLD AUTO: 254 10E9/L (ref 150–450)
PO2 BLDV: 49 MM HG (ref 25–47)
POTASSIUM SERPL-SCNC: 4.1 MMOL/L (ref 3.4–5.3)
POTASSIUM SERPL-SCNC: 4.3 MMOL/L (ref 3.4–5.3)
PROT SERPL-MCNC: 7.7 G/DL (ref 6.8–8.8)
RBC # BLD AUTO: 5.78 10E12/L (ref 4.4–5.9)
RBC #/AREA URNS AUTO: 7 /HPF (ref 0–2)
SODIUM SERPL-SCNC: 133 MMOL/L (ref 133–144)
SODIUM SERPL-SCNC: 137 MMOL/L (ref 133–144)
SOURCE: ABNORMAL
SP GR UR STRIP: 1.03 (ref 1–1.03)
TROPONIN I SERPL-MCNC: <0.015 UG/L (ref 0–0.04)
UROBILINOGEN UR STRIP-MCNC: 0 MG/DL (ref 0–2)
WBC # BLD AUTO: 8.3 10E9/L (ref 4–11)
WBC #/AREA URNS AUTO: 135 /HPF (ref 0–5)
WBC CLUMPS #/AREA URNS HPF: PRESENT /HPF

## 2021-01-16 PROCEDURE — 99223 1ST HOSP IP/OBS HIGH 75: CPT | Mod: AI | Performed by: HOSPITALIST

## 2021-01-16 PROCEDURE — 87040 BLOOD CULTURE FOR BACTERIA: CPT | Performed by: PHYSICIAN ASSISTANT

## 2021-01-16 PROCEDURE — 96361 HYDRATE IV INFUSION ADD-ON: CPT

## 2021-01-16 PROCEDURE — 96375 TX/PRO/DX INJ NEW DRUG ADDON: CPT

## 2021-01-16 PROCEDURE — 87186 SC STD MICRODIL/AGAR DIL: CPT | Performed by: EMERGENCY MEDICINE

## 2021-01-16 PROCEDURE — 210N000002 HC R&B HEART CARE

## 2021-01-16 PROCEDURE — 999N001017 HC STATISTIC GLUCOSE BY METER IP

## 2021-01-16 PROCEDURE — 71046 X-RAY EXAM CHEST 2 VIEWS: CPT

## 2021-01-16 PROCEDURE — 96366 THER/PROPH/DIAG IV INF ADDON: CPT

## 2021-01-16 PROCEDURE — 250N000011 HC RX IP 250 OP 636: Performed by: PHYSICIAN ASSISTANT

## 2021-01-16 PROCEDURE — 83036 HEMOGLOBIN GLYCOSYLATED A1C: CPT | Performed by: PHYSICIAN ASSISTANT

## 2021-01-16 PROCEDURE — 250N000012 HC RX MED GY IP 250 OP 636 PS 637: Performed by: PHYSICIAN ASSISTANT

## 2021-01-16 PROCEDURE — 83605 ASSAY OF LACTIC ACID: CPT | Performed by: PHYSICIAN ASSISTANT

## 2021-01-16 PROCEDURE — 83690 ASSAY OF LIPASE: CPT | Performed by: PHYSICIAN ASSISTANT

## 2021-01-16 PROCEDURE — 96365 THER/PROPH/DIAG IV INF INIT: CPT | Mod: 59

## 2021-01-16 PROCEDURE — 85025 COMPLETE CBC W/AUTO DIFF WBC: CPT | Performed by: PHYSICIAN ASSISTANT

## 2021-01-16 PROCEDURE — 82803 BLOOD GASES ANY COMBINATION: CPT | Performed by: PHYSICIAN ASSISTANT

## 2021-01-16 PROCEDURE — 99291 CRITICAL CARE FIRST HOUR: CPT | Mod: 25

## 2021-01-16 PROCEDURE — 82010 KETONE BODYS QUAN: CPT | Performed by: PHYSICIAN ASSISTANT

## 2021-01-16 PROCEDURE — 87636 SARSCOV2 & INF A&B AMP PRB: CPT | Performed by: PHYSICIAN ASSISTANT

## 2021-01-16 PROCEDURE — 80053 COMPREHEN METABOLIC PANEL: CPT | Performed by: PHYSICIAN ASSISTANT

## 2021-01-16 PROCEDURE — 258N000003 HC RX IP 258 OP 636: Performed by: HOSPITALIST

## 2021-01-16 PROCEDURE — 81001 URINALYSIS AUTO W/SCOPE: CPT | Performed by: PHYSICIAN ASSISTANT

## 2021-01-16 PROCEDURE — 84484 ASSAY OF TROPONIN QUANT: CPT | Performed by: PHYSICIAN ASSISTANT

## 2021-01-16 PROCEDURE — 258N000003 HC RX IP 258 OP 636: Performed by: PHYSICIAN ASSISTANT

## 2021-01-16 PROCEDURE — 83735 ASSAY OF MAGNESIUM: CPT | Performed by: PHYSICIAN ASSISTANT

## 2021-01-16 PROCEDURE — 93005 ELECTROCARDIOGRAM TRACING: CPT

## 2021-01-16 PROCEDURE — 87086 URINE CULTURE/COLONY COUNT: CPT | Performed by: EMERGENCY MEDICINE

## 2021-01-16 PROCEDURE — 80069 RENAL FUNCTION PANEL: CPT | Performed by: HOSPITALIST

## 2021-01-16 PROCEDURE — 87088 URINE BACTERIA CULTURE: CPT | Performed by: EMERGENCY MEDICINE

## 2021-01-16 RX ORDER — DEXTROSE MONOHYDRATE 25 G/50ML
25-50 INJECTION, SOLUTION INTRAVENOUS
Status: DISCONTINUED | OUTPATIENT
Start: 2021-01-16 | End: 2021-01-16

## 2021-01-16 RX ORDER — IOPAMIDOL 755 MG/ML
80 INJECTION, SOLUTION INTRAVASCULAR ONCE
Status: DISCONTINUED | OUTPATIENT
Start: 2021-01-16 | End: 2021-01-16

## 2021-01-16 RX ORDER — NALOXONE HYDROCHLORIDE 0.4 MG/ML
0.4 INJECTION, SOLUTION INTRAMUSCULAR; INTRAVENOUS; SUBCUTANEOUS
Status: DISCONTINUED | OUTPATIENT
Start: 2021-01-16 | End: 2021-01-17 | Stop reason: HOSPADM

## 2021-01-16 RX ORDER — ALBUTEROL SULFATE 90 UG/1
2 AEROSOL, METERED RESPIRATORY (INHALATION) EVERY 4 HOURS PRN
Status: DISCONTINUED | OUTPATIENT
Start: 2021-01-16 | End: 2021-01-17 | Stop reason: HOSPADM

## 2021-01-16 RX ORDER — ONDANSETRON 4 MG/1
4 TABLET, ORALLY DISINTEGRATING ORAL EVERY 8 HOURS PRN
COMMUNITY
End: 2021-09-08

## 2021-01-16 RX ORDER — ACETAMINOPHEN 325 MG/1
650 TABLET ORAL EVERY 4 HOURS PRN
Status: DISCONTINUED | OUTPATIENT
Start: 2021-01-16 | End: 2021-01-17 | Stop reason: HOSPADM

## 2021-01-16 RX ORDER — OLANZAPINE 10 MG/1
10 TABLET ORAL EVERY 6 HOURS PRN
Status: DISCONTINUED | OUTPATIENT
Start: 2021-01-16 | End: 2021-01-17 | Stop reason: HOSPADM

## 2021-01-16 RX ORDER — SODIUM CHLORIDE 9 MG/ML
INJECTION, SOLUTION INTRAVENOUS CONTINUOUS
Status: DISCONTINUED | OUTPATIENT
Start: 2021-01-16 | End: 2021-01-16

## 2021-01-16 RX ORDER — ONDANSETRON 2 MG/ML
4 INJECTION INTRAMUSCULAR; INTRAVENOUS EVERY 6 HOURS PRN
Status: DISCONTINUED | OUTPATIENT
Start: 2021-01-16 | End: 2021-01-17 | Stop reason: HOSPADM

## 2021-01-16 RX ORDER — POTASSIUM CHLORIDE 7.45 MG/ML
10 INJECTION INTRAVENOUS
Status: DISCONTINUED | OUTPATIENT
Start: 2021-01-16 | End: 2021-01-16

## 2021-01-16 RX ORDER — HYDROMORPHONE HYDROCHLORIDE 1 MG/ML
0.5 INJECTION, SOLUTION INTRAMUSCULAR; INTRAVENOUS; SUBCUTANEOUS
Status: DISCONTINUED | OUTPATIENT
Start: 2021-01-16 | End: 2021-01-16

## 2021-01-16 RX ORDER — CEFTRIAXONE 2 G/1
2 INJECTION, POWDER, FOR SOLUTION INTRAMUSCULAR; INTRAVENOUS ONCE
Status: COMPLETED | OUTPATIENT
Start: 2021-01-16 | End: 2021-01-16

## 2021-01-16 RX ORDER — ONDANSETRON 4 MG/1
4 TABLET, ORALLY DISINTEGRATING ORAL EVERY 6 HOURS PRN
Status: DISCONTINUED | OUTPATIENT
Start: 2021-01-16 | End: 2021-01-17 | Stop reason: HOSPADM

## 2021-01-16 RX ORDER — NALOXONE HYDROCHLORIDE 0.4 MG/ML
0.2 INJECTION, SOLUTION INTRAMUSCULAR; INTRAVENOUS; SUBCUTANEOUS
Status: DISCONTINUED | OUTPATIENT
Start: 2021-01-16 | End: 2021-01-17 | Stop reason: HOSPADM

## 2021-01-16 RX ORDER — ALBUTEROL SULFATE 90 UG/1
2 AEROSOL, METERED RESPIRATORY (INHALATION) EVERY 4 HOURS PRN
COMMUNITY
End: 2024-02-14

## 2021-01-16 RX ORDER — VANCOMYCIN HYDROCHLORIDE 1 G/200ML
1000 INJECTION, SOLUTION INTRAVENOUS EVERY 8 HOURS
Status: DISCONTINUED | OUTPATIENT
Start: 2021-01-17 | End: 2021-01-17 | Stop reason: HOSPADM

## 2021-01-16 RX ORDER — SODIUM CHLORIDE AND POTASSIUM CHLORIDE 150; 450 MG/100ML; MG/100ML
INJECTION, SOLUTION INTRAVENOUS CONTINUOUS
Status: DISCONTINUED | OUTPATIENT
Start: 2021-01-16 | End: 2021-01-17 | Stop reason: HOSPADM

## 2021-01-16 RX ORDER — HYDROMORPHONE HYDROCHLORIDE 1 MG/ML
0.2 INJECTION, SOLUTION INTRAMUSCULAR; INTRAVENOUS; SUBCUTANEOUS
Status: DISCONTINUED | OUTPATIENT
Start: 2021-01-16 | End: 2021-01-17 | Stop reason: HOSPADM

## 2021-01-16 RX ORDER — NICOTINE POLACRILEX 4 MG
15-30 LOZENGE BUCCAL
Status: DISCONTINUED | OUTPATIENT
Start: 2021-01-16 | End: 2021-01-17 | Stop reason: HOSPADM

## 2021-01-16 RX ORDER — DEXTROSE MONOHYDRATE 25 G/50ML
25-50 INJECTION, SOLUTION INTRAVENOUS
Status: DISCONTINUED | OUTPATIENT
Start: 2021-01-16 | End: 2021-01-17 | Stop reason: HOSPADM

## 2021-01-16 RX ORDER — DEXTROSE MONOHYDRATE, SODIUM CHLORIDE, AND POTASSIUM CHLORIDE 50; 1.49; 4.5 G/1000ML; G/1000ML; G/1000ML
INJECTION, SOLUTION INTRAVENOUS CONTINUOUS
Status: DISCONTINUED | OUTPATIENT
Start: 2021-01-16 | End: 2021-01-17 | Stop reason: HOSPADM

## 2021-01-16 RX ORDER — CEFTRIAXONE 2 G/1
2 INJECTION, POWDER, FOR SOLUTION INTRAMUSCULAR; INTRAVENOUS EVERY 24 HOURS
Status: DISCONTINUED | OUTPATIENT
Start: 2021-01-16 | End: 2021-01-17 | Stop reason: HOSPADM

## 2021-01-16 RX ORDER — HALOPERIDOL 5 MG/ML
5 INJECTION INTRAMUSCULAR ONCE
Status: COMPLETED | OUTPATIENT
Start: 2021-01-16 | End: 2021-01-16

## 2021-01-16 RX ORDER — HYDROMORPHONE HYDROCHLORIDE 1 MG/ML
0.2 INJECTION, SOLUTION INTRAMUSCULAR; INTRAVENOUS; SUBCUTANEOUS ONCE
Status: COMPLETED | OUTPATIENT
Start: 2021-01-16 | End: 2021-01-16

## 2021-01-16 RX ADMIN — SODIUM CHLORIDE 5 UNITS/HR: 9 INJECTION, SOLUTION INTRAVENOUS at 18:26

## 2021-01-16 RX ADMIN — SODIUM CHLORIDE 1000 ML: 9 INJECTION, SOLUTION INTRAVENOUS at 16:48

## 2021-01-16 RX ADMIN — SODIUM CHLORIDE 1000 ML: 9 INJECTION, SOLUTION INTRAVENOUS at 15:39

## 2021-01-16 RX ADMIN — POTASSIUM CHLORIDE, DEXTROSE MONOHYDRATE AND SODIUM CHLORIDE: 150; 5; 450 INJECTION, SOLUTION INTRAVENOUS at 22:00

## 2021-01-16 RX ADMIN — HYDROMORPHONE HYDROCHLORIDE 0.2 MG: 1 INJECTION, SOLUTION INTRAMUSCULAR; INTRAVENOUS; SUBCUTANEOUS at 18:28

## 2021-01-16 RX ADMIN — HYDROMORPHONE HYDROCHLORIDE 0.5 MG: 1 INJECTION, SOLUTION INTRAMUSCULAR; INTRAVENOUS; SUBCUTANEOUS at 15:39

## 2021-01-16 RX ADMIN — CEFTRIAXONE SODIUM 2 G: 2 INJECTION, POWDER, FOR SOLUTION INTRAMUSCULAR; INTRAVENOUS at 20:12

## 2021-01-16 RX ADMIN — SODIUM CHLORIDE: 9 INJECTION, SOLUTION INTRAVENOUS at 19:49

## 2021-01-16 RX ADMIN — VANCOMYCIN HYDROCHLORIDE 1500 MG: 5 INJECTION, POWDER, LYOPHILIZED, FOR SOLUTION INTRAVENOUS at 20:51

## 2021-01-16 RX ADMIN — HALOPERIDOL LACTATE 5 MG: 5 INJECTION, SOLUTION INTRAMUSCULAR at 15:41

## 2021-01-16 ASSESSMENT — ENCOUNTER SYMPTOMS
FEVER: 0
NAUSEA: 1
SHORTNESS OF BREATH: 1
ABDOMINAL PAIN: 1
DIARRHEA: 0
VOMITING: 1
CHILLS: 0
CONSTIPATION: 0
POLYDIPSIA: 1
BLOOD IN STOOL: 0

## 2021-01-16 NOTE — ED TRIAGE NOTES
History of DKA. Reports feeling like he's going into DKA again. C/O abdominal pain and N/V. EMS administered toradol 15 mg IM and zofran 4mg IM

## 2021-01-16 NOTE — ED PROVIDER NOTES
History   Chief Complaint:  Abdominal Pain       HPI   Reji Monahan is a 30 year old male with history of T1DM with DKA, gastroparesis, MRSA, asthma, Asperger's syndrome, ADD, depression, paranoid schizophrenia, and cannabinoid hyperemesis who presents alone via EMS with bilateral lower abdominal pain that woke him up 13 hours ago. He endorses associated nausea and one episode of vomiting. Per EMS, the patient's BS was 353, his systolic was in the 120's, and his HR was 120 bpm. The patient was given 4 mg Zofran and 15 mg toradol en route, which decreased his pain for 10/10 to 7/10. EMS attempted to place an IV without success. The patient states he has been increasingly thirsty and has noticed elevated blood sugars the past few days. Notably, he did have a UTI ~1-2 weeks ago and has been taking Keflex. He also endorses chest pain and shortness of breath which he attributes to his asthma. He had Covid in December. He denies any fever, chills, cough, diarrhea, constipation, or blood in the stool. The patient has a history of hospitalizations due to DKA with his most recent being two weeks ago.    Review of Systems   Constitutional: Negative for chills and fever.   Respiratory: Positive for shortness of breath.    Cardiovascular: Positive for chest pain.   Gastrointestinal: Positive for abdominal pain, nausea and vomiting. Negative for blood in stool, constipation and diarrhea.   Endocrine: Positive for polydipsia.   All other systems reviewed and are negative.      Allergies:  The patient has no known allergies.     Medications:  Zyprexa  Kelfex  Insulin    Past Medical History:    T1DM  Paranoid schizophrenia  ADD  Gastroparesis  Pyelonephritis  Depression  Paroxysmal SVT  Diabetic neuropathy  Asperger's  Cannabis abuse   MRSA  Covid 19    Family History:    Diabetes - brother  Stroke - father  Ovarian cancer - mother    Social History:  Patient arrived alone via EMS. EMS personnel states they have treated him  multiple times in the past.    Physical Exam     Patient Vitals for the past 24 hrs:   BP Pulse Resp SpO2   01/16/21 1830 137/72 126 15 100 %   01/16/21 1815 126/73 114 16 --   01/16/21 1805 (!) 130/98 112 27 --   01/16/21 1755 90/54 99 26 98 %   01/16/21 1750 90/53 91 10 98 %   01/16/21 1745 (!) 78/49 82 10 98 %   01/16/21 1725 (!) 84/57 92 -- 96 %   01/16/21 1720 (!) 83/52 91 -- 97 %   01/16/21 1715 (!) 79/49 94 -- 98 %   01/16/21 1710 (!) 76/52 95 -- 98 %   01/16/21 1645 (!) 87/55 98 17 --   01/16/21 1615 -- 93 12 98 %   01/16/21 1600 -- 97 10 98 %   01/16/21 1535 -- -- -- 100 %   01/16/21 1533 (!) 151/23 117 -- --       Physical Exam  Constitutional: Cooperative. Appears in mild distress. Vomiting.  Eyes: Pupils equally round and reactive  HENT: Head is normal in appearance. Dry mucous membranes.  Cardiovascular: Tachycardic. Regular rhythm, without murmurs.  Respiratory: Tachypneic.  Lungs are clear bilaterally.  GI: Diffuse abdominal TTP, otherwise soft, non-distended. No guarding, rebound, or rigidity.  Musculoskeletal: No asymmetry of the lower extremities, no tenderness to palpation.   Skin: Normal, without rash.  Neurologic: Cranial nerves grossly intact, normal cognition, no focal deficits. Alert and oriented x 3.   Psychiatric: Normal affect.  Nursing notes and vital signs reviewed.    Emergency Department Course     ECG (15:54:37):  Rate 97 bpm. WI interval 140. QRS duration 78. QT/QTc 350/444. P-R-T axes 86 99 56. Normal sinus rhythm. Rightward axis. Possible anterior infarct, age undetermined. Abnormal ECG.     Imaging:  CT Abdomen Pelvis w Contrast  Ordered, but patient refused.    XR Chest 2 Views  IMPRESSION: Imaging features can be seen with (COVID-19)  pneumonia,  though are nonspecific and can occur with a variety of infectious and  noninfectious processes.  As read by Radiology.    Laboratory:  Lactic Acid 1538: 3.0(H)  Lactic Acid 1807: 2.3(H)  Troponin I 1538: <0.015  Lipase: 91    Magnesium:  2.1    CMP: Carbon Dioxide 15(L), Anion Gap 19(H), Glucose 379(H), Creatinine 0.42(L), o/w WNL  CBC: WNL (WBC 8.3, HGB 14.7, )    Blood Gas Venous: pH 7.44(H), PCO2 23(L), PO2 49(H), Bicarbonate 16(L)    Ketone BHB Quant: 5.5(HH)  Glucose POCT 1821: 369(H)  Glucose POCT 1936: 347(H)  HGB A1c: 12(H)    UA: Glucose >1000(!), Ketones >150(!), Nitrite - Positive(!), Leukocyte Esterase - Large(!), (H), RBC 7(H), WBC Clumps - Present(!), Bacteria - Few(!), Mucous - Present(!), o/w negative  Urine Culture: Pending    Asymptomatic Covid PCR: Pending    Blood Cultures x2: Pending     Emergency Department Course:    Reviewed:  I reviewed nursing notes, vitals, past medical history and care everywhere    Assessments:  1515 I obtained history and examined the patient as noted above.   1547 I rechecked the patient who states his pain is improved after the medications. He also states he has enough insulin at home.     Consults:   1707 I discussed the patient with Dr. Sears who will staff the patient.  1834 I discussed the patient with Dr. Oreilly who will admit the patient to [facility] [place], [place].    Interventions:  1539 NS 1L IV Bolus  1539 Dilaudid 0.5 mg IV  1541 Haldol 5 mg IV  1648 NS 1L IV Bolus  1826 Insulin 5 Units/hr IV  1828 Dilaudid 0.2 mg IV  1949 NS 1L IV Bolus  2012 Rocephin 2 g IV    Disposition:  The patient was admitted to the hospital under the care of Dr. Oreilly.       Impression & Plan     Medical Decision Making:  Reji Monahan is a 30 year old male who presents to the ED for evaluation of of nominal pain, nausea, and vomiting.  Patient is a type I diabetic with multiple hospitalizations for DKA.  He notes a history of poor control of his sugars.  He is concerned for DKA at this time.  See HPI as above for additional details.  Vitals and physical exam as above.  Differential was broad and included gastroenteritis, appendicitis, DKA, mesenteric ischemia, perforated viscus, pancreatitis, atypical ACS, SBO, among  others. Work up as above suggestive for DKA with anion gap of 19, ketones at 5.5, sugar at 379. Patient does have evidence for UTI as well, and this may be source. Patient notes having UTI diagnosis about four weeks ago, and he continues to have UTI symptoms. It appears per chart review that he may have been incompletely treated as patient left AMA during that hospitalization. It is unclear if he took the Keflex as prescribed to him at discharge of that hospitalization. Additional lab work obtained as above, notable for lactate elevated at 3. Patient 3L of fluids here in the department and was started on insulin. His pain was treated adequately with dilaudid, and patient was provided Haldol for nausea as this has worked well in the past per chart review and patient. CXR with evidence for COVID pneumonia pattern, and patient had COVID about one month ago. Given abdominal pain, CT of the abdomen ordered, however patient refused. UTI treated with Ceftriaxone and vancomycin as above. Lactate improving on redraw. Discussed with patient need for hospitalization given evidence for DKA. Patient in agreement for admission. Discussed the case with the hospitalist, who agreed to accept the patient. Of note, patient did have period of low blood pressures after administration of dilaudid. Patient closely monitored, and after aggressively pushing fluids, BPs improved. All questions answered. Patient admitted in stable condition.    Covid-19  Reji Monahan was evaluated during a global COVID-19 pandemic, which necessitated consideration that the patient might be at risk for infection with the SARS-CoV-2 virus that causes COVID-19.   Applicable protocols for evaluation were followed during the patient's care.   COVID-19 was considered as part of the patient's evaluation. The plan for testing is:  a test was obtained during this visit.    Diagnosis:    ICD-10-CM    1. DKA (diabetic ketoacidoses) (H)  E11.10 UA with Microscopic  reflex to Culture     Lactic acid whole blood     Glucose by meter     Glucose by meter     Urine Culture Aerobic Bacterial     Urine Culture Aerobic Bacterial   2. Chest pain  R07.9        Scribe Disclosure:  I, Janiya Duarte, am serving as a scribe at 3:26 PM on 1/16/2021 to document services personally performed by Ty Ribera PA-C based on my observations and the provider's statements to me.     This record was created at least in part using electronic voice recognition software, so please excuse any typographical errors.           Ty Ribera PA-C  01/16/21 2207       Ty Ribera PA-C  01/16/21 2208

## 2021-01-16 NOTE — ED NOTES
Bed: ED26  Expected date: 1/16/21  Expected time: 3:05 PM  Means of arrival: Ambulance  Comments:  514 32m abd pain ETA 1527

## 2021-01-16 NOTE — PHARMACY-ADMISSION MEDICATION HISTORY
Pharmacy Medication History  Admission medication history interview status for the 1/16/2021  admission is complete. See EPIC admission navigator for prior to admission medications       Medication history sources: Patient  Location of interview: Phone  Adherence Assessment: Moderate    Significant changes made to the medication list:  Added albuterol and zofran   Removed keflex       Additional medication history information:   None     Medication reconciliation completed by provider prior to medication history? No    Time spent in this activity: 10 minutes       Prior to Admission medications    Medication Sig Last Dose Taking? Auth Provider   albuterol (PROAIR HFA/PROVENTIL HFA/VENTOLIN HFA) 108 (90 Base) MCG/ACT inhaler Inhale 2 puffs into the lungs every 4 hours as needed for shortness of breath / dyspnea or wheezing  at PRN Yes Unknown, Entered By History   insulin aspart (NOVOLOG PEN) 100 UNIT/ML pen Inject 10 UNITS subcutaneously three times daily before meals. Plus sliding scale as below  Glucose 130-150 0   Glucose 151-200 2 units   Glucose 201-250 4 units   Glucose 251-300 6 units   Glucose 301-350 8 units   Glucose 351-400 10 units   Glucose 401-500 12 units   Glucose GREATER THAN 501 14 units and call provider 1/16/2021 at am Yes Unknown, Entered By History   insulin glargine (LANTUS PEN) 100 UNIT/ML pen Inject 35 Units Subcutaneous every morning  1/16/2021 at Am Yes Yonny Leiva MD   OLANZapine (ZYPREXA) 10 MG tablet Take 1 tablet (10 mg) by mouth every 6 hours as needed (agitation) 1/14/2021 Yes Aamir Salgado PA-C   ondansetron (ZOFRAN-ODT) 4 MG ODT tab Take 4 mg by mouth every 8 hours as needed for nausea  at PRN Yes Unknown, Entered By History       The information provided in this note is only as accurate as the sources available at the time of the update(s).   Maki Said   Student Pharmacist

## 2021-01-16 NOTE — ED NOTES
DATE:  1/16/2021   TIME OF RECEIPT FROM LAB:  3378  LAB TEST:  ketones  LAB VALUE:  5.5  RESULTS GIVEN WITH READ-BACK TO (PROVIDER):  Ty Ribera PA-C  TIME LAB VALUE REPORTED TO PROVIDER:   2667

## 2021-01-17 VITALS
OXYGEN SATURATION: 100 % | HEART RATE: 105 BPM | DIASTOLIC BLOOD PRESSURE: 75 MMHG | SYSTOLIC BLOOD PRESSURE: 110 MMHG | WEIGHT: 164 LBS | TEMPERATURE: 97.8 F | BODY MASS INDEX: 21.64 KG/M2 | RESPIRATION RATE: 16 BRPM

## 2021-01-17 LAB
ALBUMIN SERPL-MCNC: 3.2 G/DL (ref 3.4–5)
ANION GAP SERPL CALCULATED.3IONS-SCNC: 7 MMOL/L (ref 3–14)
BACTERIA SPEC CULT: ABNORMAL
BUN SERPL-MCNC: 19 MG/DL (ref 7–30)
CALCIUM SERPL-MCNC: 8.2 MG/DL (ref 8.5–10.1)
CHLORIDE SERPL-SCNC: 108 MMOL/L (ref 94–109)
CO2 SERPL-SCNC: 24 MMOL/L (ref 20–32)
CREAT SERPL-MCNC: 0.49 MG/DL (ref 0.66–1.25)
GFR SERPL CREATININE-BSD FRML MDRD: >90 ML/MIN/{1.73_M2}
GLUCOSE BLDC GLUCOMTR-MCNC: 140 MG/DL (ref 70–99)
GLUCOSE BLDC GLUCOMTR-MCNC: 163 MG/DL (ref 70–99)
GLUCOSE BLDC GLUCOMTR-MCNC: 165 MG/DL (ref 70–99)
GLUCOSE BLDC GLUCOMTR-MCNC: 173 MG/DL (ref 70–99)
GLUCOSE BLDC GLUCOMTR-MCNC: 176 MG/DL (ref 70–99)
GLUCOSE SERPL-MCNC: 197 MG/DL (ref 70–99)
KETONES BLD-SCNC: 1.7 MMOL/L (ref 0–0.6)
LACTATE BLD-SCNC: 1.3 MMOL/L (ref 0.7–2)
Lab: ABNORMAL
PHOSPHATE SERPL-MCNC: 3.2 MG/DL (ref 2.5–4.5)
POTASSIUM SERPL-SCNC: 3.9 MMOL/L (ref 3.4–5.3)
SODIUM SERPL-SCNC: 139 MMOL/L (ref 133–144)
SPECIMEN SOURCE: ABNORMAL

## 2021-01-17 PROCEDURE — 80069 RENAL FUNCTION PANEL: CPT | Performed by: HOSPITALIST

## 2021-01-17 PROCEDURE — 999N001017 HC STATISTIC GLUCOSE BY METER IP

## 2021-01-17 PROCEDURE — 36415 COLL VENOUS BLD VENIPUNCTURE: CPT | Performed by: HOSPITALIST

## 2021-01-17 PROCEDURE — 82010 KETONE BODYS QUAN: CPT | Performed by: HOSPITALIST

## 2021-01-17 PROCEDURE — 83605 ASSAY OF LACTIC ACID: CPT | Performed by: HOSPITALIST

## 2021-01-17 NOTE — DISCHARGE SUMMARY
Discharge Summary  Hospitalist    PATIENT LEFT AMA      Date of Admission:  1/16/2021  Date of Discharge:  1/17/2021  Discharging Provider: Nilsa Oreilly MD  Date of Service (when I saw the patient): 01/16/21    Discharge Diagnoses   Diabetic ketoacidosis  Type 1 diabetes mellitus, uncontrolled with hyperglycemia and recurrent DKA  Abdominal pain, nausea, vomiting  Lactic acidosis  Noncompliance with medical treatments, homelessness  UTI  H/o cannabinoid hyperemesis syndrome  Paranoid schizophrenia  ADD  Depression  Asperger's syndrome  Noncompliance with medical treatments  Homelessness    History of Present Illness   Please refer H & P for details.      Hospital Course     Reji Monahan is a 30 year old male with history of uncontrolled type I DM with recurrent DKA, noncompliance, gastroparesis, as Asperger's syndrome, ADD, depression, paranoid schizophrenia, homelessness, marijuana abuse and cannabinoid hyperemesis who presents with abdominal pain, nausea and vomiting and is admitted for further management of DKA and UTI.     Diabetic ketoacidosis  Type 1 diabetes mellitus, uncontrolled with hyperglycemia and recurrent DKA  Abdominal pain, nausea, vomiting  Lactic acidosis  Noncompliance with medical treatments, homelessness  UTI  H/o cannabinoid hyperemesis syndrome  Patient has had recurrent episodes of DKA, admitted in December 2020 as well as January 2021, each time left prematurely/AMA.  Presents again with acute onset abdominal pain, nausea, vomiting that started early morning on day of admission.  Has history of noncompliance.  Possible that UTI is also precipitating DKA.  In the ED, initially hypertensive and tachycardic.  Did receive IV Dilaudid after which blood pressure dropped to the 70s and 80s systolic.  Blood pressure responded nicely to fluids.  Normal O2 saturation on room air.  EKG with no acute ischemic changes.  Chest x-ray showed bilateral nonspecific infiltrates that can be seen with  COVID-19 pneumonia.  Lactate 3.0, negative troponin, lipase 91, anion gap 19, bicarbonate 15, glucose 379, normal hemogram.  Venous pH 7.4 with PCO2 23 and bicarbonate 16.  Ketones elevated at 5.5.  He was given 1 L NS IV bolus x2, IV Dilaudid, 5 mg IV Haldol for nausea and started on insulin drip.  UA positive for WBC clumps, few bacteria, positive nitrates and large LE.  Patient refused COVID-19 swab.  He also declined CT abdomen.  -Admit as inpatient to [facility] bed  -DKA protocol ordered with insulin drip and IV fluids per protocol  -N.p.o. at this time  -As needed antiemetics, Tylenol and IV Dilaudid ordered.  Does have history of drug-seeking behavior noted in chart.  Also after receiving 0.5 mg IV Dilaudid in the ED, his blood pressure dropped to the 70s and 80s.  We will continue 1.2 mg IV Dilaudid every 3 hours as needed.  Use with caution.  -Urinalysis consistent with infection.  Urine cultures pending.  Last urine culture in December 2020 grew MRSA.  Started on IV vancomycin and ceftriaxone in the ED.  Continue on this pending further culture data.  -CT abdomen in December noted likely focal pyelonephritis in the left medial kidney and questionable small renal infarct.  [place] CT [place] [facility] [place] diffuse mesenteric stranding and enlarged mesenteric lymph nodes, nonspecific, but probably inflammatory or reactive.  Likely diffuse hepatic steatosis also noted.2  -Patient declined repeat CT abdomen in the ED.  He also declined COVID-19 swab.  -Monitor electrolytes, lactate, ketones closely.  -Patient did not express any interest in quitting marijuana use.    Labs at midnight : anion gap corrected to 7, bicarb 24, ketones down to 1.7.     Patient left AMA in the middle of the night.        Paranoid schizophrenia  ADD  Depression  Asperger's syndrome  Noncompliance with medical treatments  Homelessness  Continue PTA as needed Zyprexa every 6 hours as needed for  agitation.  -Patient will need psychiatry follow-up as outpatient, increased support services in the community given that he is high risk for recurrent admissions given his mental health history, homelessness, history of noncompliance.    Nilsa Oreilly MD, MD      Pending Results   These results will be followed up by Hospitalist team.  Unresulted Labs Ordered in the Past 30 Days of this Admission     Date and Time Order Name Status Description    1/16/2021 1807 Urine Culture Aerobic Bacterial Preliminary     1/16/2021 1531 Blood culture Preliminary     1/16/2021 1531 Blood culture Preliminary           Code Status   Full Code       Primary Care Physician   Bogdan Jhaveri MD    Follow-ups Needed After Discharge       Physical Exam   Temp: 97.8  F (36.6  C) Temp src: Oral BP: 110/75 Pulse: 105   Resp: 16 SpO2: 100 % O2 Device: None (Room air)    Vitals:    01/16/21 1900   Weight: 74.4 kg (164 lb)     Vital Signs with Ranges  Temp:  [97.8  F (36.6  C)-97.9  F (36.6  C)] 97.8  F (36.6  C)  Pulse:  [] 105  Resp:  [10-27] 16  BP: ()/(23-98) 110/75  SpO2:  [96 %-100 %] 100 %  I/O last 3 completed shifts:  In: -   Out: 475 [Urine:475]    Discharge Disposition   Patient left AMA  Condition at discharge: not assessed by me.     Consultations This Hospital Stay   PHARMACY TO DOSE VANCO    Time Spent on this Encounter   I, Nilsa Oreilly MD, prepared this discharge summary. Patient left AMA. I did not see this patient.     Discharge Orders   No discharge procedures on file.  Discharge Medications   Discharge Medication List as of 1/17/2021  4:37 AM      CONTINUE these medications which have NOT CHANGED    Details   albuterol (PROAIR HFA/PROVENTIL HFA/VENTOLIN HFA) 108 (90 Base) MCG/ACT inhaler Inhale 2 puffs into the lungs every 4 hours as needed for shortness of breath / dyspnea or wheezing, HistoricalPharmacy may dispense brand covered by insurance (Proair, or proventil or ventolin or generic albuterol  inhaler)      insulin aspart (NOVOLOG PEN) 100 UNIT/ML pen Inject 10 UNITS subcutaneously three times daily before meals. Plus sliding scale as below  Glucose 130-150 0   Glucose 151-200 2 units   Glucose 201-250 4 units   Glucose 251-300 6 units   Glucose 301-350 8 units   Glucose 351-400 10 units   Glucose 401 -500 12 units   Glucose GREATER THAN 501 14 units and call provider, Historical      insulin glargine (LANTUS PEN) 100 UNIT/ML pen Inject 35 Units Subcutaneous every morning , HistoricalIf Lantus is not covered by insurance, may substitute Basaglar at same dose and frequency.        OLANZapine (ZYPREXA) 10 MG tablet Take 1 tablet (10 mg) by mouth every 6 hours as needed (agitation), Disp-30 tablet, R-0, E-Prescribe      ondansetron (ZOFRAN-ODT) 4 MG ODT tab Take 4 mg by mouth every 8 hours as needed for nausea, Historical           Allergies   No Known Allergies  Data   Most Recent 3 CBC's:  Recent Labs   Lab Test 01/16/21  1538 12/23/20  1408 08/03/20  2206   WBC 8.3 6.5 9.5   HGB 14.7 14.2 14.5   MCV 80 78 81    220 276      Most Recent 3 BMP's:  Recent Labs   Lab Test 01/17/21  0027 01/16/21 2006 01/16/21  1538    137 133   POTASSIUM 3.9 4.1 4.3   CHLORIDE 108 105 99   CO2 24 19* 15*   BUN 19 18 13   CR 0.49* 0.59* 0.42*   ANIONGAP 7 13 19*   LYNN 8.2* 8.2* 9.5   * 335* 379*     Most Recent 2 LFT's:  Recent Labs   Lab Test 01/16/21  1538 12/23/20  1408   AST 17 16   ALT 22 22   ALKPHOS 134 148   BILITOTAL 0.7 0.6     Most Recent INR's and Anticoagulation Dosing History:  Anticoagulation Dose History     There is no flowsheet data to display.        Most Recent 3 Troponin's:  Recent Labs   Lab Test 01/16/21  1538 12/23/20  1408 06/23/20  1103 01/26/20  0818   TROPI <0.015 <0.015  --  <0.015   TROPONIN  --   --  0.00  --      Most Recent Cholesterol Panel:No lab results found.  Most Recent 6 Bacteria Isolates From Any Culture (See EPIC Reports for Culture Details):  Recent Labs   Lab  Test 01/16/21  1807 01/16/21  1652 01/16/21  1538 12/23/20  1514 12/23/20  1420 12/23/20  1408   CULT 50,000 to 100,000 colonies/mL  Staphylococcus aureus  Susceptibility testing in progress  * No growth after 13 hours No growth after 15 hours >100,000 colonies/mL  Methicillin resistant Staphylococcus aureus (MRSA)  *  >100,000 colonies/mL  Strain 2  Methicillin resistant Staphylococcus aureus (MRSA)  * No growth No growth     Most Recent TSH, T4 and A1c Labs:  Recent Labs   Lab Test 01/16/21  1538   A1C 12.0*       Results for orders placed or performed during the hospital encounter of 01/16/21   XR Chest 2 Views    Narrative    CHEST TWO VIEWS 1/16/2021 5:01 PM     HISTORY: Dyspnea    COMPARISON: 8/3/2020    FINDINGS: There is interstitial thickening and areas of mild  groundglass opacity. No airspace consolidation, pneumothorax, or  pleural effusion.       Impression    IMPRESSION: Imaging features can be seen with (COVID-19)  pneumonia,  though are nonspecific and can occur with a variety of infectious and  noninfectious processes.    NEIL MOSHER MD

## 2021-01-17 NOTE — PHARMACY-VANCOMYCIN DOSING SERVICE
Pharmacy Vancomycin Initial Note  Date of Service 2021  Patient's  1990  30 year old, male    Indication: Urinary Tract Infection w/ recent history of MRSA UTI     Current estimated CrCl = Estimated Creatinine Clearance: 189.2 mL/min (A) (based on SCr of 0.59 mg/dL (L)).    Creatinine for last 3 days  2021:  3:38 PM Creatinine 0.42 mg/dL;  8:06 PM Creatinine 0.59 mg/dL    Recent Vancomycin Level(s) for last 3 days  No results found for requested labs within last 72 hours.      Vancomycin IV Administrations (past 72 hours)                   vancomycin 1500 mg in 0.9% NaCl 250 ml intermittent infusion 1,500 mg (mg) 1,500 mg New Bag 21                Nephrotoxins and other renal medications (From now, onward)    Start     Dose/Rate Route Frequency Ordered Stop    21  vancomycin 1500 mg in 0.9% NaCl 250 ml intermittent infusion 1,500 mg      20 mg/kg × 74.4 kg  over 90 Minutes Intravenous ONCE 21            Contrast Orders - past 72 hours (72h ago, onward)    Start     Dose/Rate Route Frequency Ordered Stop    21 170  iopamidol (ISOVUE-370) solution 80 mL  Status:  Discontinued      80 mL Intravenous ONCE 21 1703 21 1709                Plan:  1.  Vancomycin  1500 mg IV x 1 started in [facility] ED, continuing vancomycin 1000 mg IV q8h.   2.  Goal Trough Level: 15-20 mg/L   3.  Pharmacy will check trough levels as appropriate in 1-3 Days.    4. Serum creatinine levels will be ordered daily for the first week of therapy and at least twice weekly for subsequent weeks.    5. Browns method utilized to dose vancomycin therapy: Method 1    Jose Lee, [facility]

## 2021-01-17 NOTE — ED PROVIDER NOTES
Emergency Department Attending Supervision Note  1/16/2021  6:18 PM      I evaluated this patient in conjunction with Ty Ribera PA-C.    Briefly, the patient has a history of type 1 diabetes mellitus with DKA who presented with bilateral lower abdominal pain. Today the patient woke up at 0400 and felt abdominal pain with associated nausea and vomiting. Glu has been elevated. He also endorses associated chest pain and shortness of breath in which he believes is asthmatic. Per chart review, the patient has a history of being hospitalized due to DKA.       On my exam  General/Appearance: appears stated age, appears uncomfortable and holding emesis bag  Eyes: EOMI, no scleral injection, no icterus  ENT: MMM  Neck: supple, nl ROM, no stiffness  Cardiovascular: tachy but regular, nl S1S2, no m/r/g, 2+ pulses in all 4 extremities, cap refill <2sec  Respiratory: CTAB, good air movement throughout, no wheezes/rhonchi/rales, no increased WOB, no retractions  Back: no lesions  GI: abd soft, non-distended, diffuse discomfort to alp,  no HSM, no rebound, no guarding, nl BS  MSK: FLORES, good tone, no bony abnormality  Skin: warm and well-perfused, no rash, no edema, no ecchymosis, nl turgor  Neuro: GCS 15, alert and oriented, no gross focal neuro deficits  Heme: no petechia, no purpura, no active bleeding        Results:  ECG (15:54:37):   Rate 97 bpm. VA interval 140. QRS duration 78. QT/QTc 350/444. P-R-T axes 86 99 56. Normal sinus rhythm. Rightward axis. Possible anterior infarct, age undetermined. Abnormal ECG.   Imaging:   CT Abdomen Pelvis w Contrast   Ordered, but patient refused.   XR Chest 2 Views   IMPRESSION: Imaging features can be seen with (COVID-19) pneumonia,   though are nonspecific and can occur with a variety of infectious and   noninfectious processes.   As read by Radiology.   Laboratory:   Lactic Acid 1538: 3.0(H)   Lactic Acid 1807: 2.3(H)   Troponin I 1538: <0.015   Lipase: 91   Magnesium: 2.1   CMP:  Carbon Dioxide 15(L), Anion Gap 19(H), Glucose 379(H), Creatinine 0.42(L), o/w WNL   CBC: WNL (WBC 8.3, HGB 14.7, )   Blood Gas Venous: pH 7.44(H), PCO2 23(L), PO2 49(H), Bicarbonate 16(L)   Ketone BHB Quant: 5.5(HH)   Glucose POCT 1821: 369(H)   Glucose POCT 1936: 347(H)   HGB A1c: 12(H)   UA: Glucose >1000(!), Ketones >150(!), Nitrite - Positive(!), Leukocyte Esterase - Large(!), (H), RBC 7(H), WBC Clumps - Present(!), Bacteria - Few(!), Mucous - Present(!), o/w negative   Urine Culture: Pending   Asymptomatic Covid PCR: Pending   Blood Cultures x2: Pending       ED course:  Interventions:  1539 NS 1L IV Bolus  1539 Dilaudid 0.5 mg IV  1541 Haldol 5 mg IV  1648 NS 1L IV Bolus  1826 Insulin 5 Units/hr IV  1828 Dilaudid 0.2 mg IV  1949 NS 1L IV Bolus  2012 Rocephin 2 g IV      My impression is that this patient is a 30-year-old male who presents with DKA.  He has care plan trying to help limit his hospitalizations as he frequently is noncompliant and presents requesting pain meds.  This is not the case today as he does have an elevated anion gap, decreased bicarb, elevated ketones, elevated glucose.  I think his abdominal pain is related to the DKA and doubt that is something surgical.  We did attempt to get a CT of his abdomen however he refused, which I think he has a capacity to do.  He is continuing to have symptoms of a UTI and was seen for Juan the other day.  Because of this he was given additional antibiotics to help treat it, especially as this may be a contributing component to his development of DKA.  He is getting IV fluids, antiemetics, pain meds, and will be admitted to the hospitalist service.    Diagnosis    ICD-10-CM    1. DKA (diabetic ketoacidoses) (H)  E11.10 Lactic acid whole blood   2. Chest pain  R07.9        Meme Sears MD, Katherine Collins, MD  01/16/21 0506

## 2021-01-17 NOTE — ED NOTES
"[facility]  ED Nurse Handoff Report    ED Chief complaint: Abdominal Pain      ED Diagnosis:   Final diagnoses:   DKA (diabetic ketoacidoses) (H)   Chest pain       Code Status: Full Code     Allergies: No Known Allergies    Patient Story: abdominal pain and nausea. History of DM. States \"it feels like I'm going into DKA again\". Patient tested positive for covid 12/23. Reports symptoms resolved 1/3-4 and has been symptom free for over 10 days.  Focused Assessment:  abd pain, nausea. Patient BP dropped into 70-80s upon recheck but was not tachycardic . While addressing hypotension patient states \"they have to use the baby cuff on my\". Cuff exchanged, improved BP. When patient's pain spiked again BP was back up in the 130s and patent was tachycardic.  Labs Ordered and Resulted from Time of ED Arrival Up to the Time of Departure from the ED   CBC WITH PLATELETS DIFFERENTIAL - Abnormal; Notable for the following components:       Result Value    MCH 25.4 (*)     All other components within normal limits   COMPREHENSIVE METABOLIC PANEL - Abnormal; Notable for the following components:    Carbon Dioxide 15 (*)     Anion Gap 19 (*)     Glucose 379 (*)     Creatinine 0.42 (*)     All other components within normal limits   LACTIC ACID WHOLE BLOOD - Abnormal; Notable for the following components:    Lactic Acid 3.0 (*)     All other components within normal limits   BLOOD GAS VENOUS - Abnormal; Notable for the following components:    Ph Venous 7.44 (*)     PCO2 Venous 23 (*)     PO2 Venous 49 (*)     Bicarbonate Venous 16 (*)     All other components within normal limits   ROUTINE UA WITH MICROSCOPIC REFLEX TO CULTURE - Abnormal; Notable for the following components:    Glucose Urine >1000 (*)     Ketones Urine >150 (*)     Nitrite Urine Positive (*)     Leukocyte Esterase Urine Large (*)     WBC Urine 135 (*)     RBC Urine 7 (*)     WBC Clumps Present (*)     Bacteria Urine Few (*)     Mucous Urine Present " (*)     All other components within normal limits   KETONE BETA-HYDROXYBUTYRATE QUANTITATIVE - Abnormal; Notable for the following components:    Ketone Quantitative 5.5 (*)     All other components within normal limits   LACTIC ACID WHOLE BLOOD - Abnormal; Notable for the following components:    Lactic Acid 2.3 (*)     All other components within normal limits   HEMOGLOBIN A1C - Abnormal; Notable for the following components:    Hemoglobin A1C 12.0 (*)     All other components within normal limits   LIPASE   TROPONIN I   MAGNESIUM   SARS-COV-2 (COVID-19) VIRUS RT-PCR   GLUCOSE MONITOR NURSING POCT   CARDIAC CONTINUOUS MONITORING   NOTIFY PHYSICIAN   GLUCOSE BY METER POCT   GLUCOSE BY METER POCT   BLOOD CULTURE   BLOOD CULTURE     XR Chest 2 Views   Final Result   IMPRESSION: Imaging features can be seen with (COVID-19)  pneumonia,   though are nonspecific and can occur with a variety of infectious and   noninfectious processes.      NEIL MOSHER MD      CT Abdomen Pelvis w Contrast    (Results Pending)   Pt declined/refused CT scan      Treatments and/or interventions provided: 3L NS bolus, dilaudid, haldol, insulin gtt  Patient's response to treatments and/or interventions: improvement in lactic acid, pain improved but them came back so got second dose.     To be done/followed up on inpatient unit:  per admitting    Does this patient have any cognitive concerns?: none    Activity level - Baseline/Home:  Independent  Activity Level - Current:   Independent    Patient's Preferred language: English   Needed?: No    Isolation: Contact - history MRSA  Infection: MRSA  Patient tested for COVID 19 prior to admission: NO - patient recovered from covid (see patient history in this note). Refusing swab for current admission.  Bariatric?: No    Vital Signs:   Vitals:    01/16/21 1755 01/16/21 1805 01/16/21 1815 01/16/21 1830   BP: 90/54 (!) 130/98 126/73 137/72   Pulse: 99 112 114 126   Resp: 26 27 16 15    SpO2: 98%   100%       Cardiac Rhythm:Cardiac Rhythm: Sinus tachycardia    Was the PSS-3 completed:   Yes  What interventions are required if any?               Family Comments: none present  OBS brochure/video discussed/provided to patient/family: N/A    For the majority of the shift this patient's behavior was Yellow - patient gets verbally agitated/frustrated but has not made any threats nor any violent behavior. History of schizophrenia and homelessness.   Behavioral interventions performed were care, rounding, reassurance.    ED NURSE PHONE NUMBER: *[phone]

## 2021-01-17 NOTE — H&P
[facility]    History and Physical  Hospitalist       Date of Admission:  1/16/2021    Assessment & Plan   Reji Monahan is a 30 year old male with history of uncontrolled type I DM with recurrent DKA, noncompliance, gastroparesis, as Asperger's syndrome, ADD, depression, paranoid schizophrenia, homelessness, marijuana abuse and cannabinoid hyperemesis who presents with abdominal pain, nausea and vomiting and is admitted for further management of DKA and UTI.    Diabetic ketoacidosis  Type 1 diabetes mellitus, uncontrolled with hyperglycemia and recurrent DKA  Abdominal pain, nausea, vomiting  Lactic acidosis  Noncompliance with medical treatments, homelessness  UTI  H/o cannabinoid hyperemesis syndrome  Patient has had recurrent episodes of DKA, admitted in December 2020 as well as January 2021, each time left prematurely/AMA.  Presents again with acute onset abdominal pain, nausea, vomiting that started early morning on day of admission.  Has history of noncompliance.  Possible that UTI is also precipitating DKA.  In the ED, initially hypertensive and tachycardic.  Did receive IV Dilaudid after which blood pressure dropped to the 70s and 80s systolic.  Blood pressure responded nicely to fluids.  Normal O2 saturation on room air.  EKG with no acute ischemic changes.  Chest x-ray showed bilateral nonspecific infiltrates that can be seen with COVID-19 pneumonia.  Lactate 3.0, negative troponin, lipase 91, anion gap 19, bicarbonate 15, glucose 379, normal hemogram.  Venous pH 7.4 with PCO2 23 and bicarbonate 16.  Ketones elevated at 5.5.  He was given 1 L NS IV bolus x2, IV Dilaudid, 5 mg IV Haldol for nausea and started on insulin drip.  UA positive for WBC clumps, few bacteria, positive nitrates and large LE.  Patient refused COVID-19 swab.  He also declined CT abdomen.  -Admit as inpatient to [facility] bed  -DKA protocol ordered with insulin drip and IV fluids per protocol  -N.p.o. at this  time  -As needed antiemetics, Tylenol and IV Dilaudid ordered.  Does have history of drug-seeking behavior noted in chart.  Also after receiving 0.5 mg IV Dilaudid in the ED, his blood pressure dropped to the 70s and 80s.  We will continue 1.2 mg IV Dilaudid every 3 hours as needed.  Use with caution.  -Urinalysis consistent with infection.  Urine cultures pending.  Last urine culture in December 2020 grew MRSA.  Started on IV vancomycin and ceftriaxone in the ED.  Continue on this pending further culture data.  -CT abdomen in December noted likely focal pyelonephritis in the left medial kidney and questionable small renal infarct.  Had a repeat CT abdomen pelvis on 1/4/2021 at [facility] that showed diffuse mesenteric stranding and enlarged mesenteric lymph nodes, nonspecific, but probably inflammatory or reactive.  Likely diffuse hepatic steatosis also noted.2  -Patient declined repeat CT abdomen in the ED.  He also declined COVID-19 swab.  -Monitor electrolytes, lactate, ketones closely.  -Patient did not express any interest in quitting marijuana use.    Paranoid schizophrenia  ADD  Depression  Asperger's syndrome  Noncompliance with medical treatments  Homelessness  Continue PTA as needed Zyprexa every 6 hours as needed for agitation.  -Patient will need psychiatry follow-up as outpatient, increased support services in the community given that he is high risk for recurrent admissions given his mental health history, homelessness, history of noncompliance.    DVT Prophylaxis: Pneumatic Compression Devices  Code Status: Full Code [could not discuss CODE STATUS with patient given poor cooperation, will continue as full code at this time]  Expected discharge: 2 - 3 days, recommended to prior living arrangement once DKA resolved, UTI treated, antibiotic plan established.    Nilsa Oreilly MD    Primary Care Physician   Bogdan Jhaveri MD    Chief Complaint   Abdominal pain, nausea, vomiting    History is obtained from  the patient, ED provider and chart review    History of Present Illness   Reji Monahan is a 30 year old male with history of uncontrolled type I DM with recurrent DKA, noncompliance, gastroparesis, as Buerger's syndrome, ADD, depression, paranoid schizophrenia, homelessness, marijuana abuse and cannabinoid hyperemesis who presents with abdominal pain, nausea and vomiting and is admitted for further management of DKA and UTI.  Most of my history is obtained from the ED provider and chart review.  Patient was quite irritable during the encounter and became increasingly more irritated with every question and was poorly cooperative.  Hence could not obtain much history from him.  He was admitted in December 2020 with DKA and left the night of admission AMA.  Subsequently admitted to [facility] on 1/4/2021 and it appears he left prematurely on 1/5/2021.  Each time he states that he has to take care of his brother who is legally blind and hence needs earlier than advised.  In December, he was diagnosed with UTI and urine cultures actually grew MRSA.  Unclear if he fill his antibiotic prescription after discharge.  Had a UA at recent admission at [facility] that was negative for infection.  Today states that he woke up around 4 AM this morning with abdominal pain nausea and vomiting.  States that he was having some sweats when he went to bed last night but otherwise felt okay.  Last took his Lantus yesterday morning.  Did take aspart this morning but does not remember how much.  Is staying at a hotel with his brother.  Did have Covid in December but has not had any symptoms for the last 2 weeks.  Denies any fevers, chills.  States that abdominal pain is mainly lower.  Occasionally has trouble with urinating and endorses dysuria.  No blood in vomit or stool.  Endorsed having some shortness of breath and chest pain along with his abdominal pain.  Admits to marijuana use and last used yesterday.  Denies current alcohol or tobacco  use.  In the ED, initially hypertensive and tachycardic.  Did receive IV Dilaudid after which blood pressure dropped to the 70s and 80s systolic.  Blood pressure responded nicely to fluids.  Normal O2 saturation on room air.    EKG with no acute ischemic changes.  Chest x-ray showed bilateral nonspecific infiltrates that can be seen with COVID-19 pneumonia.  Lactate 3.0, negative troponin, lipase 91, anion gap 19, bicarbonate 15, glucose 379, normal hemogram.  Venous pH 7.4 with PCO2 23 and bicarbonate 16.  Ketones elevated at 5.5.  He was given 1 L NS IV bolus x2, IV Dilaudid, 5 mg IV Haldol for nausea and started on insulin drip.  UA positive for WBC clumps, few bacteria, positive nitrates and large LE.  Patient refused COVID-19 swab.  He also declined CT abdomen.    Admitted for further management.    Past Medical History    I have reviewed this patient's medical history and updated it with pertinent information if needed.   Past Medical History:   Diagnosis Date     Diabetes mellitus (H)      Schizophrenia (H)        Past Surgical History   I have reviewed this patient's surgical history and updated it with pertinent information if needed.  No past surgical history on file.    Prior to Admission Medications   Prior to Admission Medications   Prescriptions Last Dose Informant Patient Reported? Taking?   OLANZapine (ZYPREXA) 10 MG tablet 1/14/2021 Self No Yes   Sig: Take 1 tablet (10 mg) by mouth every 6 hours as needed (agitation)   albuterol (PROAIR HFA/PROVENTIL HFA/VENTOLIN HFA) 108 (90 Base) MCG/ACT inhaler  at PRN Self Yes Yes   Sig: Inhale 2 puffs into the lungs every 4 hours as needed for shortness of breath / dyspnea or wheezing   insulin aspart (NOVOLOG PEN) 100 UNIT/ML pen 1/16/2021 at am Self Yes Yes   Sig: Inject 10 UNITS subcutaneously three times daily before meals. Plus sliding scale as below  Glucose 130-150 0   Glucose 151-200 2 units   Glucose 201-250 4 units   Glucose 251-300 6 units   Glucose  301-350 8 units   Glucose 351-400 10 units   Glucose 401-500 12 units   Glucose GREATER THAN 501 14 units and call provider   insulin glargine (LANTUS PEN) 100 UNIT/ML pen 1/16/2021 at Am Self Yes Yes   Sig: Inject 35 Units Subcutaneous every morning    ondansetron (ZOFRAN-ODT) 4 MG ODT tab  at PRN Self Yes Yes   Sig: Take 4 mg by mouth every 8 hours as needed for nausea      Facility-Administered Medications: None     Allergies   No Known Allergies    Social History   I have reviewed this patient's social history and updated it with pertinent information if needed. Reji Monahan  reports that he has never smoked. He does not have any smokeless tobacco history on file. He reports that he does not drink alcohol or use drugs.    Family History   I have reviewed this patient's family history and updated it with pertinent information if needed.   No pertinent family history.  Has a brother who is legally blind.    Review of Systems   Review of systems not obtained due to patient factors - lack of cooperation    Physical Exam       BP: 137/72 Pulse: 126   Resp: 15 SpO2: 100 %      Vital Signs with Ranges  Pulse:  [] 126  Resp:  [10-27] 15  BP: ()/(23-98) 137/72  SpO2:  [96 %-100 %] 100 %  0 lbs 0 oz    Constitutional: Awake, alert, cooperative, no apparent distress.  Eyes: no icterus, EOMs intact  HEENT: Moist mucous membranes  Respiratory: Clear to auscultation bilaterally, no crackles or wheezing.  Cardiovascular: Regular rate and rhythm, normal S1 and S2, and no murmur noted.  GI: Soft, non-distended, non-tender, normal bowel sounds.  Skin: No rashes, no cyanosis, no edema.  Musculoskeletal: No joint swelling, erythema or tenderness.  Neurologic: Alert, oriented and engages in appropriate conversation, no facial asymmetry, moving all extremities, fluent speech  Psychiatric: Calm and pleasant, no obvious anxiety or depression.     Data   Data reviewed today:  I personally reviewed EKG with result as  noted above and CXR with result as noted above  Recent Labs   Lab 01/16/21  1538   WBC 8.3   HGB 14.7   MCV 80         POTASSIUM 4.3   CHLORIDE 99   CO2 15*   BUN 13   CR 0.42*   ANIONGAP 19*   LYNN 9.5   *   ALBUMIN 3.9   PROTTOTAL 7.7   BILITOTAL 0.7   ALKPHOS 134   ALT 22   AST 17   LIPASE 91   TROPI <0.015       Recent Results (from the past 24 hour(s))   XR Chest 2 Views    Narrative    CHEST TWO VIEWS 1/16/2021 5:01 PM     HISTORY: Dyspnea    COMPARISON: 8/3/2020    FINDINGS: There is interstitial thickening and areas of mild  groundglass opacity. No airspace consolidation, pneumothorax, or  pleural effusion.       Impression    IMPRESSION: Imaging features can be seen with (COVID-19)  pneumonia,  though are nonspecific and can occur with a variety of infectious and  noninfectious processes.    NEIL MOSHER MD

## 2021-01-17 NOTE — PROGRESS NOTES
Patient became frustrated about his care and his insulin levels not correcting fast enough and decided to leave AMA. Writer attempted to have the patient stay and continue treatment, however pt had already made up his mind and requested the paperwork to sign. AMA paperwork was drawn up, MD was notified and paperwork was signed. Writer was the witness for AMA discharge. IV's were removed and pt was escorted down to the exit. Pt stated he had called a cab for transportation back to the hotel. Pt left with all belongings.

## 2021-01-17 NOTE — PLAN OF CARE
Reji was admitted to CICU for [facility] cares. He was admitted with insulin infusing. Trending down appropriately. Reports back pain, but is able to sleep easily. No meds given. Discussed plan of care. He is calm and cooperative. Up with SBA.

## 2021-01-17 NOTE — PROGRESS NOTES
RECEIVING UNIT ED HANDOFF REVIEW    ED Nurse Handoff Report was reviewed by: Rylie Lopez RN on January 16, 2021 at 7:35 PM

## 2021-02-06 ENCOUNTER — HOSPITAL ENCOUNTER (INPATIENT)
Facility: CLINIC | Age: 31
LOS: 1 days | Discharge: LEFT BEFORE DISCHARGE | DRG: 871 | End: 2021-02-07
Attending: EMERGENCY MEDICINE | Admitting: STUDENT IN AN ORGANIZED HEALTH CARE EDUCATION/TRAINING PROGRAM
Payer: MEDICARE

## 2021-02-06 ENCOUNTER — APPOINTMENT (OUTPATIENT)
Dept: GENERAL RADIOLOGY | Facility: CLINIC | Age: 31
DRG: 871 | End: 2021-02-06
Attending: EMERGENCY MEDICINE
Payer: MEDICARE

## 2021-02-06 ENCOUNTER — APPOINTMENT (OUTPATIENT)
Dept: CT IMAGING | Facility: CLINIC | Age: 31
DRG: 871 | End: 2021-02-06
Attending: EMERGENCY MEDICINE
Payer: MEDICARE

## 2021-02-06 DIAGNOSIS — E10.10 DIABETIC KETOACIDOSIS WITHOUT COMA ASSOCIATED WITH TYPE 1 DIABETES MELLITUS (H): ICD-10-CM

## 2021-02-06 DIAGNOSIS — A41.9 SEPSIS, DUE TO UNSPECIFIED ORGANISM, UNSPECIFIED WHETHER ACUTE ORGAN DYSFUNCTION PRESENT (H): ICD-10-CM

## 2021-02-06 DIAGNOSIS — N39.0 URINARY TRACT INFECTION WITHOUT HEMATURIA, SITE UNSPECIFIED: ICD-10-CM

## 2021-02-06 PROBLEM — F12.90 CANNABINOID HYPEREMESIS SYNDROME: Status: ACTIVE | Noted: 2020-03-24

## 2021-02-06 PROBLEM — I47.10 PAROXYSMAL SVT (SUPRAVENTRICULAR TACHYCARDIA) (H): Status: ACTIVE | Noted: 2017-11-12

## 2021-02-06 PROBLEM — K21.9 GASTROESOPHAGEAL REFLUX DISEASE WITHOUT ESOPHAGITIS: Status: ACTIVE | Noted: 2020-04-06

## 2021-02-06 PROBLEM — E44.0 MODERATE MALNUTRITION (H): Status: ACTIVE | Noted: 2018-01-20

## 2021-02-06 PROBLEM — N47.1 PHIMOSIS: Status: ACTIVE | Noted: 2017-11-01

## 2021-02-06 PROBLEM — F98.8 ADD (ATTENTION DEFICIT DISORDER): Status: ACTIVE | Noted: 2020-04-06

## 2021-02-06 PROBLEM — Z22.322 MRSA CARRIER: Status: ACTIVE | Noted: 2020-08-28

## 2021-02-06 PROBLEM — R41.89 COGNITIVE IMPAIRMENT: Status: ACTIVE | Noted: 2018-01-19

## 2021-02-06 PROBLEM — R11.2 CANNABINOID HYPEREMESIS SYNDROME: Status: ACTIVE | Noted: 2020-03-24

## 2021-02-06 LAB
ALBUMIN SERPL-MCNC: 4 G/DL (ref 3.4–5)
ALBUMIN UR-MCNC: 10 MG/DL
ALP SERPL-CCNC: 119 U/L (ref 40–150)
ALT SERPL W P-5'-P-CCNC: 28 U/L (ref 0–70)
AMPHETAMINES UR QL SCN: NEGATIVE
ANION GAP SERPL CALCULATED.3IONS-SCNC: 14 MMOL/L (ref 3–14)
ANION GAP SERPL CALCULATED.3IONS-SCNC: 23 MMOL/L (ref 3–14)
APPEARANCE UR: CLEAR
AST SERPL W P-5'-P-CCNC: 23 U/L (ref 0–45)
BARBITURATES UR QL: NEGATIVE
BASE DEFICIT BLDV-SCNC: 17.5 MMOL/L
BASE DEFICIT BLDV-SCNC: 18.7 MMOL/L
BASOPHILS # BLD AUTO: 0.1 10E9/L (ref 0–0.2)
BASOPHILS NFR BLD AUTO: 0.5 %
BENZODIAZ UR QL: NEGATIVE
BILIRUB SERPL-MCNC: 1 MG/DL (ref 0.2–1.3)
BILIRUB UR QL STRIP: NEGATIVE
BUN SERPL-MCNC: 15 MG/DL (ref 7–30)
BUN SERPL-MCNC: 18 MG/DL (ref 7–30)
CALCIUM SERPL-MCNC: 8.1 MG/DL (ref 8.5–10.1)
CALCIUM SERPL-MCNC: 9.1 MG/DL (ref 8.5–10.1)
CANNABINOIDS UR QL SCN: POSITIVE
CHLORIDE SERPL-SCNC: 100 MMOL/L (ref 94–109)
CHLORIDE SERPL-SCNC: 105 MMOL/L (ref 94–109)
CO2 SERPL-SCNC: 12 MMOL/L (ref 20–32)
CO2 SERPL-SCNC: 9 MMOL/L (ref 20–32)
COCAINE UR QL: NEGATIVE
COLOR UR AUTO: ABNORMAL
CREAT BLD-MCNC: 0.4 MG/DL (ref 0.66–1.25)
CREAT SERPL-MCNC: 0.54 MG/DL (ref 0.66–1.25)
CREAT SERPL-MCNC: 0.61 MG/DL (ref 0.66–1.25)
D DIMER PPP FEU-MCNC: <0.3 UG/ML FEU (ref 0–0.5)
DIFFERENTIAL METHOD BLD: ABNORMAL
EOSINOPHIL # BLD AUTO: 0 10E9/L (ref 0–0.7)
EOSINOPHIL NFR BLD AUTO: 0.1 %
ERYTHROCYTE [DISTWIDTH] IN BLOOD BY AUTOMATED COUNT: 14.1 % (ref 10–15)
GFR SERPL CREATININE-BSD FRML MDRD: >90 ML/MIN/{1.73_M2}
GLUCOSE BLDC GLUCOMTR-MCNC: 113 MG/DL (ref 70–99)
GLUCOSE BLDC GLUCOMTR-MCNC: 124 MG/DL (ref 70–99)
GLUCOSE BLDC GLUCOMTR-MCNC: 131 MG/DL (ref 70–99)
GLUCOSE BLDC GLUCOMTR-MCNC: 133 MG/DL (ref 70–99)
GLUCOSE BLDC GLUCOMTR-MCNC: 160 MG/DL (ref 70–99)
GLUCOSE BLDC GLUCOMTR-MCNC: 162 MG/DL (ref 70–99)
GLUCOSE BLDC GLUCOMTR-MCNC: 180 MG/DL (ref 70–99)
GLUCOSE BLDC GLUCOMTR-MCNC: 223 MG/DL (ref 70–99)
GLUCOSE BLDC GLUCOMTR-MCNC: 283 MG/DL (ref 70–99)
GLUCOSE BLDC GLUCOMTR-MCNC: 302 MG/DL (ref 70–99)
GLUCOSE BLDC GLUCOMTR-MCNC: 305 MG/DL (ref 70–99)
GLUCOSE BLDC GLUCOMTR-MCNC: 96 MG/DL (ref 70–99)
GLUCOSE SERPL-MCNC: 172 MG/DL (ref 70–99)
GLUCOSE SERPL-MCNC: 321 MG/DL (ref 70–99)
GLUCOSE UR STRIP-MCNC: >1000 MG/DL
HBA1C MFR BLD: 12.3 % (ref 0–5.6)
HCO3 BLDV-SCNC: 9 MMOL/L (ref 21–28)
HCO3 BLDV-SCNC: 9 MMOL/L (ref 21–28)
HCT VFR BLD AUTO: 45.7 % (ref 40–53)
HGB BLD-MCNC: 14.1 G/DL (ref 13.3–17.7)
HGB UR QL STRIP: NEGATIVE
HYALINE CASTS #/AREA URNS LPF: 1 /LPF (ref 0–2)
IMM GRANULOCYTES # BLD: 0.1 10E9/L (ref 0–0.4)
IMM GRANULOCYTES NFR BLD: 1 %
INTERPRETATION ECG - MUSE: NORMAL
KETONES BLD-SCNC: 5.6 MMOL/L (ref 0–0.6)
KETONES UR STRIP-MCNC: >150 MG/DL
LABORATORY COMMENT REPORT: NORMAL
LACTATE BLD-SCNC: 2.8 MMOL/L (ref 0.7–2)
LACTATE BLD-SCNC: 3.6 MMOL/L (ref 0.7–2)
LEUKOCYTE ESTERASE UR QL STRIP: ABNORMAL
LIPASE SERPL-CCNC: 49 U/L (ref 73–393)
LYMPHOCYTES # BLD AUTO: 1.5 10E9/L (ref 0.8–5.3)
LYMPHOCYTES NFR BLD AUTO: 14 %
MAGNESIUM SERPL-MCNC: 1.9 MG/DL (ref 1.6–2.3)
MCH RBC QN AUTO: 25.3 PG (ref 26.5–33)
MCHC RBC AUTO-ENTMCNC: 30.9 G/DL (ref 31.5–36.5)
MCV RBC AUTO: 82 FL (ref 78–100)
MONOCYTES # BLD AUTO: 0.6 10E9/L (ref 0–1.3)
MONOCYTES NFR BLD AUTO: 5.9 %
MUCOUS THREADS #/AREA URNS LPF: PRESENT /LPF
NEUTROPHILS # BLD AUTO: 8.5 10E9/L (ref 1.6–8.3)
NEUTROPHILS NFR BLD AUTO: 78.5 %
NITRATE UR QL: NEGATIVE
NRBC # BLD AUTO: 0 10*3/UL
NRBC BLD AUTO-RTO: 0 /100
O2/TOTAL GAS SETTING VFR VENT: ABNORMAL %
O2/TOTAL GAS SETTING VFR VENT: ABNORMAL %
OPIATES UR QL SCN: NEGATIVE
OSMOLALITY SERPL: 302 MMOL/KG (ref 275–295)
OXYHGB MFR BLDV: 83 %
OXYHGB MFR BLDV: 85 %
PCO2 BLDV: 26 MM HG (ref 40–50)
PCO2 BLDV: 27 MM HG (ref 40–50)
PCP UR QL SCN: NEGATIVE
PH BLDV: 7.13 PH (ref 7.32–7.43)
PH BLDV: 7.17 PH (ref 7.32–7.43)
PH UR STRIP: 5 PH (ref 5–7)
PHOSPHATE SERPL-MCNC: 4 MG/DL (ref 2.5–4.5)
PLATELET # BLD AUTO: 295 10E9/L (ref 150–450)
PO2 BLDV: 56 MM HG (ref 25–47)
PO2 BLDV: 62 MM HG (ref 25–47)
POTASSIUM SERPL-SCNC: 4.7 MMOL/L (ref 3.4–5.3)
POTASSIUM SERPL-SCNC: 4.8 MMOL/L (ref 3.4–5.3)
PROT SERPL-MCNC: 7.4 G/DL (ref 6.8–8.8)
RBC # BLD AUTO: 5.58 10E12/L (ref 4.4–5.9)
RBC #/AREA URNS AUTO: 3 /HPF (ref 0–2)
SARS-COV-2 RNA RESP QL NAA+PROBE: NEGATIVE
SODIUM SERPL-SCNC: 131 MMOL/L (ref 133–144)
SODIUM SERPL-SCNC: 132 MMOL/L (ref 133–144)
SOURCE: ABNORMAL
SP GR UR STRIP: 1.02 (ref 1–1.03)
SPECIMEN SOURCE: NORMAL
TROPONIN I SERPL-MCNC: <0.015 UG/L (ref 0–0.04)
UROBILINOGEN UR STRIP-MCNC: 0 MG/DL (ref 0–2)
WBC # BLD AUTO: 10.9 10E9/L (ref 4–11)
WBC #/AREA URNS AUTO: 25 /HPF (ref 0–5)

## 2021-02-06 PROCEDURE — 258N000003 HC RX IP 258 OP 636: Performed by: EMERGENCY MEDICINE

## 2021-02-06 PROCEDURE — 71046 X-RAY EXAM CHEST 2 VIEWS: CPT

## 2021-02-06 PROCEDURE — 84484 ASSAY OF TROPONIN QUANT: CPT | Performed by: EMERGENCY MEDICINE

## 2021-02-06 PROCEDURE — 250N000013 HC RX MED GY IP 250 OP 250 PS 637: Performed by: EMERGENCY MEDICINE

## 2021-02-06 PROCEDURE — 83735 ASSAY OF MAGNESIUM: CPT | Performed by: EMERGENCY MEDICINE

## 2021-02-06 PROCEDURE — 96368 THER/DIAG CONCURRENT INF: CPT

## 2021-02-06 PROCEDURE — 96365 THER/PROPH/DIAG IV INF INIT: CPT | Mod: 59

## 2021-02-06 PROCEDURE — 80048 BASIC METABOLIC PNL TOTAL CA: CPT | Performed by: INTERNAL MEDICINE

## 2021-02-06 PROCEDURE — 80307 DRUG TEST PRSMV CHEM ANLYZR: CPT | Performed by: EMERGENCY MEDICINE

## 2021-02-06 PROCEDURE — 36415 COLL VENOUS BLD VENIPUNCTURE: CPT | Performed by: INTERNAL MEDICINE

## 2021-02-06 PROCEDURE — 258N000003 HC RX IP 258 OP 636: Performed by: STUDENT IN AN ORGANIZED HEALTH CARE EDUCATION/TRAINING PROGRAM

## 2021-02-06 PROCEDURE — 83690 ASSAY OF LIPASE: CPT | Performed by: EMERGENCY MEDICINE

## 2021-02-06 PROCEDURE — 99292 CRITICAL CARE ADDL 30 MIN: CPT

## 2021-02-06 PROCEDURE — C9803 HOPD COVID-19 SPEC COLLECT: HCPCS

## 2021-02-06 PROCEDURE — 250N000011 HC RX IP 250 OP 636: Performed by: EMERGENCY MEDICINE

## 2021-02-06 PROCEDURE — 83605 ASSAY OF LACTIC ACID: CPT | Performed by: EMERGENCY MEDICINE

## 2021-02-06 PROCEDURE — 74177 CT ABD & PELVIS W/CONTRAST: CPT

## 2021-02-06 PROCEDURE — 87635 SARS-COV-2 COVID-19 AMP PRB: CPT | Performed by: EMERGENCY MEDICINE

## 2021-02-06 PROCEDURE — 82565 ASSAY OF CREATININE: CPT

## 2021-02-06 PROCEDURE — 82805 BLOOD GASES W/O2 SATURATION: CPT | Performed by: EMERGENCY MEDICINE

## 2021-02-06 PROCEDURE — 85379 FIBRIN DEGRADATION QUANT: CPT | Performed by: EMERGENCY MEDICINE

## 2021-02-06 PROCEDURE — 93005 ELECTROCARDIOGRAM TRACING: CPT

## 2021-02-06 PROCEDURE — 250N000012 HC RX MED GY IP 250 OP 636 PS 637: Performed by: EMERGENCY MEDICINE

## 2021-02-06 PROCEDURE — 250N000011 HC RX IP 250 OP 636: Performed by: STUDENT IN AN ORGANIZED HEALTH CARE EDUCATION/TRAINING PROGRAM

## 2021-02-06 PROCEDURE — 87040 BLOOD CULTURE FOR BACTERIA: CPT | Performed by: EMERGENCY MEDICINE

## 2021-02-06 PROCEDURE — 80053 COMPREHEN METABOLIC PANEL: CPT | Performed by: EMERGENCY MEDICINE

## 2021-02-06 PROCEDURE — 96375 TX/PRO/DX INJ NEW DRUG ADDON: CPT

## 2021-02-06 PROCEDURE — 99291 CRITICAL CARE FIRST HOUR: CPT | Mod: 25

## 2021-02-06 PROCEDURE — 999N001017 HC STATISTIC GLUCOSE BY METER IP

## 2021-02-06 PROCEDURE — 85025 COMPLETE CBC W/AUTO DIFF WBC: CPT | Performed by: EMERGENCY MEDICINE

## 2021-02-06 PROCEDURE — 81001 URINALYSIS AUTO W/SCOPE: CPT | Performed by: EMERGENCY MEDICINE

## 2021-02-06 PROCEDURE — 120N000013 HC R&B IMCU

## 2021-02-06 PROCEDURE — 99223 1ST HOSP IP/OBS HIGH 75: CPT | Mod: AI | Performed by: STUDENT IN AN ORGANIZED HEALTH CARE EDUCATION/TRAINING PROGRAM

## 2021-02-06 PROCEDURE — 250N000009 HC RX 250: Performed by: EMERGENCY MEDICINE

## 2021-02-06 PROCEDURE — 82010 KETONE BODYS QUAN: CPT | Performed by: EMERGENCY MEDICINE

## 2021-02-06 PROCEDURE — 250N000013 HC RX MED GY IP 250 OP 250 PS 637: Performed by: STUDENT IN AN ORGANIZED HEALTH CARE EDUCATION/TRAINING PROGRAM

## 2021-02-06 PROCEDURE — 99207 PR NO CHARGE LOS: CPT | Performed by: INTERNAL MEDICINE

## 2021-02-06 PROCEDURE — 96366 THER/PROPH/DIAG IV INF ADDON: CPT

## 2021-02-06 PROCEDURE — 250N000012 HC RX MED GY IP 250 OP 636 PS 637: Performed by: STUDENT IN AN ORGANIZED HEALTH CARE EDUCATION/TRAINING PROGRAM

## 2021-02-06 PROCEDURE — 96376 TX/PRO/DX INJ SAME DRUG ADON: CPT

## 2021-02-06 PROCEDURE — 83930 ASSAY OF BLOOD OSMOLALITY: CPT | Performed by: STUDENT IN AN ORGANIZED HEALTH CARE EDUCATION/TRAINING PROGRAM

## 2021-02-06 PROCEDURE — 83036 HEMOGLOBIN GLYCOSYLATED A1C: CPT | Performed by: EMERGENCY MEDICINE

## 2021-02-06 PROCEDURE — 84100 ASSAY OF PHOSPHORUS: CPT | Performed by: EMERGENCY MEDICINE

## 2021-02-06 RX ORDER — TRAMADOL HYDROCHLORIDE 50 MG/1
50 TABLET ORAL EVERY 6 HOURS PRN
Status: DISCONTINUED | OUTPATIENT
Start: 2021-02-06 | End: 2021-02-07 | Stop reason: HOSPADM

## 2021-02-06 RX ORDER — CEFTRIAXONE 1 G/1
1 INJECTION, POWDER, FOR SOLUTION INTRAMUSCULAR; INTRAVENOUS ONCE
Status: COMPLETED | OUTPATIENT
Start: 2021-02-06 | End: 2021-02-06

## 2021-02-06 RX ORDER — ONDANSETRON 4 MG/1
4 TABLET, ORALLY DISINTEGRATING ORAL EVERY 6 HOURS PRN
Status: DISCONTINUED | OUTPATIENT
Start: 2021-02-06 | End: 2021-02-07 | Stop reason: HOSPADM

## 2021-02-06 RX ORDER — NICOTINE POLACRILEX 4 MG
15-30 LOZENGE BUCCAL
Status: DISCONTINUED | OUTPATIENT
Start: 2021-02-06 | End: 2021-02-06

## 2021-02-06 RX ORDER — ACETAMINOPHEN 325 MG/1
650 TABLET ORAL EVERY 6 HOURS PRN
Status: DISCONTINUED | OUTPATIENT
Start: 2021-02-06 | End: 2021-02-07 | Stop reason: HOSPADM

## 2021-02-06 RX ORDER — LIDOCAINE 40 MG/G
CREAM TOPICAL
Status: DISCONTINUED | OUTPATIENT
Start: 2021-02-06 | End: 2021-02-07 | Stop reason: HOSPADM

## 2021-02-06 RX ORDER — CEFTRIAXONE 1 G/1
1 INJECTION, POWDER, FOR SOLUTION INTRAMUSCULAR; INTRAVENOUS EVERY 24 HOURS
Status: DISCONTINUED | OUTPATIENT
Start: 2021-02-07 | End: 2021-02-07 | Stop reason: HOSPADM

## 2021-02-06 RX ORDER — NALOXONE HYDROCHLORIDE 0.4 MG/ML
0.4 INJECTION, SOLUTION INTRAMUSCULAR; INTRAVENOUS; SUBCUTANEOUS
Status: DISCONTINUED | OUTPATIENT
Start: 2021-02-06 | End: 2021-02-06

## 2021-02-06 RX ORDER — DEXTROSE MONOHYDRATE 25 G/50ML
25-50 INJECTION, SOLUTION INTRAVENOUS
Status: DISCONTINUED | OUTPATIENT
Start: 2021-02-06 | End: 2021-02-07 | Stop reason: HOSPADM

## 2021-02-06 RX ORDER — OXYCODONE HYDROCHLORIDE 5 MG/1
5 TABLET ORAL EVERY 4 HOURS PRN
Status: DISCONTINUED | OUTPATIENT
Start: 2021-02-06 | End: 2021-02-07 | Stop reason: HOSPADM

## 2021-02-06 RX ORDER — OLANZAPINE 10 MG/1
10 TABLET ORAL EVERY 6 HOURS PRN
Status: DISCONTINUED | OUTPATIENT
Start: 2021-02-06 | End: 2021-02-06

## 2021-02-06 RX ORDER — POTASSIUM CHLORIDE 7.45 MG/ML
10 INJECTION INTRAVENOUS ONCE
Status: COMPLETED | OUTPATIENT
Start: 2021-02-06 | End: 2021-02-06

## 2021-02-06 RX ORDER — ONDANSETRON 2 MG/ML
4 INJECTION INTRAMUSCULAR; INTRAVENOUS EVERY 30 MIN PRN
Status: DISCONTINUED | OUTPATIENT
Start: 2021-02-06 | End: 2021-02-06

## 2021-02-06 RX ORDER — CEFAZOLIN SODIUM 1 G/50ML
1750 SOLUTION INTRAVENOUS ONCE
Status: COMPLETED | OUTPATIENT
Start: 2021-02-06 | End: 2021-02-06

## 2021-02-06 RX ORDER — NITROGLYCERIN 0.4 MG/1
0.4 TABLET SUBLINGUAL EVERY 5 MIN PRN
Status: DISCONTINUED | OUTPATIENT
Start: 2021-02-06 | End: 2021-02-07 | Stop reason: HOSPADM

## 2021-02-06 RX ORDER — DEXTROSE MONOHYDRATE 25 G/50ML
25-50 INJECTION, SOLUTION INTRAVENOUS
Status: DISCONTINUED | OUTPATIENT
Start: 2021-02-06 | End: 2021-02-06

## 2021-02-06 RX ORDER — NICOTINE POLACRILEX 4 MG
15-30 LOZENGE BUCCAL
Status: DISCONTINUED | OUTPATIENT
Start: 2021-02-06 | End: 2021-02-07 | Stop reason: HOSPADM

## 2021-02-06 RX ORDER — NALOXONE HYDROCHLORIDE 0.4 MG/ML
0.2 INJECTION, SOLUTION INTRAMUSCULAR; INTRAVENOUS; SUBCUTANEOUS
Status: DISCONTINUED | OUTPATIENT
Start: 2021-02-06 | End: 2021-02-06

## 2021-02-06 RX ORDER — NALOXONE HYDROCHLORIDE 0.4 MG/ML
0.4 INJECTION, SOLUTION INTRAMUSCULAR; INTRAVENOUS; SUBCUTANEOUS
Status: DISCONTINUED | OUTPATIENT
Start: 2021-02-06 | End: 2021-02-07 | Stop reason: HOSPADM

## 2021-02-06 RX ORDER — SODIUM CHLORIDE AND POTASSIUM CHLORIDE 150; 450 MG/100ML; MG/100ML
INJECTION, SOLUTION INTRAVENOUS CONTINUOUS
Status: DISCONTINUED | OUTPATIENT
Start: 2021-02-06 | End: 2021-02-07 | Stop reason: HOSPADM

## 2021-02-06 RX ORDER — SODIUM CHLORIDE 9 MG/ML
INJECTION, SOLUTION INTRAVENOUS CONTINUOUS
Status: DISCONTINUED | OUTPATIENT
Start: 2021-02-06 | End: 2021-02-06

## 2021-02-06 RX ORDER — NALOXONE HYDROCHLORIDE 0.4 MG/ML
0.2 INJECTION, SOLUTION INTRAMUSCULAR; INTRAVENOUS; SUBCUTANEOUS
Status: DISCONTINUED | OUTPATIENT
Start: 2021-02-06 | End: 2021-02-07 | Stop reason: HOSPADM

## 2021-02-06 RX ORDER — DEXTROSE MONOHYDRATE, SODIUM CHLORIDE, AND POTASSIUM CHLORIDE 50; 1.49; 4.5 G/1000ML; G/1000ML; G/1000ML
INJECTION, SOLUTION INTRAVENOUS CONTINUOUS
Status: DISCONTINUED | OUTPATIENT
Start: 2021-02-06 | End: 2021-02-07 | Stop reason: HOSPADM

## 2021-02-06 RX ORDER — HYDROMORPHONE HYDROCHLORIDE 1 MG/ML
0.5 INJECTION, SOLUTION INTRAMUSCULAR; INTRAVENOUS; SUBCUTANEOUS
Status: DISCONTINUED | OUTPATIENT
Start: 2021-02-06 | End: 2021-02-06

## 2021-02-06 RX ORDER — IOPAMIDOL 755 MG/ML
75 INJECTION, SOLUTION INTRAVASCULAR ONCE
Status: COMPLETED | OUTPATIENT
Start: 2021-02-06 | End: 2021-02-06

## 2021-02-06 RX ORDER — ONDANSETRON 2 MG/ML
4 INJECTION INTRAMUSCULAR; INTRAVENOUS EVERY 6 HOURS PRN
Status: DISCONTINUED | OUTPATIENT
Start: 2021-02-06 | End: 2021-02-07 | Stop reason: HOSPADM

## 2021-02-06 RX ADMIN — ONDANSETRON 4 MG: 4 TABLET, ORALLY DISINTEGRATING ORAL at 16:16

## 2021-02-06 RX ADMIN — POTASSIUM CHLORIDE AND SODIUM CHLORIDE: 450; 150 INJECTION, SOLUTION INTRAVENOUS at 14:28

## 2021-02-06 RX ADMIN — HYDROMORPHONE HYDROCHLORIDE 0.5 MG: 1 INJECTION, SOLUTION INTRAMUSCULAR; INTRAVENOUS; SUBCUTANEOUS at 09:42

## 2021-02-06 RX ADMIN — POTASSIUM CHLORIDE 10 MEQ: 7.46 INJECTION, SOLUTION INTRAVENOUS at 10:39

## 2021-02-06 RX ADMIN — HYDROMORPHONE HYDROCHLORIDE 0.5 MG: 1 INJECTION, SOLUTION INTRAMUSCULAR; INTRAVENOUS; SUBCUTANEOUS at 08:53

## 2021-02-06 RX ADMIN — ACETAMINOPHEN 650 MG: 325 TABLET, FILM COATED ORAL at 14:26

## 2021-02-06 RX ADMIN — POTASSIUM CHLORIDE, DEXTROSE MONOHYDRATE AND SODIUM CHLORIDE: 150; 5; 450 INJECTION, SOLUTION INTRAVENOUS at 16:19

## 2021-02-06 RX ADMIN — VANCOMYCIN HYDROCHLORIDE 1750 MG: 5 INJECTION, POWDER, LYOPHILIZED, FOR SOLUTION INTRAVENOUS at 12:13

## 2021-02-06 RX ADMIN — CEFTRIAXONE SODIUM 1 G: 1 INJECTION, POWDER, FOR SOLUTION INTRAMUSCULAR; INTRAVENOUS at 11:04

## 2021-02-06 RX ADMIN — SODIUM CHLORIDE 62 ML: 9 INJECTION, SOLUTION INTRAVENOUS at 10:08

## 2021-02-06 RX ADMIN — SODIUM CHLORIDE: 9 INJECTION, SOLUTION INTRAVENOUS at 10:40

## 2021-02-06 RX ADMIN — FAMOTIDINE 20 MG: 10 INJECTION, SOLUTION INTRAVENOUS at 10:38

## 2021-02-06 RX ADMIN — SODIUM CHLORIDE 4 UNITS/HR: 9 INJECTION, SOLUTION INTRAVENOUS at 10:29

## 2021-02-06 RX ADMIN — SODIUM CHLORIDE: 9 INJECTION, SOLUTION INTRAVENOUS at 14:37

## 2021-02-06 RX ADMIN — SODIUM CHLORIDE 1000 ML: 9 INJECTION, SOLUTION INTRAVENOUS at 12:56

## 2021-02-06 RX ADMIN — IOPAMIDOL 75 ML: 755 INJECTION, SOLUTION INTRAVENOUS at 10:07

## 2021-02-06 RX ADMIN — LIDOCAINE HYDROCHLORIDE 30 ML: 20 SOLUTION ORAL; TOPICAL at 11:22

## 2021-02-06 RX ADMIN — OXYCODONE HYDROCHLORIDE 5 MG: 5 TABLET ORAL at 20:21

## 2021-02-06 RX ADMIN — SODIUM CHLORIDE 1000 ML: 9 INJECTION, SOLUTION INTRAVENOUS at 08:53

## 2021-02-06 RX ADMIN — OXYCODONE HYDROCHLORIDE 5 MG: 5 TABLET ORAL at 16:06

## 2021-02-06 RX ADMIN — SODIUM CHLORIDE 1000 ML: 9 INJECTION, SOLUTION INTRAVENOUS at 09:42

## 2021-02-06 RX ADMIN — TRAMADOL HYDROCHLORIDE 50 MG: 50 TABLET, FILM COATED ORAL at 14:27

## 2021-02-06 RX ADMIN — ONDANSETRON 4 MG: 2 INJECTION INTRAMUSCULAR; INTRAVENOUS at 09:01

## 2021-02-06 ASSESSMENT — ENCOUNTER SYMPTOMS
COUGH: 0
VOMITING: 1
HEMATURIA: 0
DYSURIA: 0
SHORTNESS OF BREATH: 0
ABDOMINAL PAIN: 1
FLANK PAIN: 1
DIARRHEA: 0

## 2021-02-06 ASSESSMENT — ACTIVITIES OF DAILY LIVING (ADL)
ADLS_ACUITY_SCORE: 10
ADLS_ACUITY_SCORE: 10

## 2021-02-06 NOTE — ED TRIAGE NOTES
"[facility] in [place]. Writhing around on couch in lob. Reports \"kidney pain\" for 3 days. Denies hematuria or other urinary issues. Toradol 15mg and zofran 4mg IV per EMS.  "

## 2021-02-06 NOTE — ED NOTES
"Pt uncooperative with changing into gown at this time. RN explained to pt why we would need to change into gown. Pt continued to shout \"I don't want to change, why can't I stay in my t-shirt?\".   "

## 2021-02-06 NOTE — ED NOTES
Bed: ED02  Expected date: 2/6/21  Expected time: 8:25 AM  Means of arrival: Ambulance  Comments:  515 31m kidney pain ETA 0837

## 2021-02-06 NOTE — ED NOTES
"[facility]  ED Nurse Handoff Report    ED Chief complaint: Abdominal Pain      ED Diagnosis:   Final diagnoses:   Sepsis, due to unspecified organism, unspecified whether acute organ dysfunction present (H)   Urinary tract infection without hematuria, site unspecified   Diabetic ketoacidosis without coma associated with type 1 diabetes mellitus (H)       Code Status: Full Code    Allergies: No Known Allergies    Patient Story: left \"kidney pain\" for past 3 days. Was writhing on couch in lobby at [facility] (where currently residing). Significant history of numerous DKA episodes, schizophrenia, GERD.  Focused Assessment:  10/10 flank, abdominal and epigastric pain, writhing around upon arrival. Kussmaul respirations  Labs Ordered and Resulted from Time of ED Arrival Up to the Time of Departure from the ED   CBC WITH PLATELETS DIFFERENTIAL - Abnormal; Notable for the following components:       Result Value    MCH 25.3 (*)     MCHC 30.9 (*)     Absolute Neutrophil 8.5 (*)     All other components within normal limits   COMPREHENSIVE METABOLIC PANEL - Abnormal; Notable for the following components:    Sodium 132 (*)     Carbon Dioxide 9 (*)     Anion Gap 23 (*)     Glucose 321 (*)     Creatinine 0.61 (*)     All other components within normal limits   LIPASE - Abnormal; Notable for the following components:    Lipase 49 (*)     All other components within normal limits   KETONE BETA-HYDROXYBUTYRATE QUANTITATIVE - Abnormal; Notable for the following components:    Ketone Quantitative 5.6 (*)     All other components within normal limits   LACTIC ACID WHOLE BLOOD - Abnormal; Notable for the following components:    Lactic Acid 3.6 (*)     All other components within normal limits   ROUTINE UA WITH MICROSCOPIC - Abnormal; Notable for the following components:    Glucose Urine >1000 (*)     Ketones Urine >150 (*)     Protein Albumin Urine 10 (*)     Leukocyte Esterase Urine Moderate (*)     WBC Urine 25 " (*)     RBC Urine 3 (*)     Mucous Urine Present (*)     All other components within normal limits   DRUG ABUSE SCREEN 77 URINE (FL, RH, SH) - Abnormal; Notable for the following components:    Cannabinoids Qual Urine Positive (*)     All other components within normal limits   BLOOD GAS VENOUS AND OXYHGB - Abnormal; Notable for the following components:    Ph Venous 7.17 (*)     PCO2 Venous 26 (*)     PO2 Venous 56 (*)     Bicarbonate Venous 9 (*)     All other components within normal limits   GLUCOSE BY METER - Abnormal; Notable for the following components:    Glucose 283 (*)     All other components within normal limits   CREATININE POCT - Abnormal; Notable for the following components:    Creatinine 0.4 (*)     All other components within normal limits   HEMOGLOBIN A1C - Abnormal; Notable for the following components:    Hemoglobin A1C 12.3 (*)     All other components within normal limits   LACTIC ACID WHOLE BLOOD - Abnormal; Notable for the following components:    Lactic Acid 2.8 (*)     All other components within normal limits   BLOOD GAS VENOUS AND OXYHGB - Abnormal; Notable for the following components:    Ph Venous 7.13 (*)     PCO2 Venous 27 (*)     PO2 Venous 62 (*)     Bicarbonate Venous 9 (*)     All other components within normal limits   GLUCOSE BY METER - Abnormal; Notable for the following components:    Glucose 302 (*)     All other components within normal limits   TROPONIN I   D DIMER QUANTITATIVE   MAGNESIUM   PHOSPHORUS   SARS-COV-2 (COVID-19) VIRUS RT-PCR   GLUCOSE MONITOR NURSING POCT   CARDIAC CONTINUOUS MONITORING   ISTAT CREATININE NURSING POCT   PERIPHERAL IV CATHETER   IV ACCESS   CARDIAC CONTINUOUS MONITORING   NOTIFY PHYSICIAN   GLUCOSE BY METER POCT   GLUCOSE BY METER POCT   GLUCOSE BY METER POCT   BLOOD CULTURE   BLOOD CULTURE     CT Abdomen Pelvis w Contrast   Final Result   IMPRESSION: Limited evaluation of the abdominal organs due to lack of   significant intraperitoneal fat.  Within this limitation, there is;   1. No CT finding to explain patient's symptoms of abdominal pain.   2. Hepatic steatosis.   3. Splenomegaly.   4. Mild diffuse urinary bladder wall thickening, nonspecific, can be   seen with chronic bladder outlet obstruction versus chronic cystitis.   5. Mild prostatomegaly.      DONNELL COWART MD      XR Chest 2 Views    (Results Pending)       Treatments and/or interventions provided: 2L NS bolus and MIVF, dilaudid, zofran, insulin gtt, pepcid, gi cocktail, potassium, rocephin  Patient's response to treatments and/or interventions: pain variable but overall improved, behavior improved    To be done/followed up on inpatient unit:  per admitting    Does this patient have any cognitive concerns?: none    Activity level - Baseline/Home:  Independent  Activity Level - Current:   Independent    Patient's Preferred language: English   Needed?: No    Isolation: Contact   Infection: MRSA  Patient tested for COVID 19 prior to admission: YES (also recovered from covid in Dec)  Bariatric?: No    Vital Signs:   Vitals:    02/06/21 1045 02/06/21 1100 02/06/21 1115 02/06/21 1130   BP: 102/60 128/75 110/68 120/78   Pulse: 112 106 98 99   Resp: 30 (!) 32 13 28   Temp:       TempSrc:       SpO2: 100% 100% 99% 97%       Cardiac Rhythm:Cardiac Rhythm: Sinus tachycardia    Was the PSS-3 completed:   No -secondary to patient behavior and acuity  What interventions are required if any?               Family Comments: none present  OBS brochure/video discussed/provided to patient/family: N/A    For the majority of the shift this patient's behavior was Yellow (intitially yelling at staff and uncooperative, now calm and polite but labile behaviors if pain/anxiety worsens)  Behavioral interventions performed were reassurance, explanations, guided coping mechanisms, goal setting.    ED NURSE PHONE NUMBER: *[phone]

## 2021-02-06 NOTE — PROVIDER NOTIFICATION
MD Notification Chadwick Sharp    Notified Person Name:Chadwick Sharp    Notification Date/Time: 2/6/21 1:00pm    Notification Interaction: We based paging    Purpose of Notification: Patient requesting pain medication. Pain rated at 7/10    Orders Received: See EMR

## 2021-02-06 NOTE — ED PROVIDER NOTES
"  History   Chief Complaint:  Abdominal and flank pain     The history is provided by the patient.      Reji Monahan is a 31 year old male with history of type 1 diabetes mellitus, DKA, diabetic gastroparesis, among others who presents via EMS with abdominal and flank pain. Reji reports \"kidney and stomach pain.\" He notes the pain started several days ago and has been getting worse. He does note some associated chest pain as well that started while in ER. He had some vomiting yesterday but no diarrhea. He has not checked his sugars today but states that he is using his insulin as prescribed. He denies dysuria, hematuria, cough, or shortness of breath. EMS gave the patient Toradol and Zofran en route.     Review of Systems   Respiratory: Negative for cough and shortness of breath.    Cardiovascular: Positive for chest pain.   Gastrointestinal: Positive for abdominal pain and vomiting. Negative for diarrhea.   Genitourinary: Positive for flank pain. Negative for dysuria and hematuria.   All other systems reviewed and are negative.    Allergies:  No Known Allergies    Medications:  Albuterol inhaler  Novolog  Lantus  Zyprexa     Past Medical History:    Schizophrenia  Diabetes mellitus type 1  Splenomegaly   DKA   Pyelonephritis   ADD  GERD   Cannabinoid hyperemesis   Cellulitis   Paroxysmal SVT  Diabetic gastroparesis  Diabetic neuropathy   Asthma   Asperger's disorder   MRSA    Family History:    Brother: diabetes mellitus   Father: stroke  Mother: cancer     Social History:  The patient presents to the ER via EMS.     Physical Exam     Patient Vitals for the past 24 hrs:   BP Temp Temp src Pulse Resp SpO2 Weight   02/06/21 1240 -- 97.7  F (36.5  C) Axillary 86 16 -- 76.8 kg (169 lb 5 oz)   02/06/21 1215 108/72 -- -- 105 16 100 % --   02/06/21 1200 98/66 -- -- 102 15 100 % --   02/06/21 1145 106/68 -- -- 109 -- -- --   02/06/21 1130 120/78 -- -- 99 28 97 % --   02/06/21 1115 110/68 -- -- 98 13 99 % -- "   02/06/21 1100 128/75 -- -- 106 (!) 32 100 % --   02/06/21 1045 102/60 -- -- 112 30 100 % --   02/06/21 1000 109/67 -- -- 114 20 100 % --   02/06/21 0945 102/55 -- -- 112 (!) 36 100 % --   02/06/21 0930 116/62 -- -- 111 (!) 31 100 % --   02/06/21 0925 119/66 -- -- 112 28 100 % --   02/06/21 0900 101/79 -- -- 116 -- 100 % --   02/06/21 0841 113/67 97.8  F (36.6  C) Temporal 86 20 98 % --       Physical Exam  VS: Reviewed per above  HENT: Mucous membranes dry, poor dentition  EYES: sclera anicteric  CV: Rate as noted, regular rhythm.   RESP: Tachypnea while in pain.  Breath sounds are normal bilaterally.  GI: diffuse moderate tenderness without rebound/guarding, not distended. L CVAT  NEURO: Alert, moving all extremities  MSK: No deformity of the extremities  SKIN: Warm and dry    Emergency Department Course   ECG  ECG taken at 0905, ECG read at 0912  Sinus tachycardia  Possible left atrial enlargement   Septal infarct, age undetermined   No significant change as compared to prior, dated 1/6/21.  Rate 112 bpm. CA interval 136 ms. QRS duration 78 ms. QT/QTc 316/431 ms. P-R-T axes 83 84 68.     Imaging:  XR Chest 2 Views  IMPRESSION: PA and lateral views of the chest were obtained.  Cardiomediastinal silhouette is within normal limits. No suspicious  focal pulmonary opacities. No significant pleural effusion or  pneumothorax.  Reading per radiology    CT Abdomen Pelvis w Contrast  IMPRESSION: Limited evaluation of the abdominal organs due to lack of  significant intraperitoneal fat. Within this limitation, there is;  1. No CT finding to explain patient's symptoms of abdominal pain.  2. Hepatic steatosis.  3. Splenomegaly.  4. Mild diffuse urinary bladder wall thickening, nonspecific, can be  seen with chronic bladder outlet obstruction versus chronic cystitis.  5. Mild prostatomegaly.  Reading per radiology     Laboratory:  Creatinine POCT: Creatinine 0.4(L), GFR estimate  >90    CBC: WBC 10.9, HGB 14.1,   CMP:  (L), carbon dioxide 9(LL), anion gap 23(H), glucose 321(H), Creatinine 0.61(L) o/w WNL   Lipase: 49(L)  Troponin (Collected 0921): <0.015  Ddimer: <0.3  Ketone Beta Hydroxybutyrate: 5.6(HH)  Hemoglobin A1C: 12.3(H)  Magnesium: 1.9  Phosphorus: 4.0    UA with microscopic: glucose >1000(H), >150(H), protein albumin 10(H), leukocyte esterase moderate, WBC 25(H), RBC 3(H), mucous present o/w WNL   Drug abuse screen 77 urine: positive for cannabinoids o/w all negative      Asymptomatic COVID19 Virus PCR by nasopharyngeal swab: negative    Lactic acid whole blood(result time 0928) 3.6(H)   Lactic acid whole blood(result time 1039) 2.8(H)   Blood culture pending x2    blood gas venous and oxyhgb(Collection time 0921): pH: 7.17(LL), PCO2: 26(L), PO2: 56(H), Bicarbonate: 9(LL), Oxyhgb: 83, FIO2 RA, base excess 17.5  blood gas venous and oxyhgb(Collection time 1027): pH: 7.13(LL), PCO2: 27(L), PO2: 62(H), Bicarbonate: 9(LL), Oxyhgb: 85, FIO2 RA, base excess 18.7     Glucose by meter(collected time 0850): 283(H)   Glucose by meter(collected time 1025): 302(H)  Glucose by meter(collection time 1135): 305(H)     Emergency Department Course:    Reviewed:  I reviewed nursing notes, vitals, past medical history and care everywhere    Assessments:  0846 I obtained history and examined the patient as noted above.   0910 I rechecked the patient. He is now reporting chest pain.   1120 I returned to check on the patient and explained findings.     Consults:   1101 I spoke with Dr. Sharp of the Hospitalist service from [facility] regarding patient's presentation, findings, and plan of care.     Interventions:  0853 NS 1000 ml IV  0853 Dilaudid 0.5 mg IV  0901 Zofran 4 mg IV  0942 NS 1000 mI V  0942 Dilaudid 0.5 mg IV  1029 Insulin in NS drip 4 Units/hr IV  1038 Pepcid 20 mg IV  1039 Potassium chloride 10 mEq IV  1040 NS infusion IV  1104 Rocephin 1 g IV  1122 GI Cocktail 30 mL Oral   1213 Vancomycin 1750 mg  IV    Disposition:  The patient was admitted to the hospital under the care of Dr. Chadwick Sharp.     Impression & Plan     CMS Diagnoses:   The patient has signs of Severe Sepsis    If one the following conditions is present, a 30 mL/kg bolus is recommended as part of the 6 hour bundle (IBW can be used for BMI >30, or document refusal/contraindication):      1.   Initial hypotension  defined as 2 bps < 90 or map < 65 in the 6hrs before or 6hrs after time zero.     2.  Lactate >4.     The patient has signs of Severe Sepsis as evidenced by:    1. 2 SIRS criteria, AND  2. Suspected infection, AND   3. Organ dysfunction: Lactic Acidosis with value >2.0    Time severe sepsis diagnosis confirmed: 1026  02/06/21 as this was the time when UA result time    3 Hour Severe Sepsis Bundle Completion:    1. Initial Lactic Acid Result:   Recent Labs   Lab Test 02/06/21  1027 02/06/21  0921 01/17/21  0027   LACT 2.8* 3.6* 1.3     2. Blood Cultures before Antibiotics: Yes  3. Broad Spectrum Antibiotics Administered:  yes     Anti-infectives (From admission through now)    Start     Dose/Rate Route Frequency Ordered Stop    02/06/21 1145  vancomycin (VANCOCIN) 1,750 mg in sodium chloride 0.9 % 500 mL intermittent infusion      1,750 mg  over 2 Hours Intravenous ONCE 02/06/21 1144 02/06/21 1216    02/06/21 1040  cefTRIAXone (ROCEPHIN) 1 g vial to attach to  mL bag for ADULTS or NS 50 mL bag for PEDS      1 g  over 15-30 Minutes Intravenous ONCE 02/06/21 1037 02/06/21 1126          4. Fluid volume administered in ED:  Full 30 mL/kg bolus given (see amount below).    BMI Readings from Last 1 Encounters:   02/06/21 22.34 kg/m      30 mL/kg fluids based on weight: 2,300 mL  30 mL/kg fluids based on IBW (must be >= 60 inches tall): 2,400 mL                Severe Sepsis reassessment:  1. Repeat Lactic Acid Level: 2.8  2. MAP>65 after initial IVF bolus, will continue to monitor fluid status and vital signs    Medical Decision  Making:  Patient presents to the ER for evaluation of diffuse abdominal discomfort, vomiting.  On arrival patient appears uncomfortable on the gurney and is tachypneic.  He has low-grade regular tachycardia with stable blood pressure and normal SPO2 on room air.    I reviewed the chart and see that he has a history of recent admissions for DKA.  Point-of-care glucose testing is to 280s.  Labs show evidence of metabolic acidosis with ketosis.  He also has lactic acidosis but no fever or significant leukocytosis.  Initially, no infectious source was presumed present and antibiotics were withheld. I suspect lactic acidosis is from DKA/dehydration.    With respect to abdominal pain he does not have peritoneal signs but appears quite uncomfortable and thus CT of the abdomen was ordered.  CT shows no acute intra-abdominal process aside from mild urinary bladder wall thickening. He did report chest pain while in the ER.  He had reassuring EKG and troponin was within normal range.  I have lower suspicion for ACS.  D-dimer is within normal range, thus I have low suspicion for PE.  Chest x-ray shows no acute intrathoracic process.    After IV fluids, repeat VBG was largely unchanged and lactic acid improved to 2.8.  UA was concerning for infection.  It is unclear if we can attribute lactic acidosis to UTI versus DKA.  At this time patient was started on insulin drip and admitted to the hospital for further management of DKA, UTI.    Critical Care Time: was 40 minutes for this patient excluding procedures    Covid-19  Reji Monahan was evaluated during a global COVID-19 pandemic, which necessitated consideration that the patient might be at risk for infection with the SARS-CoV-2 virus that causes COVID-19.   Applicable protocols for evaluation were followed during the patient's care.   COVID-19 was considered as part of the patient's evaluation. The plan for testing is:  a test was obtained during this  visit.    Diagnosis:    ICD-10-CM    1. Sepsis, due to unspecified organism, unspecified whether acute organ dysfunction present (H)  A41.9 Drug abuse screen 77 urine (FL, RH, SH)     Blood culture     Blood culture     Lactic acid whole blood     Blood gas venous and oxyhgb     Asymptomatic SARS-CoV-2 COVID-19 Virus (Coronavirus) by PCR     Glucose by meter     Glucose by meter     Glucose by meter     Glucose by meter   2. Urinary tract infection without hematuria, site unspecified  N39.0    3. Diabetic ketoacidosis without coma associated with type 1 diabetes mellitus (H)  E10.10        Scribe Disclosure:  I, Hanna Villasenor, am serving as a scribe at 8:40 AM on 2/6/2021 to document services personally performed by Ernesto Pedraza MD based on my observations and the provider's statements to me.        Ernesto Pedraza MD  02/06/21 1151

## 2021-02-06 NOTE — PROVIDER NOTIFICATION
MD Notification Chadwick Orozcoynh     Notified Person Name:Chadwick Sharp     Notification Date/Time: 2/6/21 3:00pm     Notification Interaction: We based paging     Purpose of Notification: Patient requesting regular diet and stated that tramadol and tylenol does not managed his pain.     Orders Received: awaiting call back

## 2021-02-06 NOTE — PHARMACY-ADMISSION MEDICATION HISTORY
Pharmacy Medication History  Admission medication history interview status for the 2/6/2021  admission is complete. See EPIC admission navigator for prior to admission medications     Location of Interview: Phone  Medication history sources: Patient, Surescripts, Pharmacy ([facility] on [address], [phone]) and Care Everywhere  Medication history source reliability: Good  Adherence assessment: Good    Significant changes made to the medication list:  Added: None    Deleted: None    Changed:   - Olanzapine: 1 tablet q6h prn --> 1 tablet daily at bedtime (per patient)      In the past week, patient estimated taking medication this percent of the time: greater than 90%. Patient has a history of noncompliance to his medication regimen, but patient reports that he has been taking his medications every day.     Additional medication history information:   - Contacted [facility] pharmacy to verify the dose and last filled date of his insulin. Per [facility], both Novolog, Lantus, and Zofran were last filled on 1/8/2021.   - Lantus: Per [facility], the direction says to inject 38 units daily, but patient reports injecting 35 units every morning.       Medication reconciliation completed by provider prior to medication history? No    Time spent in this activity: 15 minutes      Prior to Admission medications    Medication Sig Last Dose Taking? Auth Provider   albuterol (PROAIR HFA/PROVENTIL HFA/VENTOLIN HFA) 108 (90 Base) MCG/ACT inhaler Inhale 2 puffs into the lungs every 4 hours as needed for shortness of breath / dyspnea or wheezing  at PRN Yes Unknown, Entered By History   insulin aspart (NOVOLOG PEN) 100 UNIT/ML pen Inject 10 UNITS subcutaneously three times daily before meals. Plus sliding scale as below  Glucose 130-150 0   Glucose 151-200 2 units   Glucose 201-250 4 units   Glucose 251-300 6 units   Glucose 301-350 8 units   Glucose 351-400 10 units   Glucose 401-500 12 units   Glucose GREATER THAN 501 14 units and call provider 2/5/2021  at PM Yes Unknown, Entered By History   insulin glargine (LANTUS PEN) 100 UNIT/ML pen Inject 35 Units Subcutaneous every morning  2/5/2021 at AM Yes Yonny Leiva MD   OLANZapine (ZYPREXA) 10 MG tablet Take 1 tablet (10 mg) by mouth every 6 hours as needed (agitation)  Patient taking differently: Take 10 mg by mouth At Bedtime  2/5/2021 at PM Yes Aamir Salgado PA-C   ondansetron (ZOFRAN-ODT) 4 MG ODT tab Take 4 mg by mouth every 8 hours as needed for nausea  at PRN Yes Unknown, Entered By History

## 2021-02-06 NOTE — H&P
[facility]    History and Physical - Hospitalist Service       Date of Admission:  2/6/2021    Assessment & Plan   Reji Monahan is a 31 year old male admitted on 2/6/2021. He presents with abdominal pain.       Diabetic ketoacidosis without coma associated with type 1 diabetes mellitus (H)    Metabolic acidosis    Diabetic neuropathy (H)     Assessment: Presents with several days of worsening left-sided abdominal/flank pain.  On admission his labs are pertinent for a ketone level 5.6, lactic acid of 2.8, sodium of 132, creatinine of 0.4.  His UA is suggestive of UTI, which likely explains his current DKA picture.  He does endorse compliance with his insulin, thus the likely trigger his infection at this point.  He is otherwise hemodynamically stable, not significantly toxic appearing.    Plan:   -Admit to [facility] [place], [place]  -Insulin infusion  -IV fluids  -Pain control as needed  -Follow vitals/temp  -Electrolyte to be replaced per protocol      Urinary tract infection without hematuria, site unspecified  Sepsis due to UTI, ongoing: Diagnosis based on HR > 90 bpm and RR > 20 breaths/min due to infection.     Assessment: UA does suggest possible UTI    Plan:   - Continue IV Ceftriaxone  - Follow UC      Dyslipidemia    Assessment/Plan: not on statin, follow with PCP      Gastroesophageal reflux disease without esophagitis    Assessment/Plan: PPI as needed      Paranoid schizophrenia (H)    Asperger's disorder    ADD (attention deficit disorder)    Cognitive impairment    Cognitive impairment    Assessment/Plan: continue PTA olanzapine once BG controlled, held for now as it can cause worsening of DKA.        Diet: Clear Liquid Diet Clear Liquid Diet  DVT Prophylaxis: Ambulate every shift  Wright Catheter: not present  Code Status: Full Code FULL CODE         Disposition Plan   Expected discharge: 2 - 3 days, recommended to prior living arrangement once BG controlled and DKA resolved.  Entered: Chadwick  MD Lila 02/06/2021, 12:57 PM     The patient's care was discussed with the Patient and ED Provider.    Chadwick Sharp MD  [facility]  Contact information available via [facility] Paging/Directory      ______________________________________________________________________    Chief Complaint     Abdominal Pain    History is obtained from the patient    History of Present Illness      Reji Monahan is a 31 year old male with past medical history of type 1 diabetes mellitus, diabetic gastroparesis, paranoid schizophrenia, ADHD who presents for evaluation of abdominal/flank pain.    Patient ports that several days ago he started experiencing left-sided abdominal/flank pain that occurred with urination at times.  Over the past day it has significantly worsened and on the day of admission he was unable to tolerate the pain and thus activated EMS for further assistance.  He endorsed nonbloody emesis today prior to admission, he otherwise denies any recent diarrhea or bloody stools.  He denies any chest pain or shortness of breath this time.  He reports that he has been compliant with his insulin.  He denies any recent fevers or chills, no shortness of breath, no cough.  He has been using ibuprofen and Tylenol at home with minimal symptom relief.  He otherwise denies any headaches, vision changes, localized weakness or numbness of his extremities.  He denies any recent weight loss or night sweats.  At this time he has no other complaints.    Review of Systems      The 10 point Review of Systems is negative other than noted in the HPI or here.     Past Medical History    I have reviewed this patient's medical history and updated it with pertinent information if needed.   Past Medical History:   Diagnosis Date     Diabetes mellitus (H)      Schizophrenia (H)        Past Surgical History   I have reviewed this patient's surgical history and updated it with pertinent information if needed.  History  reviewed. No pertinent surgical history.    Social History   I have reviewed this patient's social history and updated it with pertinent information if needed.  Social History     Tobacco Use     Smoking status: Never Smoker   Substance Use Topics     Alcohol use: No     Drug use: No       Family History   I have reviewed this patient's family history and updated it with pertinent information if needed.  Family History   Problem Relation Age of Onset     Cerebrovascular Disease Father      Diabetes Brother      Prior to Admission Medications   Prior to Admission Medications   Prescriptions Last Dose Informant Patient Reported? Taking?   OLANZapine (ZYPREXA) 10 MG tablet 2/5/2021 at PM Self No Yes   Sig: Take 1 tablet (10 mg) by mouth every 6 hours as needed (agitation)   Patient taking differently: Take 10 mg by mouth At Bedtime    albuterol (PROAIR HFA/PROVENTIL HFA/VENTOLIN HFA) 108 (90 Base) MCG/ACT inhaler  at PRN Self Yes Yes   Sig: Inhale 2 puffs into the lungs every 4 hours as needed for shortness of breath / dyspnea or wheezing   insulin aspart (NOVOLOG PEN) 100 UNIT/ML pen 2/5/2021 at PM Self Yes Yes   Sig: Inject 10 UNITS subcutaneously three times daily before meals. Plus sliding scale as below  Glucose 130-150 0   Glucose 151-200 2 units   Glucose 201-250 4 units   Glucose 251-300 6 units   Glucose 301-350 8 units   Glucose 351-400 10 units   Glucose 401-500 12 units   Glucose GREATER THAN 501 14 units and call provider   insulin glargine (LANTUS PEN) 100 UNIT/ML pen 2/5/2021 at AM Self Yes Yes   Sig: Inject 35 Units Subcutaneous every morning    ondansetron (ZOFRAN-ODT) 4 MG ODT tab  at PRN Self Yes Yes   Sig: Take 4 mg by mouth every 8 hours as needed for nausea      Facility-Administered Medications: None     Allergies   No Known Allergies    Physical Exam   Vital Signs: Temp: 97.7  F (36.5  C) Temp src: Axillary BP: 115/75 Pulse: 86   Resp: 16 SpO2: 100 % O2 Device: None (Room air)    Weight: 169 lbs  5.01 oz    Constitutional: awake, alert, cooperative, no apparent distress.   Eyes: Lids and lashes normal, pupils equal, round and reactive to light   ENT: Normocephalic, without obvious abnormality, atraumatic, sinuses nontender on palpation   Hematologic / Lymphatic: no cervical lymphadenopathy   Respiratory: CTABL   Cardiovascular: tachycardic with regular rhythm with no m/r/g   GI: Normal bowel sounds, soft, non-distended, mild tenderness of left lower quadrant abdomen, no significant CVA tenderness.  Skin: normal skin color, texture, turgor   Musculoskeletal: There is no redness, warmth, or swelling of the joints. Full range of motion noted.   Neurologic: Awake, alert, oriented to name, place and time. Cranial nerves II-XII are grossly intact. Motor is 5 out of 5 bilaterally. Sensory is intact.   Neuropsychiatric: normal mood and affect      Data   Data reviewed today: I reviewed all medications, new labs and imaging results over the last 24 hours. I personally reviewed the EKG tracing showing sinus tachycardia, the chest x-ray image(s) showing see below and the abdominal CT image(s) showing see below.    XR Chest 2 Views  IMPRESSION: PA and lateral views of the chest were obtained.  Cardiomediastinal silhouette is within normal limits. No suspicious  focal pulmonary opacities. No significant pleural effusion or  pneumothorax.  Reading per radiology     CT Abdomen Pelvis w Contrast  IMPRESSION: Limited evaluation of the abdominal organs due to lack of  significant intraperitoneal fat. Within this limitation, there is;  1. No CT finding to explain patient's symptoms of abdominal pain.  2. Hepatic steatosis.  3. Splenomegaly.  4. Mild diffuse urinary bladder wall thickening, nonspecific, can be  seen with chronic bladder outlet obstruction versus chronic cystitis.  5. Mild prostatomegaly.  Reading per radiology        Most Recent 3 CBC's:  Recent Labs   Lab Test 02/06/21  0921 01/16/21  1538 12/23/20  1408   WBC  10.9 8.3 6.5   HGB 14.1 14.7 14.2   MCV 82 80 78    254 220     Most Recent 3 BMP's:  Recent Labs   Lab Test 02/06/21  0921 01/17/21  0027 01/16/21 2006   * 139 137   POTASSIUM 4.8 3.9 4.1   CHLORIDE 100 108 105   CO2 9* 24 19*   BUN 18 19 18   CR 0.61* 0.49* 0.59*   ANIONGAP 23* 7 13   LYNN 9.1 8.2* 8.2*   * 197* 335*     Most Recent 2 LFT's:  Recent Labs   Lab Test 02/06/21  0921 01/16/21  1538   AST 23 17   ALT 28 22   ALKPHOS 119 134   BILITOTAL 1.0 0.7     Most Recent 3 INR's:No lab results found.  Recent Results (from the past 24 hour(s))   CT Abdomen Pelvis w Contrast    Narrative    CT ABDOMEN PELVIS WITH CONTRAST   2/6/2021 10:21 AM     HISTORY: Severe abdominal pain    TECHNIQUE:  CT abdomen and pelvis with 75 mL Isovue-370 IV. Radiation  dose for this scan was reduced using automated exposure control,  adjustment of the mA and/or kV according to patient size, or iterative  reconstruction technique.    COMPARISON: CT abdomen pelvis on 12/23/2020    FINDINGS:  Lower chest: Lung bases are clear.    Abdomen/pelvis: Limited evaluation of the abdominal organs due to lack  of significant intraperitoneal fat. Diffuse hypoattenuation of the  liver, likely due to underlying hepatic steatosis. The gallbladder is  unremarkable. No main pancreatic ductal dilatation or definite solid  pancreatic mass. The spleen is enlarged measuring 14.1 cm in  craniocaudal dimension. No adrenal nodules. No radiodense kidney  stones or hydronephrosis in either kidney.    No abnormally dilated bowel loops. No significant free fluid in the  abdomen and pelvis. No free peritoneal or portal venous gas. Mild  diffuse urinary bladder wall thickening, nonspecific, can be seen with  chronic cystitis versus chronic bladder outlet obstruction. The  prostate gland is mildly enlarged measuring 5.1 cm in diameter.    Bones and soft tissues: No suspicious osseous lesion.      Impression    IMPRESSION: Limited evaluation of the  abdominal organs due to lack of  significant intraperitoneal fat. Within this limitation, there is;  1. No CT finding to explain patient's symptoms of abdominal pain.  2. Hepatic steatosis.  3. Splenomegaly.  4. Mild diffuse urinary bladder wall thickening, nonspecific, can be  seen with chronic bladder outlet obstruction versus chronic cystitis.  5. Mild prostatomegaly.    DONNELL COWART MD   XR Chest 2 Views    Narrative    XR CHEST TWO VIEWS   2/6/2021 11:54 AM     HISTORY: Chest pain, vomiting, diabetic ketoacidosis (DKA).    COMPARISON: Chest x-ray on 1/16/2021      Impression    IMPRESSION: PA and lateral views of the chest were obtained.  Cardiomediastinal silhouette is within normal limits. No suspicious  focal pulmonary opacities. No significant pleural effusion or  pneumothorax.

## 2021-02-07 VITALS
HEART RATE: 90 BPM | WEIGHT: 169.31 LBS | DIASTOLIC BLOOD PRESSURE: 81 MMHG | SYSTOLIC BLOOD PRESSURE: 113 MMHG | TEMPERATURE: 97.7 F | RESPIRATION RATE: 21 BRPM | OXYGEN SATURATION: 100 % | BODY MASS INDEX: 22.34 KG/M2

## 2021-02-07 LAB
ALBUMIN SERPL-MCNC: 3.1 G/DL (ref 3.4–5)
ALP SERPL-CCNC: 87 U/L (ref 40–150)
ALT SERPL W P-5'-P-CCNC: 20 U/L (ref 0–70)
ANION GAP SERPL CALCULATED.3IONS-SCNC: 9 MMOL/L (ref 3–14)
AST SERPL W P-5'-P-CCNC: 13 U/L (ref 0–45)
BILIRUB DIRECT SERPL-MCNC: 0.2 MG/DL (ref 0–0.2)
BILIRUB SERPL-MCNC: 0.6 MG/DL (ref 0.2–1.3)
BUN SERPL-MCNC: 10 MG/DL (ref 7–30)
CALCIUM SERPL-MCNC: 7.9 MG/DL (ref 8.5–10.1)
CHLORIDE SERPL-SCNC: 106 MMOL/L (ref 94–109)
CO2 SERPL-SCNC: 18 MMOL/L (ref 20–32)
CREAT SERPL-MCNC: 0.61 MG/DL (ref 0.66–1.25)
ERYTHROCYTE [DISTWIDTH] IN BLOOD BY AUTOMATED COUNT: 14.1 % (ref 10–15)
GFR SERPL CREATININE-BSD FRML MDRD: >90 ML/MIN/{1.73_M2}
GLUCOSE BLDC GLUCOMTR-MCNC: 143 MG/DL (ref 70–99)
GLUCOSE BLDC GLUCOMTR-MCNC: 146 MG/DL (ref 70–99)
GLUCOSE BLDC GLUCOMTR-MCNC: 149 MG/DL (ref 70–99)
GLUCOSE BLDC GLUCOMTR-MCNC: 168 MG/DL (ref 70–99)
GLUCOSE BLDC GLUCOMTR-MCNC: 181 MG/DL (ref 70–99)
GLUCOSE SERPL-MCNC: 167 MG/DL (ref 70–99)
HCT VFR BLD AUTO: 38 % (ref 40–53)
HGB BLD-MCNC: 12.1 G/DL (ref 13.3–17.7)
KETONES BLD-SCNC: 2.9 MMOL/L (ref 0–0.6)
LACTATE BLD-SCNC: 0.7 MMOL/L (ref 0.7–2)
MAGNESIUM SERPL-MCNC: 1.8 MG/DL (ref 1.6–2.3)
MCH RBC QN AUTO: 24.9 PG (ref 26.5–33)
MCHC RBC AUTO-ENTMCNC: 31.8 G/DL (ref 31.5–36.5)
MCV RBC AUTO: 78 FL (ref 78–100)
PHOSPHATE SERPL-MCNC: 1.5 MG/DL (ref 2.5–4.5)
PLATELET # BLD AUTO: 181 10E9/L (ref 150–450)
POTASSIUM SERPL-SCNC: 3.9 MMOL/L (ref 3.4–5.3)
PROT SERPL-MCNC: 5.7 G/DL (ref 6.8–8.8)
RBC # BLD AUTO: 4.85 10E12/L (ref 4.4–5.9)
SODIUM SERPL-SCNC: 133 MMOL/L (ref 133–144)
WBC # BLD AUTO: 6.5 10E9/L (ref 4–11)

## 2021-02-07 PROCEDURE — 999N001017 HC STATISTIC GLUCOSE BY METER IP

## 2021-02-07 PROCEDURE — 83735 ASSAY OF MAGNESIUM: CPT | Performed by: STUDENT IN AN ORGANIZED HEALTH CARE EDUCATION/TRAINING PROGRAM

## 2021-02-07 PROCEDURE — 80076 HEPATIC FUNCTION PANEL: CPT | Performed by: STUDENT IN AN ORGANIZED HEALTH CARE EDUCATION/TRAINING PROGRAM

## 2021-02-07 PROCEDURE — 36415 COLL VENOUS BLD VENIPUNCTURE: CPT | Performed by: STUDENT IN AN ORGANIZED HEALTH CARE EDUCATION/TRAINING PROGRAM

## 2021-02-07 PROCEDURE — 258N000003 HC RX IP 258 OP 636: Performed by: STUDENT IN AN ORGANIZED HEALTH CARE EDUCATION/TRAINING PROGRAM

## 2021-02-07 PROCEDURE — 84100 ASSAY OF PHOSPHORUS: CPT | Performed by: STUDENT IN AN ORGANIZED HEALTH CARE EDUCATION/TRAINING PROGRAM

## 2021-02-07 PROCEDURE — 80048 BASIC METABOLIC PNL TOTAL CA: CPT | Performed by: STUDENT IN AN ORGANIZED HEALTH CARE EDUCATION/TRAINING PROGRAM

## 2021-02-07 PROCEDURE — 250N000013 HC RX MED GY IP 250 OP 250 PS 637: Performed by: STUDENT IN AN ORGANIZED HEALTH CARE EDUCATION/TRAINING PROGRAM

## 2021-02-07 PROCEDURE — 250N000012 HC RX MED GY IP 250 OP 636 PS 637: Performed by: INTERNAL MEDICINE

## 2021-02-07 PROCEDURE — 250N000011 HC RX IP 250 OP 636: Performed by: STUDENT IN AN ORGANIZED HEALTH CARE EDUCATION/TRAINING PROGRAM

## 2021-02-07 PROCEDURE — 83605 ASSAY OF LACTIC ACID: CPT | Performed by: STUDENT IN AN ORGANIZED HEALTH CARE EDUCATION/TRAINING PROGRAM

## 2021-02-07 PROCEDURE — 82010 KETONE BODYS QUAN: CPT | Performed by: STUDENT IN AN ORGANIZED HEALTH CARE EDUCATION/TRAINING PROGRAM

## 2021-02-07 PROCEDURE — 85027 COMPLETE CBC AUTOMATED: CPT | Performed by: STUDENT IN AN ORGANIZED HEALTH CARE EDUCATION/TRAINING PROGRAM

## 2021-02-07 RX ADMIN — POTASSIUM CHLORIDE, DEXTROSE MONOHYDRATE AND SODIUM CHLORIDE: 150; 5; 450 INJECTION, SOLUTION INTRAVENOUS at 05:53

## 2021-02-07 RX ADMIN — INSULIN GLARGINE 20 UNITS: 100 INJECTION, SOLUTION SUBCUTANEOUS at 04:10

## 2021-02-07 RX ADMIN — OXYCODONE HYDROCHLORIDE 5 MG: 5 TABLET ORAL at 00:24

## 2021-02-07 RX ADMIN — OXYCODONE HYDROCHLORIDE 5 MG: 5 TABLET ORAL at 04:08

## 2021-02-07 RX ADMIN — ONDANSETRON 4 MG: 2 INJECTION INTRAMUSCULAR; INTRAVENOUS at 00:24

## 2021-02-07 ASSESSMENT — ACTIVITIES OF DAILY LIVING (ADL)
ADLS_ACUITY_SCORE: 10

## 2021-02-07 NOTE — PROVIDER NOTIFICATION
Brief update:    Patient requested to leave AMA earlier in evening.  On insulin drip for DKA.    Had initially been refusing blood glucose checks on insulin drip, though now has been accepting of these and more amenable to care.    Give 20 units of Lantus now; this will help to cover patient and prevent him from going back into DKA if insulin drip needs to discontinue.  If patient refuses blood glucose checks, insulin drip should be discontinued given risk of hypoglycemia.    Michael Rodriguez MD  2:19 AM

## 2021-02-07 NOTE — PROVIDER NOTIFICATION
"  MD Notification Delia De Anda     Notified Person Name: MD Delia De Anda    Notification Date/Time: 2/6/21 6:30pm     Notification Interaction: We based paging     Purpose of Notification: Patient requesting IV pain medication. Patient stated that, \"tramadol, Tylenol and oxycodone not managing\".    Orders Received: awaiting call               "

## 2021-02-07 NOTE — PROGRESS NOTES
Pt refused to be on monitor. He took his leads and O2 sensor off. He said he doesn't need them anymore.   Lantus 20 units given. Continues on insulin gtt. Has been compliant with BG checks overnight.  Pain has been managed with oxycodone.

## 2021-02-07 NOTE — PROGRESS NOTES
Pt is demanding IV dilaudid for abdominal pain. He received oxycodone, and tramadol and tylenol were offered. He refused and said nothing works.   He wants to leave A if he does not get IV dilaudid tonight.   He has been refusing cares- vital signs checks, and BG checks while on insulin drip.   Paged hospitalist to make aware.

## 2021-02-07 NOTE — PLAN OF CARE
Patient arrived to unit at 12:40 pm, he is alert and orientated X 4. Vital signs stable on room air. Requesting pain medication MD notified. Tramadol and tylenol ordered. Patient refused medication writer educate patient regarding pain management. Tramadol and tylenol administered patient stated that it is not controlling his pain. MD LEONARDA Sharp updated and oxycodone added PRN. At 5:28 pm BG 96. Insulin stopped recheck 180. At 6:45 Insulin infusing resumed at 1.5 units/hr. Algorithm 1. Tele: SR. At 6:30 pm patient requesting IV pain medication MD Delia De Anda notified, no new order received.

## 2021-02-07 NOTE — PROGRESS NOTES
Patient is alert and orientated X 4, up independently. Insulin infusing at 1 unit/hr. BG at 6:30 167. Refused physical assessment and requesting writer to discontinue insulin  Infusion. He wants to leave because his pain is not managed properly. Education provided and he insisted  that he  wants to  leave against medical advice. AMA signed and placed in chart.

## 2021-02-07 NOTE — PROGRESS NOTES
At this is refusing caresm refused BG check, and refused his pain medication; stated that he has been in pain for over 2 hours and wants to be left alone.  He was reminded that he's on an insulin and BG needs to be monitor frequently. He still refused.  Will approach again in 45 minutes.

## 2021-02-07 NOTE — PROGRESS NOTES
Cross-Cover Note  Called with request for IV dilaudid for abdominal pain associated with DKA. Patient was just admitted this afternoon. He has a history of not taking his medications with recurrent presentations for DKA and drug-seeking behavior. He also has a history of paranoid schizophrenia.   The admitting physician was called with the same request earlier today and ordered tramadol.   I think risk outweighs benefit here and we should not use IV dilaudid.   I will recheck his BMP to make sure anion gap is normalizing as expected.   Vitals are stable.   Temp: 97.7  F (36.5  C) Temp src: Axillary BP: (!) 129/91 Pulse: 96   Resp: 18 SpO2: 100 % O2 Device: None (Room air)

## 2021-08-02 ENCOUNTER — HOSPITAL ENCOUNTER (INPATIENT)
Facility: CLINIC | Age: 31
LOS: 1 days | Discharge: LEFT BEFORE DISCHARGE | DRG: 638 | End: 2021-08-03
Attending: EMERGENCY MEDICINE | Admitting: INTERNAL MEDICINE
Payer: MEDICARE

## 2021-08-02 ENCOUNTER — APPOINTMENT (OUTPATIENT)
Dept: GENERAL RADIOLOGY | Facility: CLINIC | Age: 31
DRG: 638 | End: 2021-08-02
Attending: EMERGENCY MEDICINE
Payer: MEDICARE

## 2021-08-02 DIAGNOSIS — L08.9 SKIN INFECTION: ICD-10-CM

## 2021-08-02 DIAGNOSIS — E10.10 DIABETIC KETOACIDOSIS WITHOUT COMA ASSOCIATED WITH TYPE 1 DIABETES MELLITUS (H): ICD-10-CM

## 2021-08-02 LAB
ALBUMIN SERPL-MCNC: 3.9 G/DL (ref 3.4–5)
ALBUMIN UR-MCNC: NEGATIVE MG/DL
ALP SERPL-CCNC: 160 U/L (ref 40–150)
ALT SERPL W P-5'-P-CCNC: 36 U/L (ref 0–70)
AMPHETAMINES UR QL SCN: ABNORMAL
ANION GAP SERPL CALCULATED.3IONS-SCNC: 18 MMOL/L (ref 3–14)
ANION GAP SERPL CALCULATED.3IONS-SCNC: 25 MMOL/L (ref 3–14)
ANION GAP SERPL CALCULATED.3IONS-SCNC: 29 MMOL/L (ref 3–14)
APPEARANCE UR: CLEAR
AST SERPL W P-5'-P-CCNC: 20 U/L (ref 0–45)
ATRIAL RATE - MUSE: 113 BPM
BARBITURATES UR QL: ABNORMAL
BASE EXCESS BLDV CALC-SCNC: -18.2 MMOL/L (ref -7.7–1.9)
BASE EXCESS BLDV CALC-SCNC: -20.5 MMOL/L (ref -7.7–1.9)
BASOPHILS # BLD AUTO: 0.1 10E3/UL (ref 0–0.2)
BASOPHILS NFR BLD AUTO: 0 %
BENZODIAZ UR QL: ABNORMAL
BILIRUB DIRECT SERPL-MCNC: 0.1 MG/DL (ref 0–0.2)
BILIRUB SERPL-MCNC: 0.5 MG/DL (ref 0.2–1.3)
BILIRUB UR QL STRIP: NEGATIVE
BUN SERPL-MCNC: 22 MG/DL (ref 7–30)
BUN SERPL-MCNC: 23 MG/DL (ref 7–30)
BUN SERPL-MCNC: 25 MG/DL (ref 7–30)
CALCIUM SERPL-MCNC: 8 MG/DL (ref 8.5–10.1)
CALCIUM SERPL-MCNC: 8.2 MG/DL (ref 8.5–10.1)
CALCIUM SERPL-MCNC: 9.8 MG/DL (ref 8.5–10.1)
CANNABINOIDS UR QL SCN: ABNORMAL
CHLORIDE BLD-SCNC: 104 MMOL/L (ref 94–109)
CHLORIDE BLD-SCNC: 105 MMOL/L (ref 94–109)
CHLORIDE BLD-SCNC: 94 MMOL/L (ref 94–109)
CO2 SERPL-SCNC: 10 MMOL/L (ref 20–32)
CO2 SERPL-SCNC: 5 MMOL/L (ref 20–32)
CO2 SERPL-SCNC: 7 MMOL/L (ref 20–32)
COCAINE UR QL: ABNORMAL
COLOR UR AUTO: ABNORMAL
CREAT SERPL-MCNC: 0.63 MG/DL (ref 0.66–1.25)
CREAT SERPL-MCNC: 0.67 MG/DL (ref 0.66–1.25)
CREAT SERPL-MCNC: 0.78 MG/DL (ref 0.66–1.25)
DIASTOLIC BLOOD PRESSURE - MUSE: NORMAL MMHG
EOSINOPHIL # BLD AUTO: 0 10E3/UL (ref 0–0.7)
EOSINOPHIL NFR BLD AUTO: 0 %
ERYTHROCYTE [DISTWIDTH] IN BLOOD BY AUTOMATED COUNT: 15.9 % (ref 10–15)
GFR SERPL CREATININE-BSD FRML MDRD: >90 ML/MIN/1.73M2
GLUCOSE BLD-MCNC: 396 MG/DL (ref 70–99)
GLUCOSE BLD-MCNC: 556 MG/DL (ref 70–99)
GLUCOSE BLD-MCNC: 757 MG/DL (ref 70–99)
GLUCOSE BLDC GLUCOMTR-MCNC: 157 MG/DL (ref 70–99)
GLUCOSE BLDC GLUCOMTR-MCNC: 166 MG/DL (ref 70–99)
GLUCOSE BLDC GLUCOMTR-MCNC: 171 MG/DL (ref 70–99)
GLUCOSE BLDC GLUCOMTR-MCNC: 171 MG/DL (ref 70–99)
GLUCOSE BLDC GLUCOMTR-MCNC: 172 MG/DL (ref 70–99)
GLUCOSE BLDC GLUCOMTR-MCNC: 174 MG/DL (ref 70–99)
GLUCOSE BLDC GLUCOMTR-MCNC: 176 MG/DL (ref 70–99)
GLUCOSE BLDC GLUCOMTR-MCNC: 185 MG/DL (ref 70–99)
GLUCOSE BLDC GLUCOMTR-MCNC: 188 MG/DL (ref 70–99)
GLUCOSE BLDC GLUCOMTR-MCNC: 190 MG/DL (ref 70–99)
GLUCOSE BLDC GLUCOMTR-MCNC: 199 MG/DL (ref 70–99)
GLUCOSE BLDC GLUCOMTR-MCNC: 227 MG/DL (ref 70–99)
GLUCOSE BLDC GLUCOMTR-MCNC: 274 MG/DL (ref 70–99)
GLUCOSE BLDC GLUCOMTR-MCNC: 336 MG/DL (ref 70–99)
GLUCOSE BLDC GLUCOMTR-MCNC: 364 MG/DL (ref 70–99)
GLUCOSE BLDC GLUCOMTR-MCNC: 432 MG/DL (ref 70–99)
GLUCOSE BLDC GLUCOMTR-MCNC: 513 MG/DL (ref 70–99)
GLUCOSE BLDC GLUCOMTR-MCNC: >600 MG/DL (ref 70–99)
GLUCOSE UR STRIP-MCNC: >=1000 MG/DL
HBA1C MFR BLD: 11.6 % (ref 0–5.6)
HBA1C MFR BLD: 11.7 % (ref 0–5.6)
HCO3 BLDV-SCNC: 7 MMOL/L (ref 21–28)
HCO3 BLDV-SCNC: 9 MMOL/L (ref 21–28)
HCO3 BLDV-SCNC: 9 MMOL/L (ref 21–28)
HCT VFR BLD AUTO: 46.1 % (ref 40–53)
HGB BLD-MCNC: 13.9 G/DL (ref 13.3–17.7)
HGB UR QL STRIP: NEGATIVE
HOLD SPECIMEN: NORMAL
IMM GRANULOCYTES # BLD: 0.3 10E3/UL
IMM GRANULOCYTES NFR BLD: 1 %
INTERPRETATION ECG - MUSE: NORMAL
KETONES BLD-SCNC: 0.6 MMOL/L (ref 0–0.6)
KETONES BLD-SCNC: 1.1 MMOL/L (ref 0–0.6)
KETONES BLD-SCNC: 3.1 MMOL/L (ref 0–0.6)
KETONES BLD-SCNC: 6 MMOL/L (ref 0–0.6)
KETONES UR STRIP-MCNC: >150 MG/DL
LACTATE BLD-SCNC: 7 MMOL/L
LACTATE SERPL-SCNC: 2.1 MMOL/L (ref 0.7–2)
LEUKOCYTE ESTERASE UR QL STRIP: NEGATIVE
LIPASE SERPL-CCNC: 27 U/L (ref 73–393)
LYMPHOCYTES # BLD AUTO: 2.2 10E3/UL (ref 0.8–5.3)
LYMPHOCYTES NFR BLD AUTO: 10 %
MAGNESIUM SERPL-MCNC: 2.6 MG/DL (ref 1.6–2.3)
MCH RBC QN AUTO: 24.4 PG (ref 26.5–33)
MCHC RBC AUTO-ENTMCNC: 30.2 G/DL (ref 31.5–36.5)
MCV RBC AUTO: 81 FL (ref 78–100)
MONOCYTES # BLD AUTO: 1 10E3/UL (ref 0–1.3)
MONOCYTES NFR BLD AUTO: 4 %
MUCOUS THREADS #/AREA URNS LPF: PRESENT /LPF
NEUTROPHILS # BLD AUTO: 19.1 10E3/UL (ref 1.6–8.3)
NEUTROPHILS NFR BLD AUTO: 85 %
NITRATE UR QL: NEGATIVE
NRBC # BLD AUTO: 0 10E3/UL
NRBC BLD AUTO-RTO: 0 /100
O2/TOTAL GAS SETTING VFR VENT: 0 %
O2/TOTAL GAS SETTING VFR VENT: 0 %
OPIATES UR QL SCN: ABNORMAL
OSMOLALITY SERPL: 366 MMOL/KG (ref 275–295)
OXYHGB MFR BLDV: 58 % (ref 70–75)
P AXIS - MUSE: 70 DEGREES
PCO2 BLDV: 25 MM HG (ref 40–50)
PCO2 BLDV: 25 MM HG (ref 40–50)
PCO2 BLDV: 30 MM HG (ref 40–50)
PH BLDV: 7.04 [PH] (ref 7.32–7.43)
PH BLDV: 7.07 [PH] (ref 7.32–7.43)
PH BLDV: 7.16 [PH] (ref 7.32–7.43)
PH UR STRIP: 5 [PH] (ref 5–7)
PHOSPHATE SERPL-MCNC: 2 MG/DL (ref 2.5–4.5)
PHOSPHATE SERPL-MCNC: 2.3 MG/DL (ref 2.5–4.5)
PHOSPHATE SERPL-MCNC: 2.3 MG/DL (ref 2.5–4.5)
PHOSPHATE SERPL-MCNC: 6.6 MG/DL (ref 2.5–4.5)
PLATELET # BLD AUTO: 377 10E3/UL (ref 150–450)
PO2 BLDV: 34 MM HG (ref 25–47)
PO2 BLDV: 49 MM HG (ref 25–47)
PO2 BLDV: 50 MM HG (ref 25–47)
POTASSIUM BLD-SCNC: 4.5 MMOL/L (ref 3.4–5.3)
POTASSIUM BLD-SCNC: 4.6 MMOL/L (ref 3.4–5.3)
POTASSIUM BLD-SCNC: 4.9 MMOL/L (ref 3.4–5.3)
PR INTERVAL - MUSE: 144 MS
PROT SERPL-MCNC: 7.9 G/DL (ref 6.8–8.8)
QRS DURATION - MUSE: 82 MS
QT - MUSE: 368 MS
QTC - MUSE: 504 MS
R AXIS - MUSE: 72 DEGREES
RBC # BLD AUTO: 5.7 10E6/UL (ref 4.4–5.9)
RBC URINE: 1 /HPF
SAO2 % BLDV: 67 % (ref 94–100)
SARS-COV-2 RNA RESP QL NAA+PROBE: NEGATIVE
SODIUM SERPL-SCNC: 130 MMOL/L (ref 133–144)
SODIUM SERPL-SCNC: 133 MMOL/L (ref 133–144)
SODIUM SERPL-SCNC: 134 MMOL/L (ref 133–144)
SP GR UR STRIP: 1.02 (ref 1–1.03)
SYSTOLIC BLOOD PRESSURE - MUSE: NORMAL MMHG
T AXIS - MUSE: 40 DEGREES
TROPONIN I SERPL-MCNC: <0.015 UG/L (ref 0–0.04)
UROBILINOGEN UR STRIP-MCNC: NORMAL MG/DL
VENTRICULAR RATE- MUSE: 113 BPM
WBC # BLD AUTO: 22.6 10E3/UL (ref 4–11)
WBC URINE: 2 /HPF

## 2021-08-02 PROCEDURE — 250N000011 HC RX IP 250 OP 636: Performed by: EMERGENCY MEDICINE

## 2021-08-02 PROCEDURE — 96375 TX/PRO/DX INJ NEW DRUG ADDON: CPT

## 2021-08-02 PROCEDURE — 84484 ASSAY OF TROPONIN QUANT: CPT | Performed by: EMERGENCY MEDICINE

## 2021-08-02 PROCEDURE — 258N000003 HC RX IP 258 OP 636: Performed by: INTERNAL MEDICINE

## 2021-08-02 PROCEDURE — 84100 ASSAY OF PHOSPHORUS: CPT | Performed by: EMERGENCY MEDICINE

## 2021-08-02 PROCEDURE — 80048 BASIC METABOLIC PNL TOTAL CA: CPT | Performed by: EMERGENCY MEDICINE

## 2021-08-02 PROCEDURE — 83690 ASSAY OF LIPASE: CPT | Performed by: EMERGENCY MEDICINE

## 2021-08-02 PROCEDURE — 258N000003 HC RX IP 258 OP 636: Performed by: EMERGENCY MEDICINE

## 2021-08-02 PROCEDURE — 200N000001 HC R&B ICU

## 2021-08-02 PROCEDURE — 81001 URINALYSIS AUTO W/SCOPE: CPT | Performed by: EMERGENCY MEDICINE

## 2021-08-02 PROCEDURE — 84100 ASSAY OF PHOSPHORUS: CPT | Performed by: INTERNAL MEDICINE

## 2021-08-02 PROCEDURE — 82010 KETONE BODYS QUAN: CPT | Performed by: INTERNAL MEDICINE

## 2021-08-02 PROCEDURE — 250N000009 HC RX 250: Performed by: EMERGENCY MEDICINE

## 2021-08-02 PROCEDURE — 87086 URINE CULTURE/COLONY COUNT: CPT | Performed by: EMERGENCY MEDICINE

## 2021-08-02 PROCEDURE — 82805 BLOOD GASES W/O2 SATURATION: CPT | Performed by: EMERGENCY MEDICINE

## 2021-08-02 PROCEDURE — 84132 ASSAY OF SERUM POTASSIUM: CPT | Performed by: INTERNAL MEDICINE

## 2021-08-02 PROCEDURE — 96376 TX/PRO/DX INJ SAME DRUG ADON: CPT

## 2021-08-02 PROCEDURE — 83930 ASSAY OF BLOOD OSMOLALITY: CPT | Performed by: EMERGENCY MEDICINE

## 2021-08-02 PROCEDURE — 83605 ASSAY OF LACTIC ACID: CPT | Performed by: EMERGENCY MEDICINE

## 2021-08-02 PROCEDURE — 83036 HEMOGLOBIN GLYCOSYLATED A1C: CPT | Performed by: EMERGENCY MEDICINE

## 2021-08-02 PROCEDURE — 250N000013 HC RX MED GY IP 250 OP 250 PS 637: Performed by: INTERNAL MEDICINE

## 2021-08-02 PROCEDURE — 96361 HYDRATE IV INFUSION ADD-ON: CPT

## 2021-08-02 PROCEDURE — 82248 BILIRUBIN DIRECT: CPT | Performed by: EMERGENCY MEDICINE

## 2021-08-02 PROCEDURE — 82803 BLOOD GASES ANY COMBINATION: CPT

## 2021-08-02 PROCEDURE — 85025 COMPLETE CBC W/AUTO DIFF WBC: CPT | Performed by: EMERGENCY MEDICINE

## 2021-08-02 PROCEDURE — 87040 BLOOD CULTURE FOR BACTERIA: CPT | Performed by: EMERGENCY MEDICINE

## 2021-08-02 PROCEDURE — C9803 HOPD COVID-19 SPEC COLLECT: HCPCS

## 2021-08-02 PROCEDURE — 83735 ASSAY OF MAGNESIUM: CPT | Performed by: EMERGENCY MEDICINE

## 2021-08-02 PROCEDURE — 250N000013 HC RX MED GY IP 250 OP 250 PS 637: Performed by: EMERGENCY MEDICINE

## 2021-08-02 PROCEDURE — 71045 X-RAY EXAM CHEST 1 VIEW: CPT

## 2021-08-02 PROCEDURE — 36415 COLL VENOUS BLD VENIPUNCTURE: CPT | Performed by: INTERNAL MEDICINE

## 2021-08-02 PROCEDURE — 36415 COLL VENOUS BLD VENIPUNCTURE: CPT | Performed by: EMERGENCY MEDICINE

## 2021-08-02 PROCEDURE — 93005 ELECTROCARDIOGRAM TRACING: CPT

## 2021-08-02 PROCEDURE — 82010 KETONE BODYS QUAN: CPT | Performed by: EMERGENCY MEDICINE

## 2021-08-02 PROCEDURE — 83735 ASSAY OF MAGNESIUM: CPT | Performed by: INTERNAL MEDICINE

## 2021-08-02 PROCEDURE — 96366 THER/PROPH/DIAG IV INF ADDON: CPT

## 2021-08-02 PROCEDURE — 80307 DRUG TEST PRSMV CHEM ANLYZR: CPT | Performed by: EMERGENCY MEDICINE

## 2021-08-02 PROCEDURE — 82803 BLOOD GASES ANY COMBINATION: CPT | Performed by: EMERGENCY MEDICINE

## 2021-08-02 PROCEDURE — 82310 ASSAY OF CALCIUM: CPT | Performed by: EMERGENCY MEDICINE

## 2021-08-02 PROCEDURE — 96365 THER/PROPH/DIAG IV INF INIT: CPT

## 2021-08-02 PROCEDURE — 258N000002 HC RX IP 258 OP 250: Performed by: EMERGENCY MEDICINE

## 2021-08-02 PROCEDURE — 83036 HEMOGLOBIN GLYCOSYLATED A1C: CPT | Performed by: INTERNAL MEDICINE

## 2021-08-02 PROCEDURE — 87635 SARS-COV-2 COVID-19 AMP PRB: CPT | Performed by: EMERGENCY MEDICINE

## 2021-08-02 PROCEDURE — 99223 1ST HOSP IP/OBS HIGH 75: CPT | Mod: AI | Performed by: INTERNAL MEDICINE

## 2021-08-02 PROCEDURE — 99285 EMERGENCY DEPT VISIT HI MDM: CPT | Mod: 25

## 2021-08-02 PROCEDURE — 250N000011 HC RX IP 250 OP 636: Performed by: INTERNAL MEDICINE

## 2021-08-02 RX ORDER — DEXTROSE MONOHYDRATE 25 G/50ML
25-50 INJECTION, SOLUTION INTRAVENOUS
Status: DISCONTINUED | OUTPATIENT
Start: 2021-08-02 | End: 2021-08-02

## 2021-08-02 RX ORDER — SODIUM CHLORIDE AND POTASSIUM CHLORIDE 150; 450 MG/100ML; MG/100ML
INJECTION, SOLUTION INTRAVENOUS CONTINUOUS
Status: DISCONTINUED | OUTPATIENT
Start: 2021-08-02 | End: 2021-08-03

## 2021-08-02 RX ORDER — DEXTROSE MONOHYDRATE 25 G/50ML
25-50 INJECTION, SOLUTION INTRAVENOUS
Status: DISCONTINUED | OUTPATIENT
Start: 2021-08-02 | End: 2021-08-03 | Stop reason: HOSPADM

## 2021-08-02 RX ORDER — ACETAMINOPHEN 325 MG/1
650 TABLET ORAL EVERY 6 HOURS PRN
Status: DISCONTINUED | OUTPATIENT
Start: 2021-08-02 | End: 2021-08-03 | Stop reason: HOSPADM

## 2021-08-02 RX ORDER — ONDANSETRON 2 MG/ML
4 INJECTION INTRAMUSCULAR; INTRAVENOUS EVERY 6 HOURS PRN
Status: DISCONTINUED | OUTPATIENT
Start: 2021-08-02 | End: 2021-08-03 | Stop reason: HOSPADM

## 2021-08-02 RX ORDER — NICOTINE POLACRILEX 4 MG
15-30 LOZENGE BUCCAL
Status: DISCONTINUED | OUTPATIENT
Start: 2021-08-02 | End: 2021-08-03

## 2021-08-02 RX ORDER — PANTOPRAZOLE SODIUM 40 MG/1
40 TABLET, DELAYED RELEASE ORAL
Status: DISCONTINUED | OUTPATIENT
Start: 2021-08-02 | End: 2021-08-03 | Stop reason: HOSPADM

## 2021-08-02 RX ORDER — SODIUM CHLORIDE 450 MG/100ML
1000 INJECTION, SOLUTION INTRAVENOUS CONTINUOUS
Status: DISCONTINUED | OUTPATIENT
Start: 2021-08-02 | End: 2021-08-02

## 2021-08-02 RX ORDER — INSULIN ASPART 100 [IU]/ML
10 INJECTION, SOLUTION INTRAVENOUS; SUBCUTANEOUS
COMMUNITY
End: 2021-09-17

## 2021-08-02 RX ORDER — HYDROXYZINE HYDROCHLORIDE 25 MG/1
25 TABLET, FILM COATED ORAL ONCE
Status: COMPLETED | OUTPATIENT
Start: 2021-08-02 | End: 2021-08-02

## 2021-08-02 RX ORDER — OLANZAPINE 10 MG/1
10 TABLET ORAL AT BEDTIME
COMMUNITY
End: 2024-02-14

## 2021-08-02 RX ORDER — ONDANSETRON 4 MG/1
4 TABLET, ORALLY DISINTEGRATING ORAL EVERY 6 HOURS PRN
Status: DISCONTINUED | OUTPATIENT
Start: 2021-08-02 | End: 2021-08-03 | Stop reason: HOSPADM

## 2021-08-02 RX ORDER — POTASSIUM CHLORIDE 7.45 MG/ML
10 INJECTION INTRAVENOUS ONCE
Status: COMPLETED | OUTPATIENT
Start: 2021-08-02 | End: 2021-08-02

## 2021-08-02 RX ORDER — HYDROMORPHONE HYDROCHLORIDE 1 MG/ML
0.5 INJECTION, SOLUTION INTRAMUSCULAR; INTRAVENOUS; SUBCUTANEOUS ONCE
Status: COMPLETED | OUTPATIENT
Start: 2021-08-02 | End: 2021-08-02

## 2021-08-02 RX ORDER — HYDROXYZINE HYDROCHLORIDE 10 MG/1
10 TABLET, FILM COATED ORAL EVERY 6 HOURS PRN
Status: DISCONTINUED | OUTPATIENT
Start: 2021-08-02 | End: 2021-08-03 | Stop reason: HOSPADM

## 2021-08-02 RX ORDER — DEXTROSE MONOHYDRATE, SODIUM CHLORIDE, AND POTASSIUM CHLORIDE 50; 1.49; 4.5 G/1000ML; G/1000ML; G/1000ML
INJECTION, SOLUTION INTRAVENOUS CONTINUOUS
Status: DISCONTINUED | OUTPATIENT
Start: 2021-08-02 | End: 2021-08-03

## 2021-08-02 RX ORDER — ONDANSETRON 2 MG/ML
4 INJECTION INTRAMUSCULAR; INTRAVENOUS EVERY 30 MIN PRN
Status: COMPLETED | OUTPATIENT
Start: 2021-08-02 | End: 2021-08-02

## 2021-08-02 RX ORDER — NICOTINE POLACRILEX 4 MG
15-30 LOZENGE BUCCAL
Status: DISCONTINUED | OUTPATIENT
Start: 2021-08-02 | End: 2021-08-02

## 2021-08-02 RX ORDER — DEXTROSE MONOHYDRATE 25 G/50ML
25-50 INJECTION, SOLUTION INTRAVENOUS
Status: DISCONTINUED | OUTPATIENT
Start: 2021-08-02 | End: 2021-08-03

## 2021-08-02 RX ORDER — ACETAMINOPHEN 325 MG/1
650 TABLET ORAL ONCE
Status: COMPLETED | OUTPATIENT
Start: 2021-08-02 | End: 2021-08-02

## 2021-08-02 RX ADMIN — LIDOCAINE HYDROCHLORIDE 30 ML: 20 SOLUTION ORAL; TOPICAL at 06:04

## 2021-08-02 RX ADMIN — Medication: at 09:12

## 2021-08-02 RX ADMIN — TAZOBACTAM SODIUM AND PIPERACILLIN SODIUM 3.38 G: 375; 3 INJECTION, SOLUTION INTRAVENOUS at 21:09

## 2021-08-02 RX ADMIN — HYDROMORPHONE HYDROCHLORIDE 0.5 MG: 1 INJECTION, SOLUTION INTRAMUSCULAR; INTRAVENOUS; SUBCUTANEOUS at 06:49

## 2021-08-02 RX ADMIN — SODIUM CHLORIDE 1000 ML: 9 INJECTION, SOLUTION INTRAVENOUS at 05:03

## 2021-08-02 RX ADMIN — ONDANSETRON 4 MG: 2 INJECTION INTRAMUSCULAR; INTRAVENOUS at 06:23

## 2021-08-02 RX ADMIN — VANCOMYCIN HYDROCHLORIDE 1500 MG: 10 INJECTION, POWDER, LYOPHILIZED, FOR SOLUTION INTRAVENOUS at 10:05

## 2021-08-02 RX ADMIN — SODIUM CHLORIDE 1000 ML: 4.5 INJECTION, SOLUTION INTRAVENOUS at 06:25

## 2021-08-02 RX ADMIN — POTASSIUM CHLORIDE, DEXTROSE MONOHYDRATE AND SODIUM CHLORIDE: 150; 5; 450 INJECTION, SOLUTION INTRAVENOUS at 13:25

## 2021-08-02 RX ADMIN — ACETAMINOPHEN 650 MG: 325 TABLET, FILM COATED ORAL at 06:48

## 2021-08-02 RX ADMIN — ONDANSETRON 4 MG: 2 INJECTION INTRAMUSCULAR; INTRAVENOUS at 05:14

## 2021-08-02 RX ADMIN — PANTOPRAZOLE SODIUM 40 MG: 40 TABLET, DELAYED RELEASE ORAL at 13:25

## 2021-08-02 RX ADMIN — ONDANSETRON 4 MG: 4 TABLET, ORALLY DISINTEGRATING ORAL at 15:24

## 2021-08-02 RX ADMIN — HYDROMORPHONE HYDROCHLORIDE 0.5 MG: 1 INJECTION, SOLUTION INTRAMUSCULAR; INTRAVENOUS; SUBCUTANEOUS at 05:04

## 2021-08-02 RX ADMIN — Medication 5.5 UNITS/HR: at 05:55

## 2021-08-02 RX ADMIN — HYDROMORPHONE HYDROCHLORIDE 0.5 MG: 1 INJECTION, SOLUTION INTRAMUSCULAR; INTRAVENOUS; SUBCUTANEOUS at 08:55

## 2021-08-02 RX ADMIN — TAZOBACTAM SODIUM AND PIPERACILLIN SODIUM 3.38 G: 375; 3 INJECTION, SOLUTION INTRAVENOUS at 16:24

## 2021-08-02 RX ADMIN — POTASSIUM CHLORIDE, DEXTROSE MONOHYDRATE AND SODIUM CHLORIDE: 150; 5; 450 INJECTION, SOLUTION INTRAVENOUS at 22:38

## 2021-08-02 RX ADMIN — VANCOMYCIN HYDROCHLORIDE 1500 MG: 10 INJECTION, POWDER, LYOPHILIZED, FOR SOLUTION INTRAVENOUS at 21:55

## 2021-08-02 RX ADMIN — Medication: at 10:05

## 2021-08-02 RX ADMIN — Medication: at 11:01

## 2021-08-02 RX ADMIN — SODIUM CHLORIDE 1000 ML: 9 INJECTION, SOLUTION INTRAVENOUS at 05:11

## 2021-08-02 RX ADMIN — TAZOBACTAM SODIUM AND PIPERACILLIN SODIUM 4.5 G: 500; 4 INJECTION, SOLUTION INTRAVENOUS at 06:21

## 2021-08-02 RX ADMIN — ONDANSETRON 4 MG: 2 INJECTION INTRAMUSCULAR; INTRAVENOUS at 08:29

## 2021-08-02 RX ADMIN — Medication: at 12:13

## 2021-08-02 RX ADMIN — SODIUM CHLORIDE 1000 ML: 9 INJECTION, SOLUTION INTRAVENOUS at 06:50

## 2021-08-02 RX ADMIN — HYDROXYZINE HYDROCHLORIDE 25 MG: 25 TABLET ORAL at 09:42

## 2021-08-02 RX ADMIN — POTASSIUM CHLORIDE 10 MEQ: 7.46 INJECTION, SOLUTION INTRAVENOUS at 08:28

## 2021-08-02 RX ADMIN — POTASSIUM & SODIUM PHOSPHATES POWDER PACK 280-160-250 MG 1 PACKET: 280-160-250 PACK at 23:19

## 2021-08-02 ASSESSMENT — MIFFLIN-ST. JEOR: SCORE: 1680.88

## 2021-08-02 ASSESSMENT — ACTIVITIES OF DAILY LIVING (ADL)
ADLS_ACUITY_SCORE: 11
ADLS_ACUITY_SCORE: 11

## 2021-08-02 NOTE — PHARMACY-VANCOMYCIN DOSING SERVICE
Pharmacy Vancomycin Initial Note  Date of Service 2021  Patient's  1990  31 year old, male    Indication: Skin and Soft Tissue Infection    Current estimated CrCl = Estimated Creatinine Clearance: 160.9 mL/min (A) (based on SCr of 0.63 mg/dL (L)).    Creatinine for last 3 days  2021:  5:01 AM Creatinine 0.78 mg/dL;  7:59 AM Creatinine 0.67 mg/dL;  9:09 AM Creatinine 0.63 mg/dL    Recent Vancomycin Level(s) for last 3 days  No results found for requested labs within last 72 hours.      Vancomycin IV Administrations (past 72 hours)                   vancomycin 1500 mg in 0.9% NaCl 250 ml intermittent infusion 1,500 mg (mg) 1,500 mg New Bag 21 1005                Nephrotoxins and other renal medications (From now, onward)    None          Contrast Orders - past 72 hours (72h ago, onward)    None        Loading dose: 1500 mg at 10:05 2021.  Regimen: 1500 mg IV every 12 hours.  Start time: 14:17 on 2021  Exposure target: AUC24 (range)400-600 mg/L.hr   AUC24,ss: 555 mg/L.hr  Probability of AUC24 > 400: 80 %  Ctrough,ss: 15.2 mg/L  Probability of Ctrough,ss > 20: 31 %  Probability of nephrotoxicity (Lodise NIDIA ): 10 %    Plan:  1. Start vancomycin  1500 mg IV q12h.   2. Vancomycin monitoring method: AUC  3. Vancomycin therapeutic monitoring goal: 400-600 mg*h/L  4. Pharmacy will check vancomycin levels as appropriate in 1-3 Days.    5. Serum creatinine levels will be ordered every 48 hours.      -DIEUDONNE PATRICK (Pharmacy intern)

## 2021-08-02 NOTE — PLAN OF CARE
"ICU End of Shift Summary.  For vital signs and complete assessments, please see documentation flowsheets.     Pertinent assessments: A&Ox4. VSS. Tele SR. Afebrile. Continues on Insulin gtt, A1. Eating well. Abdominal pain/nausea on and off. Zofran with relief. Protonix added after MD notified. Large Urine Output in Urinal. Very anxious about brother who is also in ICU. Stated \"My brother kept refusing to go to the hospital, I can only call 911 when he is unresponsive to come in.\" POC reviewed with pt.   Major Shift Events: New Admit  Plan (Upcoming Events): Continue Insulin gtt  Discharge/Transfer Needs: SW!!! Pt cares for his brother PCA; however, they are homeless and do not seem to be caring for themselves well.     Bedside Shift Report Completed : y  Bedside Safety Check Completed: y      "

## 2021-08-02 NOTE — ED TRIAGE NOTES
Pt arrives via EMS from [facility] where he lives w/ his brother. EMS reports pt has been feeling unwell for the last several days, w/ BS in the 300s. Hx of DMI. Pt arrives w/ BS higher than readable, w/ severe abd pain, and kussmaul breathing.

## 2021-08-02 NOTE — H&P
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 He stated that he has not been feeling well for the last few days.  He states that he had a small discharging wound above his left eyebrow for the past 1 week.  He had chills but denies associated fever.  On arrival to emergency room, vital signs showed temperature 98.1, pulse 113, blood pressure 118/72, oxygen saturation 100% on room air.  Initial laboratory work-up showed sodium 130, potassium 4.6, creatinine 0.78, ALT 36, AST 20, hemoglobin A1c 11.6, ketones 6.0, lactic acid 7.0, lipase 27, troponin less than 1.015.  Blood glucose level 757.  WBC 22.6, hemoglobin 13.9, platelets 377.  He was started on insulin drip and IV fluid resuscitation.  He was also given IV Zosyn and vancomycin.  He was admitted to the hospital for further management.    1.  Diabetic ketoacidosis   Patient denies noncompliance with his medications.  He stated that he takes Lantus insulin 30 units daily with insulin sliding scale.  He stated that he was on NovoLog 10 units 3 times daily with meals but is not on it anymore because of recurrent hypoglycemia.  Initial work-up in the ER showed hyperglycemia with blood sugar at 757, bicarb 7, anion gap 29, ketones 6.0.   Initial venous gas showed pH 7.16/PCO2 25/bicarb 9. He was given normal saline x3 L and continued on maintenance IV fluid.  He was also started on insulin drip.  Repeat venous gas showed pH 7.16/PCO2 25/bicarb 9.  We will put him on DKA protocol.  Will  monitor BMP every 4 hours.  Will replace electrolytes as needed.    2.  Skin infection, possible MRSA infection  Rule out sepsis  Prior history of MRSA infection of left shoulder with endocarditis in 4/2  He was started on IV Zosyn and vancomycin because of tachycardia, leukocytosis, suspected skin infection  He does have small discharge wound above his left eyebrow.  WBC 22.6.  Started on Zosyn and vancomycin.  Blood culture collected in the ER.  We will continue antibiotics for now.  Will narrow down antibiotic as cultures negative.    3. Nausea/vomiting: Will give IV Zofran prn    4.  History of primary schizophrenia: We will resume his psych meds once his nausea/vomiting improved    Admit to ICU for further management.    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  He stated that he has not been feeling well for the last few days.  He stated that he has been having chills for 3 days but denies fever. He stated that he started having vomiting yesterday.  He had associated abdominal pain and some diarrhea.  He also stated that he had a small bump above his left eyebrow which started a week ago with some pussy discharge.  He has prior history of MRSA infection.  Patient stated that he lives with his brother who is also type I diabetic. He stated that they are  homeless and currently staying at a hotel. His brother is legally blind and he has his brother's healthcare agent.  His brother is also currently admitted to [facility] ICU with diabetic ketoacidosis.  On arrival to emergency room, his vital signs were checked and showed temperature 98.1, pulse 113, blood pressure 118/72, oxygen saturation 100% on room air.  Initial laboratory work-up showed sodium 130, potassium 4.6, creatinine 0.78, ALT 36, AST 20, hemoglobin A1c 11.6, ketones 6.0, lactic acid 7.0, lipase 27, troponin less than 1.015.  Blood glucose level 757.  WBC 22.6, hemoglobin 13.9, platelets 377.  In emergency room, he was given IV fluid boluses. He was also started on insulin drip.  He was also started on IV Zosyn and vancomycin.   He was admitted to ICU for further management.    Past Medical History    I have reviewed this patient's medical history and updated it with pertinent information if needed.   Past Medical History:   Diagnosis Date     Diabetes mellitus (H)      Schizophrenia (H)        Past Surgical History   I have reviewed this patient's surgical history and updated it with pertinent information if needed.  No past surgical history on file.    Prior to Admission Medications   Prior to Admission Medications   Prescriptions Last Dose Informant Patient Reported? Taking?   OLANZapine (ZYPREXA) 10 MG tablet 8/1/2021 at hs  Yes Yes   Sig: Take 10 mg by mouth At Bedtime   albuterol (PROAIR HFA/PROVENTIL HFA/VENTOLIN HFA) 108 (90 Base) MCG/ACT inhaler  at no recent usage Self Yes Yes   Sig: Inhale 2 puffs into the lungs every 4 hours as needed for shortness of breath / dyspnea or wheezing   bismuth subsalicylate (PEPTO BISMOL) 262 MG/15ML suspension 7/31/2021  Yes Yes   Sig: Take 15-30 mLs by mouth 2 times daily   insulin aspart (NOVOLOG FLEXPEN) 100 UNIT/ML pen Past Week at Unknown time  Yes Yes   Sig: Inject 10 Units Subcutaneous 3 times daily (with meals)   insulin aspart (NOVOLOG  PEN) 100 UNIT/ML pen 8/1/2021 at supper 6 units Self Yes Yes   Sig: Inject Subcutaneous 3 times daily (with meals) Sliding scale with meals as below  Glucose 130-150 0   Glucose 151-200 2 units   Glucose 201-250 4 units   Glucose 251-300 6 units   Glucose 301-350 8 units   Glucose 351-400 10 units   Glucose 401-500 12 units   Glucose GREATER THAN 501 14 units and call provider   insulin glargine (LANTUS PEN) 100 UNIT/ML pen 8/1/2021 at pm Self Yes Yes   Sig: Inject 30 Units Subcutaneous At Bedtime    ondansetron (ZOFRAN-ODT) 4 MG ODT tab Past Week at Unknown time Self Yes Yes   Sig: Take 4 mg by mouth every 8 hours as needed for nausea      Facility-Administered Medications: None     Allergies   No Known Allergies    Social History   I have reviewed this patient's social history and updated it with pertinent information if needed. Reji Monahan  reports that he has never smoked. He does not have any smokeless tobacco history on file. He reports that he does not drink alcohol and does not use drugs.    Family History   I have reviewed this patient's family history and updated it with pertinent information if needed.   Family History   Problem Relation Age of Onset     Cerebrovascular Disease Father      Diabetes Brother        Review of Systems   The 10 point Review of Systems is negative other than noted in the HPI or here.     Physical Exam   Temp: 98.1  F (36.7  C) Temp src: Oral BP: 101/65 Pulse: 91   Resp: 15 SpO2: 100 % O2 Device: None (Room air)    Vital Signs with Ranges  Temp:  [98.1  F (36.7  C)] 98.1  F (36.7  C)  Pulse:  [] 91  Resp:  [10-34] 15  BP: ()/(54-92) 101/65  SpO2:  [99 %-100 %] 100 %  153 lbs 0 oz    GEN:  Alert, oriented x 3, appears comfortable, NAD.  HEENT:  Normocephalic/atraumatic, no scleral icterus, no nasal discharge, mouth moist. Small discharging wound above his left eye.   CV:  Regular rate and rhythm, no murmur or JVD.  S1 + S2 noted, no S3 or S4.  LUNGS:  Clear to  auscultation bilaterally without rales/rhonchi/wheezing/retractions.  Symmetric chest rise on inhalation noted.  ABD:  Active bowel sounds, soft, non-tender/non-distended.  No rebound/guarding/rigidity.  EXT:  No edema or cyanosis.  Hands/feet warm to touch with good signs of peripheral perfusion.  No joint synovitis noted.  SKIN:  Dry to touch, no exanthems noted in the visualized areas.  NEURO:  Symmetric muscle strength, sensation to touch grossly intact.  No new focal deficits appreciated.    Data   Data reviewed today:  I personally reviewed  Recent Labs   Lab 08/02/21  1203 08/02/21  1050 08/02/21  0952 08/02/21  0909 08/02/21  0759 08/02/21  0501   WBC  --   --   --   --   --  22.6*   HGB  --   --   --   --   --  13.9   MCV  --   --   --   --   --  81   PLT  --   --   --   --   --  377   NA  --   --   --  133 134 130*   POTASSIUM  --   --   --  4.5 4.9 4.6   CHLORIDE  --   --   --  105 104 94   CO2  --   --   --  10* 5* 7*   BUN  --   --   --  23 22 25   CR  --   --   --  0.63* 0.67 0.78   ANIONGAP  --   --   --  18* 25* 29*   LYNN  --   --   --  8.2* 8.0* 9.8   * 274* 336* 396* 556* 757*   ALBUMIN  --   --   --   --   --  3.9   PROTTOTAL  --   --   --   --   --  7.9   BILITOTAL  --   --   --   --   --  0.5   ALKPHOS  --   --   --   --   --  160*   ALT  --   --   --   --   --  36   AST  --   --   --   --   --  20   LIPASE  --   --   --   --   --  27*   TROPONIN  --   --   --   --   --  <0.015       Recent Results (from the past 24 hour(s))   XR Chest Port 1 View    Narrative    EXAM: XR CHEST PORT 1 VIEW  LOCATION: [facility]  DATE/TIME: 8/2/2021 6:24 AM    INDICATION: chest pain  COMPARISON: 7/20/2021.      Impression    IMPRESSION: Negative chest.

## 2021-08-02 NOTE — ED PROVIDER NOTES
History   Chief Complaint:  Hyperglycemia       The history is limited by the condition of the patient.      Reji Monahan is a 31 year old male with history of type 1 diabetes mellitus, DKA, and schizophrenia who presents via EMS with hyperglycemia. Patient was found at [facility] where he lives with his brother. Patient and his brother are both diabetic. Patient's brother is also seen here at the ED today for similar symptoms. Patient has been feeling unwell for the past few days with blood sugar over 300. Patient and his brother both stopped taking home insulin. Patient arrives with severe abdominal pain and kussmaul breathing. Of note, patient has recent hospitalization (7/20/2021) for DKA.      Review of Systems   Positive headache, positive chest pain, positive abdominal pain, positive vomiting, positive skin lesions, positive polydipsia, negative fever, negative trauma  A full 10 point ROS was completed.  Pertinent positives are noted in the HPI.  All other systems reviewed and negative.      Allergies:  The patient has no known allergies.     Medications:  Novolog  Lantus    Past Medical History:    Diabetes mellitus type 1   Schizophrenia  DKA  CODY  ADD  GERD  Splenomegaly  Paroxysmal SVT  Asperger's disorder  Phimosis  Pyelonephritis  Hyponatremia   Hyperkalemia  MRSA  Balanitis    Family History:    Father: stroke  Mother: bipolar disorder  Brother: type 1 diabetes mellitus     Social History:  Patient presents to the ED via EMS.   Patient currently lives at [facility] with his brother.    Physical Exam     Patient Vitals for the past 24 hrs:   BP Pulse Resp SpO2 Weight   08/02/21 0605 -- -- -- -- 69.4 kg (153 lb)   08/02/21 0540 106/61 114 -- -- --   08/02/21 0535 -- 112 -- -- --   08/02/21 0530 90/66 110 -- -- --   08/02/21 0525 -- 112 -- -- --   08/02/21 0520 99/60 112 -- -- --   08/02/21 0505 -- -- -- 100 % --   08/02/21 0500 105/68 113 -- 100 % --   08/02/21 0455 --  -- -- 100 % --   08/02/21 0445 118/72 113 -- -- --   08/02/21 0440 -- 113 -- -- --   08/02/21 0435 117/83 113 (!) 34 100 % --       Physical Exam     Gen: alert, altered mental status, open eyes  HEENT: PERRL, oropharynx clear  Neck: normal ROM  CV: RRR, no murmurs  Pulm: breath sounds equal, lungs clear  Abd: Soft, nontender  Back: no evidence of injury, no cva tenderness  MSK: no deformity  Skin: no rash  Neuro: altered mental status, open eyes    Emergency Department Course     ECG:  ECG taken at 0439, ECG read at 0441  Sinus tachycardia. Otherwise normal ECG.   Rate 113 bpm. OK interval 144 ms. QRS duration 82 ms. QT/QTc 368/504 ms. P-R-T axes 70 72 40.       Imaging:  XR Chest Port 1 View   Final Result   IMPRESSION: Negative chest.          Laboratory:   CBC: WBC: 22.6 (H) , HGB: 13.9, PLT: 377    BMP: Pending    Hepatic Panel: Pending    ISTAT Gases (lactate) venous POCT: Lactic acid pH 7.0 (H) / Bicarb: 7 (L) / O2 Sat 67 (L) / PCO2V: 25 (L) / pH Venous 7.04 (L) / PO2 Venous 49 (H)     Hemoglobin A1C: 11.6 (H)     Troponin: Pending    Glucose by meter (0430): >600 (HH)    Magnesium: Pending    Phosphorus: Pending    Lipase: Pending    Osmolality: Pending    Ketone Beta-Hydroxybutyrate: 6.0 (HH)    Blood Cultures x2: Pending    Urine Culture Aerobic Bacterial: Pending    UA: Pending    Asymptomatic COVID-19 PCR: Pending     Labs Ordered and Resulted from Time of ED Arrival Up to the Time of Departure from the ED   ROUTINE UA WITH MICROSCOPIC - Abnormal; Notable for the following components:       Result Value    Glucose Urine >=1000 (*)     Ketones Urine >150 (*)     Mucus Urine Present (*)     All other components within normal limits   HEMOGLOBIN A1C - Abnormal; Notable for the following components:    Hemoglobin A1C 11.6 (*)     All other components within normal limits   BASIC METABOLIC PANEL - Abnormal; Notable for the following components:    Sodium 130 (*)     Carbon Dioxide (CO2) 7 (*)     Anion Gap 29  (*)     Glucose 757 (*)     All other components within normal limits   PHOSPHORUS - Abnormal; Notable for the following components:    Phosphorus 6.6 (*)     All other components within normal limits   MAGNESIUM - Abnormal; Notable for the following components:    Magnesium 2.6 (*)     All other components within normal limits   HEPATIC FUNCTION PANEL - Abnormal; Notable for the following components:    Alkaline Phosphatase 160 (*)     All other components within normal limits   LIPASE - Abnormal; Notable for the following components:    Lipase 27 (*)     All other components within normal limits   KETONE BETA-HYDROXYBUTYRATE QUANTITATIVE, RAPID - Abnormal; Notable for the following components:    Ketone (Beta-Hydroxybutyrate) Quantitative 6.0 (*)     All other components within normal limits   GLUCOSE BY METER - Abnormal; Notable for the following components:    GLUCOSE BY METER POCT >600 (*)     All other components within normal limits   CBC WITH PLATELETS AND DIFFERENTIAL - Abnormal; Notable for the following components:    WBC Count 22.6 (*)     MCH 24.4 (*)     MCHC 30.2 (*)     RDW 15.9 (*)     Absolute Neutrophils 19.1 (*)     Absolute Immature Granulocytes 0.3 (*)     All other components within normal limits   ISTAT GASES LACTATE VENOUS POCT - Abnormal; Notable for the following components:    Lactic Acid POCT 7.0 (*)     Bicarbonate Venous POCT 7 (*)     O2 Sat, Venous POCT 67 (*)     pCO2V Venous POCT 25 (*)     pH Venous POCT 7.04 (*)     pO2 Venous POCT 49 (*)     All other components within normal limits   DRUG ABUSE SCREEN 1 URINE (ED) - Abnormal; Notable for the following components:    Cannabinoids Urine Screen Positive (*)     All other components within normal limits   GLUCOSE BY METER - Abnormal; Notable for the following components:    GLUCOSE BY METER POCT 513 (*)     All other components within normal limits   TROPONIN I - Normal   SARS-COV2 (COVID-19) VIRUS RT-PCR - Normal    Narrative:      Testing was performed using the jerman  SARS-CoV-2 & Influenza A/B Assay on the jerman  Sanna  System.  This test should be ordered for the detection of SARS-COV-2 in individuals who meet SARS-CoV-2 clinical and/or epidemiological criteria. Test performance is unknown in asymptomatic patients.  This test is for in vitro diagnostic use under the FDA EUA for laboratories certified under CLIA to perform moderate and/or high complexity testing. This test has not been FDA cleared or approved.  A negative test does not rule out the presence of PCR inhibitors in the specimen or target RNA in concentration below the limit of detection for the assay. The possibility of a false negative should be considered if the patient's recent exposure or clinical presentation suggests COVID-19.  [facility] are certified under the Clinical Laboratory Improvement Amendments of 1988 (CLIA-88) as qualified to perform moderate and/or high complexity laboratory testing.   GLUCOSE MONITOR NURSING POCT   EXTRA BLUE TOP TUBE   EXTRA RED TOP TUBE   EXTRA GREEN TOP (LITHIUM HEPARIN) ON ICE   OSMOLALITY   BASIC METABOLIC PANEL   BLOOD GAS VENOUS WITH OXYHEMOGLOBIN   LACTIC ACID WHOLE BLOOD   CARDIAC CONTINUOUS MONITORING   NOTIFY PHYSICIAN   PERIPHERAL IV CATHETER   IV ACCESS   ISTAT CG4 GASES LACTATE DAVID NURSING POCT   GLUCOSE BY METER POCT   GLUCOSE BY METER POCT   GLUCOSE BY METER POCT   GLUCOSE BY METER POCT   GLUCOSE BY METER POCT   COVID-19 VIRUS (CORONAVIRUS) BY PCR    Narrative:     The following orders were created for panel order Asymptomatic COVID-19 Virus (Coronavirus) by PCR Nasopharyngeal.  Procedure                               Abnormality         Status                     ---------                               -----------         ------                     SARS-COV2 (COVID-19) Vir...[[id number]]  Normal              Final result                 Please view results for these tests on the individual orders.   BLOOD  CULTURE   BLOOD CULTURE   URINE CULTURE   CBC WITH PLATELETS & DIFFERENTIAL    Narrative:     The following orders were created for panel order CBC with platelets differential.  Procedure                               Abnormality         Status                     ---------                               -----------         ------                     CBC with platelets and d...[[id number]]  Abnormal            Final result                 Please view results for these tests on the individual orders.   EXTRA TUBE    Narrative:     The following orders were created for panel order Extra Tube (Cherry Draw).  Procedure                               Abnormality         Status                     ---------                               -----------         ------                     Extra Blue Top Tube[[id number]]                              Final result               Extra Red Top Tube[[id number]]                               Final result               Extra Green Top (Lithium...[[id number]]                                                 Extra Purple Top Tube[[id number]]                                                       Extra Green Top (Lithium...[[id number]]                      Final result                 Please view results for these tests on the individual orders.   URINE DRUGS OF ABUSE SCREEN    Narrative:     The following orders were created for panel order Urine Drugs of Abuse Screen.  Procedure                               Abnormality         Status                     ---------                               -----------         ------                     Drug abuse screen 1 urin...[[id number]]  Abnormal            Final result                 Please view results for these tests on the individual orders.       Emergency Department Course:    Reviewed:  I reviewed nursing notes, vitals, past medical history and care everywhere    Assessments:  0430 I obtained history and examined the patient as noted above.    0550 I rechecked the patient. He is awake.      Interventions:  0503 NS 1L IV  0504 Dilaudid 0.5 mg IV  0511 NS 1L IV  0514 Zofran 4 mg IV  0555 Insulin 5.5 Units/hr IV  0604 GI Cocktail 30 mL PO  0621 Zosyn 4.5 g IV  0623 Zofran 4 mg IV    Disposition:  The patient was admitted to the hospital under the care of Dr. Zimmer.       Impression & Plan     CMS Diagnoses: The Lactic acid level is elevated due to DKA, at this time there is no sign of severe sepsis or septic shock. and None    Medical Decision Making:  Patient presents for multiple complaints.  Evaluation today showed DKA with severe metabolic acidosis.  DKA protocols were initiated.  Patient with normal mental status, though somewhat reluctant to initially answer questions.  Although patient does complain of abdominal pain, abdominal exam is soft normal LFTs and lipase doubt intra-abdominal catastrophe and therefore would not image abdomen.  Likely this is DKA driving his symptoms.  Patient with multiple skin lesions, history of MRSA noted.  Will provide empiric antibiotics I for infection as well.  Patient will require ongoing management of DKA electrolytes and metabolic derangement.  Given acid significant acidosis, patient require admission to the ICU.  Discussed with Dr. Lincoln of the hospital service admitted to the ICU.  Care transferred to Dr. Garcia pending admission    Critical Care Time: was 30 minutes minutes for this patient excluding procedures    Covid-19  Reji Monahan was evaluated during a global COVID-19 pandemic, which necessitated consideration that the patient might be at risk for infection with the SARS-CoV-2 virus that causes COVID-19.   Applicable protocols for evaluation were followed during the patient's care.   COVID-19 was considered as part of the patient's evaluation. The plan for testing is:  a test was obtained during this visit.      Diagnosis:    ICD-10-CM    1. Diabetic ketoacidosis without coma associated with  type 1 diabetes mellitus (H)  E10.10    2. Skin infection  L08.9          Scribe Disclosure:  I, Veronica Tan, am serving as a scribe at 4:32 AM on 8/2/2021 to document services personally performed by Jeannie Desai MD based on my observations and the provider's statements to me.            Jeannie Desai MD  08/02/21 0835

## 2021-08-02 NOTE — PHARMACY-ADMISSION MEDICATION HISTORY
Admission medication history interview status for this patient is complete. See [facility] admission navigator for allergy information, prior to admission medications and immunization status.     Medication history interview source(s):Patient  Medication history resources (including written lists, pill bottles, clinic record):[employer] list, [facility]  Primary pharmacy:[facility] [address]    Changes made to PTA medication list:  Added: peptobismol, lantus 30 units qpm, zyprexa 10mg qhs  Deleted: lantus 35 units qam, zyprexa 10mg q6h prn  Changed:  entry for sliding scale insulin and prandial insulin    Actions taken by pharmacist (provider contacted, etc):None     Additional medication history information:None    Medication reconciliation/reorder completed by provider prior to medication history? No      For patients on insulin therapy:Y  Lantus/levemir/NPH/Mix 70/30 dose: y-30 units qpm  Sliding scale Novolog Y  If yes, do you have baseline novolog pre-meal dose: 10 units with meals  Patients eat three meals a day: 2-3 meals daily  Denies hypoglycemia  Denies missed insulin doses    Prior to Admission medications    Medication Sig Last Dose Taking? Auth Provider   albuterol (PROAIR HFA/PROVENTIL HFA/VENTOLIN HFA) 108 (90 Base) MCG/ACT inhaler Inhale 2 puffs into the lungs every 4 hours as needed for shortness of breath / dyspnea or wheezing  at no recent usage Yes Unknown, Entered By History   bismuth subsalicylate (PEPTO BISMOL) 262 MG/15ML suspension Take 15-30 mLs by mouth 2 times daily 7/31/2021 Yes Unknown, Entered By History   insulin aspart (NOVOLOG FLEXPEN) 100 UNIT/ML pen Inject 10 Units Subcutaneous 3 times daily (with meals) Past Week at Unknown time Yes Unknown, Entered By History   insulin aspart (NOVOLOG PEN) 100 UNIT/ML pen Inject Subcutaneous 3 times daily (with meals) Sliding scale with meals as below  Glucose 130-150 0   Glucose 151-200 2 units   Glucose 201-250 4 units   Glucose 251-300 6  units   Glucose 301-350 8 units   Glucose 351-400 10 units   Glucose 401-500 12 units   Glucose GREATER THAN 501 14 units and call provider 8/1/2021 at supper 6 units Yes Unknown, Entered By History   insulin glargine (LANTUS PEN) 100 UNIT/ML pen Inject 30 Units Subcutaneous At Bedtime  8/1/2021 at pm Yes Yonny Leiva MD   OLANZapine (ZYPREXA) 10 MG tablet Take 10 mg by mouth At Bedtime 8/1/2021 at hs Yes Unknown, Entered By History   ondansetron (ZOFRAN-ODT) 4 MG ODT tab Take 4 mg by mouth every 8 hours as needed for nausea Past Week at Unknown time Yes Unknown, Entered By History

## 2021-08-03 VITALS
HEART RATE: 98 BPM | RESPIRATION RATE: 11 BRPM | HEIGHT: 73 IN | BODY MASS INDEX: 19.81 KG/M2 | DIASTOLIC BLOOD PRESSURE: 87 MMHG | WEIGHT: 149.47 LBS | TEMPERATURE: 98.3 F | SYSTOLIC BLOOD PRESSURE: 133 MMHG | OXYGEN SATURATION: 99 %

## 2021-08-03 LAB
BACTERIA UR CULT: NO GROWTH
CREAT SERPL-MCNC: 0.64 MG/DL (ref 0.66–1.25)
GFR SERPL CREATININE-BSD FRML MDRD: >90 ML/MIN/1.73M2
GLUCOSE BLDC GLUCOMTR-MCNC: 133 MG/DL (ref 70–99)
GLUCOSE BLDC GLUCOMTR-MCNC: 148 MG/DL (ref 70–99)
GLUCOSE BLDC GLUCOMTR-MCNC: 148 MG/DL (ref 70–99)
GLUCOSE BLDC GLUCOMTR-MCNC: 157 MG/DL (ref 70–99)
GLUCOSE BLDC GLUCOMTR-MCNC: 158 MG/DL (ref 70–99)
GLUCOSE BLDC GLUCOMTR-MCNC: 159 MG/DL (ref 70–99)
GLUCOSE BLDC GLUCOMTR-MCNC: 164 MG/DL (ref 70–99)
GLUCOSE BLDC GLUCOMTR-MCNC: 231 MG/DL (ref 70–99)
KETONES BLD-SCNC: 0.7 MMOL/L (ref 0–0.6)
KETONES BLD-SCNC: 0.8 MMOL/L (ref 0–0.6)
MAGNESIUM SERPL-MCNC: 2.2 MG/DL (ref 1.6–2.3)
POTASSIUM BLD-SCNC: 3.8 MMOL/L (ref 3.4–5.3)
POTASSIUM BLD-SCNC: 4 MMOL/L (ref 3.4–5.3)

## 2021-08-03 PROCEDURE — 36415 COLL VENOUS BLD VENIPUNCTURE: CPT | Performed by: INTERNAL MEDICINE

## 2021-08-03 PROCEDURE — 250N000009 HC RX 250: Performed by: INTERNAL MEDICINE

## 2021-08-03 PROCEDURE — 82010 KETONE BODYS QUAN: CPT | Performed by: INTERNAL MEDICINE

## 2021-08-03 PROCEDURE — 250N000013 HC RX MED GY IP 250 OP 250 PS 637: Performed by: INTERNAL MEDICINE

## 2021-08-03 PROCEDURE — 250N000012 HC RX MED GY IP 250 OP 636 PS 637: Performed by: INTERNAL MEDICINE

## 2021-08-03 PROCEDURE — 250N000011 HC RX IP 250 OP 636: Performed by: INTERNAL MEDICINE

## 2021-08-03 PROCEDURE — 84132 ASSAY OF SERUM POTASSIUM: CPT | Performed by: INTERNAL MEDICINE

## 2021-08-03 PROCEDURE — 82565 ASSAY OF CREATININE: CPT | Performed by: INTERNAL MEDICINE

## 2021-08-03 RX ORDER — DEXTROSE MONOHYDRATE 25 G/50ML
25-50 INJECTION, SOLUTION INTRAVENOUS
Status: DISCONTINUED | OUTPATIENT
Start: 2021-08-03 | End: 2021-08-03 | Stop reason: HOSPADM

## 2021-08-03 RX ORDER — NICOTINE POLACRILEX 4 MG
15-30 LOZENGE BUCCAL
Status: DISCONTINUED | OUTPATIENT
Start: 2021-08-03 | End: 2021-08-03 | Stop reason: HOSPADM

## 2021-08-03 RX ADMIN — INSULIN ASPART 4 UNITS: 100 INJECTION, SOLUTION INTRAVENOUS; SUBCUTANEOUS at 08:28

## 2021-08-03 RX ADMIN — TAZOBACTAM SODIUM AND PIPERACILLIN SODIUM 3.38 G: 375; 3 INJECTION, SOLUTION INTRAVENOUS at 03:59

## 2021-08-03 RX ADMIN — ONDANSETRON 4 MG: 2 INJECTION INTRAMUSCULAR; INTRAVENOUS at 06:41

## 2021-08-03 RX ADMIN — POTASSIUM & SODIUM PHOSPHATES POWDER PACK 280-160-250 MG 1 PACKET: 280-160-250 PACK at 08:05

## 2021-08-03 RX ADMIN — HYDROXYZINE HYDROCHLORIDE 10 MG: 10 TABLET, FILM COATED ORAL at 08:05

## 2021-08-03 RX ADMIN — INSULIN GLARGINE 30 UNITS: 100 INJECTION, SOLUTION SUBCUTANEOUS at 08:58

## 2021-08-03 RX ADMIN — PANTOPRAZOLE SODIUM 40 MG: 40 TABLET, DELAYED RELEASE ORAL at 08:05

## 2021-08-03 RX ADMIN — LIDOCAINE HYDROCHLORIDE 30 ML: 20 SOLUTION ORAL; TOPICAL at 04:09

## 2021-08-03 ASSESSMENT — MIFFLIN-ST. JEOR: SCORE: 1676.88

## 2021-08-03 ASSESSMENT — ACTIVITIES OF DAILY LIVING (ADL)
ADLS_ACUITY_SCORE: 11

## 2021-08-03 NOTE — PROVIDER NOTIFICATION
Noted Phosphorous level trending downwards from 2.3 to 2.0. Tele-hub notified for Phosphorous replacement therapy. Order received for Phosphorous replacement. Oral replacement in place. No need to check Phosphorous q4h. Re-check phosphorous after replacement treatment is complete. Will continue to monitor and assess.

## 2021-08-03 NOTE — PLAN OF CARE
ICU End of Shift Summary.  For vital signs and complete assessments, please see documentation flowsheets.     Pertinent assessments: A&x4. Anxious and fidgety this morning. Asked to leave at 0900 immediately. MD already made aware, but pt did not want to stay. Educated about antibiotics that were due. After lantus and novolog pt decided to sign AMA form at this time, pt left with all belongings.   Major Shift Events: AMA  Plan (Upcoming Events):   Discharge/Transfer Needs:     Bedside Shift Report Completed :   Bedside Safety Check Completed:

## 2021-08-03 NOTE — PLAN OF CARE
ICU End of Shift Summary.  For vital signs and complete assessments, please see documentation flowsheets.     Pertinent assessments: Pt A&Ox4. Lung sounds clear and equal bilateral in all fields. SR to ST. Good urine output. Continuous insulin drip. One small BM.   Major Shift Events: GI cocktail for generalized abdominal pain/upset with relief. PO Phosphorous replacement in place. Insulin drip off @0615 and switched to higher sliding scale.  Plan (Upcoming Events): social work consult. Diabetes education.  Discharge/Transfer Needs: TBD    Bedside Shift Report Completed : Y   Bedside Safety Check Completed: Y

## 2021-08-03 NOTE — PROGRESS NOTES
LATE NOTE AFTER DISCHARGE    Pt admitted with DKA, noted to have unplanned readmission risk of 26%.  Care Coordination team is following and planned assess for discharge needs after morning care rounds. Patient left AMA prior to rounds. CM was unable to complete assessment.     Queta Bender RN, BSN, CPHN, CM  Inpatient Care Coordination - ICU  [facility]  [phone]

## 2021-08-03 NOTE — PROVIDER NOTIFICATION
Notified Tele-hub regarding 2 consecutive BG less than 150. Order received to stop insulin drip and start high sliding scale. TKO on maintenance fluid. Will continue to monitor and assess.

## 2021-08-04 ENCOUNTER — PATIENT OUTREACH (OUTPATIENT)
Dept: CARE COORDINATION | Facility: CLINIC | Age: 31
End: 2021-08-04

## 2021-08-04 DIAGNOSIS — Z71.89 OTHER SPECIFIED COUNSELING: ICD-10-CM

## 2021-08-04 NOTE — PROGRESS NOTES
Clinic Care Coordination Contact  [facility]/Voicemail       Clinical Data: Care Coordinator Outreach  Outreach attempted x 1.  Left message on patient's voicemail with call back information and requested return call.  Plan: Care Coordinator will try to reach patient again in 1-2 business days.

## 2021-08-05 LAB
ATRIAL RATE - MUSE: 86 BPM
DIASTOLIC BLOOD PRESSURE - MUSE: NORMAL MMHG
INTERPRETATION ECG - MUSE: NORMAL
P AXIS - MUSE: 72 DEGREES
PR INTERVAL - MUSE: 150 MS
QRS DURATION - MUSE: 84 MS
QT - MUSE: 356 MS
QTC - MUSE: 426 MS
R AXIS - MUSE: 60 DEGREES
SYSTOLIC BLOOD PRESSURE - MUSE: NORMAL MMHG
T AXIS - MUSE: 65 DEGREES
VENTRICULAR RATE- MUSE: 86 BPM

## 2021-08-05 NOTE — PROGRESS NOTES
Clinic Care Coordination Contact  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 He stated that he has not been feeling well for the last few days.  He states that he had a small discharging wound above his left eyebrow for the past 1 week.  He had chills but denies associated fever.    ASSESSMENT      Discharge Assessment  How are you doing now that you are home?: feeling fine  How are your symptoms? (Red Flag symptoms escalate to triage hotline per guidelines): Improved    CHW was able to get ahold of patient but patient declined to answer further questions, stating that he was busy. CHW asked if we could call him at a better time and patient declined. CHW was unable to complete Post Discharge Assessment.    PLAN                                                      Outpatient Plan:      Follow up with primary care provider, Bogdan Jhaveri MD, within 7 days to evaluate treatment change and for hospital  follow- up. No follow up labs or test are needed.    No future appointments.      For any urgent concerns, please contact our 24 hour nurse triage line: [phone] ([phone])       Yadi Pace  Community Health Worker  Connected Care Resource [place], [facility]  Ph: [phone]

## 2021-08-07 LAB
BACTERIA BLD CULT: NO GROWTH
BACTERIA BLD CULT: NO GROWTH

## 2021-08-10 NOTE — DISCHARGE SUMMARY
Patient refused to continue medical care as inpatient and wanted to leave AMA. He left against medical advice.

## 2021-09-08 ENCOUNTER — APPOINTMENT (OUTPATIENT)
Dept: GENERAL RADIOLOGY | Facility: CLINIC | Age: 31
DRG: 638 | End: 2021-09-08
Attending: HOSPITALIST
Payer: MEDICARE

## 2021-09-08 ENCOUNTER — APPOINTMENT (OUTPATIENT)
Dept: GENERAL RADIOLOGY | Facility: CLINIC | Age: 31
DRG: 638 | End: 2021-09-08
Attending: INTERNAL MEDICINE
Payer: MEDICARE

## 2021-09-08 ENCOUNTER — HOSPITAL ENCOUNTER (INPATIENT)
Facility: CLINIC | Age: 31
LOS: 1 days | Discharge: HOME | DRG: 638 | End: 2021-09-09
Attending: EMERGENCY MEDICINE | Admitting: HOSPITALIST
Payer: MEDICARE

## 2021-09-08 DIAGNOSIS — E10.10 DIABETIC KETOACIDOSIS WITHOUT COMA ASSOCIATED WITH TYPE 1 DIABETES MELLITUS (H): ICD-10-CM

## 2021-09-08 LAB
ALBUMIN SERPL-MCNC: 4.5 G/DL (ref 3.4–5)
ALBUMIN UR-MCNC: NEGATIVE MG/DL
ALP SERPL-CCNC: 154 U/L (ref 40–150)
ALT SERPL W P-5'-P-CCNC: 25 U/L (ref 0–70)
ANION GAP SERPL CALCULATED.3IONS-SCNC: 15 MMOL/L (ref 3–14)
ANION GAP SERPL CALCULATED.3IONS-SCNC: 22 MMOL/L (ref 3–14)
ANION GAP SERPL CALCULATED.3IONS-SCNC: 22 MMOL/L (ref 3–14)
ANION GAP SERPL CALCULATED.3IONS-SCNC: 9 MMOL/L (ref 3–14)
APPEARANCE UR: CLEAR
AST SERPL W P-5'-P-CCNC: 15 U/L (ref 0–45)
ATRIAL RATE - MUSE: 113 BPM
BACTERIA #/AREA URNS HPF: ABNORMAL /HPF
BASE EXCESS BLDV CALC-SCNC: -14.7 MMOL/L (ref -7.7–1.9)
BASE EXCESS BLDV CALC-SCNC: -16.8 MMOL/L (ref -7.7–1.9)
BASE EXCESS BLDV CALC-SCNC: -18.9 MMOL/L (ref -7.7–1.9)
BASE EXCESS BLDV CALC-SCNC: -5.5 MMOL/L (ref -7.7–1.9)
BASOPHILS # BLD AUTO: 0.1 10E3/UL (ref 0–0.2)
BASOPHILS NFR BLD AUTO: 0 %
BILIRUB SERPL-MCNC: 1 MG/DL (ref 0.2–1.3)
BILIRUB UR QL STRIP: NEGATIVE
BUN SERPL-MCNC: 18 MG/DL (ref 7–30)
BUN SERPL-MCNC: 20 MG/DL (ref 7–30)
BUN SERPL-MCNC: 21 MG/DL (ref 7–30)
BUN SERPL-MCNC: 22 MG/DL (ref 7–30)
CALCIUM SERPL-MCNC: 10.2 MG/DL (ref 8.5–10.1)
CALCIUM SERPL-MCNC: 8.4 MG/DL (ref 8.5–10.1)
CALCIUM SERPL-MCNC: 9 MG/DL (ref 8.5–10.1)
CALCIUM SERPL-MCNC: 9 MG/DL (ref 8.5–10.1)
CHLORIDE BLD-SCNC: 103 MMOL/L (ref 94–109)
CHLORIDE BLD-SCNC: 107 MMOL/L (ref 94–109)
CHLORIDE BLD-SCNC: 108 MMOL/L (ref 94–109)
CHLORIDE BLD-SCNC: 97 MMOL/L (ref 94–109)
CO2 SERPL-SCNC: 11 MMOL/L (ref 20–32)
CO2 SERPL-SCNC: 15 MMOL/L (ref 20–32)
CO2 SERPL-SCNC: 20 MMOL/L (ref 20–32)
CO2 SERPL-SCNC: 9 MMOL/L (ref 20–32)
COLOR UR AUTO: ABNORMAL
CREAT SERPL-MCNC: 0.6 MG/DL (ref 0.66–1.25)
CREAT SERPL-MCNC: 0.61 MG/DL (ref 0.66–1.25)
CREAT SERPL-MCNC: 0.66 MG/DL (ref 0.66–1.25)
CREAT SERPL-MCNC: 0.67 MG/DL (ref 0.66–1.25)
DIASTOLIC BLOOD PRESSURE - MUSE: NORMAL MMHG
EOSINOPHIL # BLD AUTO: 0 10E3/UL (ref 0–0.7)
EOSINOPHIL NFR BLD AUTO: 0 %
ERYTHROCYTE [DISTWIDTH] IN BLOOD BY AUTOMATED COUNT: 17.3 % (ref 10–15)
GFR SERPL CREATININE-BSD FRML MDRD: >90 ML/MIN/1.73M2
GLUCOSE BLD-MCNC: 178 MG/DL (ref 70–99)
GLUCOSE BLD-MCNC: 355 MG/DL (ref 70–99)
GLUCOSE BLD-MCNC: 517 MG/DL (ref 70–99)
GLUCOSE BLD-MCNC: 562 MG/DL (ref 70–99)
GLUCOSE BLDC GLUCOMTR-MCNC: 159 MG/DL (ref 70–99)
GLUCOSE BLDC GLUCOMTR-MCNC: 167 MG/DL (ref 70–99)
GLUCOSE BLDC GLUCOMTR-MCNC: 198 MG/DL (ref 70–99)
GLUCOSE BLDC GLUCOMTR-MCNC: 254 MG/DL (ref 70–99)
GLUCOSE BLDC GLUCOMTR-MCNC: 324 MG/DL (ref 70–99)
GLUCOSE BLDC GLUCOMTR-MCNC: 358 MG/DL (ref 70–99)
GLUCOSE BLDC GLUCOMTR-MCNC: 436 MG/DL (ref 70–99)
GLUCOSE BLDC GLUCOMTR-MCNC: 484 MG/DL (ref 70–99)
GLUCOSE BLDC GLUCOMTR-MCNC: 531 MG/DL (ref 70–99)
GLUCOSE UR STRIP-MCNC: >=1000 MG/DL
HBA1C MFR BLD: 11.9 % (ref 0–5.6)
HCO3 BLDV-SCNC: 10 MMOL/L (ref 21–28)
HCO3 BLDV-SCNC: 11 MMOL/L (ref 21–28)
HCO3 BLDV-SCNC: 13 MMOL/L (ref 21–28)
HCO3 BLDV-SCNC: 21 MMOL/L (ref 21–28)
HCT VFR BLD AUTO: 43.2 % (ref 40–53)
HGB BLD-MCNC: 13.4 G/DL (ref 13.3–17.7)
HGB UR QL STRIP: NEGATIVE
HOLD SPECIMEN: NORMAL
IMM GRANULOCYTES # BLD: 0.1 10E3/UL
IMM GRANULOCYTES NFR BLD: 1 %
INTERPRETATION ECG - MUSE: NORMAL
KETONES BLD-SCNC: 2.6 MMOL/L (ref 0–0.6)
KETONES BLD-SCNC: 5.6 MMOL/L (ref 0–0.6)
KETONES BLD-SCNC: 6.3 MMOL/L (ref 0–0.6)
KETONES BLD-SCNC: 6.4 MMOL/L (ref 0–0.6)
KETONES UR STRIP-MCNC: >150 MG/DL
LACTATE SERPL-SCNC: 2.7 MMOL/L (ref 0.7–2)
LACTATE SERPL-SCNC: 4.2 MMOL/L (ref 0.7–2)
LEUKOCYTE ESTERASE UR QL STRIP: NEGATIVE
LIPASE SERPL-CCNC: 56 U/L (ref 73–393)
LYMPHOCYTES # BLD AUTO: 1.7 10E3/UL (ref 0.8–5.3)
LYMPHOCYTES NFR BLD AUTO: 10 %
MAGNESIUM SERPL-MCNC: 2 MG/DL (ref 1.6–2.3)
MCH RBC QN AUTO: 25.1 PG (ref 26.5–33)
MCHC RBC AUTO-ENTMCNC: 31 G/DL (ref 31.5–36.5)
MCV RBC AUTO: 81 FL (ref 78–100)
MONOCYTES # BLD AUTO: 0.8 10E3/UL (ref 0–1.3)
MONOCYTES NFR BLD AUTO: 5 %
MUCOUS THREADS #/AREA URNS LPF: PRESENT /LPF
NEUTROPHILS # BLD AUTO: 13.7 10E3/UL (ref 1.6–8.3)
NEUTROPHILS NFR BLD AUTO: 84 %
NITRATE UR QL: NEGATIVE
NRBC # BLD AUTO: 0 10E3/UL
NRBC BLD AUTO-RTO: 0 /100
O2/TOTAL GAS SETTING VFR VENT: 0 %
OXYHGB MFR BLDV: 79 % (ref 70–75)
OXYHGB MFR BLDV: 83 % (ref 70–75)
P AXIS - MUSE: 84 DEGREES
PCO2 BLDV: 33 MM HG (ref 40–50)
PCO2 BLDV: 33 MM HG (ref 40–50)
PCO2 BLDV: 34 MM HG (ref 40–50)
PCO2 BLDV: 41 MM HG (ref 40–50)
PH BLDV: 7.09 [PH] (ref 7.32–7.43)
PH BLDV: 7.14 [PH] (ref 7.32–7.43)
PH BLDV: 7.18 [PH] (ref 7.32–7.43)
PH BLDV: 7.31 [PH] (ref 7.32–7.43)
PH UR STRIP: 5 [PH] (ref 5–7)
PHOSPHATE SERPL-MCNC: 2.2 MG/DL (ref 2.5–4.5)
PHOSPHATE SERPL-MCNC: 3.5 MG/DL (ref 2.5–4.5)
PLATELET # BLD AUTO: 258 10E3/UL (ref 150–450)
PO2 BLDV: 31 MM HG (ref 25–47)
PO2 BLDV: 39 MM HG (ref 25–47)
PO2 BLDV: 53 MM HG (ref 25–47)
PO2 BLDV: 58 MM HG (ref 25–47)
POTASSIUM BLD-SCNC: 4.1 MMOL/L (ref 3.4–5.3)
POTASSIUM BLD-SCNC: 4.2 MMOL/L (ref 3.4–5.3)
POTASSIUM BLD-SCNC: 4.5 MMOL/L (ref 3.4–5.3)
POTASSIUM BLD-SCNC: 4.6 MMOL/L (ref 3.4–5.3)
PR INTERVAL - MUSE: 148 MS
PROCALCITONIN SERPL-MCNC: 0.59 NG/ML
PROT SERPL-MCNC: 8.2 G/DL (ref 6.8–8.8)
QRS DURATION - MUSE: 74 MS
QT - MUSE: 320 MS
QTC - MUSE: 438 MS
R AXIS - MUSE: 72 DEGREES
RBC # BLD AUTO: 5.34 10E6/UL (ref 4.4–5.9)
RBC URINE: <1 /HPF
SODIUM SERPL-SCNC: 130 MMOL/L (ref 133–144)
SODIUM SERPL-SCNC: 134 MMOL/L (ref 133–144)
SODIUM SERPL-SCNC: 136 MMOL/L (ref 133–144)
SODIUM SERPL-SCNC: 138 MMOL/L (ref 133–144)
SP GR UR STRIP: 1.02 (ref 1–1.03)
SYSTOLIC BLOOD PRESSURE - MUSE: NORMAL MMHG
T AXIS - MUSE: -26 DEGREES
UROBILINOGEN UR STRIP-MCNC: NORMAL MG/DL
VENTRICULAR RATE- MUSE: 113 BPM
WBC # BLD AUTO: 16.4 10E3/UL (ref 4–11)
WBC URINE: 1 /HPF

## 2021-09-08 PROCEDURE — 82040 ASSAY OF SERUM ALBUMIN: CPT | Performed by: EMERGENCY MEDICINE

## 2021-09-08 PROCEDURE — 96365 THER/PROPH/DIAG IV INF INIT: CPT

## 2021-09-08 PROCEDURE — 36415 COLL VENOUS BLD VENIPUNCTURE: CPT | Performed by: HOSPITALIST

## 2021-09-08 PROCEDURE — 250N000011 HC RX IP 250 OP 636: Performed by: EMERGENCY MEDICINE

## 2021-09-08 PROCEDURE — 96361 HYDRATE IV INFUSION ADD-ON: CPT

## 2021-09-08 PROCEDURE — 99223 1ST HOSP IP/OBS HIGH 75: CPT | Mod: AI | Performed by: HOSPITALIST

## 2021-09-08 PROCEDURE — 250N000013 HC RX MED GY IP 250 OP 250 PS 637: Performed by: HOSPITALIST

## 2021-09-08 PROCEDURE — C9803 HOPD COVID-19 SPEC COLLECT: HCPCS

## 2021-09-08 PROCEDURE — 96375 TX/PRO/DX INJ NEW DRUG ADDON: CPT | Mod: 59

## 2021-09-08 PROCEDURE — 250N000013 HC RX MED GY IP 250 OP 250 PS 637: Performed by: INTERNAL MEDICINE

## 2021-09-08 PROCEDURE — 82805 BLOOD GASES W/O2 SATURATION: CPT | Performed by: EMERGENCY MEDICINE

## 2021-09-08 PROCEDURE — 87040 BLOOD CULTURE FOR BACTERIA: CPT | Performed by: EMERGENCY MEDICINE

## 2021-09-08 PROCEDURE — 272N000454 HC KIT, 3FR SV SINGLE LUMEN POWERPICC

## 2021-09-08 PROCEDURE — 99291 CRITICAL CARE FIRST HOUR: CPT

## 2021-09-08 PROCEDURE — 83605 ASSAY OF LACTIC ACID: CPT | Performed by: EMERGENCY MEDICINE

## 2021-09-08 PROCEDURE — 82010 KETONE BODYS QUAN: CPT | Performed by: EMERGENCY MEDICINE

## 2021-09-08 PROCEDURE — 250N000009 HC RX 250: Performed by: EMERGENCY MEDICINE

## 2021-09-08 PROCEDURE — 84100 ASSAY OF PHOSPHORUS: CPT | Performed by: HOSPITALIST

## 2021-09-08 PROCEDURE — 250N000011 HC RX IP 250 OP 636

## 2021-09-08 PROCEDURE — 85025 COMPLETE CBC W/AUTO DIFF WBC: CPT | Performed by: EMERGENCY MEDICINE

## 2021-09-08 PROCEDURE — 258N000003 HC RX IP 258 OP 636: Performed by: HOSPITALIST

## 2021-09-08 PROCEDURE — 82803 BLOOD GASES ANY COMBINATION: CPT | Performed by: HOSPITALIST

## 2021-09-08 PROCEDURE — 36569 INSJ PICC 5 YR+ W/O IMAGING: CPT

## 2021-09-08 PROCEDURE — 200N000001 HC R&B ICU

## 2021-09-08 PROCEDURE — 80048 BASIC METABOLIC PNL TOTAL CA: CPT | Performed by: HOSPITALIST

## 2021-09-08 PROCEDURE — 82010 KETONE BODYS QUAN: CPT | Performed by: HOSPITALIST

## 2021-09-08 PROCEDURE — 71045 X-RAY EXAM CHEST 1 VIEW: CPT

## 2021-09-08 PROCEDURE — 84145 PROCALCITONIN (PCT): CPT | Performed by: HOSPITALIST

## 2021-09-08 PROCEDURE — 250N000009 HC RX 250: Performed by: INTERNAL MEDICINE

## 2021-09-08 PROCEDURE — 93005 ELECTROCARDIOGRAM TRACING: CPT

## 2021-09-08 PROCEDURE — 250N000011 HC RX IP 250 OP 636: Performed by: HOSPITALIST

## 2021-09-08 PROCEDURE — 83690 ASSAY OF LIPASE: CPT | Performed by: EMERGENCY MEDICINE

## 2021-09-08 PROCEDURE — 83036 HEMOGLOBIN GLYCOSYLATED A1C: CPT | Performed by: HOSPITALIST

## 2021-09-08 PROCEDURE — 81001 URINALYSIS AUTO W/SCOPE: CPT | Performed by: EMERGENCY MEDICINE

## 2021-09-08 PROCEDURE — 258N000003 HC RX IP 258 OP 636: Performed by: EMERGENCY MEDICINE

## 2021-09-08 PROCEDURE — 36415 COLL VENOUS BLD VENIPUNCTURE: CPT | Performed by: EMERGENCY MEDICINE

## 2021-09-08 PROCEDURE — 83735 ASSAY OF MAGNESIUM: CPT | Performed by: EMERGENCY MEDICINE

## 2021-09-08 PROCEDURE — 74019 RADEX ABDOMEN 2 VIEWS: CPT

## 2021-09-08 RX ORDER — NALOXONE HYDROCHLORIDE 0.4 MG/ML
0.2 INJECTION, SOLUTION INTRAMUSCULAR; INTRAVENOUS; SUBCUTANEOUS
Status: DISCONTINUED | OUTPATIENT
Start: 2021-09-08 | End: 2021-09-09 | Stop reason: HOSPADM

## 2021-09-08 RX ORDER — NICOTINE POLACRILEX 4 MG
15-30 LOZENGE BUCCAL
Status: DISCONTINUED | OUTPATIENT
Start: 2021-09-08 | End: 2021-09-08

## 2021-09-08 RX ORDER — LIDOCAINE 40 MG/G
CREAM TOPICAL
Status: DISCONTINUED | OUTPATIENT
Start: 2021-09-08 | End: 2021-09-09 | Stop reason: HOSPADM

## 2021-09-08 RX ORDER — NALOXONE HYDROCHLORIDE 0.4 MG/ML
0.4 INJECTION, SOLUTION INTRAMUSCULAR; INTRAVENOUS; SUBCUTANEOUS
Status: DISCONTINUED | OUTPATIENT
Start: 2021-09-08 | End: 2021-09-09 | Stop reason: HOSPADM

## 2021-09-08 RX ORDER — SODIUM CHLORIDE 9 MG/ML
INJECTION, SOLUTION INTRAVENOUS CONTINUOUS
Status: DISCONTINUED | OUTPATIENT
Start: 2021-09-08 | End: 2021-09-08

## 2021-09-08 RX ORDER — HEPARIN SODIUM,PORCINE 10 UNIT/ML
5-10 VIAL (ML) INTRAVENOUS EVERY 24 HOURS
Status: DISCONTINUED | OUTPATIENT
Start: 2021-09-08 | End: 2021-09-09 | Stop reason: HOSPADM

## 2021-09-08 RX ORDER — SODIUM CHLORIDE AND POTASSIUM CHLORIDE 150; 450 MG/100ML; MG/100ML
INJECTION, SOLUTION INTRAVENOUS CONTINUOUS
Status: DISCONTINUED | OUTPATIENT
Start: 2021-09-08 | End: 2021-09-09

## 2021-09-08 RX ORDER — OLANZAPINE 10 MG/1
10 TABLET ORAL AT BEDTIME
Status: DISCONTINUED | OUTPATIENT
Start: 2021-09-08 | End: 2021-09-09 | Stop reason: HOSPADM

## 2021-09-08 RX ORDER — DEXTROSE MONOHYDRATE 25 G/50ML
25-50 INJECTION, SOLUTION INTRAVENOUS
Status: DISCONTINUED | OUTPATIENT
Start: 2021-09-08 | End: 2021-09-08

## 2021-09-08 RX ORDER — HYDROMORPHONE HYDROCHLORIDE 1 MG/ML
0.5 INJECTION, SOLUTION INTRAMUSCULAR; INTRAVENOUS; SUBCUTANEOUS ONCE
Status: DISCONTINUED | OUTPATIENT
Start: 2021-09-08 | End: 2021-09-08

## 2021-09-08 RX ORDER — ONDANSETRON 2 MG/ML
4 INJECTION INTRAMUSCULAR; INTRAVENOUS EVERY 6 HOURS PRN
Status: DISCONTINUED | OUTPATIENT
Start: 2021-09-08 | End: 2021-09-09 | Stop reason: HOSPADM

## 2021-09-08 RX ORDER — ONDANSETRON 4 MG/1
4 TABLET, ORALLY DISINTEGRATING ORAL EVERY 6 HOURS PRN
Status: DISCONTINUED | OUTPATIENT
Start: 2021-09-08 | End: 2021-09-09 | Stop reason: HOSPADM

## 2021-09-08 RX ORDER — ONDANSETRON 2 MG/ML
4 INJECTION INTRAMUSCULAR; INTRAVENOUS ONCE
Status: COMPLETED | OUTPATIENT
Start: 2021-09-08 | End: 2021-09-08

## 2021-09-08 RX ORDER — DEXTROSE MONOHYDRATE, SODIUM CHLORIDE, AND POTASSIUM CHLORIDE 50; 1.49; 4.5 G/1000ML; G/1000ML; G/1000ML
INJECTION, SOLUTION INTRAVENOUS CONTINUOUS
Status: DISCONTINUED | OUTPATIENT
Start: 2021-09-08 | End: 2021-09-09

## 2021-09-08 RX ORDER — HYDROMORPHONE HYDROCHLORIDE 1 MG/ML
INJECTION, SOLUTION INTRAMUSCULAR; INTRAVENOUS; SUBCUTANEOUS
Status: COMPLETED
Start: 2021-09-08 | End: 2021-09-08

## 2021-09-08 RX ORDER — DEXTROSE MONOHYDRATE 25 G/50ML
25-50 INJECTION, SOLUTION INTRAVENOUS
Status: DISCONTINUED | OUTPATIENT
Start: 2021-09-08 | End: 2021-09-09 | Stop reason: HOSPADM

## 2021-09-08 RX ORDER — PROCHLORPERAZINE 25 MG
25 SUPPOSITORY, RECTAL RECTAL EVERY 12 HOURS PRN
Status: DISCONTINUED | OUTPATIENT
Start: 2021-09-08 | End: 2021-09-09 | Stop reason: HOSPADM

## 2021-09-08 RX ORDER — NICOTINE POLACRILEX 4 MG
15-30 LOZENGE BUCCAL
Status: DISCONTINUED | OUTPATIENT
Start: 2021-09-08 | End: 2021-09-09 | Stop reason: HOSPADM

## 2021-09-08 RX ORDER — ONDANSETRON 2 MG/ML
INJECTION INTRAMUSCULAR; INTRAVENOUS
Status: COMPLETED
Start: 2021-09-08 | End: 2021-09-08

## 2021-09-08 RX ORDER — HYDROMORPHONE HYDROCHLORIDE 1 MG/ML
.3-.5 INJECTION, SOLUTION INTRAMUSCULAR; INTRAVENOUS; SUBCUTANEOUS EVERY 4 HOURS PRN
Status: DISCONTINUED | OUTPATIENT
Start: 2021-09-08 | End: 2021-09-09 | Stop reason: HOSPADM

## 2021-09-08 RX ORDER — HEPARIN SODIUM,PORCINE 10 UNIT/ML
5-10 VIAL (ML) INTRAVENOUS
Status: DISCONTINUED | OUTPATIENT
Start: 2021-09-08 | End: 2021-09-09 | Stop reason: HOSPADM

## 2021-09-08 RX ORDER — HYDROMORPHONE HYDROCHLORIDE 1 MG/ML
.3-.5 INJECTION, SOLUTION INTRAMUSCULAR; INTRAVENOUS; SUBCUTANEOUS
Status: DISCONTINUED | OUTPATIENT
Start: 2021-09-08 | End: 2021-09-08

## 2021-09-08 RX ORDER — PROCHLORPERAZINE MALEATE 5 MG
10 TABLET ORAL EVERY 6 HOURS PRN
Status: DISCONTINUED | OUTPATIENT
Start: 2021-09-08 | End: 2021-09-09 | Stop reason: HOSPADM

## 2021-09-08 RX ORDER — HYDROMORPHONE HYDROCHLORIDE 1 MG/ML
0.5 INJECTION, SOLUTION INTRAMUSCULAR; INTRAVENOUS; SUBCUTANEOUS ONCE
Status: COMPLETED | OUTPATIENT
Start: 2021-09-08 | End: 2021-09-08

## 2021-09-08 RX ADMIN — POTASSIUM CHLORIDE AND SODIUM CHLORIDE: 450; 150 INJECTION, SOLUTION INTRAVENOUS at 17:30

## 2021-09-08 RX ADMIN — SODIUM CHLORIDE 1000 ML: 9 INJECTION, SOLUTION INTRAVENOUS at 12:34

## 2021-09-08 RX ADMIN — OLANZAPINE 10 MG: 10 TABLET, FILM COATED ORAL at 21:55

## 2021-09-08 RX ADMIN — HYDROMORPHONE HYDROCHLORIDE 0.5 MG: 1 INJECTION, SOLUTION INTRAMUSCULAR; INTRAVENOUS; SUBCUTANEOUS at 12:34

## 2021-09-08 RX ADMIN — TAZOBACTAM SODIUM AND PIPERACILLIN SODIUM 3.38 G: 375; 3 INJECTION, SOLUTION INTRAVENOUS at 21:54

## 2021-09-08 RX ADMIN — LIDOCAINE HYDROCHLORIDE 30 ML: 20 SOLUTION ORAL; TOPICAL at 19:48

## 2021-09-08 RX ADMIN — ONDANSETRON 4 MG: 4 TABLET, ORALLY DISINTEGRATING ORAL at 18:23

## 2021-09-08 RX ADMIN — ONDANSETRON 4 MG: 2 INJECTION INTRAMUSCULAR; INTRAVENOUS at 11:38

## 2021-09-08 RX ADMIN — HYDROMORPHONE HYDROCHLORIDE 0.5 MG: 1 INJECTION, SOLUTION INTRAMUSCULAR; INTRAVENOUS; SUBCUTANEOUS at 21:28

## 2021-09-08 RX ADMIN — TAZOBACTAM SODIUM AND PIPERACILLIN SODIUM 3.38 G: 375; 3 INJECTION, SOLUTION INTRAVENOUS at 16:25

## 2021-09-08 RX ADMIN — POTASSIUM CHLORIDE, DEXTROSE MONOHYDRATE AND SODIUM CHLORIDE: 150; 5; 450 INJECTION, SOLUTION INTRAVENOUS at 20:51

## 2021-09-08 RX ADMIN — VANCOMYCIN HYDROCHLORIDE 1500 MG: 10 INJECTION, POWDER, LYOPHILIZED, FOR SOLUTION INTRAVENOUS at 17:28

## 2021-09-08 RX ADMIN — Medication 5.5 UNITS/HR: at 15:45

## 2021-09-08 RX ADMIN — HYDROMORPHONE HYDROCHLORIDE 0.5 MG: 1 INJECTION, SOLUTION INTRAMUSCULAR; INTRAVENOUS; SUBCUTANEOUS at 17:27

## 2021-09-08 RX ADMIN — HYDROMORPHONE HYDROCHLORIDE 0.5 MG: 1 INJECTION, SOLUTION INTRAMUSCULAR; INTRAVENOUS; SUBCUTANEOUS at 14:20

## 2021-09-08 RX ADMIN — SODIUM CHLORIDE: 9 INJECTION, SOLUTION INTRAVENOUS at 15:48

## 2021-09-08 RX ADMIN — PROCHLORPERAZINE MALEATE 10 MG: 5 TABLET ORAL at 18:49

## 2021-09-08 RX ADMIN — SODIUM CHLORIDE 1000 ML: 9 INJECTION, SOLUTION INTRAVENOUS at 13:30

## 2021-09-08 ASSESSMENT — ENCOUNTER SYMPTOMS
HEMATURIA: 0
DIARRHEA: 1
FATIGUE: 1
ABDOMINAL PAIN: 1
DYSURIA: 0
WEAKNESS: 1
SHORTNESS OF BREATH: 1
VOMITING: 1

## 2021-09-08 ASSESSMENT — ACTIVITIES OF DAILY LIVING (ADL)
ADLS_ACUITY_SCORE: 12
CONCENTRATING,_REMEMBERING_OR_MAKING_DECISIONS_DIFFICULTY: NO
WALKING_OR_CLIMBING_STAIRS_DIFFICULTY: NO
TOILETING_ISSUES: NO
WEAR_GLASSES_OR_BLIND: YES
HEARING_DIFFICULTY_OR_DEAF: NO
DOING_ERRANDS_INDEPENDENTLY_DIFFICULTY: NO
DIFFICULTY_COMMUNICATING: NO
DRESSING/BATHING_DIFFICULTY: NO
FALL_HISTORY_WITHIN_LAST_SIX_MONTHS: NO
DIFFICULTY_EATING/SWALLOWING: NO

## 2021-09-08 ASSESSMENT — MIFFLIN-ST. JEOR: SCORE: 1673.88

## 2021-09-08 NOTE — ED PROVIDER NOTES
History   Chief Complaint:  Abdominal Pain; Nausea, Vomiting, & Diarrhea; and Hyperglycemia       The history is provided by the patient.      Reji Monahan is a 31 year old male with history of type 1 diabetes mellitus, DKA, and sepsis who presents via ambulance with abdominal pain, diarrhea, and vomiting. The patient checked his blood sugar yesterday and it was 300 which he gave insulin for, but has not checked it today. He reports since this morning he has had shortness of breath, abdominal pain, diarrhea, increased thirst, and vomited 7-8 times. He is also weak and fatigued. On route to the ED the patient was given zophran. The patient has not checked his blood sugar today. Upon arrival he denies chest pain, dysuria, or hematuria. He used cannabis yesterday, but denies any alcohol use. He has had no recent falls or sick exposures. The patient has not eaten or drank any fluids today.     Review of Systems   Constitutional: Positive for fatigue.   Respiratory: Positive for shortness of breath.    Cardiovascular: Negative for chest pain.   Gastrointestinal: Positive for abdominal pain, diarrhea and vomiting.   Genitourinary: Negative for dysuria and hematuria.   Neurological: Positive for weakness.   All other systems reviewed and are negative.      Allergies:  No Known Allergies    Medications:  albuterol inhaler  insulin aspart  insulin glargine   olanzapine   Ondansetron     Past Medical History:    Diabetes mellitus, type 1   Paranoid Schizophrenia   Uncomplicated asthma  Urinary tract infection without hematuria  Sepsis  Diabetic ketoacidosis without coma associated  MRSA carrier  attention deficit disorder  Gastroesophageal reflux disease without esophagitis  Cannabinoid hyperemesis syndrome  Splenomegaly  Moderate malnutrition  Cognitive impairment  supraventricular tachycardia  Phimosis  Balanitis  Diabetic gastroparesis  Diabetic neuropathy  Asthma  Cannabis Usage   Metabolic acidosis  Charcot foot  due to diabetes mellitus  Dyslipidemia  Asperger's disorder  Insomnia   Facial and abdominal wall cellulitis  Hyponatremia  Leukocytosis  Hyperkalemia  Hypophosphatemia  Staphylococcal arthritis of left shoulder    Past Surgical History:    Wound irrigation and debridement   Arthroscopy shoulder     Family History:    Cerebrovascular Disease  Diabetes   Cancer Ovarian  Alcohol/ Drug     Social History:  Presents unaccompanied.   PCP: Bogdan Jhaveri     Physical Exam     Patient Vitals for the past 24 hrs:   BP Temp Temp src Pulse Resp SpO2   09/08/21 1330 104/58 -- -- 105 18 99 %   09/08/21 1319 105/60 -- -- 108 -- 99 %   09/08/21 1250 -- -- -- 102 -- --   09/08/21 1249 -- 97.8  F (36.6  C) Oral -- -- --   09/08/21 1200 98/79 -- -- 109 -- 100 %   09/08/21 1145 133/85 -- -- 115 -- 100 %   09/08/21 1143 123/66 -- -- 115 26 100 %       Physical Exam  General: Resting on the bed.  Head: No obvious trauma to head.  Ears, Nose, Throat:  External ears normal.  Nose normal.  Dry MM  Eyes:  Conjunctivae clear.  Pupils are equal, round, and reactive.   Neck: Normal range of motion.  Neck supple.   CV: tachycardic rate and rhythm.  No murmurs.      Respiratory: Effort normal and breath sounds normal.  No wheezing or crackles.   Gastrointestinal: Soft.  No distension. There is mild diffuse tenderness.  There is no rigidity, no rebound and no guarding.   Neuro: Alert. Moving all extremities appropriately.  Normal speech.    Skin: Skin is warm and dry.  Abrasions and scabs noted over body     Emergency Department Course   ECG  ECG taken at 1143, ECG read at 1143  Sinus tachycardia. ST & T wave abnormality, consider inferior ischemia.   Flipped T waves noted II, III, AVF 9 V3-V6 as compared to prior, dated 08/02/2021.  Rate 113 bpm. SC interval 148 ms. QRS duration 74 ms. QT/QTc 320/438 ms. P-R-T axes 84 72 -26.     Laboratory:  CBC: WBC 16.4 (H), HGB 13.4,   CMP: sodium 130 (L), CO2 11 (L), Anion Gap 22 (H), Calcium 10.2  (H), Glucose 562 (H), Alkaline Phosphatase 154 (H) o/w WNL (Creatinine 0.66)   BMP: CO2 9 (L), Anion Gap 22 (H), Glucose 547 (H) o/w WNL (Creatinine 0.61 (L))     Glucose by meter(Collection time 1238): 531 (H)  Glucose by meter(Collection time 1541): 484 (H)  Lipase: 56 (L)  Ketone Beta-Hydroxybutyrate Quantitative: 6.4 (H)  Ketone Beta-Hydroxybutyrate Quantitative: 6.3 (H)  Blood gas venous and oxyhgb: pH 7.14 (L), pCO2 33 (L), pO2 53 (H), Bicarbonate 11 (L), oxyhemoglobin 79 (H), Base Excess/Deficit -16.8 (L)  Blood gas venous and oxyhgb: pH 7.09 (L), pCO2 33 (L), pO2 58 (H), Bicarbonate 10 (L), oxyhemoglobin 83 (H), base excess/deficit -18.9 (L)  Magnesium: 2.0  UA with microscopic: Glucose >=1000, Ketones >150, Bacteria few, Mucus present o/w WNL   Lactic acid (result time 1320) 4.2 (H)  Lactic acid (result time 1549) 2.7 (H)    Blood Culture x2 : Pending     Emergency Department Course:    Reviewed:  I reviewed nursing notes, vitals, past medical history and care everywhere    Assessments:  1155 I obtained history and examined the patient as noted above.   1313 I rechecked the patient and explained findings.     Consults:   1404 I consulted with Dr. Pack of the hospitalist services who is in agreement to accept the patient for admission.     Interventions:  1138 Ondansetron, 4 mg  1234 0.9% sodium chloride bolus, 1,000 mL, IV  1234 Hydromorphone, 0.5 mg, IV  1330 0.9% sodium chloride bolus, 1,000 mL, IV  1420 Hydromorphone, 0.5mg, IV  1545 Insulin, 5.5 units/hr, IV     Disposition:  The patient was admitted to the hospital under the care of Dr. Pack.     Impression & Plan   CMS Diagnoses: The Lactic acid level is elevated due to DKA, at this time there is no sign of severe sepsis or septic shock. and None    Medical Decision Makin-year-old male with history of diabetes presents with hyperglycemia, vomiting, diffuse abdominal discomfort.  Broad differential was pursued include not limited to  noncompliance, infection, severe sepsis, septic shock, dehydration, electrolyte, metabolic, renal dysfunction, etc.  Patient is a difficult historian.  He has a history of noncompliance.  Labs are consistent with DKA with pH of 7.14, anion gap of 22, bicarb of 11, lactate 4.2, ketones 6.4.  This is consistent with DKA.  Fluids were initiated.  Patient does appear to be clinically dehydrated with tachycardia and dry mucous membranes.  2 L were given prior to initiation of insulin drip.  UA without signs of infection.  EKG has some inverted T waves suspect this is secondary to his elevated heart rate.  Patient denies any chest pain or shortness of breath.  Blood cultures are pending given history of bacteremia and endocarditis in the past.  Repeat labs improved after fluids with slight worsening of pH and bicarb but improvement in lactate and ketones.  BMP showed persistent anion gap.  Glucose remained elevated therefore no dextrose was added to the fluids.  I considered severe sepsis or septic shock but no identifiable cause of infection.  Chest x-ray ordered and pending.  At this point most likely secondary to noncompliance.  Insulin drip initiated.  No potassium replacement needed at this time.  Admitted to ICU.    Critical Care time was 30 minutes for this patient excluding procedures.      Covid-19  Reji Monahan was evaluated during a global COVID-19 pandemic, which necessitated consideration that the patient might be at risk for infection with the SARS-CoV-2 virus that causes COVID-19.   Applicable protocols for evaluation were followed during the patient's care.   COVID-19 was considered as part of the patient's evaluation.    Diagnosis:    ICD-10-CM    1. Diabetic ketoacidosis without coma associated with type 1 diabetes mellitus (H)  E10.10        Scribe Disclosure:  Leonie MARTIN, am serving as a scribe at 11:46 AM on 9/8/2021 to document services personally performed by Imani Arguello MD  based on my observations and the provider's statements to me.            Imani Arguello MD  09/08/21 [id number]

## 2021-09-08 NOTE — ED NOTES
Bed: ED32  Expected date: 9/8/21  Expected time: 11:14 AM  Means of arrival:   Comments:  BV 5 31M hyperglycemic

## 2021-09-08 NOTE — H&P
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    #DKA: Reji describes nausea, vomiting and abdominal discomfort that has been going on for the last day or so.  He tells me he has been using his insulin as prescribed.  He tells me he uses 30 units Lantus daily and carb counts with meals.  Has some lightheadedness but no headache.  No fevers or chills.  No dysuria.  He has multiple skin lesions which he does not think have changed.  No chest pain.  He notes that symptoms feel similar to prior DKA episodes including once a month.  Of note, when EMS arrived patient was in a motel room with drug paraphernalia surrounding.  -In the ED, patient afebrile tachycardic into the 100-110s.  Saturating well on room air.  CBC showing white count of 16.4.  BMP showing normal kidney function but bicarb of 11, anion gap 22.  Ketones positive.  Lactic acid elevated at 4.2.  VBG 7.14/33/bicarb 11.  UA negative for infection.  Blood cultures drawn.  Patient given IV fluid and started on insulin drip.  He was given vancomycin and Zosyn.  Glucose elevated at 560.  -We will treat for DKA.  Unclear trigger but given history of MRSA infection with endocarditis we will treat with Zosyn and vancomycin for now.  Follow blood cultures.  Added on procalcitonin.  Obtain chest x-ray.  UA without indication of infection.  -Admit to ICU.  -Check BMP, VBG, Phos every 4 hours  -IV fluids, insulin drip per DKA protocol.   -If patient's PM labs are looking better, patient remains stable could potentially move out of ICU in next 12 hours or so to [facility]    #Lactic acidosis: Suspect secondary to severe volume  depletion with DKA.  Received 2L IVF in ED.  Recheck lactic acid.  If persistently elevated then would obtain CT A/P.    #History of schizophrenia: Resume psych meds once verified.  He does answer questions appropriately and is cooperative.    DVT Prophylaxis: Pneumatic Compression Devices  Code Status: Full Code  Dispo: Admit to ICU.  Expect greater than 2 midnights.  Discussed with on call tele-intensivist.     Ruy Pack MD    Primary Care Physician   Bogdan Jhaveri MD    Chief Complaint   Abdominal pain, n/v. 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    Reji describes nausea, vomiting and abdominal discomfort that has been going on for the last day or so.  He tells me he has been using his insulin as prescribed.  He tells me he uses 30 units Lantus daily and carb counts with meals.  Has some lightheadedness but no headache.  No fevers or chills.  No dysuria.  He has multiple skin lesions which he does not think have changed.  No chest pain.  He notes that symptoms feel similar to prior DKA episodes including once a month.  Of note, when EMS arrived to his motel room there was reportedly drug paraphernalia through the room.  He denies to me any IV drug use but admits to marijuana use.    In the ED, patient afebrile tachycardic into the 100-110s.  Saturating well on room air.  CBC showing white count of 16.4.  BMP showing normal kidney function but bicarb of 11.  Ketones positive.  Lactic acid elevated at 4.2.  VBG 7.14/33/bicarb 11.  UA negative for infection.  Blood cultures drawn.  Patient given IV fluid and started on insulin drip.  He was given vancomycin  and Zosyn.  Glucose elevated at 560.    Past Medical History    I have reviewed this patient's medical history and updated it with pertinent information if needed.   Past Medical History:   Diagnosis Date     Diabetes mellitus (H)      Schizophrenia (H)      Uncomplicated asthma        Past Surgical History   I have reviewed this patient's surgical history and updated it with pertinent information if needed.  No past surgical history on file.    Prior to Admission Medications   Prior to Admission Medications   Prescriptions Last Dose Informant Patient Reported? Taking?   OLANZapine (ZYPREXA) 10 MG tablet 9/7/2021 at Unknown time  Yes Yes   Sig: Take 10 mg by mouth At Bedtime   albuterol (PROAIR HFA/PROVENTIL HFA/VENTOLIN HFA) 108 (90 Base) MCG/ACT inhaler  Self Yes Yes   Sig: Inhale 2 puffs into the lungs every 4 hours as needed for shortness of breath / dyspnea or wheezing   insulin aspart (NOVOLOG FLEXPEN) 100 UNIT/ML pen 9/7/2021 at Unknown time  Yes Yes   Sig: Inject 10 Units Subcutaneous 3 times daily (with meals)   insulin aspart (NOVOLOG PEN) 100 UNIT/ML pen 9/7/2021 at Unknown time Self Yes Yes   Sig: Inject Subcutaneous 3 times daily (with meals) Sliding scale with meals as below  Glucose 130-150 0   Glucose 151-200 2 units   Glucose 201-250 4 units   Glucose 251-300 6 units   Glucose 301-350 8 units   Glucose 351-400 10 units   Glucose 401-500 12 units   Glucose GREATER THAN 501 14 units and call provider   insulin glargine (LANTUS PEN) 100 UNIT/ML pen 9/7/2021 at Unknown time Self Yes Yes   Sig: Inject 30 Units Subcutaneous At Bedtime       Facility-Administered Medications: None     Allergies   No Known Allergies    Social History   I have reviewed this patient's social history and updated it with pertinent information if needed. Reji MUHAMMAD Taniya  reports that he has never smoked. He does not have any smokeless tobacco history on file. He reports that he does not drink alcohol and does not use  drugs.    Family History   I have reviewed this patient's family history and updated it with pertinent information if needed.   Family History   Problem Relation Age of Onset     Cerebrovascular Disease Father      Diabetes Brother        Review of Systems   The 10 point Review of Systems is negative other than noted in the HPI or here.     Physical Exam   Temp: 97.8  F (36.6  C) Temp src: Oral BP: 104/58 Pulse: 105   Resp: 18 SpO2: 99 % O2 Device: None (Room air)    Vital Signs with Ranges  Temp:  [97.8  F (36.6  C)] 97.8  F (36.6  C)  Pulse:  [102-115] 105  Resp:  [18-26] 18  BP: ()/(58-85) 104/58  SpO2:  [99 %-100 %] 99 %  0 lbs 0 oz    Constitutional: Thin. Disheveled, sleeping but arousable.  Answers questions appropriately.  HEENT: Normocephalic, mucous membranes dry, no elevation of JVD noted  Respiratory: Nl WOB, Clear bilaterally, No wheezes, no crackles  Cardiovascular: Regular, no murmur  GI: Thin. No distension. BS+. Some tenderness to palpation but no rebound or guarding.   Lymph/Hematologic: No bruising. No cervical LAD  Skin: Multiple skin excoriations on his face and arms.    Musculoskeletal: Nl Tone, No edema noted  Neurologic: Sleeping.  Arousable.  ANO x3.  Answers appropriately.  Cranial nerves II through XII all intact.  Moves all extremities.  Psychiatric: A bit anxious.    Data   Data reviewed today:  I personally reviewed  Recent Labs   Lab 09/08/21  1322 09/08/21  1249 09/08/21  1238   WBC 16.4*  --   --    HGB 13.4  --   --    MCV 81  --   --      --   --    NA  --  130*  --    POTASSIUM  --  4.2  --    CHLORIDE  --  97  --    CO2  --  11*  --    BUN  --  20  --    CR  --  0.66  --    ANIONGAP  --  22*  --    LYNN  --  10.2*  --    GLC  --  562* 531*   ALBUMIN  --  4.5  --    PROTTOTAL  --  8.2  --    BILITOTAL  --  1.0  --    ALKPHOS  --  154*  --    ALT  --  25  --    AST  --  15  --    LIPASE  --  56*  --        No results found for this or any previous visit (from the past  24 hour(s)).    Clinically Significant Risk Factors Present on Admission         # Hyponatremia: Na = 130 mmol/L (Ref range: 133 - 144 mmol/L) on admission, will monitor as appropriate

## 2021-09-08 NOTE — ED NOTES
Skin - Skin WDL: .WDL except  Skin Integrity/Characteristics: scab; other (see comments)  Skin Comment: PT WITH MULTIPLE WOUNDS OVER BODY IN VARIOUS STAGES OF HEALING (FACE, BRIDGE OF NOSE, HANDS, ARMS, ABD.)     Gastrointestinal - Gastrointestinal WDL: all  Bowel Sounds: All Quadrants  Nausea/Vomiting Signs/Symptoms: nausea continuous; emesis  All Quadrants Bowel Sounds:  (ACTIVE) GI Signs/Symptoms: abdominal discomfort; diarrhea; dry-heaving; nausea; vomiting  Gastrointestinal Comment: pt comes in from hotel with N/V/D and abd pain for last 2 days. pt sugar checked and read 485. pt does not check blood sugar regularly. pt able to stand and take a few steps with stand by assist.

## 2021-09-08 NOTE — ED NOTES
Zofran, 4mg, override by SHANNEN Flor. Zofran, 4mg given IV by writer, who sent SHANNEN Flor on a 30 minute break. Last zofran dose given at 1138 per MAR.

## 2021-09-08 NOTE — PHARMACY-ADMISSION MEDICATION HISTORY
Admission medication history interview status for this patient is complete. See [facility] admission navigator for allergy information, prior to admission medications and immunization status.     Medication history interview done, indicate source(s): Patient  Medication history resources (including written lists, pill bottles, clinic record):EPIC    Changes made to PTA medication list:  Added: none  Changed: none  Reported as Not Taking: none  Removed: none    Prior to Admission medications    Medication Sig Last Dose Taking? Auth Provider   albuterol (PROAIR HFA/PROVENTIL HFA/VENTOLIN HFA) 108 (90 Base) MCG/ACT inhaler Inhale 2 puffs into the lungs every 4 hours as needed for shortness of breath / dyspnea or wheezing  Yes Unknown, Entered By History   insulin aspart (NOVOLOG FLEXPEN) 100 UNIT/ML pen Inject 10 Units Subcutaneous 3 times daily (with meals) 9/7/2021 at Unknown time Yes Unknown, Entered By History   insulin aspart (NOVOLOG PEN) 100 UNIT/ML pen Inject Subcutaneous 3 times daily (with meals) Sliding scale with meals as below  Glucose 130-150 0   Glucose 151-200 2 units   Glucose 201-250 4 units   Glucose 251-300 6 units   Glucose 301-350 8 units   Glucose 351-400 10 units   Glucose 401-500 12 units   Glucose GREATER THAN 501 14 units and call provider 9/7/2021 at Unknown time Yes Unknown, Entered By History   insulin glargine (LANTUS PEN) 100 UNIT/ML pen Inject 30 Units Subcutaneous At Bedtime  9/7/2021 at Unknown time Yes Yonny Leiva MD   OLANZapine (ZYPREXA) 10 MG tablet Take 10 mg by mouth At Bedtime 9/7/2021 at Unknown time Yes Unknown, Entered By History

## 2021-09-08 NOTE — ED NOTES
[facility]  ED Nurse Handoff Report    Reji Monahan is a 31 year old male   ED Chief complaint: Abdominal Pain; Nausea, Vomiting, & Diarrhea; and Hyperglycemia  . ED Diagnosis:   Final diagnoses:   None     Allergies: No Known Allergies    Code Status: Full Code  Activity level - Baseline/Home:  Independent. Activity Level - Current:   Assist X 1. Lift room needed: No. Bariatric: No   Needed: No   Isolation: Yes. Infection: Not Applicable  MRSA.     Vital Signs:   Vitals:    09/08/21 1143 09/08/21 1249   BP: 123/66    Pulse: 115    Resp: 26    Temp:  97.8  F (36.6  C)   TempSrc:  Oral   SpO2: 100%      Cardiac Rhythm:   Sinus tachycardia   ST & T wave abnormality, consider inferior ischemia   Abnormal ECG    Pain level:   ABD. PAIN  Patient confused: No. Patient Falls Risk: Yes.   Elimination Status: Has voided   Patient Report / Focused Assessment:    Skin - Skin WDL: .WDL except  Skin Integrity/Characteristics: scab; other (see comments)  Skin Comment: PT WITH MULTIPLE WOUNDS OVER BODY IN VARIOUS STAGES OF HEALING (FACE, BRIDGE OF NOSE, HANDS, ARMS, ABD.)   Gastrointestinal - Gastrointestinal WDL: all  Bowel Sounds: All Quadrants  Nausea/Vomiting Signs/Symptoms: nausea continuous; emesis  All Quadrants Bowel Sounds:  (ACTIVE) GI Signs/Symptoms: abdominal discomfort; diarrhea; dry-heaving; nausea; vomiting  Gastrointestinal Comment: pt comes in from hotel with N/V/D and abd pain for last 2 days. pt sugar checked and read 485. pt does not check blood sugar regularly. pt able to stand and take a few steps with stand by assist.     Tests Performed / Abnormal Results:    Treatments provided: PIV, LABS, BLOOD CULT., URINE, MEDS, FLUIDS, REPEAT LABS, BLOOD SUGAR, MONITORING, EKG,   Family Comments: NONE  OBS brochure/video discussed/provided to patient:  No  ED Medications:   Medications   0.9% sodium chloride BOLUS (1,000 mLs Intravenous New Bag 9/8/21 6511)     Followed by   sodium chloride  0.9% infusion (has no administration in time range)   0.9% sodium chloride BOLUS (has no administration in time range)   ondansetron (ZOFRAN) 2 MG/ML injection (4 mg  Given 9/8/21 1138)   HYDROmorphone (PF) (DILAUDID) injection 0.5 mg (0.5 mg Intravenous Given 9/8/21 1234)     Drips infusing:  Yes  For the majority of the shift, the patient's behavior Green. Interventions performed were NONE.    Sepsis treatment initiated: No     Patient tested for COVID 19 prior to admission: yes    ED Nurse Name/Phone Number: Werner Boyce RN, [id number]  1:30 PM
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
## 2021-09-09 VITALS
BODY MASS INDEX: 19.72 KG/M2 | HEIGHT: 73 IN | TEMPERATURE: 98.1 F | HEART RATE: 104 BPM | WEIGHT: 148.81 LBS | SYSTOLIC BLOOD PRESSURE: 106 MMHG | DIASTOLIC BLOOD PRESSURE: 74 MMHG | OXYGEN SATURATION: 99 % | RESPIRATION RATE: 11 BRPM

## 2021-09-09 LAB
ANION GAP SERPL CALCULATED.3IONS-SCNC: 3 MMOL/L (ref 3–14)
ANION GAP SERPL CALCULATED.3IONS-SCNC: 5 MMOL/L (ref 3–14)
BASE EXCESS BLDV CALC-SCNC: -4.6 MMOL/L (ref -7.7–1.9)
BASE EXCESS BLDV CALC-SCNC: -6.1 MMOL/L (ref -7.7–1.9)
BUN SERPL-MCNC: 17 MG/DL (ref 7–30)
BUN SERPL-MCNC: 18 MG/DL (ref 7–30)
CALCIUM SERPL-MCNC: 8 MG/DL (ref 8.5–10.1)
CALCIUM SERPL-MCNC: 8.3 MG/DL (ref 8.5–10.1)
CHLORIDE BLD-SCNC: 109 MMOL/L (ref 94–109)
CHLORIDE BLD-SCNC: 110 MMOL/L (ref 94–109)
CO2 SERPL-SCNC: 22 MMOL/L (ref 20–32)
CO2 SERPL-SCNC: 22 MMOL/L (ref 20–32)
CREAT SERPL-MCNC: 0.65 MG/DL (ref 0.66–1.25)
CREAT SERPL-MCNC: 0.67 MG/DL (ref 0.66–1.25)
ERYTHROCYTE [DISTWIDTH] IN BLOOD BY AUTOMATED COUNT: 17.6 % (ref 10–15)
GFR SERPL CREATININE-BSD FRML MDRD: >90 ML/MIN/1.73M2
GFR SERPL CREATININE-BSD FRML MDRD: >90 ML/MIN/1.73M2
GLUCOSE BLD-MCNC: 109 MG/DL (ref 70–99)
GLUCOSE BLD-MCNC: 145 MG/DL (ref 70–99)
GLUCOSE BLDC GLUCOMTR-MCNC: 108 MG/DL (ref 70–99)
GLUCOSE BLDC GLUCOMTR-MCNC: 111 MG/DL (ref 70–99)
GLUCOSE BLDC GLUCOMTR-MCNC: 118 MG/DL (ref 70–99)
GLUCOSE BLDC GLUCOMTR-MCNC: 119 MG/DL (ref 70–99)
GLUCOSE BLDC GLUCOMTR-MCNC: 126 MG/DL (ref 70–99)
GLUCOSE BLDC GLUCOMTR-MCNC: 129 MG/DL (ref 70–99)
GLUCOSE BLDC GLUCOMTR-MCNC: 129 MG/DL (ref 70–99)
GLUCOSE BLDC GLUCOMTR-MCNC: 130 MG/DL (ref 70–99)
GLUCOSE BLDC GLUCOMTR-MCNC: 132 MG/DL (ref 70–99)
GLUCOSE BLDC GLUCOMTR-MCNC: 145 MG/DL (ref 70–99)
GLUCOSE BLDC GLUCOMTR-MCNC: 146 MG/DL (ref 70–99)
GLUCOSE BLDC GLUCOMTR-MCNC: 182 MG/DL (ref 70–99)
GLUCOSE BLDC GLUCOMTR-MCNC: 90 MG/DL (ref 70–99)
HCO3 BLDV-SCNC: 20 MMOL/L (ref 21–28)
HCO3 BLDV-SCNC: 21 MMOL/L (ref 21–28)
HCT VFR BLD AUTO: 34.8 % (ref 40–53)
HGB BLD-MCNC: 11 G/DL (ref 13.3–17.7)
KETONES BLD-SCNC: 0.3 MMOL/L (ref 0–0.6)
KETONES BLD-SCNC: 1.3 MMOL/L (ref 0–0.6)
KETONES BLD-SCNC: 1.7 MMOL/L (ref 0–0.6)
LACTATE SERPL-SCNC: 0.5 MMOL/L (ref 0.7–2)
MCH RBC QN AUTO: 24.9 PG (ref 26.5–33)
MCHC RBC AUTO-ENTMCNC: 31.6 G/DL (ref 31.5–36.5)
MCV RBC AUTO: 79 FL (ref 78–100)
O2/TOTAL GAS SETTING VFR VENT: 0 %
O2/TOTAL GAS SETTING VFR VENT: 0 %
PCO2 BLDV: 42 MM HG (ref 40–50)
PCO2 BLDV: 42 MM HG (ref 40–50)
PH BLDV: 7.29 [PH] (ref 7.32–7.43)
PH BLDV: 7.32 [PH] (ref 7.32–7.43)
PHOSPHATE SERPL-MCNC: 2.2 MG/DL (ref 2.5–4.5)
PHOSPHATE SERPL-MCNC: 3 MG/DL (ref 2.5–4.5)
PLATELET # BLD AUTO: 198 10E3/UL (ref 150–450)
PO2 BLDV: 53 MM HG (ref 25–47)
PO2 BLDV: 54 MM HG (ref 25–47)
POTASSIUM BLD-SCNC: 4.3 MMOL/L (ref 3.4–5.3)
POTASSIUM BLD-SCNC: 4.5 MMOL/L (ref 3.4–5.3)
RBC # BLD AUTO: 4.42 10E6/UL (ref 4.4–5.9)
SODIUM SERPL-SCNC: 134 MMOL/L (ref 133–144)
SODIUM SERPL-SCNC: 137 MMOL/L (ref 133–144)
WBC # BLD AUTO: 12.6 10E3/UL (ref 4–11)

## 2021-09-09 PROCEDURE — 250N000012 HC RX MED GY IP 250 OP 636 PS 637: Performed by: INTERNAL MEDICINE

## 2021-09-09 PROCEDURE — 82010 KETONE BODYS QUAN: CPT | Performed by: HOSPITALIST

## 2021-09-09 PROCEDURE — 83605 ASSAY OF LACTIC ACID: CPT | Performed by: HOSPITALIST

## 2021-09-09 PROCEDURE — 85027 COMPLETE CBC AUTOMATED: CPT | Performed by: HOSPITALIST

## 2021-09-09 PROCEDURE — 82803 BLOOD GASES ANY COMBINATION: CPT | Performed by: HOSPITALIST

## 2021-09-09 PROCEDURE — 84100 ASSAY OF PHOSPHORUS: CPT | Performed by: HOSPITALIST

## 2021-09-09 PROCEDURE — 250N000009 HC RX 250: Performed by: HOSPITALIST

## 2021-09-09 PROCEDURE — 36415 COLL VENOUS BLD VENIPUNCTURE: CPT | Performed by: HOSPITALIST

## 2021-09-09 PROCEDURE — 80048 BASIC METABOLIC PNL TOTAL CA: CPT | Performed by: HOSPITALIST

## 2021-09-09 PROCEDURE — 82010 KETONE BODYS QUAN: CPT | Performed by: INTERNAL MEDICINE

## 2021-09-09 PROCEDURE — 258N000003 HC RX IP 258 OP 636: Performed by: HOSPITALIST

## 2021-09-09 PROCEDURE — 250N000011 HC RX IP 250 OP 636: Performed by: HOSPITALIST

## 2021-09-09 PROCEDURE — 36415 COLL VENOUS BLD VENIPUNCTURE: CPT | Performed by: INTERNAL MEDICINE

## 2021-09-09 PROCEDURE — G0378 HOSPITAL OBSERVATION PER HR: HCPCS

## 2021-09-09 PROCEDURE — 99217 PR OBSERVATION CARE DISCHARGE: CPT | Performed by: INTERNAL MEDICINE

## 2021-09-09 RX ORDER — DEXTROSE MONOHYDRATE 25 G/50ML
25-50 INJECTION, SOLUTION INTRAVENOUS
Status: DISCONTINUED | OUTPATIENT
Start: 2021-09-09 | End: 2021-09-09 | Stop reason: HOSPADM

## 2021-09-09 RX ORDER — NICOTINE POLACRILEX 4 MG
15-30 LOZENGE BUCCAL
Status: DISCONTINUED | OUTPATIENT
Start: 2021-09-09 | End: 2021-09-09 | Stop reason: HOSPADM

## 2021-09-09 RX ADMIN — HYDROMORPHONE HYDROCHLORIDE 0.5 MG: 1 INJECTION, SOLUTION INTRAMUSCULAR; INTRAVENOUS; SUBCUTANEOUS at 10:11

## 2021-09-09 RX ADMIN — TAZOBACTAM SODIUM AND PIPERACILLIN SODIUM 3.38 G: 375; 3 INJECTION, SOLUTION INTRAVENOUS at 04:10

## 2021-09-09 RX ADMIN — POTASSIUM CHLORIDE, DEXTROSE MONOHYDRATE AND SODIUM CHLORIDE: 150; 5; 450 INJECTION, SOLUTION INTRAVENOUS at 05:21

## 2021-09-09 RX ADMIN — INSULIN GLARGINE 30 UNITS: 100 INJECTION, SOLUTION SUBCUTANEOUS at 10:22

## 2021-09-09 RX ADMIN — VANCOMYCIN HYDROCHLORIDE 1500 MG: 10 INJECTION, POWDER, LYOPHILIZED, FOR SOLUTION INTRAVENOUS at 06:14

## 2021-09-09 RX ADMIN — TAZOBACTAM SODIUM AND PIPERACILLIN SODIUM 3.38 G: 375; 3 INJECTION, SOLUTION INTRAVENOUS at 09:13

## 2021-09-09 RX ADMIN — ONDANSETRON 4 MG: 2 INJECTION INTRAMUSCULAR; INTRAVENOUS at 09:30

## 2021-09-09 RX ADMIN — Medication 3 UNITS/HR: at 00:02

## 2021-09-09 RX ADMIN — HYDROMORPHONE HYDROCHLORIDE 0.5 MG: 1 INJECTION, SOLUTION INTRAMUSCULAR; INTRAVENOUS; SUBCUTANEOUS at 01:16

## 2021-09-09 ASSESSMENT — ACTIVITIES OF DAILY LIVING (ADL)
ADLS_ACUITY_SCORE: 11
ADLS_ACUITY_SCORE: 12

## 2021-09-09 NOTE — PLAN OF CARE
ICU End of Shift Summary.  For vital signs and complete assessments, please see documentation flowsheets.     Pertinent assessments: VSS-Tele sr- Afebrile-  A&OX$  Major Shift Events: Insulin Gtt stopped and lantus and slideing scale insulin started.  Tolerating diet- Discharged to home- instructions given, questions answered,  Plan (Upcoming Events): discharged to home  Discharge/Transfer Needs: None    Bedside Shift Report Completed :   Bedside Safety Check Completed:

## 2021-09-09 NOTE — PROGRESS NOTES
Patient wants to leave this morning and declining to stay.  -D/rita antibiotics, denied IVDU.  -D/rita ID consult.

## 2021-09-09 NOTE — PROGRESS NOTES
DATE:  9/9/2021   TIME OF RECEIPT FROM LAB: 0605  LAB TEST: Ketone  LAB VALUE:  1.7  RESULTS GIVEN WITH READ-BACK TO (PROVIDER):  Tele Hub  TIME LAB VALUE REPORTED TO PROVIDER:  0608

## 2021-09-09 NOTE — PLAN OF CARE
ICU End of Shift Summary.  For vital signs and complete assessments, please see documentation flowsheets.     Pertinent assessments: pt on insulin drip, back on alg #1. Pt complained of pain X1 overnight, IV dilaudid given with relief. Pt denied nausea. Pt on IV vanco and zosyn for recent endocarditis.   Major Shift Events: titrating insulin drip  Plan (Upcoming Events): wean off drip  Discharge/Transfer Needs: none    Bedside Shift Report Completed : y  Bedside Safety Check Completed:y       intact

## 2021-09-09 NOTE — PLAN OF CARE
ICU End of Shift Summary.  For vital signs and complete assessments, please see documentation flowsheets.     Pertinent assessments: Alert & oriented x4, afebrile, Pain consistently high despite multiple PRNs for both pain and nausea. Tele SR to ST, BP on the higher side but WNL. RA, lungs clear, chest xray unremarkable. Using urinal to void, gas - no BM, 2 view abdominal xray unremarkable.   Major Shift Events:   - Insulin gtt algorithm 3  - D5 fluids started per orders.   - Labs: pH 7.18 chelsy to 7.31 by end of shift, Ketones 5.6 decreased to 2.6 by end of shift, Anion gap high 15.  Plan (Upcoming Events): Continue to monitor BG, continue cares  Discharge/Transfer Needs: TBD    Bedside Shift Report Completed   Bedside Safety Check Completed

## 2021-09-09 NOTE — UTILIZATION REVIEW
"  Admission Status; Secondary Review Determination         Under the authority of the Utilization Management Committee, the utilization review process indicated a secondary review on the above patient.  The review outcome is based on review of the medical records, discussions with staff, and applying clinical experience noted on the date of the review.          (x) Observation Status Appropriate - This patient does not meet hospital inpatient criteria and is placed in observation status. If this patient's primary payer is Medicare and was admitted as an inpatient, Condition Code 44 should be used and patient status changed to \"observation\".     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  Patient was in DKA started on IV insulin, acidosis resolved, he will be discharged per his request this morning according to the attending physician.  The severity of illness, intensity of service provided, expected LOS and risk for adverse outcome make the care appropriate for further observation; however, doesn't meet criteria for hospital inpatient admission. Dr Blackman notified of this determination.    This document was produced using voice recognition software.      The information on this document is developed by the utilization review team in order for the business office to ensure compliance.  This only denotes the appropriateness of proper admission status and does not reflect the quality of care rendered.         The definitions of Inpatient Status and Observation Status used in making the determination above are those provided in the CMS Coverage Manual, Chapter 1 and Chapter 6, section 70.4.      Sincerely,     KAYLAH WILLIAMSON MD    System Medical Director  Utilization Management  [facility].

## 2021-09-09 NOTE — PHARMACY-VANCOMYCIN DOSING SERVICE
"Pharmacy Vancomycin Initial Note  Date of Service 2021  Patient's  1990  31 year old, male    Indication: Sepsis    Current estimated CrCl = Estimated Creatinine Clearance: 149.7 mL/min (based on SCr of 0.67 mg/dL).    Creatinine for last 3 days  2021: 12:49 PM Creatinine 0.66 mg/dL;  3:21 PM Creatinine 0.61 mg/dL;  6:41 PM Creatinine 0.67 mg/dL    Recent Vancomycin Level(s) for last 3 days  No results found for requested labs within last 72 hours.      Vancomycin IV Administrations (past 72 hours)                   vancomycin 1500 mg in 0.9% NaCl 250 ml intermittent infusion 1,500 mg (mg) 1,500 mg New Bag 21 1728                Nephrotoxins and other renal medications (From now, onward)    Start     Dose/Rate Route Frequency Ordered Stop    21 0530  vancomycin 1500 mg in 0.9% NaCl 250 ml intermittent infusion 1,500 mg      1,500 mg  over 90 Minutes Intravenous EVERY 12 HOURS 21 1910      21 2200  piperacillin-tazobactam (ZOSYN) infusion 3.375 g     Note to Pharmacy: For [facility], [facility] and [facility]: For Zosyn-naive patients, use the \"Zosyn initial dose + extended infusion\" order panel.    3.375 g  100 mL/hr over 30 Minutes Intravenous EVERY 6 HOURS 21 1646            Contrast Orders - past 72 hours (72h ago, onward)    None        Loading dose: 1500 mg at 17:30 2021.  Regimen: 1500 mg IV every 12 hours.  Start time: 19:10 on 2021  Exposure target: AUC24 (range)400-600 mg/L.hr   AUC24,ss: 555 mg/L.hr  Probability of AUC24 > 400: 80 %  Ctrough,ss: 15.1 mg/L  Probability of Ctrough,ss > 20: 31 %  Probability of nephrotoxicity (Lodise NIDIA ): 10 %          Plan:  1. Start vancomycin  1500 mg IV q12h.   2. Vancomycin monitoring method: AUC  3. Vancomycin therapeutic monitoring goal: 400-600 mg*h/L  4. Pharmacy will check vancomycin levels as appropriate in 1-3 Days.    5. Serum creatinine levels will be ordered daily for the first week of therapy and at least " twice weekly for subsequent weeks.      Sherlyn Madrid RP

## 2021-09-09 NOTE — DISCHARGE SUMMARY
[facility]  Hospitalist Discharge Summary      Date of Admission:  9/8/2021  Date of Discharge:  9/9/2021  1:00 PM  Discharging Provider: Jose De Jesus Blackman MD      Discharge Diagnoses   DKA due to noncompliance with insulin.  Diabetes mellitus type 1  History of schizophrenia  Homelessness  Lactic acidosis  Marijuana use disorder  SIRS, non infectious, secondary to DKA,    Follow-ups Needed After Discharge   Follow-up Appointments     Follow-up and recommended labs and tests       Follow up with primary care provider, Bogdan Jhaveri MD, within 7 days   for hospital follow- up.  The following labs/tests are recommended: CBC,   BMP.             Unresulted Labs Ordered in the Past 30 Days of this Admission     Date and Time Order Name Status Description    9/8/2021 12:23 PM Blood Culture Arm, Left Preliminary     9/8/2021 12:23 PM Blood Culture Peripheral Blood Preliminary           Discharge Disposition   Discharged to home  Condition at discharge: [facility]    Hospital Course   Please see H&P done by Dr. Ruy Pack yesterday for detailed information.  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  He was started on DKA protocol.  Ketones trended down, anion gap closed, bicarb also normalized.  The plan was to change to subcutaneous insulin, and monitor the patient in the hospital for 1 more day.  Patient declined to stay and discharged.  Patient was started on IV antibiotic on admission due to prior history of IV drug abuse and endocarditis.  Patient stated he only uses marijuana only. His urine toxicology is also consistent with the history.  There was reported drug paraphernalia in his motel room, he was concerned on cessation of using drugs.  Patient advised  to be compliant with his insulin.  He was advised to follow-up with his primary care provider as well.  Patient stated he is homeless and keeps his insulin in the cooler and keeps it in his car.   Patient has multiple admission to hospital at different places for the same reason of DKA.  He is at increased risk of readmission.  Patient has non infectious SIRS, which improved with hydration and correction of the acidosis.  All his question concerns addressed    Consultations This Hospital Stay   PHARMACY TO DOSE VANCO  VASCULAR ACCESS ADULT IP CONSULT  PHARMACY TO DOSE VANCO  INFECTIOUS DISEASES IP CONSULT    Code Status   Prior    Time Spent on this Encounter   I, Jose De Jesus Blackman MD, personally saw the patient today and spent less than or equal to 30 minutes discharging this patient.       Jose De Jesus Blackman MD  [facility] ICU  [address] [place] [address]  Phone: [phone]  Fax: [phone]  ______________________________________________________________________    Physical Exam   Vital Signs: Temp: 98.1  F (36.7  C) Temp src: Oral BP: 106/74 Pulse: 104   Resp: 11 SpO2: 99 % O2 Device: None (Room air)    Weight: 148 lbs 12.97 oz  See H&P no change       Primary Care Physician   Bogdan Jhaveri MD    Discharge Orders      Reason for your hospital stay    DKA     Follow-up and recommended labs and tests     Follow up with primary care provider, Bogdan Jhaveri MD, within 7 days for hospital follow- up.  The following labs/tests are recommended: CBC, BMP.     Activity    Your activity upon discharge: activity as tolerated     Diet    Follow this diet upon discharge: Orders Placed This Encounter      Consistent Carb 60 grams CHO per Meal Diet       Significant Results and Procedures   Most Recent 3 CBC's:Recent Labs   Lab Test 09/09/21  0532 09/08/21  1322 08/02/21  0501   WBC 12.6* 16.4* 22.6*   HGB 11.0* 13.4 13.9   MCV 79 81 81    258 377     Most Recent 3  BMP's:Recent Labs   Lab Test 09/09/21  1203 09/09/21  1059 09/09/21  1000 09/09/21  0533 09/09/21  0145 09/08/21  2213   NA  --   --   --  137 134 136   POTASSIUM  --   --   --  4.5 4.3 4.5   CHLORIDE  --   --   --  110* 109 107   CO2  --   --   --  22 22 20   BUN  --   --   --  17 18 18   CR  --   --   --  0.67 0.65* 0.60*   ANIONGAP  --   --   --  5 3 9   LYNN  --   --   --  8.3* 8.0* 8.4*   * 119* 118* 109* 145* 178*       Discharge Medications   Discharge Medication List as of 9/9/2021 12:23 PM      CONTINUE these medications which have CHANGED    Details   insulin glargine (LANTUS PEN) 100 UNIT/ML pen Inject 30 Units Subcutaneous every morning, No Print OutIf Lantus is not covered by insurance, may substitute Basaglar at same dose and frequency.           CONTINUE these medications which have NOT CHANGED    Details   albuterol (PROAIR HFA/PROVENTIL HFA/VENTOLIN HFA) 108 (90 Base) MCG/ACT inhaler Inhale 2 puffs into the lungs every 4 hours as needed for shortness of breath / dyspnea or wheezing, HistoricalPharmacy may dispense brand covered by insurance (Proair, or proventil or ventolin or generic albuterol inhaler)      !! insulin aspart (NOVOLOG FLEXPEN) 100 UNIT/ML pen Inject 10 Units Subcutaneous 3 times daily (with meals), Historical      !! insulin aspart (NOVOLOG PEN) 100 UNIT/ML pen Inject Subcutaneous 3 times daily (with meals) Sliding scale with meals as below  Glucose 130-150 0   Glucose 151-200 2 units   Glucose 201-250 4 units   Glucose 251-300 6 units   Glucose 301-350 8 units   Glucose 351-400 10 units   Glucose 401-500 12 un its   Glucose GREATER THAN 501 14 units and call provider, Historical      OLANZapine (ZYPREXA) 10 MG tablet Take 10 mg by mouth At Bedtime, Historical       !! - Potential duplicate medications found. Please discuss with provider.        Allergies   No Known Allergies

## 2021-09-10 ENCOUNTER — PATIENT OUTREACH (OUTPATIENT)
Dept: CARE COORDINATION | Facility: CLINIC | Age: 31
End: 2021-09-10

## 2021-09-10 DIAGNOSIS — Z71.89 OTHER SPECIFIED COUNSELING: ICD-10-CM

## 2021-09-10 NOTE — PROGRESS NOTES
Clinic Care Coordination Contact  Clinic Care Coordination Contact  [facility]/Voicemail       Clinical Data: TCM Outreach    Outreach attempted x 2. Contact number for pt listed is his brother's. SW called and his brother states that he is not there right now.    Plan: CC ZACHARY will attempt to reach pt again later in the day.    Addendum 9/10/21: CC ZACHARY called back and left message asking pt's brother to have pt call me back if able.    COOPER Kurtz   Social Work Clinic Care Coordinator   [facility]  PH: [phone]  [email or web address]

## 2021-09-13 LAB
BACTERIA BLD CULT: NO GROWTH
BACTERIA BLD CULT: NO GROWTH

## 2021-09-17 ENCOUNTER — HOSPITAL ENCOUNTER (INPATIENT)
Facility: CLINIC | Age: 31
LOS: 1 days | Discharge: LEFT BEFORE DISCHARGE | DRG: 638 | End: 2021-09-18
Attending: EMERGENCY MEDICINE | Admitting: INTERNAL MEDICINE
Payer: MEDICARE

## 2021-09-17 DIAGNOSIS — E10.10 DIABETIC KETOACIDOSIS WITHOUT COMA ASSOCIATED WITH TYPE 1 DIABETES MELLITUS (H): ICD-10-CM

## 2021-09-17 LAB
ANION GAP SERPL CALCULATED.3IONS-SCNC: 25 MMOL/L (ref 3–14)
ANION GAP SERPL CALCULATED.3IONS-SCNC: 8 MMOL/L (ref 3–14)
BASE EXCESS BLDV CALC-SCNC: -13.6 MMOL/L (ref -7.7–1.9)
BASOPHILS # BLD AUTO: 0.1 10E3/UL (ref 0–0.2)
BASOPHILS NFR BLD AUTO: 1 %
BUN SERPL-MCNC: 17 MG/DL (ref 7–30)
BUN SERPL-MCNC: 23 MG/DL (ref 7–30)
CALCIUM SERPL-MCNC: 8.3 MG/DL (ref 8.5–10.1)
CALCIUM SERPL-MCNC: 9.8 MG/DL (ref 8.5–10.1)
CHLORIDE BLD-SCNC: 106 MMOL/L (ref 94–109)
CHLORIDE BLD-SCNC: 95 MMOL/L (ref 94–109)
CO2 SERPL-SCNC: 13 MMOL/L (ref 20–32)
CO2 SERPL-SCNC: 24 MMOL/L (ref 20–32)
CREAT SERPL-MCNC: 0.63 MG/DL (ref 0.66–1.25)
CREAT SERPL-MCNC: 0.71 MG/DL (ref 0.66–1.25)
EOSINOPHIL # BLD AUTO: 0 10E3/UL (ref 0–0.7)
EOSINOPHIL NFR BLD AUTO: 0 %
ERYTHROCYTE [DISTWIDTH] IN BLOOD BY AUTOMATED COUNT: 17.2 % (ref 10–15)
GFR SERPL CREATININE-BSD FRML MDRD: >90 ML/MIN/1.73M2
GFR SERPL CREATININE-BSD FRML MDRD: >90 ML/MIN/1.73M2
GLUCOSE BLD-MCNC: 267 MG/DL (ref 70–99)
GLUCOSE BLD-MCNC: 608 MG/DL (ref 70–99)
GLUCOSE BLDC GLUCOMTR-MCNC: 213 MG/DL (ref 70–99)
GLUCOSE BLDC GLUCOMTR-MCNC: 225 MG/DL (ref 70–99)
GLUCOSE BLDC GLUCOMTR-MCNC: 235 MG/DL (ref 70–99)
GLUCOSE BLDC GLUCOMTR-MCNC: 238 MG/DL (ref 70–99)
GLUCOSE BLDC GLUCOMTR-MCNC: 339 MG/DL (ref 70–99)
GLUCOSE BLDC GLUCOMTR-MCNC: 451 MG/DL (ref 70–99)
GLUCOSE BLDC GLUCOMTR-MCNC: 506 MG/DL (ref 70–99)
GLUCOSE BLDC GLUCOMTR-MCNC: 522 MG/DL (ref 70–99)
GLUCOSE BLDC GLUCOMTR-MCNC: 586 MG/DL (ref 70–99)
HCO3 BLDV-SCNC: 14 MMOL/L (ref 21–28)
HCT VFR BLD AUTO: 39.7 % (ref 40–53)
HGB BLD-MCNC: 12.2 G/DL (ref 13.3–17.7)
IMM GRANULOCYTES # BLD: 0.2 10E3/UL
IMM GRANULOCYTES NFR BLD: 2 %
KETONES BLD-SCNC: 5.7 MMOL/L (ref 0–0.6)
LIPASE SERPL-CCNC: 53 U/L (ref 73–393)
LYMPHOCYTES # BLD AUTO: 1.5 10E3/UL (ref 0.8–5.3)
LYMPHOCYTES NFR BLD AUTO: 14 %
MAGNESIUM SERPL-MCNC: 2.2 MG/DL (ref 1.6–2.3)
MCH RBC QN AUTO: 25.4 PG (ref 26.5–33)
MCHC RBC AUTO-ENTMCNC: 30.7 G/DL (ref 31.5–36.5)
MCV RBC AUTO: 83 FL (ref 78–100)
MONOCYTES # BLD AUTO: 0.5 10E3/UL (ref 0–1.3)
MONOCYTES NFR BLD AUTO: 4 %
NEUTROPHILS # BLD AUTO: 8.8 10E3/UL (ref 1.6–8.3)
NEUTROPHILS NFR BLD AUTO: 79 %
NRBC # BLD AUTO: 0 10E3/UL
NRBC BLD AUTO-RTO: 0 /100
O2/TOTAL GAS SETTING VFR VENT: 0 %
OXYHGB MFR BLDV: 92 % (ref 70–75)
PCO2 BLDV: 36 MM HG (ref 40–50)
PH BLDV: 7.19 [PH] (ref 7.32–7.43)
PLATELET # BLD AUTO: 278 10E3/UL (ref 150–450)
PO2 BLDV: 81 MM HG (ref 25–47)
POTASSIUM BLD-SCNC: 4.3 MMOL/L (ref 3.4–5.3)
POTASSIUM BLD-SCNC: 4.4 MMOL/L (ref 3.4–5.3)
RBC # BLD AUTO: 4.81 10E6/UL (ref 4.4–5.9)
SARS-COV-2 RNA RESP QL NAA+PROBE: NEGATIVE
SODIUM SERPL-SCNC: 133 MMOL/L (ref 133–144)
SODIUM SERPL-SCNC: 138 MMOL/L (ref 133–144)
WBC # BLD AUTO: 11 10E3/UL (ref 4–11)

## 2021-09-17 PROCEDURE — C9803 HOPD COVID-19 SPEC COLLECT: HCPCS

## 2021-09-17 PROCEDURE — 96366 THER/PROPH/DIAG IV INF ADDON: CPT

## 2021-09-17 PROCEDURE — 36415 COLL VENOUS BLD VENIPUNCTURE: CPT | Performed by: INTERNAL MEDICINE

## 2021-09-17 PROCEDURE — 96365 THER/PROPH/DIAG IV INF INIT: CPT

## 2021-09-17 PROCEDURE — 80048 BASIC METABOLIC PNL TOTAL CA: CPT | Performed by: INTERNAL MEDICINE

## 2021-09-17 PROCEDURE — 258N000003 HC RX IP 258 OP 636: Performed by: INTERNAL MEDICINE

## 2021-09-17 PROCEDURE — 250N000012 HC RX MED GY IP 250 OP 636 PS 637: Performed by: EMERGENCY MEDICINE

## 2021-09-17 PROCEDURE — 258N000003 HC RX IP 258 OP 636: Performed by: EMERGENCY MEDICINE

## 2021-09-17 PROCEDURE — 82010 KETONE BODYS QUAN: CPT | Performed by: EMERGENCY MEDICINE

## 2021-09-17 PROCEDURE — 85025 COMPLETE CBC W/AUTO DIFF WBC: CPT | Performed by: EMERGENCY MEDICINE

## 2021-09-17 PROCEDURE — 999N000128 HC STATISTIC PERIPHERAL IV START W/O US GUIDANCE

## 2021-09-17 PROCEDURE — 250N000012 HC RX MED GY IP 250 OP 636 PS 637: Performed by: INTERNAL MEDICINE

## 2021-09-17 PROCEDURE — 250N000011 HC RX IP 250 OP 636: Performed by: INTERNAL MEDICINE

## 2021-09-17 PROCEDURE — 80048 BASIC METABOLIC PNL TOTAL CA: CPT | Performed by: EMERGENCY MEDICINE

## 2021-09-17 PROCEDURE — 96375 TX/PRO/DX INJ NEW DRUG ADDON: CPT

## 2021-09-17 PROCEDURE — 99291 CRITICAL CARE FIRST HOUR: CPT

## 2021-09-17 PROCEDURE — 87635 SARS-COV-2 COVID-19 AMP PRB: CPT | Performed by: EMERGENCY MEDICINE

## 2021-09-17 PROCEDURE — 83735 ASSAY OF MAGNESIUM: CPT | Performed by: EMERGENCY MEDICINE

## 2021-09-17 PROCEDURE — 258N000001 HC RX 258: Performed by: EMERGENCY MEDICINE

## 2021-09-17 PROCEDURE — 36415 COLL VENOUS BLD VENIPUNCTURE: CPT | Performed by: EMERGENCY MEDICINE

## 2021-09-17 PROCEDURE — 210N000002 HC R&B HEART CARE

## 2021-09-17 PROCEDURE — 250N000011 HC RX IP 250 OP 636: Performed by: EMERGENCY MEDICINE

## 2021-09-17 PROCEDURE — C9113 INJ PANTOPRAZOLE SODIUM, VIA: HCPCS | Performed by: INTERNAL MEDICINE

## 2021-09-17 PROCEDURE — 83690 ASSAY OF LIPASE: CPT | Performed by: EMERGENCY MEDICINE

## 2021-09-17 PROCEDURE — 250N000009 HC RX 250

## 2021-09-17 PROCEDURE — 99223 1ST HOSP IP/OBS HIGH 75: CPT | Mod: AI | Performed by: INTERNAL MEDICINE

## 2021-09-17 PROCEDURE — 99207 PR NO CHARGE LOS: CPT | Performed by: INTERNAL MEDICINE

## 2021-09-17 PROCEDURE — 82805 BLOOD GASES W/O2 SATURATION: CPT | Performed by: EMERGENCY MEDICINE

## 2021-09-17 PROCEDURE — 250N000013 HC RX MED GY IP 250 OP 250 PS 637: Performed by: INTERNAL MEDICINE

## 2021-09-17 PROCEDURE — 96372 THER/PROPH/DIAG INJ SC/IM: CPT

## 2021-09-17 RX ORDER — DEXTROSE MONOHYDRATE 25 G/50ML
25-50 INJECTION, SOLUTION INTRAVENOUS
Status: DISCONTINUED | OUTPATIENT
Start: 2021-09-17 | End: 2021-09-18 | Stop reason: HOSPADM

## 2021-09-17 RX ORDER — ONDANSETRON 4 MG/1
4 TABLET, ORALLY DISINTEGRATING ORAL EVERY 6 HOURS PRN
Status: DISCONTINUED | OUTPATIENT
Start: 2021-09-17 | End: 2021-09-18 | Stop reason: HOSPADM

## 2021-09-17 RX ORDER — LORAZEPAM 2 MG/ML
.5-1 INJECTION INTRAMUSCULAR EVERY 4 HOURS PRN
Status: DISCONTINUED | OUTPATIENT
Start: 2021-09-17 | End: 2021-09-18 | Stop reason: HOSPADM

## 2021-09-17 RX ORDER — HYDROXYZINE HYDROCHLORIDE 25 MG/1
25 TABLET, FILM COATED ORAL EVERY 4 HOURS PRN
Status: DISCONTINUED | OUTPATIENT
Start: 2021-09-17 | End: 2021-09-18 | Stop reason: HOSPADM

## 2021-09-17 RX ORDER — ONDANSETRON 4 MG/1
4 TABLET, ORALLY DISINTEGRATING ORAL EVERY 8 HOURS PRN
COMMUNITY
End: 2021-09-17

## 2021-09-17 RX ORDER — OLANZAPINE 10 MG/2ML
5 INJECTION, POWDER, FOR SOLUTION INTRAMUSCULAR DAILY PRN
Status: DISCONTINUED | OUTPATIENT
Start: 2021-09-17 | End: 2021-09-17

## 2021-09-17 RX ORDER — WATER 10 ML/10ML
INJECTION INTRAMUSCULAR; INTRAVENOUS; SUBCUTANEOUS
Status: COMPLETED
Start: 2021-09-17 | End: 2021-09-17

## 2021-09-17 RX ORDER — SODIUM CHLORIDE 450 MG/100ML
1000 INJECTION, SOLUTION INTRAVENOUS CONTINUOUS
Status: DISCONTINUED | OUTPATIENT
Start: 2021-09-17 | End: 2021-09-18 | Stop reason: HOSPADM

## 2021-09-17 RX ORDER — PANTOPRAZOLE SODIUM 40 MG/1
40 TABLET, DELAYED RELEASE ORAL DAILY
Status: DISCONTINUED | OUTPATIENT
Start: 2021-09-18 | End: 2021-09-18 | Stop reason: HOSPADM

## 2021-09-17 RX ORDER — LIDOCAINE 40 MG/G
CREAM TOPICAL
Status: DISCONTINUED | OUTPATIENT
Start: 2021-09-17 | End: 2021-09-18 | Stop reason: HOSPADM

## 2021-09-17 RX ORDER — PANTOPRAZOLE SODIUM 40 MG/1
40 TABLET, DELAYED RELEASE ORAL DAILY
COMMUNITY
End: 2021-09-17

## 2021-09-17 RX ORDER — HYDROXYZINE HYDROCHLORIDE 25 MG/1
25 TABLET, FILM COATED ORAL EVERY 4 HOURS PRN
Status: DISCONTINUED | OUTPATIENT
Start: 2021-09-17 | End: 2021-09-17

## 2021-09-17 RX ORDER — ONDANSETRON 2 MG/ML
4 INJECTION INTRAMUSCULAR; INTRAVENOUS EVERY 6 HOURS PRN
Status: DISCONTINUED | OUTPATIENT
Start: 2021-09-17 | End: 2021-09-18 | Stop reason: HOSPADM

## 2021-09-17 RX ORDER — NITROGLYCERIN 0.4 MG/1
0.4 TABLET SUBLINGUAL EVERY 5 MIN PRN
Status: DISCONTINUED | OUTPATIENT
Start: 2021-09-17 | End: 2021-09-18 | Stop reason: HOSPADM

## 2021-09-17 RX ORDER — HYDROXYZINE HYDROCHLORIDE 25 MG/1
50 TABLET, FILM COATED ORAL EVERY 4 HOURS PRN
Status: DISCONTINUED | OUTPATIENT
Start: 2021-09-17 | End: 2021-09-18 | Stop reason: HOSPADM

## 2021-09-17 RX ORDER — OXYCODONE AND ACETAMINOPHEN 5; 325 MG/1; MG/1
1 TABLET ORAL EVERY 6 HOURS PRN
COMMUNITY
End: 2023-08-03

## 2021-09-17 RX ORDER — OLANZAPINE 5 MG/1
10 TABLET ORAL AT BEDTIME
Status: DISCONTINUED | OUTPATIENT
Start: 2021-09-17 | End: 2021-09-18 | Stop reason: HOSPADM

## 2021-09-17 RX ORDER — POTASSIUM CHLORIDE 7.45 MG/ML
10 INJECTION INTRAVENOUS ONCE
Status: DISCONTINUED | OUTPATIENT
Start: 2021-09-17 | End: 2021-09-17

## 2021-09-17 RX ORDER — INSULIN LISPRO 100 [IU]/ML
INJECTION, SOLUTION INTRAVENOUS; SUBCUTANEOUS
COMMUNITY
End: 2021-09-17

## 2021-09-17 RX ORDER — ONDANSETRON 4 MG/1
4 TABLET, ORALLY DISINTEGRATING ORAL EVERY 8 HOURS PRN
Status: DISCONTINUED | OUTPATIENT
Start: 2021-09-17 | End: 2021-09-17

## 2021-09-17 RX ORDER — HYDROMORPHONE HYDROCHLORIDE 1 MG/ML
0.5 INJECTION, SOLUTION INTRAMUSCULAR; INTRAVENOUS; SUBCUTANEOUS
Status: DISCONTINUED | OUTPATIENT
Start: 2021-09-17 | End: 2021-09-17

## 2021-09-17 RX ORDER — DEXTROSE MONOHYDRATE 25 G/50ML
25-50 INJECTION, SOLUTION INTRAVENOUS ONCE
Status: COMPLETED | OUTPATIENT
Start: 2021-09-17 | End: 2021-09-17

## 2021-09-17 RX ORDER — LORAZEPAM 2 MG/ML
.5-1 INJECTION INTRAMUSCULAR EVERY 4 HOURS PRN
Status: DISCONTINUED | OUTPATIENT
Start: 2021-09-17 | End: 2021-09-17

## 2021-09-17 RX ORDER — ALBUTEROL SULFATE 90 UG/1
2 AEROSOL, METERED RESPIRATORY (INHALATION) EVERY 4 HOURS PRN
Status: DISCONTINUED | OUTPATIENT
Start: 2021-09-17 | End: 2021-09-18 | Stop reason: HOSPADM

## 2021-09-17 RX ORDER — ONDANSETRON 2 MG/ML
4 INJECTION INTRAMUSCULAR; INTRAVENOUS EVERY 30 MIN PRN
Status: DISCONTINUED | OUTPATIENT
Start: 2021-09-17 | End: 2021-09-17

## 2021-09-17 RX ORDER — SODIUM CHLORIDE 9 MG/ML
INJECTION, SOLUTION INTRAVENOUS CONTINUOUS
Status: DISCONTINUED | OUTPATIENT
Start: 2021-09-17 | End: 2021-09-18 | Stop reason: HOSPADM

## 2021-09-17 RX ORDER — HYDROXYZINE HYDROCHLORIDE 25 MG/1
50 TABLET, FILM COATED ORAL EVERY 4 HOURS PRN
Status: DISCONTINUED | OUTPATIENT
Start: 2021-09-17 | End: 2021-09-17

## 2021-09-17 RX ORDER — NICOTINE POLACRILEX 4 MG
15-30 LOZENGE BUCCAL
Status: DISCONTINUED | OUTPATIENT
Start: 2021-09-17 | End: 2021-09-18 | Stop reason: HOSPADM

## 2021-09-17 RX ADMIN — PANTOPRAZOLE SODIUM 40 MG: 40 INJECTION, POWDER, FOR SOLUTION INTRAVENOUS at 17:29

## 2021-09-17 RX ADMIN — SODIUM CHLORIDE 1000 ML: 9 INJECTION, SOLUTION INTRAVENOUS at 16:36

## 2021-09-17 RX ADMIN — SODIUM CHLORIDE: 9 INJECTION, SOLUTION INTRAVENOUS at 20:31

## 2021-09-17 RX ADMIN — HYDROXYZINE HYDROCHLORIDE 25 MG: 25 TABLET ORAL at 18:52

## 2021-09-17 RX ADMIN — INSULIN GLARGINE 40 UNITS: 100 INJECTION, SOLUTION SUBCUTANEOUS at 21:57

## 2021-09-17 RX ADMIN — SODIUM CHLORIDE 1000 ML: 9 INJECTION, SOLUTION INTRAVENOUS at 15:52

## 2021-09-17 RX ADMIN — DEXTROSE MONOHYDRATE 50 ML: 500 INJECTION PARENTERAL at 18:37

## 2021-09-17 RX ADMIN — INSULIN ASPART 2 UNITS: 100 INJECTION, SOLUTION INTRAVENOUS; SUBCUTANEOUS at 21:53

## 2021-09-17 RX ADMIN — HYDROMORPHONE HYDROCHLORIDE 0.5 MG: 1 INJECTION, SOLUTION INTRAMUSCULAR; INTRAVENOUS; SUBCUTANEOUS at 16:08

## 2021-09-17 RX ADMIN — PROCHLORPERAZINE EDISYLATE 5 MG: 5 INJECTION INTRAMUSCULAR; INTRAVENOUS at 20:10

## 2021-09-17 RX ADMIN — ONDANSETRON 4 MG: 2 INJECTION INTRAMUSCULAR; INTRAVENOUS at 15:51

## 2021-09-17 RX ADMIN — WATER 10 ML: 1 INJECTION INTRAMUSCULAR; INTRAVENOUS; SUBCUTANEOUS at 18:53

## 2021-09-17 RX ADMIN — DEXTROSE AND SODIUM CHLORIDE: 5; 900 INJECTION, SOLUTION INTRAVENOUS at 21:45

## 2021-09-17 RX ADMIN — OLANZAPINE 10 MG: 5 TABLET, FILM COATED ORAL at 21:52

## 2021-09-17 RX ADMIN — OLANZAPINE 5 MG: 10 INJECTION, POWDER, LYOPHILIZED, FOR SOLUTION INTRAMUSCULAR at 18:37

## 2021-09-17 RX ADMIN — SODIUM CHLORIDE 5.5 UNITS/HR: 9 INJECTION, SOLUTION INTRAVENOUS at 17:07

## 2021-09-17 NOTE — ED TRIAGE NOTES
Rapid response called on [id number]. Pt here at [facility] visiting Brother. Pt collapsed at Nursing Station desk. BS in 500s known diabetic, woke up vomiting. HR 110s, SPO2: 95%. Pt alert and throwing up on arrival to Triage.

## 2021-09-17 NOTE — H&P
[facility]    History and Physical  Hospitalist       Date of Admission:  9/17/2021    Assessment & Plan   31 year old male with past medical hx of DM type 1 and frequent DKA, who presents with nausea, vomiting and abdominal pain:    Summary:    Principal Problem:    DKA related to DM type 1 -- moderate with Bicarb 13   -- insulin drip and then resume Long acting insulin    -- aggressive IV hydration       Abdominal pain, possibly related to gastroparesis   -- Atarax 25-50 mg q4hr prn   -- try to minimize narcotics      Nausea with vomiting -- possibly cyclic vomiting related to Cannabis use   -- antiemetics, IV fluids, Antivan if needed     Active Problems:    Type 1 diabetes mellitus       Diabetic gastroparesis (H)      Diabetic neuropathy (H)      Homelessness      Paranoid schizophrenia        Plan:  Admit to [facility], he does not want me to call anyone.  Goal is to resolve this episode, but given the collection of mental and physical problems it will be challenging to keep him healthy.      DVT Prophylaxis: Pneumatic Compression Devices  Code Status: Full Code    Disposition: Expected discharge in 1-2 days     Reji Reyes Yennyjohanne  Pager: [phone]  Cell Phone:  [phone]     Primary Care Physician   Bogdan Jhaveri MD    Chief Complaint   Nausea and vomiting and abd pain    History is obtained from Patient    History of Present Illness   31 year old male with type 1 diabetes and frequent episodes of DKA, schizophrenia and frequent episodes of nausea and vomiting probably related to gastroparesis and possibly cyclic vomiting related to cannabis abuse -- who presents with abdominal pain vomiting and hyperglycemia.  Patient's had this numerous times in the past, 5 ER visits in August, and [facility] after episdoe of DKA.  He is homeless, but currently living in a hotel.  He does not have a car.   This episode started with nausea and vomiting -- not  "sure when he last took his insulin.  When asking him questions he frequently yells out \"Give me pain meds. I need Dilaudid\", and doesn't answer the question.    In ER, , BP 92/47, Bicarb 13, Glucose 608, pH 7.19.      PAST MEDICAL HISTORY    Past Medical History:   Diagnosis Date     Cannabis abuse      DKA (diabetic ketoacidoses) (H)     Frequent episodes related to non-compliance with insulin     Endocarditis 04/2021    MRSA, thought 2nd to IV drug use     Homeless      Schizophrenia (H)      Type 1 diabetes mellitus (H)      Uncomplicated asthma         PAST SURGICAL HISTORY    No past surgical history on file.     Prior to Admission Medications   Prior to Admission Medications   Prescriptions Last Dose Informant Patient Reported? Taking?   OLANZapine (ZYPREXA) 10 MG tablet   Yes No   Sig: Take 10 mg by mouth At Bedtime   albuterol (PROAIR HFA/PROVENTIL HFA/VENTOLIN HFA) 108 (90 Base) MCG/ACT inhaler  Self Yes No   Sig: Inhale 2 puffs into the lungs every 4 hours as needed for shortness of breath / dyspnea or wheezing   insulin aspart (NOVOLOG FLEXPEN) 100 UNIT/ML pen   Yes No   Sig: Inject 10 Units Subcutaneous 3 times daily (with meals)   insulin aspart (NOVOLOG PEN) 100 UNIT/ML pen  Self Yes No   Sig: Inject Subcutaneous 3 times daily (with meals) Sliding scale with meals as below  Glucose 130-150 0   Glucose 151-200 2 units   Glucose 201-250 4 units   Glucose 251-300 6 units   Glucose 301-350 8 units   Glucose 351-400 10 units   Glucose 401-500 12 units   Glucose GREATER THAN 501 14 units and call provider   insulin detemir (LEVEMIR PEN) 100 UNIT/ML pen   Yes Yes   Sig: Inject 30 Units Subcutaneous every morning    insulin glargine (LANTUS PEN) 100 UNIT/ML pen   No No   Sig: Inject 30 Units Subcutaneous every morning   insulin lispro (HUMALOG KWIKPEN) 100 UNIT/ML (1 unit dial) KWIKPEN   Yes Yes   ondansetron (ZOFRAN-ODT) 4 MG ODT tab   Yes Yes   Sig: Take 4 mg by mouth every 8 hours as needed for nausea "   oxyCODONE-acetaminophen (PERCOCET) 5-325 MG tablet   Yes Yes   Sig: Take 1 tablet by mouth every 6 hours as needed for severe pain   pantoprazole (PROTONIX) 40 MG EC tablet   Yes Yes   Sig: Take 40 mg by mouth daily      Facility-Administered Medications: None     Allergies   No Known Allergies    SOCIAL HISTORY    Social History     Social History Narrative    Single, homeless, currently living in a hotel.  (last updated 9/17/2021)       Social History     Tobacco Use     Smoking status: Never Smoker   Substance Use Topics     Alcohol use: No     Drug use: Yes     Types: Marijuana        FAMILY HISTORY    Family History   Problem Relation Age of Onset     Cerebrovascular Disease Father      Diabetes Brother         Review of Systems   Review of systems not obtained due to patient factors - lack of cooperation      PHYSICAL EXAM     Temp: 97.4  F (36.3  C)   BP: 131/80 Pulse: 112   Resp: 26 SpO2: 100 %      Vital Signs with Ranges  Temp:  [97.4  F (36.3  C)] 97.4  F (36.3  C)  Pulse:  [] 112  Resp:  [13-32] 26  BP: ()/(47-80) 131/80  SpO2:  [98 %-100 %] 100 %  0 lbs 0 oz    Constitutional: Awake, lying on stomach, at times yells out for pain meds, cachetic  Eyes: Conjunctiva and pupils examined and normal.  HEENT: Moist mucous membranes, normal dentition.  Respiratory: Clear to auscultation bilaterally, no crackles or wheezing.  Cardiovascular: Regular rate and rhythm, normal S1 and S2, and no murmur noted, no carotid bruits.  No ankle edema.   GI: Soft, non-distended, yells when his abdomin is touched but appears soft, bowel sounds present but decreased.   Lymph/Hematologic: No anterior cervical, supraclavicular or axillary adenopathy.  Skin: No rashes, no cyanosis.   Musculoskeletal: No joint swelling, erythema or tenderness.  Neurologic: Alert, minimally cooperative when answering questions, Cranial nerves 2-12 intact, no focal weakness noted (moving in bed appropriately)  Psychiatric:  Appears  quite stressed out, unable to have a conversation.     Data   Data reviewed today:  I personally reviewed no images or EKG's today.  Recent Labs   Lab 09/17/21  1704 09/17/21  1549 09/17/21  1531   WBC  --  11.0  --    HGB  --  12.2*  --    MCV  --  83  --    PLT  --  278  --    NA  --  133  --    POTASSIUM  --  4.3  --    CHLORIDE  --  95  --    CO2  --  13*  --    BUN  --  23  --    CR  --  0.71  --    ANIONGAP  --  25*  --    LYNN  --  9.8  --    * 608* 586*   LIPASE  --  53*  --        Imaging:  No results found for this or any previous visit (from the past 24 hour(s)).

## 2021-09-17 NOTE — ED PROVIDER NOTES
History   Chief Complaint:  Syncope       HPI   Reji Monahan is a 31 year old male with history of DKA, type 1 diabetes, schizophrenia and gastroparesis who presents with abdominal pain vomiting and hyperglycemia.  Patient's had this numerous times in the past.  Is asked Buerger's disorder as well.  He is questionable if he is homeless versus living in a hotel or his car.  There is care plan that he presents frequently in DKA and there is true that he is looking for pain control.  Patient was recently visiting somebody upstairs and started vomiting and has been vomiting at home as well.  Patient is fairly focused on his pain and gives a very limited history.    Review of Systems  Positive for vomiting hyperglycemia abdominal pain negative for fevers chills diarrhea or constipation all other systems negative    Allergies:  The patient has no known allergies.     Medications:  Lantus  Albuterol  Novolog  Zyprexa    Past Medical History:    Type 1 diabetes mellitus  Schizophrenia  Asthma  Sepsis  Diabetic ketoacidosis  MRSA carrier  ADD  GERD  Paroxysmal SVT  Phimosis  Bal[place]  Diabetic gastroparesis  Diabetic neuropathy  Asperger's disorder    Past Surgical History:    Insert PICC  Abscess drainage    Family History:    Father: Stroke  Brother: Type 1 diabetes  Mother: Hypertension, bipolar disorder    Social History:  Patient presents to the ED alone.    Physical Exam     Patient Vitals for the past 24 hrs:   BP Temp Pulse Resp SpO2   09/17/21 1630 98/51 -- 98 13 98 %   09/17/21 1615 90/48 -- 99 (!) 32 98 %   09/17/21 1608 -- -- -- 18 --   09/17/21 1555 92/47 -- 104 -- --   09/17/21 1500 92/47 97.4  F (36.3  C) 65 18 --       Physical Exam  GENERAL: Yells out frequently then gives very limited history with interaction  HEAD: atraumatic  EYES: pupils reactive, extraocular muscles intact, conjunctivae normal  ENT: Dry mucous membranes poor dentition  NECK:  trachea midline, normal range of motion  RESPIRATORY:  no tachypnea, breath sounds clear to auscultation   CVS: normal S1/S2, no murmurs, intact distal pulses  ABDOMEN: soft, nontender, nondistention  MUSCULOSKELETAL: no deformities  SKIN: warm and dry, no acute rashes or ulceration  NEURO: GCS 15, cranial nerves intact, alert and oriented x3  PSYCH:  Mood/affect normal        Emergency Department Course     No orders to display       Laboratory:  Labs Ordered and Resulted from Time of ED Arrival Up to the Time of Departure from the ED   GLUCOSE BY METER - Abnormal; Notable for the following components:       Result Value    GLUCOSE BY METER POCT 522 (*)     All other components within normal limits   BASIC METABOLIC PANEL - Abnormal; Notable for the following components:    Carbon Dioxide (CO2) 13 (*)     Anion Gap 25 (*)     Glucose 608 (*)     All other components within normal limits   LIPASE - Abnormal; Notable for the following components:    Lipase 53 (*)     All other components within normal limits   BLOOD GAS VENOUS WITH OXYHEMOGLOBIN - Abnormal; Notable for the following components:    pH Venous 7.19 (*)     pCO2 Venous 36 (*)     pO2 Venous 81 (*)     Bicarbonate Venous 14 (*)     Oxyhemoglobin Venous 92 (*)     Base Excess/Deficit (+/-) -13.6 (*)     All other components within normal limits   KETONE BETA-HYDROXYBUTYRATE QUANTITATIVE, RAPID - Abnormal; Notable for the following components:    Ketone (Beta-Hydroxybutyrate) Quantitative 5.7 (*)     All other components within normal limits   GLUCOSE BY METER - Abnormal; Notable for the following components:    GLUCOSE BY METER POCT 586 (*)     All other components within normal limits   CBC WITH PLATELETS AND DIFFERENTIAL - Abnormal; Notable for the following components:    Hemoglobin 12.2 (*)     Hematocrit 39.7 (*)     MCH 25.4 (*)     MCHC 30.7 (*)     RDW 17.2 (*)     Absolute Neutrophils 8.8 (*)     Absolute Immature Granulocytes 0.2 (*)     All other components within normal limits   GLUCOSE BY METER -  Abnormal; Notable for the following components:    GLUCOSE BY METER POCT 506 (*)     All other components within normal limits   MAGNESIUM - Normal   GLUCOSE MONITOR NURSING POCT   COVID-19 VIRUS (CORONAVIRUS) BY PCR   CARDIAC CONTINUOUS MONITORING   NOTIFY PHYSICIAN   PERIPHERAL IV CATHETER   CBC WITH PLATELETS & DIFFERENTIAL    Narrative:     The following orders were created for panel order CBC with platelets differential.  Procedure                               Abnormality         Status                     ---------                               -----------         ------                     CBC with platelets and d...[[id number]]  Abnormal            Final result                 Please view results for these tests on the individual orders.         Procedures  na    Emergency Department Course:    Reviewed:  I reviewed nursing notes, vitals, past medical history and care everywhere    Assessments:  I obtained history and examined the patient as noted above.     I rechecked the patient and explained findings.       Consults:   na    Interventions:  Medications   0.9% sodium chloride BOLUS (1,000 mLs Intravenous New Bag 9/17/21 1552)     Followed by   0.9% sodium chloride BOLUS (1,000 mLs Intravenous New Bag 9/17/21 1636)     Followed by   0.45% sodium chloride infusion (has no administration in time range)   ondansetron (ZOFRAN) injection 4 mg (4 mg Intravenous Given 9/17/21 1551)   HYDROmorphone (PF) (DILAUDID) injection 0.5 mg (0.5 mg Intravenous Given 9/17/21 1608)   insulin 1 unit/1 mL in NS (NovoLIN, HumuLIN Regular) drip -ED DKA algorithm (5.5 Units/hr Intravenous New Bag 9/17/21 1707)   OLANZapine (zyPREXA) injection 5 mg (has no administration in time range)   hydrOXYzine (ATARAX) tablet 25 mg (has no administration in time range)     Or   hydrOXYzine (ATARAX) tablet 50 mg (has no administration in time range)   pantoprazole (PROTONIX) IV push injection 40 mg (has no administration in time range)    dextrose 50 % injection 25-50 mL (has no administration in time range)   potassium chloride 10 mEq in 100 mL sterile water intermittent infusion (premix) (has no administration in time range)     Disposition:  The patient was admitted to the hospital under the care of .       Impression & Plan       CMS Diagnoses: None    Medical Decision Making:  Patient presents with hyperglycemia and vomiting and has presented with this numerous times in the past.  Labs look consistent with DKA.  He was given IV fluids as well as insulin drip, potassium and pain control.  Spoke with Dr. Roque from the hospitalist group regarding admission.  Patient not a very good historian and he has underlying mental health issues confound the situation.    Critical Care Time: was 0 minutes for this patient excluding procedures    Diagnosis:    ICD-10-CM    1. Diabetic ketoacidosis without coma associated with type 1 diabetes mellitus (H)  E10.10        Discharge Medications:  New Prescriptions    No medications on file       Scribe Disclosure:  Marcelino MARTIN, am serving as a scribe at 3:29 PM on 9/17/2021 to document services personally performed by Ramírez Douglas MD based on my observations and the provider's statements to me.                Ramírez Douglas MD  09/17/21 5627

## 2021-09-17 NOTE — ED NOTES
[facility]  ED Nurse Handoff Report    ED Chief complaint: Syncope      ED Diagnosis:   Final diagnoses:   Diabetic ketoacidosis without coma associated with type 1 diabetes mellitus (H)       Code Status: Full Code    Allergies: No Known Allergies    Patient Story: Abdominal pain since last night, diabetic type 1, vomiting, diabetic ketoacidosis..  Focused Assessment:  IV insertion 20 gauge on left arm, blood sent to lab, Covid swab, blood sugars often on insulin pump, Zofran for nausea  , IV fluid bolus  Treatments and/or interventions provided: Fluids, Insulin pump, Dilaudid for abdominal pain, be aware patient has a care plan . Patient present frequently to ER to request narcotics.  Patient's response to treatments and/or intervention   Nausea and vomiting decrease, pain decreased.    To be done/followed up on inpatient unit:      Does this patient have any cognitive concerns?: Forgetful.  Patient is schizophrenic, flat affect, demanding yells when his needs are not met.    Activity level - Baseline/Home:  Independent  Activity Level - Current:   Independent    Patient's Preferred language: English   Needed?: No    Isolation: Contact   Infection: Not Applicable  C-Diff (Clostridium Difficile) diagnosis  Patient tested for COVID 19 prior to admission: YES  Bariatric?: No    Vital Signs:   Vitals:    09/17/21 1615 09/17/21 1630 09/17/21 1700 09/17/21 1730   BP: 90/48 98/51 116/68 131/80   Pulse: 99 98 112    Resp: (!) 32 13 26    Temp:       SpO2: 98% 98% 100%        Cardiac Rhythm:     Was the PSS-3 completed:   Yes  What interventions are required if any?  None               Family Comments: \  OBS brochure/video discussed/provided to patient/family              Name of person given brochure if not patient:               Relationship to patient:    For the majority of the shift this patient's behavior was Green.   Behavioral interventions performed were     ED NURSE PHONE NUMBER: [phone]

## 2021-09-17 NOTE — PHARMACY-ADMISSION MEDICATION HISTORY
Pharmacy Medication History  Admission medication history interview status for the 9/17/2021 admission is complete. See EPIC admission navigator for prior to admission medications     Location of Interview: Patient room  Medication history sources: Patient and Care Everywhere    Significant changes made to the medication list:  Removed: Levemir insulin, Humalog insulin, ondansetron, pantoprazole  Changed: Lantus 30 U -> 35 units hs      In the past week, patient estimated taking medication this percent of the time: 50-90% due to Unclear, patient did not want to give much information    Additional medication history information:   Patient did not want to answer questions, but reports that he is currently only using Lantus and sliding scale insulin.    Medication reconciliation completed by provider prior to medication history? Yes    Time spent in this activity: 10 minutes    Prior to Admission medications    Medication Sig Last Dose Taking? Auth Provider   albuterol (PROAIR HFA/PROVENTIL HFA/VENTOLIN HFA) 108 (90 Base) MCG/ACT inhaler Inhale 2 puffs into the lungs every 4 hours as needed for shortness of breath / dyspnea or wheezing  at PRN Yes Unknown, Entered By History   insulin aspart (NOVOLOG PEN) 100 UNIT/ML pen Inject Subcutaneous 3 times daily (with meals) Sliding scale with meals as below  Glucose 130-150 0   Glucose 151-200 2 units   Glucose 201-250 4 units   Glucose 251-300 6 units   Glucose 301-350 8 units   Glucose 351-400 10 units   Glucose 401-500 12 units   Glucose GREATER THAN 501 14 units and call provider 9/16/2021 at PM Yes Unknown, Entered By History   insulin glargine (LANTUS PEN) 100 UNIT/ML pen Inject 35 Units Subcutaneous At Bedtime 9/16/2021 at PM Yes Unknown, Entered By History   OLANZapine (ZYPREXA) 10 MG tablet Take 10 mg by mouth At Bedtime 9/17/2021 at Unknown time Yes Unknown, Entered By History   oxyCODONE-acetaminophen (PERCOCET) 5-325 MG tablet Take 1 tablet by mouth every 6 hours  as needed for severe pain Past Week at Unknown time Yes Unknown, Entered By History       The information provided in this note is only as accurate as the sources available at the time of update(s)

## 2021-09-18 VITALS
OXYGEN SATURATION: 99 % | TEMPERATURE: 97.7 F | HEART RATE: 94 BPM | WEIGHT: 158.7 LBS | BODY MASS INDEX: 20.94 KG/M2 | DIASTOLIC BLOOD PRESSURE: 92 MMHG | RESPIRATION RATE: 8 BRPM | SYSTOLIC BLOOD PRESSURE: 128 MMHG

## 2021-09-18 LAB
ANION GAP SERPL CALCULATED.3IONS-SCNC: 9 MMOL/L (ref 3–14)
BUN SERPL-MCNC: 14 MG/DL (ref 7–30)
CALCIUM SERPL-MCNC: 8.2 MG/DL (ref 8.5–10.1)
CHLORIDE BLD-SCNC: 107 MMOL/L (ref 94–109)
CO2 SERPL-SCNC: 21 MMOL/L (ref 20–32)
CREAT SERPL-MCNC: 0.5 MG/DL (ref 0.66–1.25)
GFR SERPL CREATININE-BSD FRML MDRD: >90 ML/MIN/1.73M2
GLUCOSE BLD-MCNC: 251 MG/DL (ref 70–99)
GLUCOSE BLDC GLUCOMTR-MCNC: 244 MG/DL (ref 70–99)
GLUCOSE BLDC GLUCOMTR-MCNC: 250 MG/DL (ref 70–99)
POTASSIUM BLD-SCNC: 4.4 MMOL/L (ref 3.4–5.3)
SODIUM SERPL-SCNC: 137 MMOL/L (ref 133–144)

## 2021-09-18 PROCEDURE — 250N000011 HC RX IP 250 OP 636: Performed by: INTERNAL MEDICINE

## 2021-09-18 PROCEDURE — 36415 COLL VENOUS BLD VENIPUNCTURE: CPT | Performed by: INTERNAL MEDICINE

## 2021-09-18 PROCEDURE — 80048 BASIC METABOLIC PNL TOTAL CA: CPT | Performed by: INTERNAL MEDICINE

## 2021-09-18 PROCEDURE — 99238 HOSP IP/OBS DSCHRG MGMT 30/<: CPT | Performed by: INTERNAL MEDICINE

## 2021-09-18 PROCEDURE — 99207 PR CDG-CODE CATEGORY CHANGED: CPT | Performed by: INTERNAL MEDICINE

## 2021-09-18 RX ORDER — KETOROLAC TROMETHAMINE 15 MG/ML
30 INJECTION, SOLUTION INTRAMUSCULAR; INTRAVENOUS EVERY 6 HOURS PRN
Status: DISCONTINUED | OUTPATIENT
Start: 2021-09-18 | End: 2021-09-18 | Stop reason: HOSPADM

## 2021-09-18 RX ADMIN — KETOROLAC TROMETHAMINE 30 MG: 15 INJECTION, SOLUTION INTRAMUSCULAR; INTRAVENOUS at 02:03

## 2021-09-18 RX ADMIN — ONDANSETRON 4 MG: 2 INJECTION INTRAMUSCULAR; INTRAVENOUS at 01:34

## 2021-09-18 NOTE — PLAN OF CARE
Came to floor around 1930. A&Ox4. VSS on RA, HR Tachy @times. Tele: SR. Has not been OOB. Clear Liquid diet. Insulin gtt discontinued, BG checks, coverage given. C/o abdominal pain, managed with PRN heat packs, declines PRN Atarax. BS hypoactive. C/o nausea, Compazine given x1. Voiding urinal. IVF infusing @100mL/h x2. Discharge pending. Continue to monitor.

## 2021-09-18 NOTE — PROVIDER NOTIFICATION
Paged on call MD, pt yelling in pain, stating his abdomen hurts. Refuses Atarax, states does not work. Paged on call MD to ask for PRN. Awaiting return call.     Dr. Dougherty returned page, writer received order for Prn Toradol, stop D5NS. Declined to order repeat VBG, Ketones. Will follow through with new orders.

## 2021-09-18 NOTE — DISCHARGE SUMMARY
[facility]    Discharge Summary  Hospitalist    Date of Admission:  9/17/2021  Date of Discharge:  Left against medical advice  Discharging Provider: Marlen Coleman MD    Discharge Diagnoses   DKA related to DM type 1  Abdominal pain, possibly related to gastroparesis  Nausea with vomiting -- possibly cyclic vomiting related to Cannabis use    Other diagnosis:  Diabetic neuropathy  Homelessness  Paranoid schizophrenia      Chief Complaint: Syncope    History of Present Illness   Reji Monahan is a 31 year old male with history of DKA, type 1 diabetes, schizophrenia and gastroparesis who presents with abdominal pain vomiting and hyperglycemia.  Patient's had this numerous times in the past.  Is asked Buerger's disorder as well.  He is questionable if he is homeless versus living in a hotel or his car.  There is care plan that he presents frequently in DKA and there is true that he is looking for pain control.  Patient was recently visiting somebody upstairs and started vomiting and has been vomiting at home as well.  Patient is fairly focused on his pain and gives a very limited history.       Hospital Course   Reji Monahan was admitted on 9/17/2021.  The following problems were addressed during his hospitalization:    DKA related to DM type 1 -- moderate with Bicarb 13  - insulin drip and then resume Long acting insulin   recieved aggressive IV hydration       Abdominal pain, possibly related to gastroparesis  - Atarax 25-50 mg q4hr prn  - try to minimize narcotics       Nausea with vomiting   -- possibly cyclic vomiting related to Cannabis use  - improved with- antiemetics, IV fluids, Antivan if needed     This morning before I could see the pt- he requested to leave AMA.  Pt left Against medical advice.    Marlen Coleman MD    Significant Results and Procedures   none    Pending Results   none  Unresulted Labs Ordered in the Past 30 Days of this Admission      No orders found for last 31 day(s).          Code Status   Full Code       Primary Care Physician   Bogdan Jhaveri MD     Pt left AMA.    Marlen Coleman MD.  Hospitalist [phone] (7am -6 pm)

## 2021-09-18 NOTE — PROVIDER NOTIFICATION
MD Notification    Notified Person: MD    Notified Person Name: Dr. Coleman     Notification Date/Time: 9/18 0725    Notification Interaction: text page    Purpose of Notification: Pt requesting to leave AMA this morning. Are you able to see him soon or do you want me to just have him sign the AMA paper?    Orders Received: MD bedside and stated pt is okay to leave AMA.    Comments:

## 2021-09-18 NOTE — PROGRESS NOTES
Vitals: BP (!) 147/103   Pulse 104   Temp 97.7  F (36.5  C) (Oral)   Resp 16   SpO2 100%     Neuro: intact, resistive to cares, flat affect.     CV:  SR/ST.   Respiratory: RA, clear LS.    GI/: intermittent nausea, PRN Compazine, Zofran available. Given Zofran x1. C/o intermittent abdominal pain. Voiding in urinal.  Skin: scattered scabs.   POC: Blood sugar checks before meals. Off Insulin gtt since last evening.      *See EPIC flowsheet for full nursing assessment findings

## 2021-09-18 NOTE — DISCHARGE SUMMARY
"[facility]    Discharge Summary  Hospitalist    Date of Admission:  9/17/2021  Date of Discharge:  9/18/2021  7:56 AM  Discharging Provider: Reji Dye MD    Discharge Diagnoses   Principal Problem:    DKA related to DM type 1  Active Problems:    DM type 1, Hgb A1C 11.9 on 9/8/21    Cannabis abuse    Diabetic gastroparesis (H)    Diabetic neuropathy (H)    Homelessness    Paranoid schizophrenia (H)    Nausea with vomiting      History of Present Illness   31 year old male with type 1 diabetes and frequent episodes of DKA, schizophrenia and frequent episodes of nausea and vomiting probably related to gastroparesis and possibly cyclic vomiting related to cannabis abuse -- who presents with abdominal pain vomiting and hyperglycemia.  Patient's had this numerous times in the past, 5 ER visits in August, and was just discharged from [facility] on 9/9/21 after episdoe of DKA.  He is homeless, but currently living in a hotel.  He does not have a car.   This episode started with nausea and vomiting -- not sure when he last took his insulin.  When asking him questions he frequently yells out \"Give me pain meds. I need Dilaudid\", and doesn't answer the question.     In ER, , BP 92/47, Bicarb 13, Glucose 608, pH 7.19.       Hospital Course   Admitted to CCU under [facility] protocol, initially on insulin drip with aggressive fluid hydration, no additional narcotics provided, but was given Ativan for nausea and anxiety and Hydroxyzine for abdominal pain.  He was then started on Lantus 40 units subcutaneous at bedtime, higher than his normal 30 units, and also given D5NS to avoid hypoglycemia.   His repeat Bicarb was 24 and his potassium remained normal at 4.4, and blood sugars where in the 200's.     After 12 hours he left AMA, but able to tolerate oral intake.     In the future I would suggest avoiding narcotics -- which is very challenging as he screens at the nurse until he gets what he " wants -- but in the whole scheme of things it appears to just reinforce this inappropriate behavior on his part.  And the narcotic just worsen his gastroparesis and associated nausea.  Given his mental illness, his whole health care is a challenge.      Reji Dye MD  Pager: [phone]  Cell Phone:  [phone]       Significant Results and Procedures   As above    Pending Results   These results will be followed up by Dr. Dye  Unresulted Labs Ordered in the Past 30 Days of this Admission     No orders found for last 31 day(s).          Code Status   Full Code       Primary Care Physician   Bogdan Jhaveri MD    Physical Exam   Temp: 97.7  F (36.5  C) Temp src: Oral BP: (!) 128/92 Pulse: 94   Resp: 8 SpO2: 99 % O2 Device: None (Room air)    Vitals:    09/18/21 0624   Weight: 72 kg (158 lb 11.2 oz)     Vital Signs with Ranges  Temp:  [97.4  F (36.3  C)-97.8  F (36.6  C)] 97.7  F (36.5  C)  Pulse:  [] 94  Resp:  [8-32] 8  BP: ()/() 128/92  SpO2:  [98 %-100 %] 99 %  I/O last 3 completed shifts:  In: 1608.34 [P.O.:325; I.V.:1283.34]  Out: 1925 [Urine:1925]    Exam on discharge:   Left AMA    Discharge Disposition   Discharged to his hotel for the homeless  Condition at discharge: Fair    Consultations This Hospital Stay   None    Time Spent on this Encounter   I spent a total of 20 minutes discharging this patient.     Discharge Orders   No discharge procedures on file.  Discharge Medications   Discharge Medication List as of 9/18/2021  7:57 AM      CONTINUE these medications which have NOT CHANGED    Details   albuterol (PROAIR HFA/PROVENTIL HFA/VENTOLIN HFA) 108 (90 Base) MCG/ACT inhaler Inhale 2 puffs into the lungs every 4 hours as needed for shortness of breath / dyspnea or wheezing, HistoricalPharmacy may dispense brand covered by insurance (Proair, or proventil or ventolin or generic albuterol inhaler)      insulin aspart (NOVOLOG PEN) 100 UNIT/ML pen Inject Subcutaneous 3  times daily (with meals) Sliding scale with meals as below  Glucose 130-150 0   Glucose 151-200 2 units   Glucose 201-250 4 units   Glucose 251-300 6 units   Glucose 301-350 8 units   Glucose 351-400 10 units   Glucose 401-500 12 un its   Glucose GREATER THAN 501 14 units and call provider, Historical      insulin glargine (LANTUS PEN) 100 UNIT/ML pen Inject 35 Units Subcutaneous At Bedtime, Historical      OLANZapine (ZYPREXA) 10 MG tablet Take 10 mg by mouth At Bedtime, Historical      oxyCODONE-acetaminophen (PERCOCET) 5-325 MG tablet Take 1 tablet by mouth every 6 hours as needed for severe pain, Historical         STOP taking these medications       pantoprazole (PROTONIX) 40 MG EC tablet Comments:   Reason for Stopping:             Allergies   No Known Allergies  Data   Most Recent 3 CBC's:Recent Labs   Lab Test 09/17/21  1549 09/09/21  0532 09/08/21  1322   WBC 11.0 12.6* 16.4*   HGB 12.2* 11.0* 13.4   MCV 83 79 81    198 258      Most Recent 3 BMP's:  Recent Labs   Lab Test 09/18/21  0538 09/18/21  0217 09/18/21  0007 09/17/21  2246 09/17/21  2235 09/17/21  1704 09/17/21  1549     --   --   --  138  --  133   POTASSIUM 4.4  --   --   --  4.4  --  4.3   CHLORIDE 107  --   --   --  106  --  95   CO2 21  --   --   --  24  --  13*   BUN 14  --   --   --  17  --  23   CR 0.50*  --   --   --  0.63*  --  0.71   ANIONGAP 9  --   --   --  8  --  25*   LYNN 8.2*  --   --   --  8.3*  --  9.8   * 244* 250*   < > 267*   < > 608*    < > = values in this interval not displayed.     Most Recent 2 LFT's:  Recent Labs   Lab Test 09/08/21  1249 08/02/21  0501   AST 15 20   ALT 25 36   ALKPHOS 154* 160*   BILITOTAL 1.0 0.5     Most Recent INR's and Anticoagulation Dosing History:  Anticoagulation Dose History    There is no flowsheet data to display.       Most Recent 3 Troponin's:  Recent Labs   Lab Test 08/02/21  0501 02/06/21  0921 01/16/21  1538 12/23/20  1408 06/23/20  1103 01/26/20  0818   TROPI  --   <0.015 <0.015 <0.015  --    < >   TROPONIN <0.015  --   --   --  0.00  --     < > = values in this interval not displayed.     Most Recent Cholesterol Panel:No lab results found.  Most Recent 6 Bacteria Isolates From Any Culture (See EPIC Reports for Culture Details):  Recent Labs   Lab Test 02/06/21  1103 02/06/21  0921 01/16/21  1807 01/16/21  1652 01/16/21  1538 12/23/20  1514   CULT No growth No growth 50,000 to 100,000 colonies/mL  Methicillin resistant Staphylococcus aureus (MRSA)  * No growth No growth >100,000 colonies/mL  Methicillin resistant Staphylococcus aureus (MRSA)  *  >100,000 colonies/mL  Strain 2  Methicillin resistant Staphylococcus aureus (MRSA)  *     Most Recent TSH, T4 and A1c Labs:  Recent Labs   Lab Test 09/08/21  1841   A1C 11.9*

## 2021-09-20 ENCOUNTER — PATIENT OUTREACH (OUTPATIENT)
Dept: CARE COORDINATION | Facility: CLINIC | Age: 31
End: 2021-09-20

## 2021-09-20 DIAGNOSIS — Z71.89 OTHER SPECIFIED COUNSELING: ICD-10-CM

## 2021-09-20 NOTE — PROGRESS NOTES
Clinic Care Coordination Contact  [facility]/Voicemail       Clinical Data: Care Coordinator Outreach  Outreach attempted x 1.  Left message on patient's voicemail with call back information and requested return call.  Plan:  Care Coordinator will try to reach patient again in 1-2 business days.    Berenice Krueger  Community Health Worker  [facility]  Ph:[phone]

## 2021-09-21 NOTE — PROGRESS NOTES
Clinic Care Coordination Contact  [facility]/Voicemail       Clinical Data: Care Coordinator Outreach  Outreach attempted x 2.  Left message on patient's voicemail with call back information and requested return call.  Plan:Care Coordinator will do no further outreaches at this time.    Berenice Krueger  Community Health Worker  [facility]  Ph:[phone]

## 2022-02-17 PROBLEM — E11.10 DKA (DIABETIC KETOACIDOSIS) (H): Status: RESOLVED | Noted: 2019-12-30 | Resolved: 2021-09-17

## 2023-08-03 ENCOUNTER — HOSPITAL ENCOUNTER (EMERGENCY)
Facility: CLINIC | Age: 33
Discharge: HOME | End: 2023-08-03
Attending: EMERGENCY MEDICINE | Admitting: EMERGENCY MEDICINE
Payer: MEDICARE

## 2023-08-03 VITALS
HEART RATE: 106 BPM | RESPIRATION RATE: 15 BRPM | DIASTOLIC BLOOD PRESSURE: 95 MMHG | HEIGHT: 73 IN | WEIGHT: 196 LBS | OXYGEN SATURATION: 97 % | BODY MASS INDEX: 25.98 KG/M2 | TEMPERATURE: 98.1 F | SYSTOLIC BLOOD PRESSURE: 141 MMHG

## 2023-08-03 DIAGNOSIS — R11.2 NAUSEA AND VOMITING, UNSPECIFIED VOMITING TYPE: ICD-10-CM

## 2023-08-03 DIAGNOSIS — G89.29 CHRONIC ABDOMINAL PAIN: ICD-10-CM

## 2023-08-03 DIAGNOSIS — E10.65 TYPE 1 DIABETES MELLITUS WITH HYPERGLYCEMIA (H): ICD-10-CM

## 2023-08-03 DIAGNOSIS — R10.9 CHRONIC ABDOMINAL PAIN: ICD-10-CM

## 2023-08-03 LAB
ALBUMIN SERPL BCG-MCNC: 4.3 G/DL (ref 3.5–5.2)
ALBUMIN UR-MCNC: NEGATIVE MG/DL
ALP SERPL-CCNC: 116 U/L (ref 40–129)
ALT SERPL W P-5'-P-CCNC: 24 U/L (ref 0–70)
AMPHETAMINES UR QL: NOT DETECTED
ANION GAP SERPL CALCULATED.3IONS-SCNC: 19 MMOL/L (ref 7–15)
APPEARANCE UR: CLEAR
AST SERPL W P-5'-P-CCNC: 22 U/L (ref 0–45)
B-OH-BUTYR SERPL-SCNC: 3.3 MMOL/L
BARBITURATES UR QL SCN: NOT DETECTED
BASE EXCESS BLDV CALC-SCNC: -0.1 MMOL/L (ref -7.7–1.9)
BASOPHILS # BLD AUTO: 0 10E3/UL (ref 0–0.2)
BASOPHILS NFR BLD AUTO: 0 %
BENZODIAZ UR QL SCN: NOT DETECTED
BILIRUB SERPL-MCNC: 0.9 MG/DL
BILIRUB UR QL STRIP: NEGATIVE
BUN SERPL-MCNC: 25.8 MG/DL (ref 6–20)
BUPRENORPHINE UR QL: NOT DETECTED
CALCIUM SERPL-MCNC: 9.3 MG/DL (ref 8.6–10)
CANNABINOIDS UR QL: DETECTED
CHLORIDE SERPL-SCNC: 94 MMOL/L (ref 98–107)
COCAINE UR QL SCN: NOT DETECTED
COLOR UR AUTO: YELLOW
CREAT SERPL-MCNC: 0.68 MG/DL (ref 0.67–1.17)
D-METHAMPHET UR QL: NOT DETECTED
DEPRECATED HCO3 PLAS-SCNC: 21 MMOL/L (ref 22–29)
EOSINOPHIL # BLD AUTO: 0 10E3/UL (ref 0–0.7)
EOSINOPHIL NFR BLD AUTO: 0 %
ERYTHROCYTE [DISTWIDTH] IN BLOOD BY AUTOMATED COUNT: 14.4 % (ref 10–15)
GFR SERPL CREATININE-BSD FRML MDRD: >90 ML/MIN/1.73M2
GLUCOSE BLDC GLUCOMTR-MCNC: 305 MG/DL (ref 70–99)
GLUCOSE SERPL-MCNC: 302 MG/DL (ref 70–99)
GLUCOSE UR STRIP-MCNC: >499 MG/DL
HBA1C MFR BLD: 8.6 %
HCO3 BLDV-SCNC: 23 MMOL/L (ref 21–28)
HCT VFR BLD AUTO: 39.5 % (ref 40–53)
HGB BLD-MCNC: 12.8 G/DL (ref 13.3–17.7)
HGB UR QL STRIP: NEGATIVE
IMM GRANULOCYTES # BLD: 0 10E3/UL
IMM GRANULOCYTES NFR BLD: 0 %
KETONES UR STRIP-MCNC: 80 MG/DL
LACTATE SERPL-SCNC: 1.8 MMOL/L (ref 0.7–2)
LEUKOCYTE ESTERASE UR QL STRIP: ABNORMAL
LIPASE SERPL-CCNC: 10 U/L (ref 13–60)
LYMPHOCYTES # BLD AUTO: 1.3 10E3/UL (ref 0.8–5.3)
LYMPHOCYTES NFR BLD AUTO: 14 %
MAGNESIUM SERPL-MCNC: 1.8 MG/DL (ref 1.7–2.3)
MCH RBC QN AUTO: 25.7 PG (ref 26.5–33)
MCHC RBC AUTO-ENTMCNC: 32.4 G/DL (ref 31.5–36.5)
MCV RBC AUTO: 79 FL (ref 78–100)
METHADONE UR QL SCN: NOT DETECTED
MONOCYTES # BLD AUTO: 0.7 10E3/UL (ref 0–1.3)
MONOCYTES NFR BLD AUTO: 7 %
MUCOUS THREADS #/AREA URNS LPF: PRESENT /LPF
NEUTROPHILS # BLD AUTO: 7.6 10E3/UL (ref 1.6–8.3)
NEUTROPHILS NFR BLD AUTO: 79 %
NITRATE UR QL: NEGATIVE
NRBC # BLD AUTO: 0 10E3/UL
NRBC BLD AUTO-RTO: 0 /100
O2/TOTAL GAS SETTING VFR VENT: 21 %
OPIATES UR QL SCN: NOT DETECTED
OSMOLALITY SERPL: 299 MMOL/KG (ref 275–295)
OXYCODONE UR QL SCN: NOT DETECTED
PCO2 BLDV: 33 MM HG (ref 40–50)
PCP UR QL SCN: NOT DETECTED
PH BLDV: 7.45 [PH] (ref 7.32–7.43)
PH UR STRIP: 6 [PH] (ref 5–7)
PHOSPHATE SERPL-MCNC: 3.3 MG/DL (ref 2.5–4.5)
PLATELET # BLD AUTO: 212 10E3/UL (ref 150–450)
PO2 BLDV: 49 MM HG (ref 25–47)
POTASSIUM SERPL-SCNC: 4 MMOL/L (ref 3.4–5.3)
PROPOXYPH UR QL: NOT DETECTED
PROT SERPL-MCNC: 7.1 G/DL (ref 6.4–8.3)
RBC # BLD AUTO: 4.99 10E6/UL (ref 4.4–5.9)
RBC URINE: 2 /HPF
SODIUM SERPL-SCNC: 134 MMOL/L (ref 136–145)
SP GR UR STRIP: 1.03 (ref 1–1.03)
SQUAMOUS EPITHELIAL: <1 /HPF
TRICYCLICS UR QL SCN: NOT DETECTED
UROBILINOGEN UR STRIP-MCNC: NORMAL MG/DL
WBC # BLD AUTO: 9.6 10E3/UL (ref 4–11)
WBC URINE: 14 /HPF

## 2023-08-03 PROCEDURE — 96374 THER/PROPH/DIAG INJ IV PUSH: CPT | Performed by: EMERGENCY MEDICINE

## 2023-08-03 PROCEDURE — 82947 ASSAY GLUCOSE BLOOD QUANT: CPT | Performed by: EMERGENCY MEDICINE

## 2023-08-03 PROCEDURE — 250N000013 HC RX MED GY IP 250 OP 250 PS 637: Performed by: EMERGENCY MEDICINE

## 2023-08-03 PROCEDURE — 82803 BLOOD GASES ANY COMBINATION: CPT | Performed by: EMERGENCY MEDICINE

## 2023-08-03 PROCEDURE — 82962 GLUCOSE BLOOD TEST: CPT

## 2023-08-03 PROCEDURE — 84155 ASSAY OF PROTEIN SERUM: CPT | Performed by: EMERGENCY MEDICINE

## 2023-08-03 PROCEDURE — 96361 HYDRATE IV INFUSION ADD-ON: CPT | Performed by: EMERGENCY MEDICINE

## 2023-08-03 PROCEDURE — 250N000011 HC RX IP 250 OP 636: Mod: JZ | Performed by: EMERGENCY MEDICINE

## 2023-08-03 PROCEDURE — 36415 COLL VENOUS BLD VENIPUNCTURE: CPT | Performed by: EMERGENCY MEDICINE

## 2023-08-03 PROCEDURE — 83735 ASSAY OF MAGNESIUM: CPT | Performed by: EMERGENCY MEDICINE

## 2023-08-03 PROCEDURE — 99284 EMERGENCY DEPT VISIT MOD MDM: CPT | Performed by: EMERGENCY MEDICINE

## 2023-08-03 PROCEDURE — 83036 HEMOGLOBIN GLYCOSYLATED A1C: CPT | Performed by: EMERGENCY MEDICINE

## 2023-08-03 PROCEDURE — 258N000003 HC RX IP 258 OP 636: Performed by: EMERGENCY MEDICINE

## 2023-08-03 PROCEDURE — 85004 AUTOMATED DIFF WBC COUNT: CPT | Performed by: EMERGENCY MEDICINE

## 2023-08-03 PROCEDURE — 96375 TX/PRO/DX INJ NEW DRUG ADDON: CPT | Performed by: EMERGENCY MEDICINE

## 2023-08-03 PROCEDURE — 83690 ASSAY OF LIPASE: CPT | Performed by: EMERGENCY MEDICINE

## 2023-08-03 PROCEDURE — 80306 DRUG TEST PRSMV INSTRMNT: CPT | Performed by: EMERGENCY MEDICINE

## 2023-08-03 PROCEDURE — 81001 URINALYSIS AUTO W/SCOPE: CPT | Mod: XU | Performed by: EMERGENCY MEDICINE

## 2023-08-03 PROCEDURE — 96376 TX/PRO/DX INJ SAME DRUG ADON: CPT | Performed by: EMERGENCY MEDICINE

## 2023-08-03 PROCEDURE — 83930 ASSAY OF BLOOD OSMOLALITY: CPT | Performed by: EMERGENCY MEDICINE

## 2023-08-03 PROCEDURE — 82010 KETONE BODYS QUAN: CPT | Performed by: EMERGENCY MEDICINE

## 2023-08-03 PROCEDURE — 84100 ASSAY OF PHOSPHORUS: CPT | Performed by: EMERGENCY MEDICINE

## 2023-08-03 PROCEDURE — 99284 EMERGENCY DEPT VISIT MOD MDM: CPT | Mod: 25 | Performed by: EMERGENCY MEDICINE

## 2023-08-03 PROCEDURE — 87086 URINE CULTURE/COLONY COUNT: CPT | Performed by: EMERGENCY MEDICINE

## 2023-08-03 PROCEDURE — 83605 ASSAY OF LACTIC ACID: CPT | Performed by: EMERGENCY MEDICINE

## 2023-08-03 RX ORDER — ONDANSETRON 2 MG/ML
4 INJECTION INTRAMUSCULAR; INTRAVENOUS EVERY 30 MIN PRN
Status: DISCONTINUED | OUTPATIENT
Start: 2023-08-03 | End: 2023-08-03 | Stop reason: HOSPADM

## 2023-08-03 RX ORDER — SODIUM CHLORIDE 9 MG/ML
1000 INJECTION, SOLUTION INTRAVENOUS CONTINUOUS
Status: DISCONTINUED | OUTPATIENT
Start: 2023-08-03 | End: 2023-08-03 | Stop reason: HOSPADM

## 2023-08-03 RX ORDER — POLYETHYLENE GLYCOL 3350 17 G/17G
1 POWDER, FOR SOLUTION ORAL DAILY PRN
COMMUNITY
Start: 2023-02-03 | End: 2024-02-14

## 2023-08-03 RX ORDER — SENNOSIDES A AND B 8.6 MG/1
2 TABLET, FILM COATED ORAL 2 TIMES DAILY
COMMUNITY
Start: 2024-06-08

## 2023-08-03 RX ORDER — TAMSULOSIN HYDROCHLORIDE 0.4 MG/1
0.4 CAPSULE ORAL DAILY
COMMUNITY
Start: 2023-06-27 | End: 2024-02-14

## 2023-08-03 RX ORDER — MAGNESIUM HYDROXIDE/ALUMINUM HYDROXICE/SIMETHICONE 120; 1200; 1200 MG/30ML; MG/30ML; MG/30ML
15 SUSPENSION ORAL ONCE
Status: COMPLETED | OUTPATIENT
Start: 2023-08-03 | End: 2023-08-03

## 2023-08-03 RX ORDER — HYDROXYZINE HYDROCHLORIDE 50 MG/1
50 TABLET, FILM COATED ORAL EVERY 6 HOURS PRN
COMMUNITY
Start: 2023-06-26 | End: 2024-02-14

## 2023-08-03 RX ORDER — INSULIN ASPART 100 [IU]/ML
10 INJECTION, SOLUTION INTRAVENOUS; SUBCUTANEOUS
COMMUNITY
End: 2023-08-03

## 2023-08-03 RX ORDER — FAMOTIDINE 20 MG/1
20 TABLET, FILM COATED ORAL 2 TIMES DAILY
COMMUNITY
Start: 2023-06-26

## 2023-08-03 RX ORDER — HYDROMORPHONE HYDROCHLORIDE 1 MG/ML
0.5 INJECTION, SOLUTION INTRAMUSCULAR; INTRAVENOUS; SUBCUTANEOUS EVERY 30 MIN PRN
Status: DISCONTINUED | OUTPATIENT
Start: 2023-08-03 | End: 2023-08-03

## 2023-08-03 RX ORDER — METOCLOPRAMIDE HYDROCHLORIDE 5 MG/ML
10 INJECTION INTRAMUSCULAR; INTRAVENOUS EVERY 6 HOURS
Status: DISCONTINUED | OUTPATIENT
Start: 2023-08-03 | End: 2023-08-03 | Stop reason: HOSPADM

## 2023-08-03 RX ORDER — CYCLOBENZAPRINE HCL 10 MG
10 TABLET ORAL 3 TIMES DAILY PRN
COMMUNITY
Start: 2023-07-31 | End: 2024-02-14

## 2023-08-03 RX ORDER — LORAZEPAM 2 MG/ML
1 INJECTION INTRAMUSCULAR ONCE
Status: COMPLETED | OUTPATIENT
Start: 2023-08-03 | End: 2023-08-03

## 2023-08-03 RX ADMIN — SODIUM CHLORIDE 1000 ML: 9 INJECTION, SOLUTION INTRAVENOUS at 08:22

## 2023-08-03 RX ADMIN — SODIUM CHLORIDE 1000 ML: 9 INJECTION, SOLUTION INTRAVENOUS at 10:16

## 2023-08-03 RX ADMIN — LORAZEPAM 1 MG: 2 INJECTION INTRAMUSCULAR; INTRAVENOUS at 08:34

## 2023-08-03 RX ADMIN — HYDROMORPHONE HYDROCHLORIDE 0.5 MG: 1 INJECTION, SOLUTION INTRAMUSCULAR; INTRAVENOUS; SUBCUTANEOUS at 08:51

## 2023-08-03 RX ADMIN — METOCLOPRAMIDE HYDROCHLORIDE 10 MG: 5 INJECTION INTRAMUSCULAR; INTRAVENOUS at 08:35

## 2023-08-03 RX ADMIN — FAMOTIDINE 40 MG: 10 INJECTION, SOLUTION INTRAVENOUS at 10:16

## 2023-08-03 RX ADMIN — HYDROMORPHONE HYDROCHLORIDE 0.5 MG: 1 INJECTION, SOLUTION INTRAMUSCULAR; INTRAVENOUS; SUBCUTANEOUS at 10:41

## 2023-08-03 RX ADMIN — SODIUM CHLORIDE 1000 ML: 9 INJECTION, SOLUTION INTRAVENOUS at 11:13

## 2023-08-03 RX ADMIN — ONDANSETRON 4 MG: 2 INJECTION INTRAMUSCULAR; INTRAVENOUS at 08:23

## 2023-08-03 RX ADMIN — ALUMINUM HYDROXIDE, MAGNESIUM HYDROXIDE, AND SIMETHICONE 15 ML: 200; 200; 20 SUSPENSION ORAL at 10:16

## 2023-08-03 ASSESSMENT — ACTIVITIES OF DAILY LIVING (ADL)
ADLS_ACUITY_SCORE: 35
ADLS_ACUITY_SCORE: 35

## 2023-08-03 NOTE — DISCHARGE INSTRUCTIONS
Continue your gastroparesis diet and your gastroparesis medications.  Continue your diabetes medications.    I hope you feel better and have a great weekend!!

## 2023-08-03 NOTE — ED TRIAGE NOTES
"      Patient arrived via EMS with complaints of vomiting since yesterday. He has been unable to take any meds and is concerned for \"diabetic ketoacidosis\". He has a extensive history and doctors at [facility]. Kendal Brown RN    "

## 2023-08-04 LAB — BACTERIA UR CULT: NORMAL

## 2023-08-04 NOTE — ED PROVIDER NOTES
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  Patient arrived via EMS noting vomiting episodes since yesterday.  He apparently has not been able to take any of his meds and he was concerned that he might be in DKA.  Patient usually doctors through [facility].    Review of medical record shows that patient was seen in [place] ED yesterday complaining of 1 week of lower abdominal pain and the development of nausea and vomiting yesterday.  He was given saline, droperidol and labs were evaluated.  Apparently he left AMA as he was not receiving any narcotic pain medications.    Further review of medical record shows that patient has an old care plan through [facility].  He seems to follow closely with his care team at [facility].  He is on a special gastroparesis diet, erythromycin and Reglan.  There are some concerns that some of his antipsychotic medications might be contributing to slowing his gastric motility.    Patient states that he did have a bowel movement yesterday.  He has never had surgeries on his abdomen.  It hurts to urinate.  Pain is in the lower abdomen.  He has not been able to keep anything down.  No fevers.    Allergies:  Allergies   Allergen Reactions    Bees Anaphylaxis       Problem List:    Patient Active Problem List    Diagnosis Date Noted    Nausea with vomiting 09/17/2021     Priority: Medium    Urinary tract infection without hematuria, site unspecified 02/06/2021     Priority: Medium    Sepsis, due to unspecified organism, unspecified whether acute organ dysfunction present (H) 02/06/2021     Priority: Medium    DKA related to DM type 1 12/23/2020     Priority: Medium    MRSA carrier 08/28/2020     Priority: Medium     DM type 1, Hgb A1C 11.9 on 9/8/21 08/04/2020     Priority: Medium    ADD (attention deficit disorder) 04/06/2020     Priority: Medium    Gastroesophageal reflux disease without esophagitis 04/06/2020     Priority: Medium    Cannabinoid hyperemesis syndrome 03/24/2020     Priority: Medium    Chest pain 01/26/2020     Priority: Medium    Splenomegaly 01/22/2020     Priority: Medium    Moderate malnutrition (H) 01/20/2018     Priority: Medium    Cognitive impairment 01/19/2018     Priority: Medium    Paroxysmal SVT (supraventricular tachycardia) (H) 11/12/2017     Priority: Medium     Last Assessment & Plan:   Formatting of this note might be different from the original.  Started on diltiazem on d/c from [facility]. Tachycardic today: 117. He reports compliance with this medication. Declined ablation while seen by Cards at [facility] but will be seen as an outpatient- prefers to be seen at [facility] scheduled.  Last Assessment & Plan:   Started on diltiazem on d/c from [facility]. Tachycardic today: 117. He reports compliance with this medication. Declined ablation while seen by Cards at [facility] but will be seen as an outpatient- prefers to be seen at [facility] scheduled.      Phimosis 11/01/2017     Priority: Medium     Formatting of this note might be different from the original.  Penis entrapped with recurrent balanitis      Balanitis 12/04/2016     Priority: Medium     Last Assessment & Plan:   Formatting of this note might be different from the original.  Reji is here today requesting additional antibiotics as he continues to have penile pain and phimosis. He was seen at [facility]'s ED 9/7, left upset and went to [facility] and was treated with percocet, lotrimin, and levaquin. Per care everywhere, he then went to [facility] 9/12 and was seen by urology:  Urology was consulted for balanitis and phimosis, recommended a 10 day course of augmentin, 14 day course of fluconazole, and  Lotrisone cream BID to help with symtpoms. Urology recommended to follow up in clinic in 1 month to reassess phimosis and reevaluate need for circumcision.    Reji is a poor historian, but it sounds like he took the meds given by [facility] and not others. Discussed with PharmD in our clinic. Will give doxycycline, fluconazone and lotrisone cream as recommended by urology (14 day course). Also referring to urology. Giving ibuprofen for pain. Recommended that he return next week to see his PCP.      Homelessness 06/09/2016     Priority: Medium     Last Assessment & Plan:   Formatting of this note might be different from the original.  Per [facility] notes, was living in a hotel, though patient tells me today that he lives in a house in [place]- living with his brother and caring for him.  Last Assessment & Plan:   Per [facility] notes, was living in a hotel, though patient tells me today that he lives in a house in [place]- living with his brother and caring for him.      Diabetic gastroparesis (H) 05/06/2016     Priority: Medium    Diabetic neuropathy (H) 05/06/2016     Priority: Medium    ACP (advance care planning) 01/08/2016     Priority: Medium     Patient has identified Health Care Agent(s): No  Add Health Care Agents: No  Patient has Advance Care Plan Documents (Health Care Directive, POLST): No, Pt declined.    Patient has identified Specific Treatment Preferences: Yes   Specific Treatment Preferences: a.) Code Status:  CPR/Attempt Resuscitation      SW met with Pt 1/7/2106 - Pt states if he's unable to communicate his healthcare wishes his brother, Raj Monahan - C: [phone], would be his primary spokesperson. LEXY Barreto, [facility]...Pager [phone]... ext. [phone], contact via anwpaging      Asthma 01/06/2016     Priority: Medium    Cannabis abuse 12/16/2015     Priority: Medium    Poor dentition 11/04/2015     Priority: Medium    Dental caries 08/12/2015     Priority: Medium     "Paranoid schizophrenia (H) 11/25/2014     Priority: Medium    Mild intermittent asthma 02/13/2014     Priority: Medium    Charcot foot due to diabetes mellitus (H) 11/22/2013     Priority: Medium    Borderline intellectual functioning 09/27/2010     Priority: Medium    Dyslipidemia 08/30/2010     Priority: Medium     Formatting of this note might be different from the original.  Last LDL- 105 on 6/11/13  Last LDL- 105 on 6/11/13      Asperger's disorder 08/05/2009     Priority: Medium    Insomnia 06/30/2009     Priority: Medium        Past Medical History:    Past Medical History:   Diagnosis Date    Cannabis abuse     DKA (diabetic ketoacidoses) (H)     Endocarditis 04/2021    Homeless     Schizophrenia (H)     Type 1 diabetes mellitus (H)     Uncomplicated asthma        Past Surgical History:    No past surgical history on file.    Family History:    Family History   Problem Relation Age of Onset    Cerebrovascular Disease Father     Diabetes Brother        Social History:  Marital Status:  Single [1]  Social History     Tobacco Use    Smoking status: Never   Substance Use Topics    Alcohol use: No    Drug use: Yes     Types: Marijuana        Medications:    cyclobenzaprine (FLEXERIL) 10 MG tablet  famotidine (PEPCID) 20 MG tablet  hydrOXYzine (ATARAX) 50 MG tablet  insulin glargine (LANTUS PEN) 100 UNIT/ML pen  polyethylene glycol (MIRALAX) 17 GM/Dose powder  senna (SENOKOT) 8.6 MG tablet  tamsulosin (FLOMAX) 0.4 MG capsule  albuterol (PROAIR HFA/PROVENTIL HFA/VENTOLIN HFA) 108 (90 Base) MCG/ACT inhaler  insulin aspart (NOVOLOG PEN) 100 UNIT/ML pen  OLANZapine (ZYPREXA) 10 MG tablet          Review of Systems  All other ROS reviewed and are negative or non-contributory except as stated in HPI.     Physical Exam   BP: (!) 128/90  Pulse: 112  Temp: 98.1  F (36.7  C)  Resp: (!) 32  Height: 185.4 cm (6' 1\")  Weight: 88.9 kg (196 lb)  SpO2: 98 %      Physical Exam  Vitals and nursing note reviewed.   Constitutional:  "      Comments: Young, thin, extremely anxious male sitting on the bed hyperventilating   HENT:      Head: Normocephalic.      Nose: Nose normal.      Mouth/Throat:      Mouth: Mucous membranes are moist.      Pharynx: Oropharynx is clear.   Eyes:      General: No scleral icterus.     Extraocular Movements: Extraocular movements intact.      Conjunctiva/sclera: Conjunctivae normal.   Cardiovascular:      Rate and Rhythm: Regular rhythm. Tachycardia present.      Pulses: Normal pulses.      Heart sounds: Normal heart sounds.   Pulmonary:      Breath sounds: Normal breath sounds.      Comments: Hyperventilating, tachypnea, shallow breaths  Abdominal:      General: There is no distension.      Palpations: Abdomen is soft.      Tenderness: There is abdominal tenderness (Diffuse).   Musculoskeletal:         General: Normal range of motion.      Cervical back: Normal range of motion and neck supple.      Right lower leg: No edema.      Left lower leg: No edema.   Skin:     General: Skin is warm and dry.      Coloration: Skin is not pale.      Findings: No rash.   Neurological:      General: No focal deficit present.      Mental Status: He is alert.   Psychiatric:      Comments: Extremely anxious         ED Course (with Medical Decision Making)    Pt seen and examined by me.  RN and EPIC notes reviewed.       Patient with longstanding type 1 diabetes, gastroparesis, chronic abdominal pain, other history as above presenting with a week of abdominal pain, he is on his second day of nausea and vomiting.  This is his second ED visit.  Obviously concern for dehydration and developing DKA.  Except for the [facility] visit he seems to been following his doctors recommendations pretty well more recently.    IV placed, fluids started.  He was given lorazepam for his anxiety and for his nausea.  I also added IV Reglan.    Patient continued to writhe around on the bed asking for pain medication.  I agreed to give him 1 dose of Dilaudid  even if this will slow his bowels even more.    Labs show sodium 134, potassium 4, chloride 94, bicarb 21, anion gap of 19.  BUN/creatinine 25.8/0.68.  Glucose is 302.  Magnesium, phosphorus, lactic acid normal.  VBG does not show acidosis.  He does have some ketones.  LFTs and lipase normal.  CBC with normal white count.  Hemoglobin A1c 8.6.    Patient has ketones in his urine and some white cells.  Sent for culture.  I do not think I am going to start any antibiotics at this point.  U tox with THC.    Patient given a second liter of fluids.  I agreed to give him 1 more dose of Dilaudid and a GI cocktail.  He was also given Pepcid.    Since he is not acidotic and he has received a lot of fluids and is eating ice chips I think he is safe to return home but should follow-up closely with his provider who is in a completely different health system.    Obviously, I am hoping this is a one-time thing for this emergency department.  Will need to establish goals and objectives if he comes back again since his providers are not giving him any narcotic medications.  If I see him again I will not either unless situation definitely warrants.   Procedures  Results for orders placed or performed during the hospital encounter of 08/03/23 (from the past 24 hour(s))   Glucose by meter   Result Value Ref Range    GLUCOSE BY METER POCT 305 (H) 70 - 99 mg/dL   Hemoglobin A1c   Result Value Ref Range    Hemoglobin A1C 8.6 (H) <5.7 %   CBC with platelets differential    Narrative    The following orders were created for panel order CBC with platelets differential.  Procedure                               Abnormality         Status                     ---------                               -----------         ------                     CBC with platelets and d...[[id number]]  Abnormal            Final result                 Please view results for these tests on the individual orders.   Comprehensive metabolic panel   Result Value Ref  Range    Sodium 134 (L) 136 - 145 mmol/L    Potassium 4.0 3.4 - 5.3 mmol/L    Chloride 94 (L) 98 - 107 mmol/L    Carbon Dioxide (CO2) 21 (L) 22 - 29 mmol/L    Anion Gap 19 (H) 7 - 15 mmol/L    Urea Nitrogen 25.8 (H) 6.0 - 20.0 mg/dL    Creatinine 0.68 0.67 - 1.17 mg/dL    Calcium 9.3 8.6 - 10.0 mg/dL    Glucose 302 (H) 70 - 99 mg/dL    Alkaline Phosphatase 116 40 - 129 U/L    AST 22 0 - 45 U/L    ALT 24 0 - 70 U/L    Protein Total 7.1 6.4 - 8.3 g/dL    Albumin 4.3 3.5 - 5.2 g/dL    Bilirubin Total 0.9 <=1.2 mg/dL    GFR Estimate >90 >60 mL/min/1.73m2   Phosphorus   Result Value Ref Range    Phosphorus 3.3 2.5 - 4.5 mg/dL   Magnesium   Result Value Ref Range    Magnesium 1.8 1.7 - 2.3 mg/dL   Lactic acid whole blood   Result Value Ref Range    Lactic Acid 1.8 0.7 - 2.0 mmol/L   Blood gas venous   Result Value Ref Range    pH Venous 7.45 (H) 7.32 - 7.43    pCO2 Venous 33 (L) 40 - 50 mm Hg    pO2 Venous 49 (H) 25 - 47 mm Hg    Bicarbonate Venous 23 21 - 28 mmol/L    Base Excess/Deficit (+/-) -0.1 -7.7 - 1.9 mmol/L    FIO2 21    Osmolality   Result Value Ref Range    Osmolality Blood 299 (H) 275 - 295 mmol/kg    Narrative    Greater than 385 mmol/kg relates to stupor in hyperglycemia   Greater than 400 mmol/kg can relate to seizures   Greater than 420 mmol/kg can be lethal    Serum Osmalar Gap:   Normal <10   Larger suggest unmeasured substances present in serum (ethanol, methanol, isopropanol, mannitol, ethylene glycol).   Ketone Beta-Hydroxybutyrate Quantitative   Result Value Ref Range    Ketone (Beta-Hydroxybutyrate) Quantitative 3.30 (HH) <=0.30 mmol/L   Lipase   Result Value Ref Range    Lipase 10 (L) 13 - 60 U/L   CBC with platelets and differential   Result Value Ref Range    WBC Count 9.6 4.0 - 11.0 10e3/uL    RBC Count 4.99 4.40 - 5.90 10e6/uL    Hemoglobin 12.8 (L) 13.3 - 17.7 g/dL    Hematocrit 39.5 (L) 40.0 - 53.0 %    MCV 79 78 - 100 fL    MCH 25.7 (L) 26.5 - 33.0 pg    MCHC 32.4 31.5 - 36.5 g/dL    RDW  14.4 10.0 - 15.0 %    Platelet Count 212 150 - 450 10e3/uL    % Neutrophils 79 %    % Lymphocytes 14 %    % Monocytes 7 %    % Eosinophils 0 %    % Basophils 0 %    % Immature Granulocytes 0 %    NRBCs per 100 WBC 0 <1 /100    Absolute Neutrophils 7.6 1.6 - 8.3 10e3/uL    Absolute Lymphocytes 1.3 0.8 - 5.3 10e3/uL    Absolute Monocytes 0.7 0.0 - 1.3 10e3/uL    Absolute Eosinophils 0.0 0.0 - 0.7 10e3/uL    Absolute Basophils 0.0 0.0 - 0.2 10e3/uL    Absolute Immature Granulocytes 0.0 <=0.4 10e3/uL    Absolute NRBCs 0.0 10e3/uL   UA with Microscopic reflex to Culture    Specimen: Urine, Clean Catch   Result Value Ref Range    Color Urine Yellow Colorless, Straw, Light Yellow, Yellow    Appearance Urine Clear Clear    Glucose Urine >499 (A) Negative mg/dL    Bilirubin Urine Negative Negative    Ketones Urine 80 (A) Negative mg/dL    Specific Gravity Urine 1.027 1.003 - 1.035    Blood Urine Negative Negative    pH Urine 6.0 5.0 - 7.0    Protein Albumin Urine Negative Negative mg/dL    Urobilinogen Urine Normal Normal, 2.0 mg/dL    Nitrite Urine Negative Negative    Leukocyte Esterase Urine Moderate (A) Negative    Mucus Urine Present (A) None Seen /LPF    RBC Urine 2 <=2 /HPF    WBC Urine 14 (H) <=5 /HPF    Squamous Epithelials Urine <1 <=1 /HPF    Narrative    Urine Culture ordered based on laboratory criteria   Urine Drugs of Abuse Screen    Narrative    The following orders were created for panel order Urine Drugs of Abuse Screen.  Procedure                               Abnormality         Status                     ---------                               -----------         ------                     Urine Drugs of Abuse Scr...[[id number]]  Abnormal            Final result                 Please view results for these tests on the individual orders.   Urine Drugs of Abuse Screen Panel 13   Result Value Ref Range    Cannabinoids (49-biu-1-carboxy-9-THC) Detected (A) Not Detected, Indeterminate    Phencyclidine Not  Detected Not Detected, Indeterminate    Cocaine (Benzoylecgonine) Not Detected Not Detected, Indeterminate    Methamphetamine (d-Methamphetamine) Not Detected Not Detected, Indeterminate    Opiates (Morphine) Not Detected Not Detected, Indeterminate    Amphetamine (d-Amphetamine) Not Detected Not Detected, Indeterminate    Benzodiazepines (Nordiazepam) Not Detected Not Detected, Indeterminate    Tricyclic Antidepressants (Desipramine) Not Detected Not Detected, Indeterminate    Methadone Not Detected Not Detected, Indeterminate    Barbiturates (Butalbital) Not Detected Not Detected, Indeterminate    Oxycodone Not Detected Not Detected, Indeterminate    Propoxyphene (Norpropoxyphene) Not Detected Not Detected, Indeterminate    Buprenorphine Not Detected Not Detected, Indeterminate       Medications   0.9% sodium chloride BOLUS (0 mLs Intravenous Stopped 8/3/23 0910)   LORazepam (ATIVAN) injection 1 mg (1 mg Intravenous $Given 8/3/23 0834)   0.9% sodium chloride BOLUS (0 mLs Intravenous Stopped 8/3/23 1113)   alum & mag hydroxide-simethicone (MAALOX) suspension 15 mL (15 mLs Oral $Given 8/3/23 1016)       Assessments & Plan   I have reviewed the findings, diagnosis, plan and need for follow up with the patient.  Discharge Medication List as of 8/3/2023 11:44 AM          Final diagnoses:   Chronic abdominal pain   Nausea and vomiting, unspecified vomiting type   Type 1 diabetes mellitus with hyperglycemia (H)     Disposition: Patient discharged home in stable condition.  Plan as above.  Return for concerns.     Note: Chart documentation done in part with Dragon Voice Recognition software. Although reviewed after completion, some word and grammatical errors may remain.   8/3/2023   [facility] EMERGENCY DEPT       Bessie Coats MD  08/04/23 0536

## 2023-08-04 NOTE — RESULT ENCOUNTER NOTE
Final urine culture report is negative.  Adult Negative Urine culture parameters per protocol: Any # Urogenital single or mixed organism, <10,000 col/ml single organism (cath/midstream), and > 3 organisms (No susceptibilities performed).  [facility] Emergency Dept discharge antibiotic prescribed (If applicable): None  Treatment recommendations per [facility] ED Lab Result Urine Culture protocol.

## 2023-09-11 ENCOUNTER — HOSPITAL ENCOUNTER (EMERGENCY)
Facility: CLINIC | Age: 33
Discharge: ANOTHER HOSPITAL | End: 2023-09-11
Attending: FAMILY MEDICINE | Admitting: FAMILY MEDICINE
Payer: MEDICARE

## 2023-09-11 ENCOUNTER — HOSPITAL ENCOUNTER (OUTPATIENT)
Facility: CLINIC | Age: 33
Setting detail: OBSERVATION
Discharge: LEFT BEFORE DISCHARGE | End: 2023-09-12
Attending: FAMILY MEDICINE | Admitting: FAMILY MEDICINE
Payer: MEDICARE

## 2023-09-11 VITALS
SYSTOLIC BLOOD PRESSURE: 90 MMHG | WEIGHT: 180 LBS | TEMPERATURE: 98.4 F | DIASTOLIC BLOOD PRESSURE: 58 MMHG | HEART RATE: 105 BPM | BODY MASS INDEX: 23.86 KG/M2 | HEIGHT: 73 IN | OXYGEN SATURATION: 99 % | RESPIRATION RATE: 8 BRPM

## 2023-09-11 DIAGNOSIS — E88.89 KETOSIS (H): ICD-10-CM

## 2023-09-11 DIAGNOSIS — E11.65 TYPE 2 DIABETES MELLITUS WITH HYPERGLYCEMIA, WITH LONG-TERM CURRENT USE OF INSULIN (H): ICD-10-CM

## 2023-09-11 DIAGNOSIS — Z79.4 TYPE 2 DIABETES MELLITUS WITH HYPERGLYCEMIA, WITH LONG-TERM CURRENT USE OF INSULIN (H): ICD-10-CM

## 2023-09-11 DIAGNOSIS — F19.10 POLYSUBSTANCE ABUSE (H): ICD-10-CM

## 2023-09-11 PROBLEM — H54.61 VISION LOSS, RIGHT EYE: Status: ACTIVE | Noted: 2023-09-11

## 2023-09-11 PROBLEM — N39.0 URINARY TRACT INFECTION WITHOUT HEMATURIA, SITE UNSPECIFIED: Status: RESOLVED | Noted: 2021-02-06 | Resolved: 2023-09-11

## 2023-09-11 PROBLEM — E10.9 TYPE 1 DIABETES MELLITUS (H): Status: ACTIVE | Noted: 2020-08-04

## 2023-09-11 PROBLEM — A41.9 SEPSIS, DUE TO UNSPECIFIED ORGANISM, UNSPECIFIED WHETHER ACUTE ORGAN DYSFUNCTION PRESENT (H): Status: RESOLVED | Noted: 2021-02-06 | Resolved: 2023-09-11

## 2023-09-11 LAB
ALBUMIN UR-MCNC: NEGATIVE MG/DL
AMPHETAMINES UR QL: NOT DETECTED
ANION GAP SERPL CALCULATED.3IONS-SCNC: 11 MMOL/L (ref 7–15)
ANION GAP SERPL CALCULATED.3IONS-SCNC: 23 MMOL/L (ref 7–15)
APPEARANCE UR: CLEAR
B-OH-BUTYR SERPL-SCNC: 0.5 MMOL/L
B-OH-BUTYR SERPL-SCNC: 1.5 MMOL/L
BARBITURATES UR QL SCN: NOT DETECTED
BASE EXCESS BLDV CALC-SCNC: 0.9 MMOL/L (ref -7.7–1.9)
BASE EXCESS BLDV CALC-SCNC: 2.4 MMOL/L (ref -7.7–1.9)
BASOPHILS # BLD AUTO: 0 10E3/UL (ref 0–0.2)
BASOPHILS NFR BLD AUTO: 0 %
BENZODIAZ UR QL SCN: NOT DETECTED
BILIRUB UR QL STRIP: NEGATIVE
BUN SERPL-MCNC: 23.8 MG/DL (ref 6–20)
BUN SERPL-MCNC: 27.6 MG/DL (ref 6–20)
BUPRENORPHINE UR QL: NOT DETECTED
CALCIUM SERPL-MCNC: 10.5 MG/DL (ref 8.6–10)
CALCIUM SERPL-MCNC: 9.1 MG/DL (ref 8.6–10)
CANNABINOIDS UR QL: DETECTED
CHLORIDE SERPL-SCNC: 104 MMOL/L (ref 98–107)
CHLORIDE SERPL-SCNC: 93 MMOL/L (ref 98–107)
COCAINE UR QL SCN: NOT DETECTED
COLOR UR AUTO: YELLOW
CREAT SERPL-MCNC: 0.75 MG/DL (ref 0.67–1.17)
CREAT SERPL-MCNC: 0.76 MG/DL (ref 0.67–1.17)
D-METHAMPHET UR QL: NOT DETECTED
DEPRECATED HCO3 PLAS-SCNC: 20 MMOL/L (ref 22–29)
DEPRECATED HCO3 PLAS-SCNC: 24 MMOL/L (ref 22–29)
EGFRCR SERPLBLD CKD-EPI 2021: >90 ML/MIN/1.73M2
EGFRCR SERPLBLD CKD-EPI 2021: >90 ML/MIN/1.73M2
EOSINOPHIL # BLD AUTO: 0 10E3/UL (ref 0–0.7)
EOSINOPHIL NFR BLD AUTO: 0 %
ERYTHROCYTE [DISTWIDTH] IN BLOOD BY AUTOMATED COUNT: 14.2 % (ref 10–15)
ERYTHROCYTE [DISTWIDTH] IN BLOOD BY AUTOMATED COUNT: 14.5 % (ref 10–15)
ETHANOL SERPL-MCNC: <0.01 G/DL
GLUCOSE BLDC GLUCOMTR-MCNC: 101 MG/DL (ref 70–99)
GLUCOSE BLDC GLUCOMTR-MCNC: 151 MG/DL (ref 70–99)
GLUCOSE BLDC GLUCOMTR-MCNC: 224 MG/DL (ref 70–99)
GLUCOSE BLDC GLUCOMTR-MCNC: 234 MG/DL (ref 70–99)
GLUCOSE BLDC GLUCOMTR-MCNC: 75 MG/DL (ref 70–99)
GLUCOSE BLDC GLUCOMTR-MCNC: 82 MG/DL (ref 70–99)
GLUCOSE SERPL-MCNC: 138 MG/DL (ref 70–99)
GLUCOSE SERPL-MCNC: 299 MG/DL (ref 70–99)
GLUCOSE UR STRIP-MCNC: >499 MG/DL
HCO3 BLDV-SCNC: 24 MMOL/L (ref 21–28)
HCO3 BLDV-SCNC: 27 MMOL/L (ref 21–28)
HCT VFR BLD AUTO: 33.5 % (ref 40–53)
HCT VFR BLD AUTO: 41.4 % (ref 40–53)
HGB BLD-MCNC: 11.1 G/DL (ref 13.3–17.7)
HGB BLD-MCNC: 13.7 G/DL (ref 13.3–17.7)
HGB UR QL STRIP: NEGATIVE
IMM GRANULOCYTES # BLD: 0 10E3/UL
IMM GRANULOCYTES NFR BLD: 0 %
KETONES UR STRIP-MCNC: 20 MG/DL
LEUKOCYTE ESTERASE UR QL STRIP: ABNORMAL
LYMPHOCYTES # BLD AUTO: 1.3 10E3/UL (ref 0.8–5.3)
LYMPHOCYTES NFR BLD AUTO: 14 %
MAGNESIUM SERPL-MCNC: 2.1 MG/DL (ref 1.7–2.3)
MCH RBC QN AUTO: 25.9 PG (ref 26.5–33)
MCH RBC QN AUTO: 26.3 PG (ref 26.5–33)
MCHC RBC AUTO-ENTMCNC: 33.1 G/DL (ref 31.5–36.5)
MCHC RBC AUTO-ENTMCNC: 33.1 G/DL (ref 31.5–36.5)
MCV RBC AUTO: 78 FL (ref 78–100)
MCV RBC AUTO: 79 FL (ref 78–100)
METHADONE UR QL SCN: NOT DETECTED
MONOCYTES # BLD AUTO: 0.6 10E3/UL (ref 0–1.3)
MONOCYTES NFR BLD AUTO: 6 %
NEUTROPHILS # BLD AUTO: 7.8 10E3/UL (ref 1.6–8.3)
NEUTROPHILS NFR BLD AUTO: 80 %
NITRATE UR QL: NEGATIVE
NRBC # BLD AUTO: 0 10E3/UL
NRBC BLD AUTO-RTO: 0 /100
O2/TOTAL GAS SETTING VFR VENT: 21 %
O2/TOTAL GAS SETTING VFR VENT: 21 %
OPIATES UR QL SCN: DETECTED
OXYCODONE UR QL SCN: NOT DETECTED
PCO2 BLDV: 32 MM HG (ref 40–50)
PCO2 BLDV: 38 MM HG (ref 40–50)
PCP UR QL SCN: NOT DETECTED
PH BLDV: 7.45 [PH] (ref 7.32–7.43)
PH BLDV: 7.48 [PH] (ref 7.32–7.43)
PH UR STRIP: 6 [PH] (ref 5–7)
PLATELET # BLD AUTO: 201 10E3/UL (ref 150–450)
PLATELET # BLD AUTO: 245 10E3/UL (ref 150–450)
PO2 BLDV: 23 MM HG (ref 25–47)
PO2 BLDV: 64 MM HG (ref 25–47)
POTASSIUM SERPL-SCNC: 3.7 MMOL/L (ref 3.4–5.3)
POTASSIUM SERPL-SCNC: 4.1 MMOL/L (ref 3.4–5.3)
PROPOXYPH UR QL: NOT DETECTED
RBC # BLD AUTO: 4.22 10E6/UL (ref 4.4–5.9)
RBC # BLD AUTO: 5.29 10E6/UL (ref 4.4–5.9)
RBC URINE: 1 /HPF
SARS-COV-2 RNA RESP QL NAA+PROBE: NEGATIVE
SODIUM SERPL-SCNC: 136 MMOL/L (ref 136–145)
SODIUM SERPL-SCNC: 139 MMOL/L (ref 136–145)
SP GR UR STRIP: 1.03 (ref 1–1.03)
SQUAMOUS EPITHELIAL: <1 /HPF
TRICYCLICS UR QL SCN: NOT DETECTED
UROBILINOGEN UR STRIP-MCNC: NORMAL MG/DL
WBC # BLD AUTO: 9.3 10E3/UL (ref 4–11)
WBC # BLD AUTO: 9.8 10E3/UL (ref 4–11)
WBC URINE: 7 /HPF

## 2023-09-11 PROCEDURE — 85025 COMPLETE CBC W/AUTO DIFF WBC: CPT | Performed by: FAMILY MEDICINE

## 2023-09-11 PROCEDURE — 82962 GLUCOSE BLOOD TEST: CPT

## 2023-09-11 PROCEDURE — 96375 TX/PRO/DX INJ NEW DRUG ADDON: CPT | Performed by: FAMILY MEDICINE

## 2023-09-11 PROCEDURE — 258N000003 HC RX IP 258 OP 636: Performed by: FAMILY MEDICINE

## 2023-09-11 PROCEDURE — 36415 COLL VENOUS BLD VENIPUNCTURE: CPT | Performed by: FAMILY MEDICINE

## 2023-09-11 PROCEDURE — 82077 ASSAY SPEC XCP UR&BREATH IA: CPT | Performed by: FAMILY MEDICINE

## 2023-09-11 PROCEDURE — 87635 SARS-COV-2 COVID-19 AMP PRB: CPT | Performed by: FAMILY MEDICINE

## 2023-09-11 PROCEDURE — 96374 THER/PROPH/DIAG INJ IV PUSH: CPT | Mod: 59 | Performed by: FAMILY MEDICINE

## 2023-09-11 PROCEDURE — 96361 HYDRATE IV INFUSION ADD-ON: CPT | Performed by: FAMILY MEDICINE

## 2023-09-11 PROCEDURE — 80048 BASIC METABOLIC PNL TOTAL CA: CPT | Performed by: FAMILY MEDICINE

## 2023-09-11 PROCEDURE — 36415 COLL VENOUS BLD VENIPUNCTURE: CPT | Performed by: PHYSICIAN ASSISTANT

## 2023-09-11 PROCEDURE — 82803 BLOOD GASES ANY COMBINATION: CPT | Performed by: FAMILY MEDICINE

## 2023-09-11 PROCEDURE — 82010 KETONE BODYS QUAN: CPT | Performed by: FAMILY MEDICINE

## 2023-09-11 PROCEDURE — 99285 EMERGENCY DEPT VISIT HI MDM: CPT | Mod: 25 | Performed by: FAMILY MEDICINE

## 2023-09-11 PROCEDURE — 250N000013 HC RX MED GY IP 250 OP 250 PS 637: Performed by: PHYSICIAN ASSISTANT

## 2023-09-11 PROCEDURE — 93005 ELECTROCARDIOGRAM TRACING: CPT | Performed by: FAMILY MEDICINE

## 2023-09-11 PROCEDURE — 81001 URINALYSIS AUTO W/SCOPE: CPT | Mod: XU | Performed by: FAMILY MEDICINE

## 2023-09-11 PROCEDURE — 82010 KETONE BODYS QUAN: CPT | Performed by: PHYSICIAN ASSISTANT

## 2023-09-11 PROCEDURE — 250N000011 HC RX IP 250 OP 636: Mod: JZ | Performed by: EMERGENCY MEDICINE

## 2023-09-11 PROCEDURE — G0378 HOSPITAL OBSERVATION PER HR: HCPCS

## 2023-09-11 PROCEDURE — 80048 BASIC METABOLIC PNL TOTAL CA: CPT | Performed by: PHYSICIAN ASSISTANT

## 2023-09-11 PROCEDURE — 85014 HEMATOCRIT: CPT | Performed by: PHYSICIAN ASSISTANT

## 2023-09-11 PROCEDURE — 82803 BLOOD GASES ANY COMBINATION: CPT | Performed by: PHYSICIAN ASSISTANT

## 2023-09-11 PROCEDURE — 250N000012 HC RX MED GY IP 250 OP 636 PS 637: Performed by: PHYSICIAN ASSISTANT

## 2023-09-11 PROCEDURE — 99223 1ST HOSP IP/OBS HIGH 75: CPT | Mod: AI | Performed by: PHYSICIAN ASSISTANT

## 2023-09-11 PROCEDURE — 250N000011 HC RX IP 250 OP 636: Mod: JZ | Performed by: FAMILY MEDICINE

## 2023-09-11 PROCEDURE — 82962 GLUCOSE BLOOD TEST: CPT | Mod: 91

## 2023-09-11 PROCEDURE — 250N000013 HC RX MED GY IP 250 OP 250 PS 637: Performed by: EMERGENCY MEDICINE

## 2023-09-11 PROCEDURE — 83735 ASSAY OF MAGNESIUM: CPT | Performed by: FAMILY MEDICINE

## 2023-09-11 PROCEDURE — 93010 ELECTROCARDIOGRAM REPORT: CPT | Performed by: FAMILY MEDICINE

## 2023-09-11 PROCEDURE — 80306 DRUG TEST PRSMV INSTRMNT: CPT | Performed by: FAMILY MEDICINE

## 2023-09-11 RX ORDER — KETOROLAC TROMETHAMINE 30 MG/ML
30 INJECTION, SOLUTION INTRAMUSCULAR; INTRAVENOUS ONCE
Status: COMPLETED | OUTPATIENT
Start: 2023-09-11 | End: 2023-09-11

## 2023-09-11 RX ORDER — KETOROLAC TROMETHAMINE 30 MG/ML
30 INJECTION, SOLUTION INTRAMUSCULAR; INTRAVENOUS EVERY 6 HOURS PRN
Status: DISCONTINUED | OUTPATIENT
Start: 2023-09-11 | End: 2023-09-12 | Stop reason: HOSPADM

## 2023-09-11 RX ORDER — METOCLOPRAMIDE 10 MG/1
10 TABLET ORAL
COMMUNITY
Start: 2023-07-17 | End: 2024-07-17

## 2023-09-11 RX ORDER — METOCLOPRAMIDE HYDROCHLORIDE 5 MG/ML
10 INJECTION INTRAMUSCULAR; INTRAVENOUS ONCE
Status: COMPLETED | OUTPATIENT
Start: 2023-09-11 | End: 2023-09-11

## 2023-09-11 RX ORDER — CYCLOBENZAPRINE HCL 10 MG
10 TABLET ORAL 3 TIMES DAILY PRN
Status: DISCONTINUED | OUTPATIENT
Start: 2023-09-11 | End: 2023-09-12 | Stop reason: HOSPADM

## 2023-09-11 RX ORDER — OLANZAPINE 10 MG/1
5 INJECTION, POWDER, LYOPHILIZED, FOR SOLUTION INTRAMUSCULAR ONCE
Status: COMPLETED | OUTPATIENT
Start: 2023-09-11 | End: 2023-09-11

## 2023-09-11 RX ORDER — ACETAMINOPHEN 325 MG/1
650 TABLET ORAL EVERY 6 HOURS
Status: DISCONTINUED | OUTPATIENT
Start: 2023-09-11 | End: 2023-09-12 | Stop reason: HOSPADM

## 2023-09-11 RX ORDER — ONDANSETRON 4 MG/1
4 TABLET, ORALLY DISINTEGRATING ORAL EVERY 6 HOURS PRN
Status: DISCONTINUED | OUTPATIENT
Start: 2023-09-11 | End: 2023-09-12 | Stop reason: HOSPADM

## 2023-09-11 RX ORDER — DIPHENHYDRAMINE HYDROCHLORIDE 50 MG/ML
25 INJECTION INTRAMUSCULAR; INTRAVENOUS ONCE
Status: COMPLETED | OUTPATIENT
Start: 2023-09-11 | End: 2023-09-11

## 2023-09-11 RX ORDER — CAPSAICIN 0.025 %
CREAM (GRAM) TOPICAL 3 TIMES DAILY
Status: DISCONTINUED | OUTPATIENT
Start: 2023-09-11 | End: 2023-09-12 | Stop reason: HOSPADM

## 2023-09-11 RX ORDER — SODIUM CHLORIDE 9 MG/ML
INJECTION, SOLUTION INTRAVENOUS CONTINUOUS
Status: DISCONTINUED | OUTPATIENT
Start: 2023-09-11 | End: 2023-09-12 | Stop reason: HOSPADM

## 2023-09-11 RX ORDER — ONDANSETRON 2 MG/ML
4 INJECTION INTRAMUSCULAR; INTRAVENOUS EVERY 6 HOURS PRN
Status: DISCONTINUED | OUTPATIENT
Start: 2023-09-11 | End: 2023-09-12 | Stop reason: HOSPADM

## 2023-09-11 RX ORDER — IBUPROFEN 600 MG/1
600 TABLET, FILM COATED ORAL EVERY 6 HOURS PRN
COMMUNITY
Start: 2023-07-19 | End: 2024-02-14

## 2023-09-11 RX ORDER — ALBUTEROL SULFATE 0.83 MG/ML
2.5 SOLUTION RESPIRATORY (INHALATION)
Status: DISCONTINUED | OUTPATIENT
Start: 2023-09-11 | End: 2023-09-12 | Stop reason: HOSPADM

## 2023-09-11 RX ORDER — FAMOTIDINE 20 MG/1
20 TABLET, FILM COATED ORAL 2 TIMES DAILY
Status: DISCONTINUED | OUTPATIENT
Start: 2023-09-11 | End: 2023-09-12 | Stop reason: HOSPADM

## 2023-09-11 RX ORDER — TAMSULOSIN HYDROCHLORIDE 0.4 MG/1
0.4 CAPSULE ORAL DAILY
Status: DISCONTINUED | OUTPATIENT
Start: 2023-09-12 | End: 2023-09-12 | Stop reason: HOSPADM

## 2023-09-11 RX ORDER — IBUPROFEN 600 MG/1
600 TABLET, FILM COATED ORAL EVERY 6 HOURS PRN
Status: DISCONTINUED | OUTPATIENT
Start: 2023-09-11 | End: 2023-09-12 | Stop reason: HOSPADM

## 2023-09-11 RX ORDER — HYDROXYZINE HYDROCHLORIDE 50 MG/1
50 TABLET, FILM COATED ORAL EVERY 6 HOURS PRN
Status: DISCONTINUED | OUTPATIENT
Start: 2023-09-11 | End: 2023-09-12 | Stop reason: HOSPADM

## 2023-09-11 RX ORDER — MAGNESIUM HYDROXIDE/ALUMINUM HYDROXICE/SIMETHICONE 120; 1200; 1200 MG/30ML; MG/30ML; MG/30ML
15 SUSPENSION ORAL ONCE
Status: COMPLETED | OUTPATIENT
Start: 2023-09-11 | End: 2023-09-11

## 2023-09-11 RX ORDER — PROCHLORPERAZINE MALEATE 5 MG
10 TABLET ORAL EVERY 6 HOURS PRN
Status: DISCONTINUED | OUTPATIENT
Start: 2023-09-11 | End: 2023-09-12 | Stop reason: HOSPADM

## 2023-09-11 RX ORDER — ACETAMINOPHEN 500 MG
500 TABLET ORAL EVERY 4 HOURS PRN
COMMUNITY
Start: 2023-02-21 | End: 2024-02-14

## 2023-09-11 RX ORDER — OLANZAPINE 5 MG/1
10 TABLET ORAL AT BEDTIME
Status: DISCONTINUED | OUTPATIENT
Start: 2023-09-11 | End: 2023-09-12 | Stop reason: HOSPADM

## 2023-09-11 RX ORDER — DEXTROSE MONOHYDRATE 25 G/50ML
25-50 INJECTION, SOLUTION INTRAVENOUS
Status: DISCONTINUED | OUTPATIENT
Start: 2023-09-11 | End: 2023-09-12 | Stop reason: HOSPADM

## 2023-09-11 RX ORDER — POLYETHYLENE GLYCOL 3350 17 G/17G
17 POWDER, FOR SOLUTION ORAL DAILY PRN
Status: DISCONTINUED | OUTPATIENT
Start: 2023-09-11 | End: 2023-09-12 | Stop reason: HOSPADM

## 2023-09-11 RX ORDER — METOCLOPRAMIDE 10 MG/1
10 TABLET ORAL
Status: DISCONTINUED | OUTPATIENT
Start: 2023-09-11 | End: 2023-09-12 | Stop reason: HOSPADM

## 2023-09-11 RX ORDER — PEN NEEDLE, DIABETIC 31 GX5/16"
NEEDLE, DISPOSABLE MISCELLANEOUS
COMMUNITY
Start: 2023-07-25 | End: 2024-02-14

## 2023-09-11 RX ORDER — NICOTINE POLACRILEX 4 MG
15-30 LOZENGE BUCCAL
Status: DISCONTINUED | OUTPATIENT
Start: 2023-09-11 | End: 2023-09-12 | Stop reason: HOSPADM

## 2023-09-11 RX ORDER — RISPERIDONE 2 MG/1
2 TABLET ORAL AT BEDTIME
COMMUNITY
Start: 2023-08-25 | End: 2024-07-02

## 2023-09-11 RX ORDER — ACETAMINOPHEN 325 MG/1
650 TABLET ORAL EVERY 6 HOURS PRN
Status: DISCONTINUED | OUTPATIENT
Start: 2023-09-11 | End: 2023-09-12 | Stop reason: HOSPADM

## 2023-09-11 RX ORDER — ONDANSETRON 4 MG/1
1 TABLET, ORALLY DISINTEGRATING ORAL 3 TIMES DAILY
COMMUNITY
Start: 2023-07-27 | End: 2024-02-14

## 2023-09-11 RX ORDER — SENNOSIDES 8.6 MG
2 TABLET ORAL 2 TIMES DAILY
Status: DISCONTINUED | OUTPATIENT
Start: 2023-09-11 | End: 2023-09-12 | Stop reason: HOSPADM

## 2023-09-11 RX ORDER — ERYTHROMYCIN ETHYLSUCCINATE 200 MG/5ML
2.5 SUSPENSION ORAL
COMMUNITY
Start: 2023-08-11 | End: 2024-06-03

## 2023-09-11 RX ORDER — IBUPROFEN 600 MG/1
600 TABLET, FILM COATED ORAL EVERY 6 HOURS PRN
Status: DISCONTINUED | OUTPATIENT
Start: 2023-09-11 | End: 2023-09-11

## 2023-09-11 RX ORDER — PROCHLORPERAZINE 25 MG
25 SUPPOSITORY, RECTAL RECTAL EVERY 12 HOURS PRN
Status: DISCONTINUED | OUTPATIENT
Start: 2023-09-11 | End: 2023-09-12 | Stop reason: HOSPADM

## 2023-09-11 RX ADMIN — METOCLOPRAMIDE 10 MG: 10 TABLET ORAL at 16:25

## 2023-09-11 RX ADMIN — FAMOTIDINE 20 MG: 20 TABLET, FILM COATED ORAL at 16:25

## 2023-09-11 RX ADMIN — SENNOSIDES 2 TABLET: 8.6 TABLET, FILM COATED ORAL at 20:17

## 2023-09-11 RX ADMIN — DIPHENHYDRAMINE HYDROCHLORIDE 25 MG: 50 INJECTION, SOLUTION INTRAMUSCULAR; INTRAVENOUS at 07:00

## 2023-09-11 RX ADMIN — METOCLOPRAMIDE HYDROCHLORIDE 10 MG: 5 INJECTION INTRAMUSCULAR; INTRAVENOUS at 07:00

## 2023-09-11 RX ADMIN — KETOROLAC TROMETHAMINE 30 MG: 30 INJECTION, SOLUTION INTRAMUSCULAR; INTRAVENOUS at 07:00

## 2023-09-11 RX ADMIN — INSULIN HUMAN 10 UNITS: 100 INJECTION, SUSPENSION SUBCUTANEOUS at 21:44

## 2023-09-11 RX ADMIN — ALUMINUM HYDROXIDE, MAGNESIUM HYDROXIDE, AND SIMETHICONE 15 ML: 200; 200; 20 SUSPENSION ORAL at 09:13

## 2023-09-11 RX ADMIN — OLANZAPINE 5 MG: 10 INJECTION, POWDER, FOR SOLUTION INTRAMUSCULAR at 09:13

## 2023-09-11 RX ADMIN — SODIUM CHLORIDE 2000 ML: 9 INJECTION, SOLUTION INTRAVENOUS at 06:59

## 2023-09-11 RX ADMIN — OLANZAPINE 10 MG: 5 TABLET, FILM COATED ORAL at 22:17

## 2023-09-11 RX ADMIN — FAMOTIDINE 20 MG: 20 TABLET, FILM COATED ORAL at 20:17

## 2023-09-11 ASSESSMENT — ACTIVITIES OF DAILY LIVING (ADL)
ADLS_ACUITY_SCORE: 31
ADLS_ACUITY_SCORE: 35
ADLS_ACUITY_SCORE: 31
ADLS_ACUITY_SCORE: 31
ADLS_ACUITY_SCORE: 35
ADLS_ACUITY_SCORE: 35
ADLS_ACUITY_SCORE: 31
ADLS_ACUITY_SCORE: 31
ADLS_ACUITY_SCORE: 35

## 2023-09-11 ASSESSMENT — COLUMBIA-SUICIDE SEVERITY RATING SCALE - C-SSRS
3. HAVE YOU BEEN THINKING ABOUT HOW YOU MIGHT KILL YOURSELF?: NO
1. IN THE PAST MONTH, HAVE YOU WISHED YOU WERE DEAD OR WISHED YOU COULD GO TO SLEEP AND NOT WAKE UP?: NO
5. HAVE YOU STARTED TO WORK OUT OR WORKED OUT THE DETAILS OF HOW TO KILL YOURSELF? DO YOU INTEND TO CARRY OUT THIS PLAN?: NO
4. HAVE YOU HAD THESE THOUGHTS AND HAD SOME INTENTION OF ACTING ON THEM?: NO
6. HAVE YOU EVER DONE ANYTHING, STARTED TO DO ANYTHING, OR PREPARED TO DO ANYTHING TO END YOUR LIFE?: NO
2. HAVE YOU ACTUALLY HAD ANY THOUGHTS OF KILLING YOURSELF IN THE PAST MONTH?: NO

## 2023-09-11 NOTE — PROGRESS NOTES
[facility] [facility] [place]    Patient admitted to room 2407 at approximately 1240 via cart from [facility] ED. Patient was accompanied by transport tech.     Verbal SBAR report received from Preethi prior to patient arrival.     Patient ambulated to bed with stand-by assist. Patient alert and oriented X 3. Patient reported 10/10 generalized abdominal pain, patient would like Dilaudid, but that is currently not an option. Will utilize available pharmacologic and non-pharmacologic interventions including distraction, repositioning, therapeutic communication, and lighting adjustments. Admission vital signs: 125/84 P: 91, 97% Room air, 18 RR. Patient was oriented to plan of care, call light, bed controls, tv, telephone, bathroom, and visiting hours.     Risk Assessment    The following safety risks were identified during admission: fall and isolation. Yellow risk band applied: YES.     Skin Initial Assessment    This writer admitted this patient and completed a full skin assessment and Ralph score in the Adult PCS flowsheet. Appropriate interventions initiated as needed.     Secondary skin check completed by Erik CERNA         Education    Patient has a [place] to Observation order: Yes  Observation education completed and documented: YesObservation Brochure and Video    Patient informed of observation status based on provider's order.  Observation brochure was given. Patient/Family stated understanding. Questions answered.  Mary Gonsalez, SHANNEN Gonsalez RN

## 2023-09-11 NOTE — H&P
[facility]    History and Physical - Hospitalist Service       Date of Admission:  9/11/2023    Assessment & Plan      Reji Monahan is a 33 year old male admitted on 9/11/2023. He presented to the [facility] emergency department for evaluation of abdominal pain and nausea and was found to likely have cyclic vomiting exacerbation vs gastroparesis exacerbation and associated hyperglycemia. No beds were available at [facility], and he was transferred for admission to [facility] for further evaluation and treatment.     Uncontrolled diabetes mellitus with hyperglycemia  Ketosis - starvation vs diabetic ketosis (but not DKA)  DM type 1  Presented with elevated ketones (1.5), hyperglycemia (234), and elevated anion gap (23), but not in DKA (pH alkalotic (7.48)).   Occurs in the setting of known type 1 diabetes mellitus, most recent HgbA1c 8.6. Managed prior to admission with Lantus 30 U q am and Novolog sliding scale insulin. Home insulin regimen has been off schedule due to poor oral intake.   Is not in true DKA, so insulin drip is not necessary but needs closer than usual glucose monitoring. Elevated ketones likely due to starvation vs diabetic hyperglycemia.   - One time dose of NPH insulin 10U tonight, then resume home Lantus 30 U in am 9/12  - High sliding scale insulin  - Monitor glucose tonight with meals, again at 11 pm (and OK to give sliding scale insulin at that time), 2 am, and with morning labs 9/12  - Repeat ketones, VBG, BMP on admission and again in am     Abdominal pain, nausea, vomiting - likely cannabinoid hyperemesis syndrome and / or diabetic gastroparesis   Gastroesophageal reflux disease without esophagitis  Known gastroparesis diagnosed on gastric emptying test (see Care Everywhere 5/26/23), also with history of cannabinoid hyperemesis syndrome.   Follows with GI through [facility], recently trying to reduce anticholinergic medications, thus prompting  recent change from olanzapine to Risperdal (see below). Has also recently tried erythromycin but it is no longer on patient's home medication list.   Utox positive for cannabinoids and opiates, patient admits to smoking marijuana. Patient's symptoms are likely an exacerbation of his known gastroparesis / cannabis hyperemesis.   Managed prior to admission with Pepcid 20 mg bid, Reglan 10 mg tid, and Zofran tid.  - Defer imaging at this time, as prior records indicate he has had multiple CT's for these symptoms without significant findings  - Continue home Pepcid and Reglan  - IV antiemetics available  - Analgesia: scheduled acetaminophen, prn Toradol vs ibuprofen. Avoid narcotics if possible.    Paranoid schizophrenia  Asperger's disorder  Emotional, restless at time of admission. Has been prescribed olanzapine 10 mg q hs and Risperdal 2 mg q hs in the past (at different times, not in combination); was recently changed to Risperdal rather than olanzapine due to anticholinergic effects, but prefers Zyprexa, so has been taking what is left of prior Zyprexa prescription he has on hand at home.   - Stop Risperdal  - Continue olanzapine  - Prn hydroxyzine available  - If worsening, stop hydroxyzine and change olanzapine back to Risperdal     Diabetic neuropathy / chronic pain   Uses Flexeril 10 mg tid prn, continue.     Mild intermittent asthma  Stable respiratory status. Managed prior to admission with albuterol prn.  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 Pupils unequal right > left, patient unsure if this is chronic. There are no other associated neurologic deficits or reported acute neurologic changes.   - No further work-up unless new neurologic changes develop         Diet: Advance Diet as Tolerated: Clear Liquid Diet    DVT Prophylaxis: Low Risk/Ambulatory with no VTE prophylaxis indicated  Wright Catheter: Not present  Lines: None    "  Cardiac Monitoring: None  Code Status: Full Code      Clinically Significant Risk Factors Present on Admission           # Hypercalcemia: Highest Ca = 10.5 mg/dL in last 2 days, will monitor as appropriate   # Anion Gap Metabolic Acidosis: Highest Anion Gap = 23 mmol/L in last 2 days, will monitor and treat as appropriate          # DMII: A1C = 8.6 % (Ref range: <5.7 %) within past 6 months   # Overweight: Estimated body mass index is 25.98 kg/m  as calculated from the following:    Height as of this encounter: 1.854 m (6' 0.99\").    Weight as of this encounter: 89.3 kg (196 lb 13.9 oz).       # Asthma: noted on problem list        Disposition Plan      Expected Discharge Date: 09/12/2023                The patient's care was discussed with the Attending Physician, Dr. Anand Blackman, Bedside Nurse, and Patient.    Heidi Lizama PA-C  Hospitalist Service  [facility]  Securely message with [employer] (more info)  Text page via [facility] Paging/Directory     ______________________________________________________________________    Chief Complaint   \"I have abdominal pain.\"    History is obtained from the patient, review of EMR, and emergency department documentation.    History of Present Illness   Reji Monahan is a 33 year old male who presented to the emergency department for evaluation of abdominal pain, nausea, and vomiting.     Patient has known gastroparesis as well as cyclic vomiting syndrome.   He admits to recent marijuana use, says his cyclic vomiting never got better even when he stopped using marijuana.   He had a confirmed gastric emptying test in May 2023 confirming gastroparesis.     About 4 days ago his abdominal pain worsened, and he started having nausea and vomiting.   He doesn't know how often he has been vomiting, but \"it has been a lot.\"  Pain is present in his entire abdomen, is constant without radiation. It improves if he has dilaudid or with hot showers.  He " does not know if pain gets any better or worse after vomiting.   Emesis is non-bloody.  No associated fevers, chills, or diarrhea.     His home insulin regimen has been off schedule because he hasn't been able to eat or drink normally.  He last had his Lantus yesterday evening around 9 pm (although he usually takes it in the morning).    He has increased urinary frequency lately.  Has been lightheaded, but no falls or syncope.    Denies chest pain, palpitations, cough, wheeze, shortness of breath.  Is blind in his right eye, but denies any acute vision changes.   The remainder review of systems is negative.       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    Prior to Admission Medications   Prior to Admission Medications   Prescriptions Last Dose Informant Patient Reported? Taking?   B-D U/F 31G X 8 MM insulin pen needle 9/10/2023 at pm Self Yes Yes   Si times daily   CONTOUR NEXT TEST test strip 2023 at am #400 Self Yes Yes   Si strip by In Vitro route 4 times daily   OLANZapine (ZYPREXA) 10 MG tablet Past Month at hs Self Yes Yes   Sig: Take 10 mg by mouth At Bedtime   acetaminophen (TYLENOL) 500 MG tablet Past Month at prn Self Yes Yes   Sig: Take 500 mg by mouth every 4 hours as needed   albuterol (PROAIR HFA/PROVENTIL HFA/VENTOLIN HFA) 108 (90 Base) MCG/ACT inhaler More than a month at on hand if needed Self Yes Yes   Sig: Inhale 2 puffs into the lungs every 4 hours as needed for  shortness of breath / dyspnea or wheezing   cyclobenzaprine (FLEXERIL) 10 MG tablet Past Week at prn Self Yes Yes   Sig: Take 10 mg by mouth 3 times daily as needed for muscle spasms   erythromycin ethylsuccinate (ERYPED) 200 MG/5ML suspension Past Month at has refills for this Self Yes Yes   Sig: Take 2.5 mLs by mouth 3 times daily (before meals)   famotidine (PEPCID) 20 MG tablet 9/10/2023 at am Self Yes Yes   Sig: Take 20 mg by mouth 2 times daily   hydrOXYzine (ATARAX) 50 MG tablet Past Month at prn Self Yes Yes   Sig: Take 50 mg by mouth every 6 hours as needed   ibuprofen (ADVIL/MOTRIN) 600 MG tablet Past Month at prn Self Yes Yes   Sig: Take 600 mg by mouth every 6 hours as needed for moderate pain   insulin aspart (NOVOLOG PEN) 100 UNIT/ML pen 9/11/2023 at 0500 Self Yes Yes   Sig: Inject Subcutaneous 3 times daily (with meals) Sliding scale with meals as below    Glucose 150-200 2 units   Glucose 201-250 4 units   Glucose 251-300 6 units   Glucose 301-350 8 units   Glucose 351-400 10 units   Glucose 401-500 12 units   Glucose GREATER THAN 501 14 units and call provider   insulin glargine (LANTUS PEN) 100 UNIT/ML pen 9/10/2023 at am Self Yes Yes   Sig: Inject 30 Units Subcutaneous every morning   metoclopramide (REGLAN) 10 MG tablet 9/10/2023 at am Self Yes Yes   Sig: Take 10 mg by mouth 3 times daily (before meals)   ondansetron (ZOFRAN ODT) 4 MG ODT tab 9/10/2023 at pm Self Yes Yes   Sig: Take 1 tablet by mouth 3 times daily   polyethylene glycol (MIRALAX) 17 GM/Dose powder More than a month at not needed Self Yes Yes   Sig: Take 1 Capful by mouth daily as needed   risperiDONE (RISPERDAL) 2 MG tablet 9/4/2023 at stopped taking Self Yes Yes   Sig: Take 2 mg by mouth At Bedtime   senna (SENOKOT) 8.6 MG tablet 9/10/2023 at am Self Yes Yes   Sig: Take 2 tablets by mouth 2 times daily   tamsulosin (FLOMAX) 0.4 MG capsule 9/10/2023 at am Self Yes Yes   Sig: Take 0.4 mg by mouth daily      Facility-Administered  Medications: None        Review of Systems    The 10 point Review of Systems is negative other than noted in the HPI or here.      Physical Exam   Vital Signs: Temp: 98.2  F (36.8  C) Temp src: Oral BP: 119/83 Pulse: 90   Resp: 16 SpO2: 97 % O2 Device: None (Room air)    Weight: 196 lbs 13.93 oz    Constitutional: Alert, oriented, cooperative. Appears uncomfortable and restless, but not acutely toxic. Speaking in full coherent sentences.     Eyes: Eyes are clear. Anisocoria present (right > left). No scleral icterus.    HEENT: Oropharynx is clear but dry. Normocephalic, no evidence of cranial trauma.      Cardiovascular: Regular rhythm and rate, normal S1 and S2. No murmur, rubs, or gallops. Peripheral pulses intact bilaterally. No lower extremity edema.    Respiratory: Lung sounds are clear to auscultation bilaterally without wheezes, rhonchi, or crackles.    GI: Dull to percussion in all quadrants. Soft, non-distended. Patient winces with any palpation of abdomen, even light palpation. No hepatosplenomegaly or masses appreciated. Hyperactive bowel sounds.     Musculoskeletal: Without obvious deformity, normal range of motion. Normal muscle bulk and tone. Distal CMS intact.      Skin: Warm and dry, no rashes or ecchymoses. No mottling of skin.      Neurologic: Patient moves all extremities. Gross strength and sensation are equal bilaterally.    Genitourinary: Deferred      Medical Decision Making       75 MINUTES SPENT BY ME on the date of service doing chart review, history, exam, documentation & further activities per the note.  MANAGEMENT DISCUSSED with the following over the past 24 hours: Dr. Anand Blackman   NOTE(S)/MEDICAL RECORDS REVIEWED over the past 24 hours: emergency department documentation 9/11/23, GI office visit 8/10/23, and discharge summary 6/11/23       Data     I have personally reviewed the following data over the past 24 hrs:    9.3  \   11.1 (L)   / 201     139 104 27.6 (H) /  138 (H)   3.7 24  0.75 \       Imaging results reviewed over the past 24 hrs:   No results found for this or any previous visit (from the past 24 hour(s)).

## 2023-09-11 NOTE — ED PROVIDER NOTES
Signout received at change of shift from Dr. Uche Pineda.  See his note for patient presenting details and initial work-up.  Briefly, this is a 33-year-old male with past medical history of type 1 diabetes, chronic pain, polysubstance abuse, homelessness, who frequently presents to ED with similar symptoms of intractable vomiting and drug-seeking behavior.  Came in today via EMS with complaints of abdominal pain, vomiting, and elevated blood sugars.  Has been taking additional insulin to try and bring his blood sugar down but also not eating.  Work-up was initiated with peripheral IV, IV fluids, Reglan, Benadryl, and Toradol.  Patient does have an emergency care plan, which we will follow and assess further management in the ED    Results for orders placed or performed during the hospital encounter of 09/11/23   Basic metabolic panel     Status: Abnormal   Result Value Ref Range    Sodium 136 136 - 145 mmol/L    Potassium 4.1 3.4 - 5.3 mmol/L    Chloride 93 (L) 98 - 107 mmol/L    Carbon Dioxide (CO2) 20 (L) 22 - 29 mmol/L    Anion Gap 23 (H) 7 - 15 mmol/L    Urea Nitrogen 23.8 (H) 6.0 - 20.0 mg/dL    Creatinine 0.76 0.67 - 1.17 mg/dL    Calcium 10.5 (H) 8.6 - 10.0 mg/dL    Glucose 299 (H) 70 - 99 mg/dL    GFR Estimate >90 >60 mL/min/1.73m2   UA with Microscopic reflex to Culture     Status: Abnormal    Specimen: Urine, Clean Catch   Result Value Ref Range    Color Urine Yellow Colorless, Straw, Light Yellow, Yellow    Appearance Urine Clear Clear    Glucose Urine >499 (A) Negative mg/dL    Bilirubin Urine Negative Negative    Ketones Urine 20 (A) Negative mg/dL    Specific Gravity Urine 1.030 1.003 - 1.035    Blood Urine Negative Negative    pH Urine 6.0 5.0 - 7.0    Protein Albumin Urine Negative Negative mg/dL    Urobilinogen Urine Normal Normal, 2.0 mg/dL    Nitrite Urine Negative Negative    Leukocyte Esterase Urine Trace (A) Negative    RBC Urine 1 <=2 /HPF    WBC Urine 7 (H) <=5 /HPF    Squamous Epithelials  Urine <1 <=1 /HPF    Narrative    Urine Culture not indicated   CBC with platelets and differential     Status: Abnormal   Result Value Ref Range    WBC Count 9.8 4.0 - 11.0 10e3/uL    RBC Count 5.29 4.40 - 5.90 10e6/uL    Hemoglobin 13.7 13.3 - 17.7 g/dL    Hematocrit 41.4 40.0 - 53.0 %    MCV 78 78 - 100 fL    MCH 25.9 (L) 26.5 - 33.0 pg    MCHC 33.1 31.5 - 36.5 g/dL    RDW 14.5 10.0 - 15.0 %    Platelet Count 245 150 - 450 10e3/uL    % Neutrophils 80 %    % Lymphocytes 14 %    % Monocytes 6 %    % Eosinophils 0 %    % Basophils 0 %    % Immature Granulocytes 0 %    NRBCs per 100 WBC 0 <1 /100    Absolute Neutrophils 7.8 1.6 - 8.3 10e3/uL    Absolute Lymphocytes 1.3 0.8 - 5.3 10e3/uL    Absolute Monocytes 0.6 0.0 - 1.3 10e3/uL    Absolute Eosinophils 0.0 0.0 - 0.7 10e3/uL    Absolute Basophils 0.0 0.0 - 0.2 10e3/uL    Absolute Immature Granulocytes 0.0 <=0.4 10e3/uL    Absolute NRBCs 0.0 10e3/uL   Blood gas venous     Status: Abnormal   Result Value Ref Range    pH Venous 7.48 (H) 7.32 - 7.43    pCO2 Venous 32 (L) 40 - 50 mm Hg    pO2 Venous 23 (L) 25 - 47 mm Hg    Bicarbonate Venous 24 21 - 28 mmol/L    Base Excess/Deficit 0.9 -7.7 - 1.9 mmol/L    FIO2 21    Magnesium     Status: Normal   Result Value Ref Range    Magnesium 2.1 1.7 - 2.3 mg/dL   Ethyl Alcohol Level     Status: Normal   Result Value Ref Range    Alcohol ethyl <0.01 <=0.01 g/dL   Ketone Beta-Hydroxybutyrate Quantitative     Status: Abnormal   Result Value Ref Range    Ketone (Beta-Hydroxybutyrate) Quantitative 1.50 (H) <=0.30 mmol/L   Symptomatic COVID-19 Virus (Coronavirus) by PCR Nose     Status: Normal    Specimen: Nose; Swab   Result Value Ref Range    SARS CoV2 PCR Negative Negative    Narrative    Testing was performed using the [employer] Xpress SARS-CoV-2 Assay on the Cepheid Gene-Xpert Instrument Systems. Additional information about this Emergency Use Authorization (EUA) assay can be found via the Lab Guide. This test should be ordered for the  detection of SARS-CoV-2 in individuals who meet SARS-CoV-2 clinical and/or epidemiological criteria as well as from individuals without symptoms or other reasons to suspect COVID-19. Test performance for asymptomatic patients has only been established in anterior nasal swab specimens. This test is for in vitro diagnostic use under the FDA EUA for laboratories certified under CLIA to perform high complexity testing. This test has not been FDA cleared or approved. A negative result does not rule out the presence of PCR inhibitors in the specimen or target RNA concentration below the limit of detection for the assay. The possibility of a false negative should be considered if the patient's recent exposure or clinical presentation suggests COVID-19. This test was validated by the [facility]. This laboratory is certified under the Clinical Laboratory Improvement Amendments (CLIA) as qualified to perform high complexity laboratory testing.     Glucose by meter     Status: Abnormal   Result Value Ref Range    GLUCOSE BY METER POCT 234 (H) 70 - 99 mg/dL   Urine Drugs of Abuse Screen Panel 13     Status: Abnormal   Result Value Ref Range    Cannabinoids (59-joo-5-carboxy-9-THC) Detected (A) Not Detected, Indeterminate    Phencyclidine Not Detected Not Detected, Indeterminate    Cocaine (Benzoylecgonine) Not Detected Not Detected, Indeterminate    Methamphetamine (d-Methamphetamine) Not Detected Not Detected, Indeterminate    Opiates (Morphine) Detected (A) Not Detected, Indeterminate    Amphetamine (d-Amphetamine) Not Detected Not Detected, Indeterminate    Benzodiazepines (Nordiazepam) Not Detected Not Detected, Indeterminate    Tricyclic Antidepressants (Desipramine) Not Detected Not Detected, Indeterminate    Methadone Not Detected Not Detected, Indeterminate    Barbiturates (Butalbital) Not Detected Not Detected, Indeterminate    Oxycodone Not Detected Not Detected,  Indeterminate    Propoxyphene (Norpropoxyphene) Not Detected Not Detected, Indeterminate    Buprenorphine Not Detected Not Detected, Indeterminate   Glucose by meter     Status: Abnormal   Result Value Ref Range    GLUCOSE BY METER POCT 224 (H) 70 - 99 mg/dL   CBC with Platelets & Differential     Status: Abnormal    Narrative    The following orders were created for panel order CBC with Platelets & Differential.  Procedure                               Abnormality         Status                     ---------                               -----------         ------                     CBC with platelets and d...[[id number]]  Abnormal            Final result                 Please view results for these tests on the individual orders.   Urine Drugs of Abuse Screen     Status: Abnormal    Narrative    The following orders were created for panel order Urine Drugs of Abuse Screen.  Procedure                               Abnormality         Status                     ---------                               -----------         ------                     Urine Drugs of Abuse Scr...[[id number]]  Abnormal            Final result                 Please view results for these tests on the individual orders.      Lab results as above.  Does have an elevated blood glucose at 299, and elevated ketones at 1.5, but is not acidotic and is rather alkalotic with a pH of 7.48.  Has been given 2 L of IV fluids and is now stating that his nausea has improved, was given ice chips and crackers to trial.  Of note, urine drug screen is positive for THC and opiates.  Patient is slightly tachycardic but otherwise vitally stable and appears slightly improved.  Given his lab findings and underlying type 1 diabetes, think he should be hospitalized for ongoing observation, hydration, and medical management.  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  There was a bed available at [facility] in [facility], and after speaking with Dr. Blackman, hospitalist, he excepted the patient in transfer.    Continues to complain of abdominal pain but also wanted something more to eat.  He was given a bag lunch, and a GI cocktail as he had requested.  Also ordered for subcu insulin to be given, especially since he was wanting to eat.  However, after just having a few bites of the sandwich, he started complaining of abdominal pain.  Decreased insulin dose from the ordered 12 units down to 5 units subcu since he was not taking in as much by mouth.  Also ordered for Zyprexa to help with the patient's abdominal pain, which I suspect may be due to cannabis hyperemesis syndrome or narcotic misuse.    There was a delay in patient's transfer due to high number of transfers needing to leave from the department.  EMS was finally able to to get to the ED after 11 AM.  Patient had been observed in the hallway fully dressed and was standing at the door to his room.  EMS transported the patient to [facility] in stable condition     Mindy Peterson,   09/11/23 1175

## 2023-09-11 NOTE — PLAN OF CARE
Patient chart accessed due to patient being a potential admission to [facility]/Surg unit.

## 2023-09-11 NOTE — MEDICATION SCRIBE - ADMISSION MEDICATION HISTORY
Medication Scribe Admission Medication History    Admission medication history is complete. The information provided in this note is only as accurate as the sources available at the time of the update.    Medication reconciliation/reorder completed by provider prior to medication history? Yes    Information Source(s): Patient, Clinic records, Hospital records, and CareEverywhere/SureScripts via  while at [facility] and finished at [facility].    Pertinent Information: Waiting on refills of sensors for Freestyle Ana.  Was taking Erythromycin suspension prior to meals and has refills for it but doesn't like to take it as it makes him feel ill.  Stopped taking Creon due to kidneyu pain.  Was on Olanzapine and that was discontinued due to not good for his stomach issues.  He was prescribed Risperidone instead but didn't like the way he felt on it so went back to taking the Olanzapine as he had some left of that.  Has not had Olanzapine in 2 weeks.  Has not had Risperdal in 1 week.     Changes made to PTA medication list:  Added: Ondansetron 4 mg ODT tab, Tylenol 500 mg tab, Blood Glucose test strip and Insulin needles, Risperidone 2 mg tab, Ibuprofen 600 mg tab, Erythromycin supsension.  Deleted: None  Changed: None    Medication Affordability:  Not including over the counter (OTC) medications, was there a time in the past 3 months when you did not take your medications as prescribed because of cost?: No    Allergies reviewed with patient and updates made in EHR: yes, no change.    Medication History Completed By: Jodi Ahumada 9/11/2023 2:27 PM    Prior to Admission medications    Medication Sig Last Dose Taking? Auth Provider Long Term End Date   acetaminophen (TYLENOL) 500 MG tablet Take 500 mg by mouth every 4 hours as needed Past Month at prn Yes Abstract, Provider     albuterol (PROAIR HFA/PROVENTIL HFA/VENTOLIN HFA) 108 (90 Base) MCG/ACT inhaler Inhale 2 puffs into the lungs every 4 hours as needed for shortness of  breath / dyspnea or wheezing More than a month at on hand if needed Yes Unknown, Entered By History Yes    B-D U/F 31G X 8 MM insulin pen needle 5 times daily 9/10/2023 at pm Yes Reported, Patient     CONTOUR NEXT TEST test strip 1 strip by In Vitro route 4 times daily 9/11/2023 at am #400 Yes Reported, Patient     cyclobenzaprine (FLEXERIL) 10 MG tablet Take 10 mg by mouth 3 times daily as needed for muscle spasms Past Week at prn Yes Reported, Patient     erythromycin ethylsuccinate (ERYPED) 200 MG/5ML suspension Take 2.5 mLs by mouth 3 times daily (before meals) Past Month at has refills for this Yes Reported, Patient     famotidine (PEPCID) 20 MG tablet Take 20 mg by mouth 2 times daily 9/10/2023 at am Yes Reported, Patient     hydrOXYzine (ATARAX) 50 MG tablet Take 50 mg by mouth every 6 hours as needed Past Month at prn Yes Reported, Patient     ibuprofen (ADVIL/MOTRIN) 600 MG tablet Take 600 mg by mouth every 6 hours as needed for moderate pain Past Month at prn Yes Abstract, Provider     insulin aspart (NOVOLOG PEN) 100 UNIT/ML pen Inject Subcutaneous 3 times daily (with meals) Sliding scale with meals as below    Glucose 150-200 2 units   Glucose 201-250 4 units   Glucose 251-300 6 units   Glucose 301-350 8 units   Glucose 351-400 10 units   Glucose 401-500 12 units   Glucose GREATER THAN 501 14 units and call provider 9/11/2023 at 0500 Yes Unknown, Entered By History No    insulin glargine (LANTUS PEN) 100 UNIT/ML pen Inject 30 Units Subcutaneous every morning 9/10/2023 at am Yes Reported, Patient No    metoclopramide (REGLAN) 10 MG tablet Take 10 mg by mouth 3 times daily (before meals) 9/10/2023 at am Yes Reported, Patient     OLANZapine (ZYPREXA) 10 MG tablet Take 10 mg by mouth At Bedtime Past Month at hs Yes Unknown, Entered By History No    ondansetron (ZOFRAN ODT) 4 MG ODT tab Take 1 tablet by mouth 3 times daily 9/10/2023 at pm Yes Reported, Patient No    polyethylene glycol (MIRALAX) 17 GM/Dose  powder Take 1 Capful by mouth daily as needed More than a month at not needed Yes Reported, Patient     risperiDONE (RISPERDAL) 2 MG tablet Take 2 mg by mouth At Bedtime 9/4/2023 at stopped taking Yes Reported, Patient Yes    senna (SENOKOT) 8.6 MG tablet Take 2 tablets by mouth 2 times daily 9/10/2023 at am Yes Reported, Patient     tamsulosin (FLOMAX) 0.4 MG capsule Take 0.4 mg by mouth daily 9/10/2023 at am Yes Reported, Patient

## 2023-09-11 NOTE — PROVIDER NOTIFICATION
D: Patient refusing IV fluids, acetaminophen, and capsaicin at this time.    A: Educated patient regarding patient rights, how medications may help decrease the pain, and help patient to feel better.    R: Patient declined and stated that the provider can come and talk to him if they want to.    A: Writer updated provider. Advised to encourage patient and document response.     P: Pending.

## 2023-09-11 NOTE — PLAN OF CARE
Time: Assumed care of patient at admission - around 1240 today.    Reason, date of admission: Uncontrolled DM1   OBS patient status.    Admitted from: [facility] ED  Code: Full    Isolation? Yes, Contact. MRSA - urine several years ago.    History Schizophrenia, endocarditis, septic joint, asthma, chronic pain    Activity: SBA    Neuro: Alert and oriented x 4    Cardiac: WNL      Telemetry: No    Resp: WNL     O2: Room air    GI/:  WNL         Skin: Scattered scratches, scars    Lines/Drains: Left AC    Fluids: Refused    Vitals: Vitals    Labs/BG: BG at 1900: 82       Pain: Intermittent abdominal pain.     Plan: Pending     Goal Outcome Evaluation:

## 2023-09-11 NOTE — ED PROVIDER NOTES
History     Chief Complaint   Patient presents with    Hyperglycemia    Abdominal Pain    Nausea & Vomiting     HPI  Reji Monahan is a 33 year old male who presents via EMS with complaints of vomiting and abdominal pain and elevated blood sugars.  Patient states that his blood sugars have been running high for the past week.  Patient states that he has had 2 take additional insulin, short acting insulin to try and bring his blood sugar down but then he would not eat which he knows he is not supposed to.  4 days ago he started getting some lower abdominal discomfort and left flank pain.  He is also had some nausea over the last 4 days.  Then in the last 12 hours he has developed continuous vomiting was been unable to stop at home.  When patient arrived he was writhing and screaming and crying.  Patient does endorse some urinary frequency and some discomfort with urination.  Has not noticed any hematuria.  Patient states bowel movements have been normal.  Patient has felt a little feverish last few days and has had a little bit of a cough, runny nose and sore throat.  Denies any rashes anywhere.  Denies any recent trauma.  Denies any significant shortness of breath.    Copy of patient's emergency care plan:  EMERGENCY CARE PLAN--- 9/23/21     At each Emergency Department visit, we will evaluate this patient to determine if an  emergency medical condition is present.     Brief History of Condition: Reji Monahan is a homeless, medically-complicated  young man with type I diabetes along with chronic pain and polysubstance abuse,  frequently presenting to local emergency departments with DKA, intractable vomiting,  and drug seeking behavior. He is often found to be in DKA, typically from medication  noncompliance and secondary illnesses. These illnesses include complications of IVDU  (endocarditis, septic emboli, septic joint, cellulitis), chronic marijuana use with cyclic  vomiting, and pyelonephritis. He is  frequently unruly, screaming in pain until he receives  Dilaudid. He will agree to admission for the DKA but signs out in less than 24 hours into  hospitalization against medical advice. His schizophrenia, social situation, daily drug  use, medical noncompliance, and poor insight into his very serious illnesses makes care  management very challenging in the emergency setting.     ED care plan: While this patient is frequently ill with significant pathology, work up for  his chronic abdominal pain and cyclic vomiting has typically been without life-threatening  findings on his > 10 CT scans in the past year. Use of narcotic medication for his  vomiting and chronic abdominal pain should be avoided. With his complicated medical  condition, IV access with fluids and antiemetics is appropriate. Laboratory investigation  for DKA, sepsis, metabolic derangement, and infection is prudent. Antibiotics, insulin,  electrolyte repletion, and resuscitation will often be necessary. While he almost always  signs out against medical advice, admission to the hospital will be the likely requirement  for standard medical cares when he is found to have these life-threatening pathologies.  Relevant Medical History: Diabetic ketoacidosis, type I diabetes, endocarditis, septic  joint, septic pulmonary emboli, cannabis hyperemesis syndrome, chronic abdominal  pain, gastroparesis, paranoid schizophrenia, frequent UTI/pyelonephritis, narcotic  seeking behavior.    Allergies:  Allergies   Allergen Reactions    Bees Anaphylaxis       Problem List:    Patient Active Problem List    Diagnosis Date Noted    Nausea with vomiting 09/17/2021     Priority: Medium    Urinary tract infection without hematuria, site unspecified 02/06/2021     Priority: Medium    Sepsis, due to unspecified organism, unspecified whether acute organ dysfunction present (H) 02/06/2021     Priority: Medium    DKA related to DM type 1 12/23/2020     Priority: Medium    MRSA  carrier 08/28/2020     Priority: Medium    DM type 1, Hgb A1C 11.9 on 9/8/21 08/04/2020     Priority: Medium    ADD (attention deficit disorder) 04/06/2020     Priority: Medium    Gastroesophageal reflux disease without esophagitis 04/06/2020     Priority: Medium    Cannabinoid hyperemesis syndrome 03/24/2020     Priority: Medium    Chest pain 01/26/2020     Priority: Medium    Splenomegaly 01/22/2020     Priority: Medium    Moderate malnutrition (H) 01/20/2018     Priority: Medium    Cognitive impairment 01/19/2018     Priority: Medium    Paroxysmal SVT (supraventricular tachycardia) (H) 11/12/2017     Priority: Medium     Last Assessment & Plan:   Formatting of this note might be different from the original.  Started on diltiazem on d/c from [facility]. Tachycardic today: 117. He reports compliance with this medication. Declined ablation while seen by [facility] [place] but will be seen as an outpatient- prefers to be seen at [facility] scheduled.  Last Assessment & Plan:   Started on diltiazem on d/c from [facility]. Tachycardic today: 117. He reports compliance with this medication. Declined ablation while seen by [facility] [place] but will be seen as an outpatient- prefers to be seen at [facility] scheduled.      Phimosis 11/01/2017     Priority: Medium     Formatting of this note might be different from the original.  Penis entrapped with recurrent balanitis      Balanitis 12/04/2016     Priority: Medium     Last Assessment & Plan:   Formatting of this note might be different from the original.  Reji is here today requesting additional antibiotics as he continues to have penile pain and phimosis. He was [facility], left upset and went to [facility] and was treated with percocet, lotrimin, and levaquin. Per care everywhere, he then went to [facility] 9/12 and was seen by urology:  Urology was consulted for balanitis and phimosis, recommended a 10 day course of  augmentin, 14 day course of fluconazole, and Lotrisone cream BID to help with symtpoms. Urology recommended to follow up in clinic in 1 month to reassess phimosis and reevaluate need for circumcision.    Reji is a poor historian, but it sounds like he took the meds given by [facility] and not others. Discussed with PharmD in our clinic. Will give doxycycline, fluconazone and lotrisone cream as recommended by urology (14 day course). Also referring to urology. Giving ibuprofen for pain. Recommended that he return next week to see his PCP.      Homelessness 06/09/2016     Priority: Medium     Last Assessment & Plan:   Formatting of this note might be different from the original.  Per [facility] notes, was living in a hotel, though patient tells me today that he lives in a house in [place]- living with his brother and caring for him.  Last Assessment & Plan:   Per [facility] notes, was living in a hotel, though patient tells me today that he lives in a house in [place]- living with his brother and caring for him.      Diabetic gastroparesis (H) 05/06/2016     Priority: Medium    Diabetic neuropathy (H) 05/06/2016     Priority: Medium    ACP (advance care planning) 01/08/2016     Priority: Medium     Patient has identified Health Care Agent(s): No  Add Health Care Agents: No  Patient has Advance Care Plan Documents (Health Care Directive, POLST): No, Pt declined.    Patient has identified Specific Treatment Preferences: Yes   Specific Treatment Preferences: a.) Code Status:  CPR/Attempt Resuscitation      SW met with Pt 1/7/2106 - Pt states if he's unable to communicate his healthcare wishes his brother, Raj Monahan - C: [phone], would be his primary spokesperson. LEXY Barreto, LGSW...Pager [phone]... ext. [phone], contact via anwpaging      Asthma 01/06/2016     Priority: Medium    Cannabis abuse 12/16/2015     Priority: Medium    Poor dentition 11/04/2015     Priority: Medium    Dental  "caries 08/12/2015     Priority: Medium    Paranoid schizophrenia (H) 11/25/2014     Priority: Medium    Mild intermittent asthma 02/13/2014     Priority: Medium    Charcot foot due to diabetes mellitus (H) 11/22/2013     Priority: Medium    Borderline intellectual functioning 09/27/2010     Priority: Medium    Dyslipidemia 08/30/2010     Priority: Medium     Formatting of this note might be different from the original.  Last LDL- 105 on 6/11/13  Last LDL- 105 on 6/11/13      Asperger's disorder 08/05/2009     Priority: Medium    Insomnia 06/30/2009     Priority: Medium        Past Medical History:    Past Medical History:   Diagnosis Date    Cannabis abuse     DKA (diabetic ketoacidoses) (H)     Endocarditis 04/2021    Homeless     Schizophrenia (H)     Type 1 diabetes mellitus (H)     Uncomplicated asthma        Past Surgical History:    No past surgical history on file.    Family History:    Family History   Problem Relation Age of Onset    Cerebrovascular Disease Father     Diabetes Brother        Social History:  Marital Status:  Single [1]  Social History     Tobacco Use    Smoking status: Never   Substance Use Topics    Alcohol use: No    Drug use: Yes     Types: Marijuana        Medications:    insulin aspart (NOVOLOG PEN) 100 UNIT/ML pen  insulin glargine (LANTUS PEN) 100 UNIT/ML pen  albuterol (PROAIR HFA/PROVENTIL HFA/VENTOLIN HFA) 108 (90 Base) MCG/ACT inhaler  cyclobenzaprine (FLEXERIL) 10 MG tablet  famotidine (PEPCID) 20 MG tablet  hydrOXYzine (ATARAX) 50 MG tablet  OLANZapine (ZYPREXA) 10 MG tablet  polyethylene glycol (MIRALAX) 17 GM/Dose powder  senna (SENOKOT) 8.6 MG tablet  tamsulosin (FLOMAX) 0.4 MG capsule          Review of Systems   All other systems reviewed and are negative.      Physical Exam   BP: (!) 156/103  Pulse: 98  Temp: 98.4  F (36.9  C)  Resp: 28  Height: 185.4 cm (6' 1\")  Weight: 81.6 kg (180 lb)  SpO2: 99 %      Physical Exam  Vitals and nursing note reviewed.   Constitutional:  "      General: He is not in acute distress.     Appearance: He is well-developed. He is ill-appearing and diaphoretic.   HENT:      Head: Normocephalic and atraumatic.      Nose: Nose normal.      Mouth/Throat:      Pharynx: No oropharyngeal exudate.   Eyes:      General: No scleral icterus.     Conjunctiva/sclera: Conjunctivae normal.      Pupils: Pupils are equal, round, and reactive to light.   Cardiovascular:      Rate and Rhythm: Normal rate and regular rhythm.      Heart sounds: Normal heart sounds. No murmur heard.     No friction rub.   Pulmonary:      Effort: Pulmonary effort is normal. No respiratory distress.      Breath sounds: Normal breath sounds. No wheezing or rales.   Abdominal:      General: Bowel sounds are normal. There is no distension.      Palpations: Abdomen is soft. There is no mass.      Tenderness: There is abdominal tenderness in the right lower quadrant, suprapubic area and left lower quadrant. There is no guarding or rebound.   Musculoskeletal:         General: Tenderness (Left CVA tenderness) present. Normal range of motion.      Cervical back: Normal range of motion.   Skin:     General: Skin is warm.      Findings: No rash.   Neurological:      Mental Status: He is alert and oriented to person, place, and time.   Psychiatric:         Judgment: Judgment normal.         ED Course               EKG Interpretation:      Interpreted by Uche Pineda  Time reviewed: now   Symptoms at time of EKG: now   Rhythm: normal sinus   Rate: normal  Axis: NORMAL  Ectopy: none  Conduction: normal  ST Segments/ T Waves: No ST-T wave changes  Q Waves: none  Comparison to prior: No old EKG available    Clinical Impression: normal EKG         Procedures        Results for orders placed or performed during the hospital encounter of 09/11/23 (from the past 24 hour(s))   CBC with Platelets & Differential    Narrative    The following orders were created for panel order CBC with Platelets &  Differential.  Procedure                               Abnormality         Status                     ---------                               -----------         ------                     CBC with platelets and d...[[id number]]                      In process                   Please view results for these tests on the individual orders.       Medications   0.9% sodium chloride BOLUS (has no administration in time range)   metoclopramide (REGLAN) injection 10 mg (has no administration in time range)   diphenhydrAMINE (BENADRYL) injection 25 mg (has no administration in time range)   ketorolac (TORADOL) injection 30 mg (has no administration in time range)     This is a 33-year-old male who comes in via EMS with complaints of elevated blood sugars for the past week, 4 days of nausea and URI-like symptoms and belly pain and left flank pain and now vomiting over the last 12 hours.  Patient has an emergency care plan that states that he comes in often with what seems like similar complaints and usually demands narcotics.  Unfortunate patient is left many times AMA.  They strongly discouraged from using narcotics to treat his pain.  I have initiated treatment with this patient and will follow the emergency care plan.  I have given him a fluid bolus, written for Reglan and Benadryl and some Toradol.  I am concerned about possible DKA because there is a smell of possible ketones in the room.  We will order blood work to work this up.  Patient will be signed out at change of shift to  as a work-up just started.  I have not ordered any imaging to see what the blood work looks like first, per the emergency care plan they do discourage CT scans as he has had numerous ones in all of all been unremarkable.    Assessments & Plan (with Medical Decision Making)  Abdominal pain, vomiting     I have reviewed the nursing notes.    I have reviewed the findings, diagnosis, plan and need for follow up with the  patient.        9/11/2023   [facility] EMERGENCY DEPT       Uche Pineda MD  09/11/23 0659       Uche Pineda MD  09/17/23 [id number]

## 2023-09-11 NOTE — PROGRESS NOTES
Skin affirmation note    Admitting nurse completed full skin assessment, Ralph score and Ralph interventions. This writer agrees with the initial skin assessment findings.

## 2023-09-12 VITALS
RESPIRATION RATE: 16 BRPM | OXYGEN SATURATION: 97 % | WEIGHT: 196.87 LBS | TEMPERATURE: 98.2 F | SYSTOLIC BLOOD PRESSURE: 146 MMHG | HEIGHT: 73 IN | HEART RATE: 97 BPM | DIASTOLIC BLOOD PRESSURE: 101 MMHG | BODY MASS INDEX: 26.09 KG/M2

## 2023-09-12 LAB
ANION GAP SERPL CALCULATED.3IONS-SCNC: 10 MMOL/L (ref 7–15)
B-OH-BUTYR SERPL-SCNC: 1.1 MMOL/L
BUN SERPL-MCNC: 19.2 MG/DL (ref 6–20)
CALCIUM SERPL-MCNC: 8.9 MG/DL (ref 8.6–10)
CHLORIDE SERPL-SCNC: 103 MMOL/L (ref 98–107)
CREAT SERPL-MCNC: 0.73 MG/DL (ref 0.67–1.17)
DEPRECATED HCO3 PLAS-SCNC: 25 MMOL/L (ref 22–29)
EGFRCR SERPLBLD CKD-EPI 2021: >90 ML/MIN/1.73M2
ERYTHROCYTE [DISTWIDTH] IN BLOOD BY AUTOMATED COUNT: 14.2 % (ref 10–15)
GLUCOSE BLDC GLUCOMTR-MCNC: 172 MG/DL (ref 70–99)
GLUCOSE BLDC GLUCOMTR-MCNC: 246 MG/DL (ref 70–99)
GLUCOSE SERPL-MCNC: 161 MG/DL (ref 70–99)
HCT VFR BLD AUTO: 36.2 % (ref 40–53)
HGB BLD-MCNC: 11.8 G/DL (ref 13.3–17.7)
MCH RBC QN AUTO: 26.2 PG (ref 26.5–33)
MCHC RBC AUTO-ENTMCNC: 32.6 G/DL (ref 31.5–36.5)
MCV RBC AUTO: 80 FL (ref 78–100)
PLATELET # BLD AUTO: 190 10E3/UL (ref 150–450)
POTASSIUM SERPL-SCNC: 3.6 MMOL/L (ref 3.4–5.3)
RBC # BLD AUTO: 4.51 10E6/UL (ref 4.4–5.9)
SODIUM SERPL-SCNC: 138 MMOL/L (ref 136–145)
WBC # BLD AUTO: 5.6 10E3/UL (ref 4–11)

## 2023-09-12 PROCEDURE — 250N000013 HC RX MED GY IP 250 OP 250 PS 637: Performed by: PHYSICIAN ASSISTANT

## 2023-09-12 PROCEDURE — 36415 COLL VENOUS BLD VENIPUNCTURE: CPT | Performed by: PHYSICIAN ASSISTANT

## 2023-09-12 PROCEDURE — 85027 COMPLETE CBC AUTOMATED: CPT | Performed by: PHYSICIAN ASSISTANT

## 2023-09-12 PROCEDURE — 250N000012 HC RX MED GY IP 250 OP 636 PS 637: Performed by: PHYSICIAN ASSISTANT

## 2023-09-12 PROCEDURE — 80048 BASIC METABOLIC PNL TOTAL CA: CPT | Performed by: PHYSICIAN ASSISTANT

## 2023-09-12 PROCEDURE — 82010 KETONE BODYS QUAN: CPT | Performed by: FAMILY MEDICINE

## 2023-09-12 PROCEDURE — 82962 GLUCOSE BLOOD TEST: CPT

## 2023-09-12 PROCEDURE — G0378 HOSPITAL OBSERVATION PER HR: HCPCS

## 2023-09-12 RX ADMIN — METOCLOPRAMIDE 10 MG: 10 TABLET ORAL at 08:05

## 2023-09-12 RX ADMIN — TAMSULOSIN HYDROCHLORIDE 0.4 MG: 0.4 CAPSULE ORAL at 08:05

## 2023-09-12 RX ADMIN — HYDROXYZINE HYDROCHLORIDE 50 MG: 50 TABLET, FILM COATED ORAL at 04:03

## 2023-09-12 RX ADMIN — FAMOTIDINE 20 MG: 20 TABLET, FILM COATED ORAL at 08:05

## 2023-09-12 RX ADMIN — INSULIN ASPART 5 UNITS: 100 INJECTION, SOLUTION INTRAVENOUS; SUBCUTANEOUS at 08:03

## 2023-09-12 RX ADMIN — CYCLOBENZAPRINE 10 MG: 10 TABLET, FILM COATED ORAL at 04:03

## 2023-09-12 RX ADMIN — ACETAMINOPHEN 650 MG: 325 TABLET, FILM COATED ORAL at 04:02

## 2023-09-12 RX ADMIN — SENNOSIDES 2 TABLET: 8.6 TABLET, FILM COATED ORAL at 08:05

## 2023-09-12 ASSESSMENT — ACTIVITIES OF DAILY LIVING (ADL)
ADLS_ACUITY_SCORE: 31

## 2023-09-12 NOTE — PROGRESS NOTES
"0754: Patient complaining of severe abdominal pain, writer can hear patient moaning from outside the room.    Patient received 650 mg of acetaminophen, 10 mg Flexeril, and 50 mg hydroxyzine at 0400 today.     Patient has been refusing IV maintenance fluids and capsaicin. Patient refused to continue to maintain an IV so patient unable to receive ketorolac and ibuprofen is to be taken with food. Patient also refusing non-pharmacological interventions stating, \"none of that works.\"    Per patient, unable to tolerate fluids due to the pain.     Writer to administer Reglan and Pepcid as scheduled.    0807: Writer asked patient what he does for the pain when he is not in the hospital, patient responded that he takes a hot shower which does not work. When discussing IV access, patient reported, \"not like it matters anyway, Toradol don't work anyway. The only thing that works is Dilaudid and I don't know why they won't give it to me.\"    Provided therapeutic communication.  "

## 2023-09-12 NOTE — PROGRESS NOTES
"Patient was pacing the hallway. Writer approached patient who stated he wanted to leave AMA.    Writer tried to encourage patient to wait until plan of care known.    Patient responded, 'there is nothing, you all keep telling me to change how I eat to take my medications and I am doing that. There is nothing you can do.'     Patient then told writer, \"you guys just don't listen, I'm going\" and walked down the hallway to sign an AMA form.  "

## 2023-09-12 NOTE — CONSULTS
Care Management Note:     Care Management team received referral for discharge planning.    Unfortunately, during IDT rounds, it was announced that pt left AMA.    SW unable to meet with pt to complete assessment.          COOPER Trevino  Care Transitions   Tele: [phone]

## 2023-09-12 NOTE — PROGRESS NOTES
"Patient continues to refuse scheduled Tylenol, \"Nothing like that works.\"  Patient C/O SL IV \"hurting.\" Chg nurse tried to replace a new IV and patient refused and wanted current SL out, \"I am going home tomorrow, we are not using that.\"  Blood sugars:  1900 = 82  2000 = 75  Patient did drink 2 apple juice over approximately. Hour  2130 = 151   NPH insulin administered 10 units  0200 = 172  Patient tossing and thrashing around in bed with abdominal pain, however patient continued to refuse Tylenol. Warm ross X 2 applied to abdominal area.      "

## 2023-09-25 NOTE — DISCHARGE SUMMARY
Patient left AMA prior to being seen, unwilling to wait for provider.  See H+patient for details.       Anand Blackman MD

## 2023-11-16 ENCOUNTER — TRANSCRIBE ORDERS (OUTPATIENT)
Dept: OTHER | Age: 33
End: 2023-11-16

## 2023-11-16 DIAGNOSIS — K31.84 GASTROPARESIS: Primary | ICD-10-CM

## 2023-11-17 ENCOUNTER — TELEPHONE (OUTPATIENT)
Dept: GASTROENTEROLOGY | Facility: CLINIC | Age: 33
End: 2023-11-17

## 2023-11-17 NOTE — TELEPHONE ENCOUNTER
Advanced Endoscopy     Referring provider:  Tr Gonzales affiliated with [facility]     Referred to: Advanced Endoscopy Provider Group     Provider Requested:  none specified     Referral Received:  11/16/23     Records received:  CE    CT A/P 7/1/23  Impression    1. No acute findings are seen to explain abdominal pain.  2. The pancreas appears normal on CT. There is no peripancreatic fluid collection, glandular atrophy, or duct dilation.  3. Normal caliber appendix.  4. Diffuse hepatic steatosis, which is less pronounced on the current study when compared with 06/09/2023.  5. No drainable fluid collection. Trace amount of pelvic ascites.    CT chest/PE/abd/pelvis 6/9/23  Impression    1. Right upper lobe pneumonia.  2. No pulmonary artery embolism seen  3. Large amount of stool in the colon.  4. Stranding of the subcutaneous fat in the chest, abdomen and pelvis which could be secondary to a anasarca  5. Fatty infiltration of the liver.  6. Thickening of the wall of the distal esophagus which is also seen the previous studies    Gastric emptying 5/26/23  FINDINGS:   Percent retention at 60 min =  98  %.   Percent retention at 120 min =  87  %.   Percent retention at 180 min =  78  %.   Percent retention at 240 min =  64 %.     T 1/2 emptying time =  401 mins.      Images received:  PACs    Insurance Coverage: Medicare    Evaluation for: Gastroparesis , Discuss G-POEM for refractory gastroparesis      Clinical History (per RN review):   Patient with severe refractory gastroparesis. Would like referral to N to discuss G-POEM. Placed referral, please ensure this is scheduled.       MD review date:   MD Decision for clinic consultation/Orders:            Referral updates/Patient contacted:

## 2023-11-20 ENCOUNTER — DOCUMENTATION ONLY (OUTPATIENT)
Dept: GASTROENTEROLOGY | Facility: CLINIC | Age: 33
End: 2023-11-20
Payer: MEDICARE

## 2023-11-20 NOTE — PROGRESS NOTES
Faxed request to [facility].     Imaging Requested:  -- NM GASTRIC EMPTYING (05/26/2023 3:43 PM CDT)    Facility Information:  [facility]   [address] [address]   Phone #: [phone]   [facility] Phone #: [phone] Opt 1,4  [facility] Fax #: [phone]  [facility] Urgent Fax #: [phone]      SK

## 2023-11-20 NOTE — PROGRESS NOTES
Called to request images be pushed to [facility] PACS. Left VM.    Imaging Requested:  -- CT ABDOMEN PELVIS W (07/01/2023 5:18 PM CDT)  -- CT CHEST PULMONARY EMBOLUS PE ABDOMEN PELVIS W (06/09/2023 1:15 PM CDT)    Facility Information:  [facility]   [address] [id number]   Phone #: [phone] sk

## 2023-12-21 ENCOUNTER — HOSPITAL ENCOUNTER (INPATIENT)
Facility: CLINIC | Age: 33
Setting detail: SURGERY ADMIT
End: 2023-12-21
Attending: INTERNAL MEDICINE | Admitting: INTERNAL MEDICINE
Payer: MEDICARE

## 2023-12-21 ENCOUNTER — PREP FOR PROCEDURE (OUTPATIENT)
Dept: GASTROENTEROLOGY | Facility: CLINIC | Age: 33
End: 2023-12-21
Payer: MEDICARE

## 2023-12-21 ENCOUNTER — PATIENT OUTREACH (OUTPATIENT)
Dept: GASTROENTEROLOGY | Facility: CLINIC | Age: 33
End: 2023-12-21
Payer: MEDICARE

## 2023-12-21 DIAGNOSIS — K31.84 GASTROPARESIS: Primary | ICD-10-CM

## 2023-12-21 NOTE — TELEPHONE ENCOUNTER
Called pt to discuss referral with clinic visit arranged on 2/14/24 at 7am with Dr Negro. Pt in agreement with changing this to a virtual visit. Message routed to clinic coordinators for this change.     Procedure/Imaging/Clinic: EGD with IVANNA  Physician: Marky  Timing: next avail  Scope time needed: 60 min  Anesthesia: Gen  Dx: gastroparesis  Tier: 3  Location: [facility] OR  Header of letter for pt communication: Upper endoscopy with IVANNA       Comments: admit for obs afterwards     Pt is in agreement with next available date of March 1st.    Explained they will be admitted following the procedure and to have a  the following day.     Patient needs to get pre-op physical completed. If outside  health system will need physical faxed to number [phone]     If you do not get a preop physical, your procedure could be cancelled, patient voiced understanding*    Preop Plan:Pt verbalized that he knew he needed a pre op exam within 30 days of procedure, did not have time to ask where he would go for this exam.     Does patient have any history of gastric bypass/gastric surgery/altered panc/bili anatomy? Did not have time to ask    Any recent Covid symptoms or positive covid test?  Did not have time to ask    Does patient have [employer] insurance?: Medicare    Med Review    Blood thinner -  none  ASA - none  Diabetic - insulin, asked that he discuss in detail at pre op exam  Any meds by injection or mouth for weight loss or diabetes-none    Patient Education r/t procedure: mychart activation sent    A pre-op nurse will call 1-2 days prior to the procedure.    NPO/Prep:   Did not discuss 48 hour liquid diet restriction.    Verbalized understanding of brief instructions, pt did express knowledge of need for pre op exam and ended the call early. He seemed irritated by this call and did not allow for full details to be explained.      Procedure order placed, message routed to OR

## 2024-01-05 ENCOUNTER — TELEPHONE (OUTPATIENT)
Dept: GASTROENTEROLOGY | Facility: CLINIC | Age: 34
End: 2024-01-05
Payer: MEDICARE

## 2024-01-05 NOTE — TELEPHONE ENCOUNTER
[facility] Call Center    Phone Message    May a detailed message be left on voicemail: yes     Reason for Call: Other: Symptoms of severe abdominal pain refractory to medication, requesting to be seen sooner. Patient has been into ER twice. Please call back     Action Taken: Message routed to:  [facility] ([facility] efrain morales    Travel Screening: Not Applicable

## 2024-01-07 ENCOUNTER — HEALTH MAINTENANCE LETTER (OUTPATIENT)
Age: 34
End: 2024-01-07

## 2024-01-08 NOTE — TELEPHONE ENCOUNTER
Called pt to follow up on msg below. He is unable to take in much food or liquids, stating that he is in pain. Sometimes it hurts too much to drink and we discussed the risk of becoming dehydrated. Stressed importance of staying hydrated. Pt's PCP has been following him, placed him on TID reglan and antiacid, but told him that is all his PCP can really do for him. We [facility] if needed while waiting for clinic and procedure. Clinic on 2/14/24 at 7am, changed to virtual visit. Procedure moved up to Monday 2/26. Will keep pt in mind for waitlist if cancellation in clinic. Pt in agreement with this plan. Message routed to OR schedule and clinic coordinator.    Andra Ziegler, RN, BSN,   Advanced Gastroenterology  Care coordinator

## 2024-01-16 ENCOUNTER — NURSE TRIAGE (OUTPATIENT)
Dept: NURSING | Facility: CLINIC | Age: 34
End: 2024-01-16
Payer: MEDICARE

## 2024-01-16 ENCOUNTER — PATIENT OUTREACH (OUTPATIENT)
Dept: GASTROENTEROLOGY | Facility: CLINIC | Age: 34
End: 2024-01-16
Payer: MEDICARE

## 2024-01-16 NOTE — TELEPHONE ENCOUNTER
Red flad triage call. C/o stomach pain. Did not answer questions. Gaps.long silence. States will go to ER . Will not drive. Call ended. He hung up.

## 2024-01-16 NOTE — TELEPHONE ENCOUNTER
VM left by Latoya, who stated that Reji lives with her. Called back and spoke with patient's brother Mark, who is listed in his contacts. Pt went to the [facility] ED with increased nausea and vomiting, continues to have waves of pain that are 10/10. Brother states that sometimes he is doing well but has been returning to the ED frequently with these symptoms.  Per Dr Negro, pt's pain will not be resolved with G POEM, that it may not be the appropriate avenue to treat his symptoms. Will discuss at clinic on 2/14/24. Pt currently arranged for procedure on 2/26.  Advised that the family reach out to the referring GI provider at [facility] Tr Gonzales to discuss other resources for pain treatment. To consider possible need for pain consult.  Family verbalized  Understanding    Andra Ziegler, RN, BSN,   Advanced Gastroenterology  Care coordinator

## 2024-02-08 NOTE — TELEPHONE ENCOUNTER
REFERRAL INFORMATION:  Referring Provider:  Tr Gonzales   Referring Clinic:  Recs at [facility])   Reason for Visit/Diagnosis: Gastroparesis      FUTURE VISIT INFORMATION:  Appointment Date: 2/14/2024   Appointment Time: 6:45AM       Action    Action Taken 2/8/2024 9:51AM KRISTI MARTIN faxed over a request for imaging to [facility]     2/12/24 10:26 AM MMT- Second request faxed to [facility] for images.   Fax: [phone]    2/12/24 3:20 PM MMT - Images resolved in PACS.       NOTES STATUS DETAILS   OFFICE NOTE from Referring Provider Care Everywhere 10/2/23 - Hep OV with Tr Gonzales PA-C at [facility]   OFFICE NOTE from Other Specialist Internal    HOSPITAL DISCHARGE SUMMARY/  ED VISITS Internal 2/26/24 Surgery (Upcoming)     1/16/24 ED- Chronic Abdominal Pain (CE)     More in EPIC/CE    MEDICATION LIST Internal         ENDOSCOPY Care Everywhere [facility]:   4/29/21   EUS Care Everywhere [facility]:  6/23/22   PERTINENT LABS Internal    IMAGING (CT, MRI, EGD, MRCP, Small Bowel Follow Through/SBT, MR/CT Enterography) PACS [facility]:  CT Abdomen Pelvis 1/22/24, 9/24/22, 5/11/22, 5/1/22, 1/4/22, 11/17/21, 8/22/21, 4/28/21, 1/4/21, 8/30/20     NM Gastric Emptying- 5/26/23    US Abdomen - 3/28/23, 9/12/22, 8/3/22, 5/10/22, 2/15/22, 8/22/21    CT Abdomen - 9/24/22    CT CAP - 8/23/22    CT Chest - 4/28/21, 4/21/21    EUS - 6/23/22    XR Abdomen - 12/30/21, 4/30/21    [facility]:  CT Abdomen Pelvis- 7/1/23     CT Chest - 6/9/23    XR Chest - 5/28/23

## 2024-02-14 ENCOUNTER — VIRTUAL VISIT (OUTPATIENT)
Dept: GASTROENTEROLOGY | Facility: CLINIC | Age: 34
End: 2024-02-14
Attending: INTERNAL MEDICINE
Payer: MEDICARE

## 2024-02-14 ENCOUNTER — PRE VISIT (OUTPATIENT)
Dept: GASTROENTEROLOGY | Facility: CLINIC | Age: 34
End: 2024-02-14
Payer: MEDICARE

## 2024-02-14 VITALS — WEIGHT: 187 LBS | HEIGHT: 73 IN | BODY MASS INDEX: 24.78 KG/M2

## 2024-02-14 DIAGNOSIS — K31.84 GASTROPARESIS: ICD-10-CM

## 2024-02-14 PROCEDURE — 99204 OFFICE O/P NEW MOD 45 MIN: CPT | Mod: 95 | Performed by: INTERNAL MEDICINE

## 2024-02-14 ASSESSMENT — PAIN SCALES - GENERAL: PAINLEVEL: SEVERE PAIN (7)

## 2024-02-14 NOTE — LETTER
2/14/2024         RE: Reji Monahan  [address] [place] [place] [address]        Dear Colleague,    Thank you for referring your patient, Reji Monahan, to the [facility]. Please see a copy of my visit note below.      INTERVENTIONAL ENDOSCOPY OUTPATIENT CLINIC CONSULT  DATE OF SERVICE: 2/14/2024  PROVIDER REQUESTING CONSULT: Tr Gonzales PA-C  Reason for Consultation: Gastroparesis     ASSESSMENT:  Reji is a 34 year old male with a PMHx of poorly controled type 1 diabetes complicated by peripheral neuropathy, retinopathy, charcot foot, and cannabis use who was referred for consideration of G-POEM for diabetic gastroparesis. Patient has primarily pain predominant symptoms. We reviewed the pathophysiology of gastroparesis and the technical aspects of a G-POEM procedure including current data. Unfortunately, essentially all studies have show pain symptoms do not improve following this procedure and in some situations exacerbate these symptoms which is why most centers do not offer it in this patient population. Fundamentally, pain predominant gastroparesis is similar to peripheral neuropathies and improving gastric emptying often doesn't help. Additionally, with his severe constipation he likely has downstream dysmotility that will impede any improvement in gastric emptying.  For those reasons, I recommended against proceeding with a G-POEM procedure. I reinforced the typical gastroparesis diet of small frequent meals of about 5 per day that are low in fats and fibers.      RECOMMENDATIONS:  - Do not recommend G-POEM. Unlikely to improve symptoms and likely to exacerbate pain      Saurabh Negro MD  [facility]  Division of Gastroenterology and Hepatology  [facility] [address] [address]    ________________________________________________________________  HPI:  Reji is a 34 year old male with a PMHx of poorly  controled type 1 diabetes complicated by peripheral neuropathy, retinopathy, charcot foot, and cannabis use who was referred for consideration of G-POEM for diabetic gastroparesis. Patient notes symptoms mainly started 2-3 years ago with generalized abdominal pain after eating. He has associated nausea and vomiting, early satiety. He also reports chronic constipation. He has had multiple ER visits for exacerbations of these problems. He has been on Reglan 10 mg QID and Erythromycin without any benefit per the patient. He uses Senna daily for the constipation. His primary concern is the abdominal pain. Chronic nausea is present throughout the day. He uses cannabis several times a week and tried a cannabis holiday without change. All foods cause him to be symptomatic even liquids. He eats a bland meal with cereal, toast, and crackers. He follows with a pain clinic and per the patient is considering some type of nerve block depending on what we recommend.     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    Homeless     Schizophrenia (H)     Sepsis, due to unspecified organism, unspecified whether acute organ dysfunction present (H) 02/06/2021    Type 1 diabetes mellitus (H)     Uncomplicated asthma     Urinary tract infection without hematuria, site unspecified 02/06/2021     Patient Active Problem List   Diagnosis    Splenomegaly    Chest pain    DM type 1, Hgb A1C 11.9 on 9/8/21    DKA related to DM type 1    ACP (advance care planning)    ADD (attention deficit disorder)    Asperger's disorder    Asthma    Balanitis    Borderline intellectual functioning    Cannabinoid hyperemesis syndrome    Cannabis abuse    Charcot foot due to diabetes mellitus (H)    Cognitive impairment    Dental caries    Diabetic gastroparesis (H)    Diabetic neuropathy (H)     Dyslipidemia    Gastroesophageal reflux disease without esophagitis    Homelessness    Insomnia    Mild intermittent asthma    Moderate malnutrition (H24)    MRSA carrier    Paranoid schizophrenia (H)    Paroxysmal SVT (supraventricular tachycardia)    Phimosis    Poor dentition    Nausea with vomiting    Uncontrolled diabetes mellitus with hyperglycemia (H)    Vision loss, right eye       PSurgHx:  None    MEDS:  Current Outpatient Medications   Medication    erythromycin ethylsuccinate (ERYPED) 200 MG/5ML suspension    famotidine (PEPCID) 20 MG tablet    metoclopramide (REGLAN) 10 MG tablet    risperiDONE (RISPERDAL) 2 MG tablet    senna (SENOKOT) 8.6 MG tablet    acetaminophen (TYLENOL) 500 MG tablet    albuterol (PROAIR HFA/PROVENTIL HFA/VENTOLIN HFA) 108 (90 Base) MCG/ACT inhaler    B-D U/F 31G X 8 MM insulin pen needle    CONTOUR NEXT TEST test strip    cyclobenzaprine (FLEXERIL) 10 MG tablet    hydrOXYzine (ATARAX) 50 MG tablet    ibuprofen (ADVIL/MOTRIN) 600 MG tablet    insulin aspart (NOVOLOG PEN) 100 UNIT/ML pen    insulin glargine (LANTUS PEN) 100 UNIT/ML pen    OLANZapine (ZYPREXA) 10 MG tablet    ondansetron (ZOFRAN ODT) 4 MG ODT tab    polyethylene glycol (MIRALAX) 17 GM/Dose powder    tamsulosin (FLOMAX) 0.4 MG capsule     No current facility-administered medications for this visit.     ALLERGIES:    Allergies   Allergen Reactions    Bees Anaphylaxis     FHx:  Family History   Problem Relation Age of Onset    Cerebrovascular Disease Father     Diabetes Brother        SOCIAL Hx:  Social History     Socioeconomic History    Marital status: Single     Spouse name: Not on file    Number of children: Not on file    Years of education: Not on file    Highest education level: Not on file   Occupational History    Not on file   Tobacco Use    Smoking status: Never    Smokeless tobacco: Not on file   Substance and Sexual Activity    Alcohol use: No    Drug use: Yes     Types: Marijuana    Sexual activity:  "Never   Other Topics Concern    Not on file   Social History Narrative    Single, homeless, currently living in a hotel.  (last updated 9/17/2021)      Social Determinants of Health     Financial Resource Strain: Not on file   Food Insecurity: Not on file   Transportation Needs: Not on file   Physical Activity: Not on file   Stress: Not on file   Social Connections: Not on file   Interpersonal Safety: Not on file   Housing Stability: Not on file       ROS: A comprehensive Review of Systems was asked and answered in the negative unless specifically commented upon in the HPI    Physical Exam  Ht 1.854 m (6' 1\")   Wt 84.8 kg (187 lb)   BMI 24.67 kg/m    Body mass index is 24.67 kg/m .  Gen: A&Ox3, NAD  HEENT: Moist mucus membranes, no scleral icterus.  Lungs: no respiratory distress    LABS:  Admission on 09/11/2023, Discharged on 09/12/2023   Component Date Value Ref Range Status    Sodium 09/11/2023 139  136 - 145 mmol/L Final    Potassium 09/11/2023 3.7  3.4 - 5.3 mmol/L Final    Chloride 09/11/2023 104  98 - 107 mmol/L Final    Carbon Dioxide (CO2) 09/11/2023 24  22 - 29 mmol/L Final    Anion Gap 09/11/2023 11  7 - 15 mmol/L Final    Urea Nitrogen 09/11/2023 27.6 (H)  6.0 - 20.0 mg/dL Final    Creatinine 09/11/2023 0.75  0.67 - 1.17 mg/dL Final    Calcium 09/11/2023 9.1  8.6 - 10.0 mg/dL Final    Glucose 09/11/2023 138 (H)  70 - 99 mg/dL Final    GFR Estimate 09/11/2023 >90  >60 mL/min/1.73m2 Final    WBC Count 09/11/2023 9.3  4.0 - 11.0 10e3/uL Final    RBC Count 09/11/2023 4.22 (L)  4.40 - 5.90 10e6/uL Final    Hemoglobin 09/11/2023 11.1 (L)  13.3 - 17.7 g/dL Final    Hematocrit 09/11/2023 33.5 (L)  40.0 - 53.0 % Final    MCV 09/11/2023 79  78 - 100 fL Final    MCH 09/11/2023 26.3 (L)  26.5 - 33.0 pg Final    MCHC 09/11/2023 33.1  31.5 - 36.5 g/dL Final    RDW 09/11/2023 14.2  10.0 - 15.0 % Final    Platelet Count 09/11/2023 201  150 - 450 10e3/uL Final    Ketone (Beta-Hydroxybutyrate) Zackary* 09/11/2023 0.50 (H) "  <=0.30 mmol/L Final    pH Venous 09/11/2023 7.45 (H)  7.32 - 7.43 Final    pCO2 Venous 09/11/2023 38 (L)  40 - 50 mm Hg Final    pO2 Venous 09/11/2023 64 (H)  25 - 47 mm Hg Final    Bicarbonate Venous 09/11/2023 27  21 - 28 mmol/L Final    Base Excess/Deficit Venous 09/11/2023 2.4 (H)  -7.7 - 1.9 mmol/L Final    FIO2 09/11/2023 21   Final    GLUCOSE BY METER POCT 09/11/2023 101 (H)  70 - 99 mg/dL Final    GLUCOSE BY METER POCT 09/11/2023 82  70 - 99 mg/dL Final    GLUCOSE BY METER POCT 09/11/2023 75  70 - 99 mg/dL Final    GLUCOSE BY METER POCT 09/11/2023 151 (H)  70 - 99 mg/dL Final    Sodium 09/12/2023 138  136 - 145 mmol/L Final    Potassium 09/12/2023 3.6  3.4 - 5.3 mmol/L Final    Chloride 09/12/2023 103  98 - 107 mmol/L Final    Carbon Dioxide (CO2) 09/12/2023 25  22 - 29 mmol/L Final    Anion Gap 09/12/2023 10  7 - 15 mmol/L Final    Urea Nitrogen 09/12/2023 19.2  6.0 - 20.0 mg/dL Final    Creatinine 09/12/2023 0.73  0.67 - 1.17 mg/dL Final    Calcium 09/12/2023 8.9  8.6 - 10.0 mg/dL Final    Glucose 09/12/2023 161 (H)  70 - 99 mg/dL Final    GFR Estimate 09/12/2023 >90  >60 mL/min/1.73m2 Final    WBC Count 09/12/2023 5.6  4.0 - 11.0 10e3/uL Final    RBC Count 09/12/2023 4.51  4.40 - 5.90 10e6/uL Final    Hemoglobin 09/12/2023 11.8 (L)  13.3 - 17.7 g/dL Final    Hematocrit 09/12/2023 36.2 (L)  40.0 - 53.0 % Final    MCV 09/12/2023 80  78 - 100 fL Final    MCH 09/12/2023 26.2 (L)  26.5 - 33.0 pg Final    MCHC 09/12/2023 32.6  31.5 - 36.5 g/dL Final    RDW 09/12/2023 14.2  10.0 - 15.0 % Final    Platelet Count 09/12/2023 190  150 - 450 10e3/uL Final    Ketone (Beta-Hydroxybutyrate) Zackary* 09/12/2023 1.10 (H)  <=0.30 mmol/L Final    GLUCOSE BY METER POCT 09/12/2023 172 (H)  70 - 99 mg/dL Final    GLUCOSE BY METER POCT 09/12/2023 246 (H)  70 - 99 mg/dL Final       IMAGING:  Gastric Emptying Study     HISTORY:        TECHNIQUE: The patient ingested 0.9 mCi of Tc-99m sulfur colloid in eggs; pt had 4 oz eggs and 3  oz water. Static images were acquired at approximately 1 hour intervals out through 4 hours using a dual head gamma camera in an anterior and posterior position. The calculation of emptying was based on dual head imaging with geometric mean calculations.  A Linear square fit was calculated to the emptying curve to determine the amount of residual activity at each time point.     FINDINGS:   Percent retention at 60 min =  98  %.   Percent retention at 120 min =  87  %.   Percent retention at 180 min =  78  %.   Percent retention at 240 min =  64 %.     T 1/2 emptying time =  401 mins.     1/22/24 CT abd/pelvis  Indication: Abdominal pain, acute, nonlocalized      Technique: Volumetric helical acquisition of CT images from the lung bases through the symphysis pubis after the administration of intravenous contrast.     DOSE:    Total DLP = 416 mGy.cm.      Findings:     Liver: Within normal limits.     Gallbladder and biliary tree: No calcified gallstones. No intra- or extrahepatic biliary ductal dilation.     Pancreas: Within normal limits.     Spleen: Within normal limits.     Adrenals: Within normal limits.     Kidneys, ureters and bladder: Within normal limits.     Reproductive organs: No pelvic masses.     Bowel: Normal caliber small bowel and large bowel. Mild pericolonic fat stranding predominantly involving the descending colon, sigmoid and rectum. Normal appendix.     Lymph nodes: No enlarged lymph nodes.     Peritoneum: No ascites or free air. Mild hazy attenuation of the central mesentery.     Vessels: Aorta and major branches are patent without aneurysm or significant stenosis. Portal vein and superior mesenteric vein are patent. No atherosclerotic disease.     Skeletal structures: No acute or suspicious osseous abnormality..     Soft tissues: No suspicious soft tissue lesion.     Lung bases: Clear.   Impression:   1. Mild pericolonic fat stranding predominantly adjacent to the descending colon, sigmoid and  rectum. Findings are nonspecific and can be seen with infectious/inflammatory etiologies.   2. Mild hazy attenuation of the central mesentery, as can be seen with mesenteric edema.     Endoscopies:  Normal prior EGD and EUS per outside GI clinic reports          Again, thank you for allowing me to participate in the care of your patient.      Sincerely,    Saurabh Negro MD

## 2024-02-14 NOTE — PATIENT INSTRUCTIONS
You will find a brief summary of your discussion and care plan from today's visit below.  Dr Negro has outlined the following steps after your recent clinic visit:     RECOMMENDATIONS:  - Do not recommend G-POEM. Unlikely to improve symptoms and likely to exacerbate pain    Please call with any questions or concerns regarding your clinic visit today.     It is a pleasure being involved in your health care.     Contacts post-consultation depending on your need:     Schedule Clinic Appointments                        [phone], option 1    Hailey Ziegler RN Care Coordinator           [phone]     Sushma Medeiros OR                           [phone]     GI Procedure Scheduling                               [phone], option 2     For urgent/emergent questions after business hours, you may reach the on-call GI Fellow by contacting the [facility]  at [phone].    How to I schedule a follow-up visit?  If you did not schedule a follow-up visit today, please call [phone] option #1 to schedule a follow-up office visit.       How do I schedule labs, imaging studies, or procedures that were ordered in clinic today?      Labs: To schedule lab appointment at the [facility] and [facility] Center, use my chart or call [phone]. If you have a [facility] lab closer to home where you are regularly seen you can give them a call.      Procedures: If a colonoscopy, upper endoscopy, breath test, esophageal manometry, or pH impedence was ordered today, our endoscopy team will call you to schedule this. If you have not heard from our endoscopy team within a week, please call [phone] to schedule.      Imaging Studies: If you were scheduled for a CT scan, X-ray, MRI, ultrasound, HIDA scan or other imaging study, please call [phone] to have this scheduled.      Referral: If a referral to another specialty was ordered, expect a phone call or follow instructions above. If you have not  heard from anyone regarding your referral in a week, please call our clinic to check the status.     I recommend signing up for [employer] access if you have not already done so and are comfortable with using a computer.  This allows for online access to your lab results and also helps you communicate efficiently with the clinic should any questions arise in your care.

## 2024-02-14 NOTE — NURSING NOTE
Is the patient currently in the state of [place]? YES    Visit mode:VIDEO    If the visit is dropped, the patient can be reconnected by: VIDEO VISIT: Text to cell phone:   Telephone Information:   Mobile [phone]       Will anyone else be joining the visit? NO  (If patient encounters technical issues they should call [phone])    How would you like to obtain your AVS? MyChart    Are changes needed to the allergy or medication list? Pt stated no changes to allergies and pt only taking Famotodine, Erythromycin, Risperdal, Senna and Reglan    Reason for visit: Consult    Imani Teran LPN

## 2024-02-14 NOTE — PROGRESS NOTES
Virtual Visit Details    Type of service:  Video Visit   Video Start Time: 7:01 AM  Video End Time:7:22 AM    Originating Location (pt. Location): Home    Distant Location (provider location):  Off-site  Platform used for Video Visit: [facility]    INTERVENTIONAL ENDOSCOPY OUTPATIENT CLINIC CONSULT  DATE OF SERVICE: 2/14/2024  PROVIDER REQUESTING CONSULT: Tr Gonzales PA-C  Reason for Consultation: Gastroparesis     ASSESSMENT:  Reji is a 34 year old male with a PMHx of poorly controled type 1 diabetes complicated by peripheral neuropathy, retinopathy, charcot foot, and cannabis use who was referred for consideration of G-POEM for diabetic gastroparesis. Patient has primarily pain predominant symptoms. We reviewed the pathophysiology of gastroparesis and the technical aspects of a G-POEM procedure including current data. Unfortunately, essentially all studies have show pain symptoms do not improve following this procedure and in some situations exacerbate these symptoms which is why most centers do not offer it in this patient population. Fundamentally, pain predominant gastroparesis is similar to peripheral neuropathies and improving gastric emptying often doesn't help. Additionally, with his severe constipation he likely has downstream dysmotility that will impede any improvement in gastric emptying.  For those reasons, I recommended against proceeding with a G-POEM procedure. I reinforced the typical gastroparesis diet of small frequent meals of about 5 per day that are low in fats and fibers.      RECOMMENDATIONS:  - Do not recommend G-POEM. Unlikely to improve symptoms and likely to exacerbate pain      Saurabh Negro MD  [facility]  Division of Gastroenterology and Hepatology  [facility] [address] [address]    ________________________________________________________________  HPI:  Reji is a 34 year old male with a PMHx of poorly controled type 1  diabetes complicated by peripheral neuropathy, retinopathy, charcot foot, and cannabis use who was referred for consideration of G-POEM for diabetic gastroparesis. Patient notes symptoms mainly started 2-3 years ago with generalized abdominal pain after eating. He has associated nausea and vomiting, early satiety. He also reports chronic constipation. He has had multiple ER visits for exacerbations of these problems. He has been on Reglan 10 mg QID and Erythromycin without any benefit per the patient. He uses Senna daily for the constipation. His primary concern is the abdominal pain. Chronic nausea is present throughout the day. He uses cannabis several times a week and tried a cannabis holiday without change. All foods cause him to be symptomatic even liquids. He eats a bland meal with cereal, toast, and crackers. He follows with a pain clinic and per the patient is considering some type of nerve block depending on what we recommend.     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    Homeless     Schizophrenia (H)     Sepsis, due to unspecified organism, unspecified whether acute organ dysfunction present (H) 02/06/2021    Type 1 diabetes mellitus (H)     Uncomplicated asthma     Urinary tract infection without hematuria, site unspecified 02/06/2021     Patient Active Problem List   Diagnosis    Splenomegaly    Chest pain    DM type 1, Hgb A1C 11.9 on 9/8/21    DKA related to DM type 1    ACP (advance care planning)    ADD (attention deficit disorder)    Asperger's disorder    Asthma    Balanitis    Borderline intellectual functioning    Cannabinoid hyperemesis syndrome    Cannabis abuse    Charcot foot due to diabetes mellitus (H)    Cognitive impairment    Dental caries    Diabetic gastroparesis (H)    Diabetic neuropathy (H)    Dyslipidemia     Gastroesophageal reflux disease without esophagitis    Homelessness    Insomnia    Mild intermittent asthma    Moderate malnutrition (H24)    MRSA carrier    Paranoid schizophrenia (H)    Paroxysmal SVT (supraventricular tachycardia)    Phimosis    Poor dentition    Nausea with vomiting    Uncontrolled diabetes mellitus with hyperglycemia (H)    Vision loss, right eye       PSurgHx:  None    MEDS:  Current Outpatient Medications   Medication    erythromycin ethylsuccinate (ERYPED) 200 MG/5ML suspension    famotidine (PEPCID) 20 MG tablet    metoclopramide (REGLAN) 10 MG tablet    risperiDONE (RISPERDAL) 2 MG tablet    senna (SENOKOT) 8.6 MG tablet    acetaminophen (TYLENOL) 500 MG tablet    albuterol (PROAIR HFA/PROVENTIL HFA/VENTOLIN HFA) 108 (90 Base) MCG/ACT inhaler    B-D U/F 31G X 8 MM insulin pen needle    CONTOUR NEXT TEST test strip    cyclobenzaprine (FLEXERIL) 10 MG tablet    hydrOXYzine (ATARAX) 50 MG tablet    ibuprofen (ADVIL/MOTRIN) 600 MG tablet    insulin aspart (NOVOLOG PEN) 100 UNIT/ML pen    insulin glargine (LANTUS PEN) 100 UNIT/ML pen    OLANZapine (ZYPREXA) 10 MG tablet    ondansetron (ZOFRAN ODT) 4 MG ODT tab    polyethylene glycol (MIRALAX) 17 GM/Dose powder    tamsulosin (FLOMAX) 0.4 MG capsule     No current facility-administered medications for this visit.     ALLERGIES:    Allergies   Allergen Reactions    Bees Anaphylaxis     FHx:  Family History   Problem Relation Age of Onset    Cerebrovascular Disease Father     Diabetes Brother        SOCIAL Hx:  Social History     Socioeconomic History    Marital status: Single     Spouse name: Not on file    Number of children: Not on file    Years of education: Not on file    Highest education level: Not on file   Occupational History    Not on file   Tobacco Use    Smoking status: Never    Smokeless tobacco: Not on file   Substance and Sexual Activity    Alcohol use: No    Drug use: Yes     Types: Marijuana    Sexual activity: Never   Other  "Topics Concern    Not on file   Social History Narrative    Single, homeless, currently living in a hotel.  (last updated 9/17/2021)      Social Determinants of Health     Financial Resource Strain: Not on file   Food Insecurity: Not on file   Transportation Needs: Not on file   Physical Activity: Not on file   Stress: Not on file   Social Connections: Not on file   Interpersonal Safety: Not on file   Housing Stability: Not on file       ROS: A comprehensive Review of Systems was asked and answered in the negative unless specifically commented upon in the HPI    Physical Exam  Ht 1.854 m (6' 1\")   Wt 84.8 kg (187 lb)   BMI 24.67 kg/m    Body mass index is 24.67 kg/m .  Gen: A&Ox3, NAD  HEENT: Moist mucus membranes, no scleral icterus.  Lungs: no respiratory distress    LABS:  Admission on 09/11/2023, Discharged on 09/12/2023   Component Date Value Ref Range Status    Sodium 09/11/2023 139  136 - 145 mmol/L Final    Potassium 09/11/2023 3.7  3.4 - 5.3 mmol/L Final    Chloride 09/11/2023 104  98 - 107 mmol/L Final    Carbon Dioxide (CO2) 09/11/2023 24  22 - 29 mmol/L Final    Anion Gap 09/11/2023 11  7 - 15 mmol/L Final    Urea Nitrogen 09/11/2023 27.6 (H)  6.0 - 20.0 mg/dL Final    Creatinine 09/11/2023 0.75  0.67 - 1.17 mg/dL Final    Calcium 09/11/2023 9.1  8.6 - 10.0 mg/dL Final    Glucose 09/11/2023 138 (H)  70 - 99 mg/dL Final    GFR Estimate 09/11/2023 >90  >60 mL/min/1.73m2 Final    WBC Count 09/11/2023 9.3  4.0 - 11.0 10e3/uL Final    RBC Count 09/11/2023 4.22 (L)  4.40 - 5.90 10e6/uL Final    Hemoglobin 09/11/2023 11.1 (L)  13.3 - 17.7 g/dL Final    Hematocrit 09/11/2023 33.5 (L)  40.0 - 53.0 % Final    MCV 09/11/2023 79  78 - 100 fL Final    MCH 09/11/2023 26.3 (L)  26.5 - 33.0 pg Final    MCHC 09/11/2023 33.1  31.5 - 36.5 g/dL Final    RDW 09/11/2023 14.2  10.0 - 15.0 % Final    Platelet Count 09/11/2023 201  150 - 450 10e3/uL Final    Ketone (Beta-Hydroxybutyrate) Zackary* 09/11/2023 0.50 (H)  <=0.30 " mmol/L Final    pH Venous 09/11/2023 7.45 (H)  7.32 - 7.43 Final    pCO2 Venous 09/11/2023 38 (L)  40 - 50 mm Hg Final    pO2 Venous 09/11/2023 64 (H)  25 - 47 mm Hg Final    Bicarbonate Venous 09/11/2023 27  21 - 28 mmol/L Final    Base Excess/Deficit Venous 09/11/2023 2.4 (H)  -7.7 - 1.9 mmol/L Final    FIO2 09/11/2023 21   Final    GLUCOSE BY METER POCT 09/11/2023 101 (H)  70 - 99 mg/dL Final    GLUCOSE BY METER POCT 09/11/2023 82  70 - 99 mg/dL Final    GLUCOSE BY METER POCT 09/11/2023 75  70 - 99 mg/dL Final    GLUCOSE BY METER POCT 09/11/2023 151 (H)  70 - 99 mg/dL Final    Sodium 09/12/2023 138  136 - 145 mmol/L Final    Potassium 09/12/2023 3.6  3.4 - 5.3 mmol/L Final    Chloride 09/12/2023 103  98 - 107 mmol/L Final    Carbon Dioxide (CO2) 09/12/2023 25  22 - 29 mmol/L Final    Anion Gap 09/12/2023 10  7 - 15 mmol/L Final    Urea Nitrogen 09/12/2023 19.2  6.0 - 20.0 mg/dL Final    Creatinine 09/12/2023 0.73  0.67 - 1.17 mg/dL Final    Calcium 09/12/2023 8.9  8.6 - 10.0 mg/dL Final    Glucose 09/12/2023 161 (H)  70 - 99 mg/dL Final    GFR Estimate 09/12/2023 >90  >60 mL/min/1.73m2 Final    WBC Count 09/12/2023 5.6  4.0 - 11.0 10e3/uL Final    RBC Count 09/12/2023 4.51  4.40 - 5.90 10e6/uL Final    Hemoglobin 09/12/2023 11.8 (L)  13.3 - 17.7 g/dL Final    Hematocrit 09/12/2023 36.2 (L)  40.0 - 53.0 % Final    MCV 09/12/2023 80  78 - 100 fL Final    MCH 09/12/2023 26.2 (L)  26.5 - 33.0 pg Final    MCHC 09/12/2023 32.6  31.5 - 36.5 g/dL Final    RDW 09/12/2023 14.2  10.0 - 15.0 % Final    Platelet Count 09/12/2023 190  150 - 450 10e3/uL Final    Ketone (Beta-Hydroxybutyrate) Zackary* 09/12/2023 1.10 (H)  <=0.30 mmol/L Final    GLUCOSE BY METER POCT 09/12/2023 172 (H)  70 - 99 mg/dL Final    GLUCOSE BY METER POCT 09/12/2023 246 (H)  70 - 99 mg/dL Final       IMAGING:  Gastric Emptying Study     HISTORY:        TECHNIQUE: The patient ingested 0.9 mCi of Tc-99m sulfur colloid in eggs; pt had 4 oz eggs and 3 oz water.  Static images were acquired at approximately 1 hour intervals out through 4 hours using a dual head gamma camera in an anterior and posterior position. The calculation of emptying was based on dual head imaging with geometric mean calculations.  A Linear square fit was calculated to the emptying curve to determine the amount of residual activity at each time point.     FINDINGS:   Percent retention at 60 min =  98  %.   Percent retention at 120 min =  87  %.   Percent retention at 180 min =  78  %.   Percent retention at 240 min =  64 %.     T 1/2 emptying time =  401 mins.     1/22/24 CT abd/pelvis  Indication: Abdominal pain, acute, nonlocalized      Technique: Volumetric helical acquisition of CT images from the lung bases through the symphysis pubis after the administration of intravenous contrast.     DOSE:    Total DLP = 416 mGy.cm.      Findings:     Liver: Within normal limits.     Gallbladder and biliary tree: No calcified gallstones. No intra- or extrahepatic biliary ductal dilation.     Pancreas: Within normal limits.     Spleen: Within normal limits.     Adrenals: Within normal limits.     Kidneys, ureters and bladder: Within normal limits.     Reproductive organs: No pelvic masses.     Bowel: Normal caliber small bowel and large bowel. Mild pericolonic fat stranding predominantly involving the descending colon, sigmoid and rectum. Normal appendix.     Lymph nodes: No enlarged lymph nodes.     Peritoneum: No ascites or free air. Mild hazy attenuation of the central mesentery.     Vessels: Aorta and major branches are patent without aneurysm or significant stenosis. Portal vein and superior mesenteric vein are patent. No atherosclerotic disease.     Skeletal structures: No acute or suspicious osseous abnormality..     Soft tissues: No suspicious soft tissue lesion.     Lung bases: Clear.   Impression:   1. Mild pericolonic fat stranding predominantly adjacent to the descending colon, sigmoid and rectum.  Findings are nonspecific and can be seen with infectious/inflammatory etiologies.   2. Mild hazy attenuation of the central mesentery, as can be seen with mesenteric edema.     Endoscopies:  Normal prior EGD and EUS per outside GI clinic reports

## 2024-05-26 ENCOUNTER — HEALTH MAINTENANCE LETTER (OUTPATIENT)
Age: 34
End: 2024-05-26

## 2024-06-03 ENCOUNTER — HOSPITAL ENCOUNTER (INPATIENT)
Facility: CLINIC | Age: 34
LOS: 1 days | Discharge: LEFT BEFORE DISCHARGE | DRG: 638 | End: 2024-06-04
Attending: PHYSICIAN ASSISTANT | Admitting: INTERNAL MEDICINE
Payer: MEDICARE

## 2024-06-03 DIAGNOSIS — E10.10 DIABETIC KETOACIDOSIS WITHOUT COMA ASSOCIATED WITH TYPE 1 DIABETES MELLITUS (H): Primary | ICD-10-CM

## 2024-06-03 DIAGNOSIS — R10.9 CHRONIC ABDOMINAL PAIN: ICD-10-CM

## 2024-06-03 DIAGNOSIS — G89.29 CHRONIC ABDOMINAL PAIN: ICD-10-CM

## 2024-06-03 DIAGNOSIS — E11.10 DKA (DIABETIC KETOACIDOSIS) (H): ICD-10-CM

## 2024-06-03 DIAGNOSIS — E87.1 HYPONATREMIA: ICD-10-CM

## 2024-06-03 LAB
ALBUMIN SERPL BCG-MCNC: 4.3 G/DL (ref 3.5–5.2)
ALBUMIN UR-MCNC: NEGATIVE MG/DL
ALP SERPL-CCNC: 124 U/L (ref 40–150)
ALT SERPL W P-5'-P-CCNC: 29 U/L (ref 0–70)
AMPHETAMINES UR QL SCN: ABNORMAL
ANION GAP SERPL CALCULATED.3IONS-SCNC: 13 MMOL/L (ref 7–15)
ANION GAP SERPL CALCULATED.3IONS-SCNC: 18 MMOL/L (ref 7–15)
ANION GAP SERPL CALCULATED.3IONS-SCNC: 25 MMOL/L (ref 7–15)
APPEARANCE UR: CLEAR
AST SERPL W P-5'-P-CCNC: 28 U/L (ref 0–45)
B-OH-BUTYR SERPL-SCNC: 2.2 MMOL/L
B-OH-BUTYR SERPL-SCNC: 5.2 MMOL/L
B-OH-BUTYR SERPL-SCNC: 8 MMOL/L
BARBITURATES UR QL SCN: ABNORMAL
BASE EXCESS BLDV CALC-SCNC: -9.4 MMOL/L (ref -3–3)
BASOPHILS # BLD AUTO: 0 10E3/UL (ref 0–0.2)
BASOPHILS NFR BLD AUTO: 0 %
BENZODIAZ UR QL SCN: ABNORMAL
BILIRUB SERPL-MCNC: 1.1 MG/DL
BILIRUB UR QL STRIP: NEGATIVE
BUN SERPL-MCNC: 15.1 MG/DL (ref 6–20)
BUN SERPL-MCNC: 17.3 MG/DL (ref 6–20)
BUN SERPL-MCNC: 18.3 MG/DL (ref 6–20)
BZE UR QL SCN: ABNORMAL
CALCIUM SERPL-MCNC: 8.1 MG/DL (ref 8.6–10)
CALCIUM SERPL-MCNC: 8.4 MG/DL (ref 8.6–10)
CALCIUM SERPL-MCNC: 9.2 MG/DL (ref 8.6–10)
CANNABINOIDS UR QL SCN: ABNORMAL
CHLORIDE SERPL-SCNC: 88 MMOL/L (ref 98–107)
CHLORIDE SERPL-SCNC: 92 MMOL/L (ref 98–107)
CHLORIDE SERPL-SCNC: 96 MMOL/L (ref 98–107)
COLOR UR AUTO: YELLOW
CREAT SERPL-MCNC: 0.71 MG/DL (ref 0.67–1.17)
CREAT SERPL-MCNC: 0.74 MG/DL (ref 0.67–1.17)
CREAT SERPL-MCNC: 0.77 MG/DL (ref 0.67–1.17)
CRP SERPL-MCNC: 4.31 MG/L
DEPRECATED HCO3 PLAS-SCNC: 14 MMOL/L (ref 22–29)
DEPRECATED HCO3 PLAS-SCNC: 15 MMOL/L (ref 22–29)
DEPRECATED HCO3 PLAS-SCNC: 17 MMOL/L (ref 22–29)
EGFRCR SERPLBLD CKD-EPI 2021: >90 ML/MIN/1.73M2
EOSINOPHIL # BLD AUTO: 0 10E3/UL (ref 0–0.7)
EOSINOPHIL NFR BLD AUTO: 0 %
ERYTHROCYTE [DISTWIDTH] IN BLOOD BY AUTOMATED COUNT: 13.3 % (ref 10–15)
FENTANYL UR QL: ABNORMAL
GLUCOSE BLDC GLUCOMTR-MCNC: 179 MG/DL (ref 70–99)
GLUCOSE BLDC GLUCOMTR-MCNC: 189 MG/DL (ref 70–99)
GLUCOSE BLDC GLUCOMTR-MCNC: 198 MG/DL (ref 70–99)
GLUCOSE BLDC GLUCOMTR-MCNC: 200 MG/DL (ref 70–99)
GLUCOSE BLDC GLUCOMTR-MCNC: 225 MG/DL (ref 70–99)
GLUCOSE BLDC GLUCOMTR-MCNC: 227 MG/DL (ref 70–99)
GLUCOSE BLDC GLUCOMTR-MCNC: 228 MG/DL (ref 70–99)
GLUCOSE BLDC GLUCOMTR-MCNC: 264 MG/DL (ref 70–99)
GLUCOSE SERPL-MCNC: 199 MG/DL (ref 70–99)
GLUCOSE SERPL-MCNC: 218 MG/DL (ref 70–99)
GLUCOSE SERPL-MCNC: 270 MG/DL (ref 70–99)
GLUCOSE UR STRIP-MCNC: >499 MG/DL
HBA1C MFR BLD: 8.3 %
HCO3 BLDV-SCNC: 16 MMOL/L (ref 21–28)
HCT VFR BLD AUTO: 40.3 % (ref 40–53)
HGB BLD-MCNC: 13.4 G/DL (ref 13.3–17.7)
HGB UR QL STRIP: NEGATIVE
HYALINE CASTS: 1 /LPF
IMM GRANULOCYTES # BLD: 0.1 10E3/UL
IMM GRANULOCYTES NFR BLD: 1 %
KETONES UR STRIP-MCNC: 80 MG/DL
LACTATE SERPL-SCNC: 1.5 MMOL/L (ref 0.7–2)
LEUKOCYTE ESTERASE UR QL STRIP: ABNORMAL
LIPASE SERPL-CCNC: 12 U/L (ref 13–60)
LYMPHOCYTES # BLD AUTO: 1.4 10E3/UL (ref 0.8–5.3)
LYMPHOCYTES NFR BLD AUTO: 16 %
MAGNESIUM SERPL-MCNC: 1.9 MG/DL (ref 1.7–2.3)
MCH RBC QN AUTO: 27.5 PG (ref 26.5–33)
MCHC RBC AUTO-ENTMCNC: 33.3 G/DL (ref 31.5–36.5)
MCV RBC AUTO: 83 FL (ref 78–100)
MONOCYTES # BLD AUTO: 0.6 10E3/UL (ref 0–1.3)
MONOCYTES NFR BLD AUTO: 7 %
MUCOUS THREADS #/AREA URNS LPF: PRESENT /LPF
NEUTROPHILS # BLD AUTO: 6.7 10E3/UL (ref 1.6–8.3)
NEUTROPHILS NFR BLD AUTO: 76 %
NITRATE UR QL: NEGATIVE
NRBC # BLD AUTO: 0 10E3/UL
NRBC BLD AUTO-RTO: 0 /100
O2/TOTAL GAS SETTING VFR VENT: 21 %
OPIATES UR QL SCN: ABNORMAL
OXYHGB MFR BLDV: 60 % (ref 70–75)
PCO2 BLDV: 31 MM HG (ref 40–50)
PCP QUAL URINE (ROCHE): ABNORMAL
PH BLDV: 7.31 [PH] (ref 7.32–7.43)
PH UR STRIP: 5 [PH] (ref 5–7)
PHOSPHATE SERPL-MCNC: 2.1 MG/DL (ref 2.5–4.5)
PLATELET # BLD AUTO: 196 10E3/UL (ref 150–450)
PO2 BLDV: 33 MM HG (ref 25–47)
POTASSIUM SERPL-SCNC: 3.7 MMOL/L (ref 3.4–5.3)
POTASSIUM SERPL-SCNC: 3.8 MMOL/L (ref 3.4–5.3)
POTASSIUM SERPL-SCNC: 4.2 MMOL/L (ref 3.4–5.3)
PROT SERPL-MCNC: 7 G/DL (ref 6.4–8.3)
RBC # BLD AUTO: 4.87 10E6/UL (ref 4.4–5.9)
RBC URINE: 1 /HPF
SAO2 % BLDV: 61.4 % (ref 70–75)
SODIUM SERPL-SCNC: 125 MMOL/L (ref 135–145)
SODIUM SERPL-SCNC: 126 MMOL/L (ref 135–145)
SODIUM SERPL-SCNC: 127 MMOL/L (ref 135–145)
SP GR UR STRIP: 1.02 (ref 1–1.03)
UROBILINOGEN UR STRIP-MCNC: NORMAL MG/DL
WBC # BLD AUTO: 8.8 10E3/UL (ref 4–11)
WBC URINE: 5 /HPF

## 2024-06-03 PROCEDURE — 99223 1ST HOSP IP/OBS HIGH 75: CPT | Mod: AI | Performed by: NURSE PRACTITIONER

## 2024-06-03 PROCEDURE — 99291 CRITICAL CARE FIRST HOUR: CPT | Mod: 25 | Performed by: PHYSICIAN ASSISTANT

## 2024-06-03 PROCEDURE — 82010 KETONE BODYS QUAN: CPT | Performed by: PHYSICIAN ASSISTANT

## 2024-06-03 PROCEDURE — 83690 ASSAY OF LIPASE: CPT | Performed by: PHYSICIAN ASSISTANT

## 2024-06-03 PROCEDURE — 96361 HYDRATE IV INFUSION ADD-ON: CPT | Performed by: PHYSICIAN ASSISTANT

## 2024-06-03 PROCEDURE — 80307 DRUG TEST PRSMV CHEM ANLYZR: CPT | Performed by: PHYSICIAN ASSISTANT

## 2024-06-03 PROCEDURE — 36415 COLL VENOUS BLD VENIPUNCTURE: CPT | Performed by: PHYSICIAN ASSISTANT

## 2024-06-03 PROCEDURE — 86140 C-REACTIVE PROTEIN: CPT | Performed by: PHYSICIAN ASSISTANT

## 2024-06-03 PROCEDURE — 83735 ASSAY OF MAGNESIUM: CPT | Performed by: NURSE PRACTITIONER

## 2024-06-03 PROCEDURE — 258N000003 HC RX IP 258 OP 636: Performed by: PHYSICIAN ASSISTANT

## 2024-06-03 PROCEDURE — 258N000003 HC RX IP 258 OP 636: Performed by: INTERNAL MEDICINE

## 2024-06-03 PROCEDURE — 99285 EMERGENCY DEPT VISIT HI MDM: CPT | Performed by: PHYSICIAN ASSISTANT

## 2024-06-03 PROCEDURE — 250N000009 HC RX 250: Performed by: PHYSICIAN ASSISTANT

## 2024-06-03 PROCEDURE — 83036 HEMOGLOBIN GLYCOSYLATED A1C: CPT | Performed by: PHYSICIAN ASSISTANT

## 2024-06-03 PROCEDURE — 80048 BASIC METABOLIC PNL TOTAL CA: CPT | Performed by: NURSE PRACTITIONER

## 2024-06-03 PROCEDURE — 36415 COLL VENOUS BLD VENIPUNCTURE: CPT | Performed by: NURSE PRACTITIONER

## 2024-06-03 PROCEDURE — 250N000009 HC RX 250: Performed by: INTERNAL MEDICINE

## 2024-06-03 PROCEDURE — 82805 BLOOD GASES W/O2 SATURATION: CPT | Performed by: PHYSICIAN ASSISTANT

## 2024-06-03 PROCEDURE — 200N000001 HC R&B ICU

## 2024-06-03 PROCEDURE — 250N000011 HC RX IP 250 OP 636: Performed by: INTERNAL MEDICINE

## 2024-06-03 PROCEDURE — 81001 URINALYSIS AUTO W/SCOPE: CPT | Performed by: PHYSICIAN ASSISTANT

## 2024-06-03 PROCEDURE — 250N000011 HC RX IP 250 OP 636: Performed by: PHYSICIAN ASSISTANT

## 2024-06-03 PROCEDURE — 99292 CRITICAL CARE ADDL 30 MIN: CPT | Performed by: PHYSICIAN ASSISTANT

## 2024-06-03 PROCEDURE — S5010 5% DEXTROSE AND 0.45% SALINE: HCPCS | Performed by: PHYSICIAN ASSISTANT

## 2024-06-03 PROCEDURE — 85025 COMPLETE CBC W/AUTO DIFF WBC: CPT | Performed by: PHYSICIAN ASSISTANT

## 2024-06-03 PROCEDURE — 83605 ASSAY OF LACTIC ACID: CPT | Performed by: PHYSICIAN ASSISTANT

## 2024-06-03 PROCEDURE — 82962 GLUCOSE BLOOD TEST: CPT

## 2024-06-03 PROCEDURE — 96374 THER/PROPH/DIAG INJ IV PUSH: CPT | Performed by: PHYSICIAN ASSISTANT

## 2024-06-03 PROCEDURE — 82010 KETONE BODYS QUAN: CPT | Performed by: NURSE PRACTITIONER

## 2024-06-03 PROCEDURE — 84100 ASSAY OF PHOSPHORUS: CPT | Performed by: NURSE PRACTITIONER

## 2024-06-03 PROCEDURE — 250N000011 HC RX IP 250 OP 636: Performed by: NURSE PRACTITIONER

## 2024-06-03 PROCEDURE — 82040 ASSAY OF SERUM ALBUMIN: CPT | Performed by: PHYSICIAN ASSISTANT

## 2024-06-03 RX ORDER — DEXTROSE MONOHYDRATE AND SODIUM CHLORIDE 5; .45 G/100ML; G/100ML
1000 INJECTION, SOLUTION INTRAVENOUS CONTINUOUS PRN
Status: DISCONTINUED | OUTPATIENT
Start: 2024-06-03 | End: 2024-06-04 | Stop reason: HOSPADM

## 2024-06-03 RX ORDER — ONDANSETRON 4 MG/1
1 TABLET, ORALLY DISINTEGRATING ORAL 3 TIMES DAILY
COMMUNITY
Start: 2024-05-01

## 2024-06-03 RX ORDER — DULOXETIN HYDROCHLORIDE 30 MG/1
30 CAPSULE, DELAYED RELEASE ORAL DAILY
Status: DISCONTINUED | OUTPATIENT
Start: 2024-06-03 | End: 2024-06-04 | Stop reason: HOSPADM

## 2024-06-03 RX ORDER — ONDANSETRON 2 MG/ML
4 INJECTION INTRAMUSCULAR; INTRAVENOUS EVERY 6 HOURS PRN
Status: DISCONTINUED | OUTPATIENT
Start: 2024-06-03 | End: 2024-06-04 | Stop reason: HOSPADM

## 2024-06-03 RX ORDER — ONDANSETRON 4 MG/1
4 TABLET, ORALLY DISINTEGRATING ORAL EVERY 6 HOURS PRN
Status: DISCONTINUED | OUTPATIENT
Start: 2024-06-03 | End: 2024-06-04 | Stop reason: HOSPADM

## 2024-06-03 RX ORDER — OXYCODONE HYDROCHLORIDE 5 MG/1
5-10 TABLET ORAL EVERY 6 HOURS PRN
Status: ON HOLD | COMMUNITY
Start: 2024-02-21 | End: 2024-06-04

## 2024-06-03 RX ORDER — GLIPIZIDE 10 MG/1
1-2 TABLET ORAL
Status: DISCONTINUED | OUTPATIENT
Start: 2024-06-03 | End: 2024-06-04 | Stop reason: HOSPADM

## 2024-06-03 RX ORDER — DORZOLAMIDE HYDROCHLORIDE AND TIMOLOL MALEATE 20; 5 MG/ML; MG/ML
1 SOLUTION/ DROPS OPHTHALMIC 2 TIMES DAILY
COMMUNITY

## 2024-06-03 RX ORDER — ONDANSETRON 2 MG/ML
4 INJECTION INTRAMUSCULAR; INTRAVENOUS EVERY 30 MIN PRN
Status: DISCONTINUED | OUTPATIENT
Start: 2024-06-03 | End: 2024-06-03

## 2024-06-03 RX ORDER — NICOTINE POLACRILEX 4 MG
15-30 LOZENGE BUCCAL
Status: DISCONTINUED | OUTPATIENT
Start: 2024-06-03 | End: 2024-06-04

## 2024-06-03 RX ORDER — DEXTROSE MONOHYDRATE 25 G/50ML
25-50 INJECTION, SOLUTION INTRAVENOUS
Status: DISCONTINUED | OUTPATIENT
Start: 2024-06-03 | End: 2024-06-04

## 2024-06-03 RX ORDER — DORZOLAMIDE HYDROCHLORIDE AND TIMOLOL MALEATE 20; 5 MG/ML; MG/ML
1 SOLUTION/ DROPS OPHTHALMIC 2 TIMES DAILY
Status: DISCONTINUED | OUTPATIENT
Start: 2024-06-03 | End: 2024-06-04 | Stop reason: HOSPADM

## 2024-06-03 RX ORDER — BISACODYL 5 MG
10 TABLET, DELAYED RELEASE (ENTERIC COATED) ORAL DAILY PRN
Status: DISCONTINUED | OUTPATIENT
Start: 2024-06-03 | End: 2024-06-04 | Stop reason: HOSPADM

## 2024-06-03 RX ORDER — PSEUDOEPHEDRINE HCL 30 MG
100 TABLET ORAL 2 TIMES DAILY
COMMUNITY
Start: 2024-04-29 | End: 2024-07-17

## 2024-06-03 RX ORDER — DEXTROSE MONOHYDRATE 100 MG/ML
INJECTION, SOLUTION INTRAVENOUS CONTINUOUS PRN
Status: DISCONTINUED | OUTPATIENT
Start: 2024-06-03 | End: 2024-06-04 | Stop reason: ALTCHOICE

## 2024-06-03 RX ORDER — POTASSIUM CHLORIDE 7.45 MG/ML
10 INJECTION INTRAVENOUS
Status: COMPLETED | OUTPATIENT
Start: 2024-06-03 | End: 2024-06-03

## 2024-06-03 RX ORDER — OLANZAPINE 2.5 MG/1
10 TABLET, FILM COATED ORAL AT BEDTIME
Status: DISCONTINUED | OUTPATIENT
Start: 2024-06-03 | End: 2024-06-04 | Stop reason: HOSPADM

## 2024-06-03 RX ORDER — BISACODYL 5 MG/1
5-10 TABLET, DELAYED RELEASE ORAL DAILY PRN
COMMUNITY
Start: 2024-02-28

## 2024-06-03 RX ORDER — LIDOCAINE 4 G/G
2 PATCH TOPICAL DAILY PRN
Status: DISCONTINUED | OUTPATIENT
Start: 2024-06-03 | End: 2024-06-04 | Stop reason: HOSPADM

## 2024-06-03 RX ORDER — OXYCODONE HYDROCHLORIDE 5 MG/1
5 TABLET ORAL 2 TIMES DAILY PRN
Status: DISCONTINUED | OUTPATIENT
Start: 2024-06-03 | End: 2024-06-04 | Stop reason: ALTCHOICE

## 2024-06-03 RX ORDER — AMOXICILLIN 250 MG
1 CAPSULE ORAL 2 TIMES DAILY PRN
Status: DISCONTINUED | OUTPATIENT
Start: 2024-06-03 | End: 2024-06-04 | Stop reason: HOSPADM

## 2024-06-03 RX ORDER — DULOXETIN HYDROCHLORIDE 30 MG/1
60 CAPSULE, DELAYED RELEASE ORAL DAILY
COMMUNITY
Start: 2024-05-29 | End: 2024-07-17

## 2024-06-03 RX ORDER — PEN NEEDLE, DIABETIC 31 GX5/16"
NEEDLE, DISPOSABLE MISCELLANEOUS
COMMUNITY
Start: 2024-05-01

## 2024-06-03 RX ORDER — KETOROLAC TROMETHAMINE 30 MG/ML
30 INJECTION, SOLUTION INTRAMUSCULAR; INTRAVENOUS EVERY 6 HOURS
Qty: 8 ML | Refills: 0 | Status: DISCONTINUED | OUTPATIENT
Start: 2024-06-03 | End: 2024-06-04 | Stop reason: HOSPADM

## 2024-06-03 RX ORDER — METOCLOPRAMIDE 5 MG/1
10 TABLET ORAL
Status: DISCONTINUED | OUTPATIENT
Start: 2024-06-03 | End: 2024-06-04 | Stop reason: HOSPADM

## 2024-06-03 RX ORDER — FAMOTIDINE 20 MG/1
20 TABLET, FILM COATED ORAL 2 TIMES DAILY
Status: DISCONTINUED | OUTPATIENT
Start: 2024-06-03 | End: 2024-06-04

## 2024-06-03 RX ORDER — METHOCARBAMOL 500 MG/1
500 TABLET, FILM COATED ORAL EVERY 6 HOURS PRN
Status: DISCONTINUED | OUTPATIENT
Start: 2024-06-03 | End: 2024-06-04 | Stop reason: HOSPADM

## 2024-06-03 RX ORDER — AMOXICILLIN 250 MG
2 CAPSULE ORAL 2 TIMES DAILY PRN
Status: DISCONTINUED | OUTPATIENT
Start: 2024-06-03 | End: 2024-06-04 | Stop reason: HOSPADM

## 2024-06-03 RX ORDER — OLANZAPINE 10 MG/1
10 TABLET ORAL DAILY
COMMUNITY
Start: 2024-05-29

## 2024-06-03 RX ORDER — ACETAMINOPHEN 325 MG/1
650 TABLET ORAL 3 TIMES DAILY
Status: DISCONTINUED | OUTPATIENT
Start: 2024-06-03 | End: 2024-06-04 | Stop reason: HOSPADM

## 2024-06-03 RX ADMIN — POTASSIUM CHLORIDE 10 MEQ: 7.46 INJECTION, SOLUTION INTRAVENOUS at 21:21

## 2024-06-03 RX ADMIN — INSULIN HUMAN 3 UNITS/HR: 1 INJECTION, SOLUTION INTRAVENOUS at 16:17

## 2024-06-03 RX ADMIN — POTASSIUM CHLORIDE 10 MEQ: 7.46 INJECTION, SOLUTION INTRAVENOUS at 20:32

## 2024-06-03 RX ADMIN — ONDANSETRON 4 MG: 2 INJECTION INTRAMUSCULAR; INTRAVENOUS at 14:42

## 2024-06-03 RX ADMIN — SODIUM CHLORIDE 1000 ML: 9 INJECTION, SOLUTION INTRAVENOUS at 14:50

## 2024-06-03 RX ADMIN — PROCHLORPERAZINE EDISYLATE 5 MG: 5 INJECTION INTRAMUSCULAR; INTRAVENOUS at 20:33

## 2024-06-03 RX ADMIN — DEXTROSE AND SODIUM CHLORIDE 1000 ML: 5; 450 INJECTION, SOLUTION INTRAVENOUS at 18:18

## 2024-06-03 RX ADMIN — ONDANSETRON 4 MG: 2 INJECTION INTRAMUSCULAR; INTRAVENOUS at 17:17

## 2024-06-03 RX ADMIN — SODIUM PHOSPHATE, MONOBASIC, MONOHYDRATE AND SODIUM PHOSPHATE, DIBASIC, ANHYDROUS 15 MMOL: 142; 276 INJECTION, SOLUTION INTRAVENOUS at 22:19

## 2024-06-03 RX ADMIN — KETOROLAC TROMETHAMINE 30 MG: 30 INJECTION, SOLUTION INTRAMUSCULAR; INTRAVENOUS at 18:45

## 2024-06-03 RX ADMIN — DEXTROSE AND SODIUM CHLORIDE 1000 ML: 5; 450 INJECTION, SOLUTION INTRAVENOUS at 22:17

## 2024-06-03 RX ADMIN — ONDANSETRON 4 MG: 2 INJECTION INTRAMUSCULAR; INTRAVENOUS at 23:51

## 2024-06-03 ASSESSMENT — COLUMBIA-SUICIDE SEVERITY RATING SCALE - C-SSRS
1. IN THE PAST MONTH, HAVE YOU WISHED YOU WERE DEAD OR WISHED YOU COULD GO TO SLEEP AND NOT WAKE UP?: NO
2. HAVE YOU ACTUALLY HAD ANY THOUGHTS OF KILLING YOURSELF IN THE PAST MONTH?: NO
6. HAVE YOU EVER DONE ANYTHING, STARTED TO DO ANYTHING, OR PREPARED TO DO ANYTHING TO END YOUR LIFE?: NO

## 2024-06-03 ASSESSMENT — ACTIVITIES OF DAILY LIVING (ADL)
DIFFICULTY_COMMUNICATING: NO
HEARING_DIFFICULTY_OR_DEAF: NO
ADLS_ACUITY_SCORE: 38
DRESSING/BATHING_DIFFICULTY: NO
ADLS_ACUITY_SCORE: 23
WEAR_GLASSES_OR_BLIND: YES
CONCENTRATING,_REMEMBERING_OR_MAKING_DECISIONS_DIFFICULTY: NO
ADLS_ACUITY_SCORE: 36
ADLS_ACUITY_SCORE: 23
ADLS_ACUITY_SCORE: 23
TOILETING_ISSUES: NO
DIFFICULTY_EATING/SWALLOWING: NO
ADLS_ACUITY_SCORE: 23
VISION_MANAGEMENT: GLASSES
DOING_ERRANDS_INDEPENDENTLY_DIFFICULTY: NO
ADLS_ACUITY_SCORE: 38
FALL_HISTORY_WITHIN_LAST_SIX_MONTHS: NO
ADLS_ACUITY_SCORE: 38
WALKING_OR_CLIMBING_STAIRS_DIFFICULTY: NO
ADLS_ACUITY_SCORE: 36
ADLS_ACUITY_SCORE: 36
CHANGE_IN_FUNCTIONAL_STATUS_SINCE_ONSET_OF_CURRENT_ILLNESS/INJURY: NO
ADLS_ACUITY_SCORE: 23

## 2024-06-03 NOTE — PROGRESS NOTES
This is a telemetry ICU note.  This is a 34-year-old male with type 1 diabetes mellitus for more than 15 years who was admitted to the hospital with abdominal pain, nausea, poor oral intake.  His last insulin dose was yesterday.  His labs were consistent with diabetic ketoacidosis.  I interviewed the patient via the video monitor.  He is awake and conversant.  He reports nausea, and abdominal pain.  He received a liter of IV fluids in the emergency department.  And he is currently being started on D5 half-normal saline.  He is on insulin drip per the DKA protocol.  His BMP is ordered every 4 hours, and he is due for a BMP now.  His most recent glucose was 198 at 6 PM.  I agree with the treatment plan consisting of treating diabetic ketoacidosis, treating pain, controlling nausea.  The plan of care was discussed with the bedside nurse.

## 2024-06-03 NOTE — PROGRESS NOTES
"S-(situation): Patient arrives to room 215 via cart from ED    B-(background): DKA    A-(assessment): Pt is alert and oriented. Frustrated with recent health care challenges. Pt states he was at [facility] yesterday and was told not to come back \"unless you're dying\". Pt reports chronic abdominal pain for several years that no one wants to help him with. He is requesting IV narcotics because he is \"so nauseous\" and reports frustration that \"all of the hospitals know I have a plan now so I can't get IV medications\". Vitals WNL. Pt is tachypneic when staff is present but relaxed when staff leave the room.  upon arrival. IV fluids changed. Insulin drip infusing at 1.5u/hr.     R-(recommendations): Orders reviewed with patient. Will monitor patient per MD orders.     Inpatient nursing criteria listed below were met:    Health care directives status obtained and documented: Yes  VTE ordered/documented: Yes  Skin issues/needs documented:NA  Isolation addressed and Signage used: Yes  Fall Prevention: Care plan updated Yes Education given and documented Yes  Care Plan initiated and Co-Morbidities added: Yes  Education Assessment documented:Yes  Admission Education Documented: Yes  If present CAUTI/CLABI Education done: NA  New medication patient education completed and documented (Possible Side Effects of Common Medications handout): No  Allergies Reviewed: Yes  Admission Medication Reconciliation completed: Yes  Home medications if not able to send immediately home with family stored here: NA  Reminder note placed in discharge instructions regarding home meds: NA  Individualized care needs/preferences addressed and charted: No  Provider Notified that patient has arrived to the unit: No       "

## 2024-06-03 NOTE — MEDICATION SCRIBE - ADMISSION MEDICATION HISTORY
Medication Scribe Admission Medication History    Admission medication history is complete. The information provided in this note is only as accurate as the sources available at the time of the update.    Information Source(s): Patient and CareEverywhere/SureScripts via in-person    Pertinent Information: patient reluctant to communicate    Changes made to PTA medication list:  Added: docusate, dorzolamide/timolol, Duloxetine, Novolog, Lantus, Olanzapine, Ondansetron, oxycodone  Deleted: erythormycin  Changed: None    Allergies reviewed with patient and updates made in EHR: yes    Medication History Completed By: ERA EATON 6/3/2024 3:41 PM    PTA Med List   Medication Sig Last Dose    B-D U/F 31G X 8 MM insulin pen needle Inject Subcutaneous 5 times daily 6/1/2024 at am    bisacodyl (DULCOLAX) 5 MG EC tablet Take 5-10 mg by mouth daily as needed for constipation 6/1/2024 at am    dorzolamide-timolol (COSOPT) 2-0.5 % ophthalmic solution Place 1 drop into the right eye 2 times daily 6/1/2024 at am     MG capsule Take 100 mg by mouth 2 times daily 6/1/2024 at am    DULoxetine (CYMBALTA) 30 MG capsule Take 30 mg by mouth daily For 7 days, then 60mg daily 6/1/2024 at am    famotidine (PEPCID) 20 MG tablet Take 20 mg by mouth 2 times daily 6/1/2024 at am    insulin aspart (NOVOLOG PEN) 100 UNIT/ML pen Inject 8 UNITS subcutaneously with each meal and 4 UNITS with each snack. Skip dose if not eating. Inject 2 UNITS if -200, 4 UNITS if -250, 6 UNITS if -300, 8 UNITS if -350, 10 UNITS if -400, 12 UNITS if -450, 14 UNITS if -500 with each meal. Max daily dose: 40 units 6/1/2024 at am 4units    insulin glargine (LANTUS PEN) 100 UNIT/ML pen Inject 30 Units Subcutaneous every morning 6/1/2024 at am    metoclopramide (REGLAN) 10 MG tablet Take 10 mg by mouth 3 times daily (before meals) 6/1/2024 at am    OLANZapine (ZYPREXA) 10 MG tablet Take 10 mg by mouth daily 6/1/2024 at hs     ondansetron (ZOFRAN ODT) 4 MG ODT tab Take 1 tablet by mouth 3 times daily 6/1/2024 at am    oxyCODONE (ROXICODONE) 5 MG tablet Take 5-10 mg by mouth every 6 hours as needed for moderate to severe pain (abdominal) 6/1/2024 at unkn    Polyethylene Glycol 400 (VISINE DRY EYE RELIEF) 1 % SOLN Apply 1-2 drops to eye every hour as needed (dry eye) Unknown at unkn    risperiDONE (RISPERDAL) 2 MG tablet Take 2 mg by mouth At Bedtime Unknown at unkn    senna (SENOKOT) 8.6 MG tablet Take 2 tablets by mouth 2 times daily 6/1/2024 at am

## 2024-06-03 NOTE — ED TRIAGE NOTES
Patient brought in by EMS for chronic abdominal pain. Per EMS pt has had multiple recent ED visits in different facilities.     Zofran was declined.  No pain meds were given for chronic pain, also denied Toradol.    20g right AC present.     As not taken insulin since yesterday, has not eating for a week.    Oxy 2 days ago

## 2024-06-03 NOTE — ED NOTES
ED Nursing criteria listed below was addressed during verbal handoff:     Abnormal vitals: Yes  Abnormal results: Yes  Med Reconciliation completed: Yes  Meds given in ED: Yes  Any Overdue Meds: No  Core Measures: N/A  Isolation:yes  Special needs: No  Skin assessment: Yes    Observation Patient  Education provided: Yes

## 2024-06-04 ENCOUNTER — APPOINTMENT (OUTPATIENT)
Dept: ULTRASOUND IMAGING | Facility: CLINIC | Age: 34
DRG: 638 | End: 2024-06-04
Attending: NURSE PRACTITIONER
Payer: MEDICARE

## 2024-06-04 VITALS
OXYGEN SATURATION: 99 % | DIASTOLIC BLOOD PRESSURE: 90 MMHG | HEIGHT: 73 IN | RESPIRATION RATE: 18 BRPM | BODY MASS INDEX: 24.03 KG/M2 | SYSTOLIC BLOOD PRESSURE: 157 MMHG | WEIGHT: 181.3 LBS | TEMPERATURE: 98 F | HEART RATE: 90 BPM

## 2024-06-04 PROBLEM — E87.6 HYPOKALEMIA: Status: ACTIVE | Noted: 2024-06-04

## 2024-06-04 PROBLEM — E83.39 HYPOPHOSPHATEMIA: Status: ACTIVE | Noted: 2024-06-04

## 2024-06-04 LAB
ANION GAP SERPL CALCULATED.3IONS-SCNC: 8 MMOL/L (ref 7–15)
B-OH-BUTYR SERPL-SCNC: 0.5 MMOL/L
BUN SERPL-MCNC: 13.1 MG/DL (ref 6–20)
CALCIUM SERPL-MCNC: 8.2 MG/DL (ref 8.6–10)
CHLORIDE SERPL-SCNC: 100 MMOL/L (ref 98–107)
CREAT SERPL-MCNC: 0.76 MG/DL (ref 0.67–1.17)
DEPRECATED HCO3 PLAS-SCNC: 22 MMOL/L (ref 22–29)
EGFRCR SERPLBLD CKD-EPI 2021: >90 ML/MIN/1.73M2
GLUCOSE BLDC GLUCOMTR-MCNC: 102 MG/DL (ref 70–99)
GLUCOSE BLDC GLUCOMTR-MCNC: 120 MG/DL (ref 70–99)
GLUCOSE BLDC GLUCOMTR-MCNC: 121 MG/DL (ref 70–99)
GLUCOSE BLDC GLUCOMTR-MCNC: 122 MG/DL (ref 70–99)
GLUCOSE BLDC GLUCOMTR-MCNC: 131 MG/DL (ref 70–99)
GLUCOSE BLDC GLUCOMTR-MCNC: 152 MG/DL (ref 70–99)
GLUCOSE BLDC GLUCOMTR-MCNC: 198 MG/DL (ref 70–99)
GLUCOSE BLDC GLUCOMTR-MCNC: 220 MG/DL (ref 70–99)
GLUCOSE BLDC GLUCOMTR-MCNC: 92 MG/DL (ref 70–99)
GLUCOSE BLDC GLUCOMTR-MCNC: 93 MG/DL (ref 70–99)
GLUCOSE SERPL-MCNC: 124 MG/DL (ref 70–99)
HOLD SPECIMEN: NORMAL
PHOSPHATE SERPL-MCNC: 1.9 MG/DL (ref 2.5–4.5)
POTASSIUM SERPL-SCNC: 3.3 MMOL/L (ref 3.4–5.3)
POTASSIUM SERPL-SCNC: 3.7 MMOL/L (ref 3.4–5.3)
SODIUM SERPL-SCNC: 130 MMOL/L (ref 135–145)

## 2024-06-04 PROCEDURE — 36415 COLL VENOUS BLD VENIPUNCTURE: CPT | Performed by: INTERNAL MEDICINE

## 2024-06-04 PROCEDURE — 258N000003 HC RX IP 258 OP 636: Performed by: INTERNAL MEDICINE

## 2024-06-04 PROCEDURE — 250N000011 HC RX IP 250 OP 636: Performed by: INTERNAL MEDICINE

## 2024-06-04 PROCEDURE — 250N000013 HC RX MED GY IP 250 OP 250 PS 637: Performed by: INTERNAL MEDICINE

## 2024-06-04 PROCEDURE — 82010 KETONE BODYS QUAN: CPT | Performed by: NURSE PRACTITIONER

## 2024-06-04 PROCEDURE — S5010 5% DEXTROSE AND 0.45% SALINE: HCPCS | Performed by: PHYSICIAN ASSISTANT

## 2024-06-04 PROCEDURE — 84132 ASSAY OF SERUM POTASSIUM: CPT | Performed by: INTERNAL MEDICINE

## 2024-06-04 PROCEDURE — 250N000009 HC RX 250: Performed by: INTERNAL MEDICINE

## 2024-06-04 PROCEDURE — 258N000003 HC RX IP 258 OP 636: Performed by: PHYSICIAN ASSISTANT

## 2024-06-04 PROCEDURE — 250N000013 HC RX MED GY IP 250 OP 250 PS 637: Performed by: NURSE PRACTITIONER

## 2024-06-04 PROCEDURE — 80048 BASIC METABOLIC PNL TOTAL CA: CPT | Performed by: NURSE PRACTITIONER

## 2024-06-04 PROCEDURE — 76700 US EXAM ABDOM COMPLETE: CPT

## 2024-06-04 PROCEDURE — 36415 COLL VENOUS BLD VENIPUNCTURE: CPT | Performed by: NURSE PRACTITIONER

## 2024-06-04 PROCEDURE — 84100 ASSAY OF PHOSPHORUS: CPT | Performed by: INTERNAL MEDICINE

## 2024-06-04 PROCEDURE — 250N000011 HC RX IP 250 OP 636: Performed by: NURSE PRACTITIONER

## 2024-06-04 RX ORDER — HYDROXYZINE HYDROCHLORIDE 25 MG/1
25 TABLET, FILM COATED ORAL EVERY 4 HOURS PRN
Status: DISCONTINUED | OUTPATIENT
Start: 2024-06-04 | End: 2024-06-04 | Stop reason: HOSPADM

## 2024-06-04 RX ORDER — NICOTINE POLACRILEX 4 MG
15-30 LOZENGE BUCCAL
Status: DISCONTINUED | OUTPATIENT
Start: 2024-06-04 | End: 2024-06-04 | Stop reason: HOSPADM

## 2024-06-04 RX ORDER — LORAZEPAM 2 MG/ML
1 INJECTION INTRAMUSCULAR EVERY 4 HOURS PRN
Status: DISCONTINUED | OUTPATIENT
Start: 2024-06-04 | End: 2024-06-04 | Stop reason: ALTCHOICE

## 2024-06-04 RX ORDER — POTASSIUM CHLORIDE 7.45 MG/ML
10 INJECTION INTRAVENOUS
Status: DISCONTINUED | OUTPATIENT
Start: 2024-06-04 | End: 2024-06-04 | Stop reason: HOSPADM

## 2024-06-04 RX ORDER — MAGNESIUM HYDROXIDE/ALUMINUM HYDROXICE/SIMETHICONE 120; 1200; 1200 MG/30ML; MG/30ML; MG/30ML
15 SUSPENSION ORAL 3 TIMES DAILY PRN
Status: DISCONTINUED | OUTPATIENT
Start: 2024-06-04 | End: 2024-06-04 | Stop reason: HOSPADM

## 2024-06-04 RX ORDER — DEXTROSE MONOHYDRATE 25 G/50ML
25-50 INJECTION, SOLUTION INTRAVENOUS
Status: DISCONTINUED | OUTPATIENT
Start: 2024-06-04 | End: 2024-06-04 | Stop reason: HOSPADM

## 2024-06-04 RX ADMIN — SODIUM PHOSPHATE, MONOBASIC, MONOHYDRATE AND SODIUM PHOSPHATE, DIBASIC, ANHYDROUS 9 MMOL: 142; 276 INJECTION, SOLUTION INTRAVENOUS at 06:07

## 2024-06-04 RX ADMIN — FAMOTIDINE 20 MG: 10 INJECTION, SOLUTION INTRAVENOUS at 02:49

## 2024-06-04 RX ADMIN — DEXTROSE AND SODIUM CHLORIDE 1000 ML: 5; 450 INJECTION, SOLUTION INTRAVENOUS at 02:13

## 2024-06-04 RX ADMIN — POTASSIUM CHLORIDE 10 MEQ: 7.46 INJECTION, SOLUTION INTRAVENOUS at 05:55

## 2024-06-04 RX ADMIN — PROCHLORPERAZINE EDISYLATE 5 MG: 5 INJECTION INTRAMUSCULAR; INTRAVENOUS at 02:12

## 2024-06-04 RX ADMIN — OXYCODONE HYDROCHLORIDE 5 MG: 5 TABLET ORAL at 02:12

## 2024-06-04 RX ADMIN — POTASSIUM CHLORIDE 10 MEQ: 7.46 INJECTION, SOLUTION INTRAVENOUS at 08:46

## 2024-06-04 RX ADMIN — SODIUM PHOSPHATE, MONOBASIC, MONOHYDRATE AND SODIUM PHOSPHATE, DIBASIC, ANHYDROUS 9 MMOL: 142; 276 INJECTION, SOLUTION INTRAVENOUS at 08:25

## 2024-06-04 RX ADMIN — DEXTROSE AND SODIUM CHLORIDE 1000 ML: 5; 450 INJECTION, SOLUTION INTRAVENOUS at 05:59

## 2024-06-04 RX ADMIN — LORAZEPAM 1 MG: 2 INJECTION INTRAMUSCULAR; INTRAVENOUS at 04:15

## 2024-06-04 RX ADMIN — ALUMINUM HYDROXIDE, MAGNESIUM HYDROXIDE, AND SIMETHICONE 15 ML: 200; 200; 20 SUSPENSION ORAL at 02:49

## 2024-06-04 RX ADMIN — KETOROLAC TROMETHAMINE 30 MG: 30 INJECTION, SOLUTION INTRAMUSCULAR; INTRAVENOUS at 00:02

## 2024-06-04 RX ADMIN — METOCLOPRAMIDE 10 MG: 5 TABLET ORAL at 08:23

## 2024-06-04 RX ADMIN — POTASSIUM CHLORIDE 10 MEQ: 7.46 INJECTION, SOLUTION INTRAVENOUS at 07:33

## 2024-06-04 RX ADMIN — LIDOCAINE 2 PATCH: 4 PATCH TOPICAL at 03:09

## 2024-06-04 RX ADMIN — KETOROLAC TROMETHAMINE 30 MG: 30 INJECTION, SOLUTION INTRAMUSCULAR; INTRAVENOUS at 05:55

## 2024-06-04 ASSESSMENT — ACTIVITIES OF DAILY LIVING (ADL)
ADLS_ACUITY_SCORE: 23

## 2024-06-04 NOTE — H&P
[facility]    History and Physical - Hospitalist Service       Date of Admission:  6/3/2024    Assessment & Plan      Reji Monahan is a 34 year old male admitted on 6/3/2024. He has history of type 1 diabetes with frequent DKA, chronic abdominal pain, asthma, endocarditis, schizophrenia who comes to the hospital with complaint of abdominal pain and is admitted for DKA.    #diabetic ketoacidosis  Presents with ketones 8mmol/L, , bicarb 14, anion gap 25, pH 7.31. Suspect component of starvation ketosis as well as ketoacidosis as patient reports not eating for several days due to nausea and abdominal pain.      -insulin drip per protocol    -transition to subcutaneous insulin when anion gap closes    -every 4 hour BMP, ketones    -add on magnesium, phosphorus to labs    -potassium replacement    #acute on chronic abdominal pain  #gastroparesis  Patient notes diagnosis of gastroparesis and trying to change diet to help. Abdominal exam is reassuring, no peritonitis. No lab values to warrant urgent imaging at this time. Reviewed abd CT without acute findings from April 2024    -when feeling better, small infrequent meals    -scheduled acetaminophen and toradol    -continue duloxetine 30mg daily for anxiety and atypical pain    -continue famotidine 20mg BID    -continue metoclopramide tab TID before meals    -ok for oxycodone BID prn for severe pain    -methocarbamol PRN, lidocaine patches PRN    -abdominal US in AM    #hyponatremia  Sodium corrected for hyperglycemia was 130 on admission.    -fluid resuscitation, DKA protocol, and repeat Na in BMP    #schizophrenia    -continue PTA olanzapine 10 mg nightly          Diet: NPO for Medical/Clinical Reasons Except for: Meds, Ice Chips  DVT Prophylaxis: Pneumatic Compression Devices  Wright Catheter: Not present  Lines: None     Cardiac Monitoring: ACTIVE order. Indication: ICU  Code Status: Full Code      Clinically Significant Risk  Factors Present on Admission         # Hyponatremia: Lowest Na = 125 mmol/L in last 2 days, will monitor as appropriate  # Hypocalcemia: Lowest Ca = 8.4 mg/dL in last 2 days, will monitor and replace as appropriate    # Anion Gap Metabolic Acidosis: Highest Anion Gap = 25 mmol/L in last 2 days, will monitor and treat as appropriate                   # DMII: A1C = 8.3 % (Ref range: <5.7 %) within past 6 months       # Asthma: noted on problem list        Disposition Plan     Medically Ready for Discharge: Anticipated in 2-4 Days         The patient's care was discussed with the Attending Physician, Dr. Montero, Bedside Nurse, and Patient.    PORTILLO [facility]  Hospitalist Service  [facility]  Securely message with Seen (more info)  Text page via [facility] Paging/Directory     ______________________________________________________________________    Chief Complaint   Abdominal pain     History is obtained from the patient and electronic health record    History of Present Illness   Reji Monahan is a 34 year old male who has history of type 1 diabetes with frequent DKA, chronic abdominal pain, asthma, endocarditis, schizophrenia who comes to the hospital with complaint of abdominal pain and is admitted for DKA.    During interview, he is anxious about pain relief plan and interview was somewhat limited due to that. He stated that this pain was ongoing for about a week and that he hasn't eaten much due to nausea. He is not having diarrhea or vomiting. Last BM he says was yesterday. Labs re: abdominal pain are reassuring - LFTs look good, no lipase elevation, bilirubin is normal. WBC wnl. No UTI. Plan for abdominal ultrasound. Multimodal agents for pain relief.    Regarding DKA, he is admitted to ICU for insulin drip management. ICU MD with tele-consult.      Past Medical History    Past Medical History:   Diagnosis Date    Cannabis abuse     DKA (diabetic ketoacidoses)      Frequent episodes related to non-compliance with insulin    Endocarditis 04/2021    MRSA, thought 2nd to IV drug use    Homeless     Schizophrenia (H)     Sepsis, due to unspecified organism, unspecified whether acute organ dysfunction present (H) 02/06/2021    Type 1 diabetes mellitus (H)     Uncomplicated asthma     Urinary tract infection without hematuria, site unspecified 02/06/2021       Past Surgical History   No past surgical history on file.    Prior to Admission Medications   Prior to Admission Medications   Prescriptions Last Dose Informant Patient Reported? Taking?   B-D U/F 31G X 8 MM insulin pen needle 6/1/2024 at am  Yes Yes   Sig: Inject Subcutaneous 5 times daily    MG capsule 6/1/2024 at am  Yes Yes   Sig: Take 100 mg by mouth 2 times daily   DULoxetine (CYMBALTA) 30 MG capsule 6/1/2024 at am  Yes Yes   Sig: Take 30 mg by mouth daily For 7 days, then 60mg daily   OLANZapine (ZYPREXA) 10 MG tablet 6/1/2024 at hs  Yes Yes   Sig: Take 10 mg by mouth daily   Polyethylene Glycol 400 (VISINE DRY EYE RELIEF) 1 % SOLN Unknown at unkn  Yes Yes   Sig: Apply 1-2 drops to eye every hour as needed (dry eye)   bisacodyl (DULCOLAX) 5 MG EC tablet 6/1/2024 at am  Yes Yes   Sig: Take 5-10 mg by mouth daily as needed for constipation   dorzolamide-timolol (COSOPT) 2-0.5 % ophthalmic solution 6/1/2024 at am  Yes Yes   Sig: Place 1 drop into the right eye 2 times daily   famotidine (PEPCID) 20 MG tablet 6/1/2024 at am Self Yes Yes   Sig: Take 20 mg by mouth 2 times daily   insulin aspart (NOVOLOG PEN) 100 UNIT/ML pen 6/1/2024 at am 4units  Yes Yes   Sig: Inject 8 UNITS subcutaneously with each meal and 4 UNITS with each snack. Skip dose if not eating. Inject 2 UNITS if -200, 4 UNITS if -250, 6 UNITS if -300, 8 UNITS if -350, 10 UNITS if -400, 12 UNITS if -450, 14 UNITS if -500 with each meal. Max daily dose: 40 units   insulin glargine (LANTUS PEN) 100 UNIT/ML pen  6/1/2024 at am  Yes Yes   Sig: Inject 30 Units Subcutaneous every morning   metoclopramide (REGLAN) 10 MG tablet 6/1/2024 at am Self Yes Yes   Sig: Take 10 mg by mouth 3 times daily (before meals)   ondansetron (ZOFRAN ODT) 4 MG ODT tab 6/1/2024 at am  Yes Yes   Sig: Take 1 tablet by mouth 3 times daily   oxyCODONE (ROXICODONE) 5 MG tablet 6/1/2024 at unkn  Yes Yes   Sig: Take 5-10 mg by mouth every 6 hours as needed for moderate to severe pain (abdominal)   risperiDONE (RISPERDAL) 2 MG tablet Unknown at unkn Self Yes Yes   Sig: Take 2 mg by mouth At Bedtime   senna (SENOKOT) 8.6 MG tablet 6/1/2024 at am Self Yes Yes   Sig: Take 2 tablets by mouth 2 times daily      Facility-Administered Medications: None           Physical Exam   Vital Signs: Temp: 98.7  F (37.1  C) Temp src: Oral BP: 119/82 Pulse: 87   Resp: 26 SpO2: 99 % O2 Device: None (Room air)    Weight: 181 lbs 4.8 oz    GENERAL APPEARANCE: alert and no distress   EYES: Eyes grossly normal to inspection. Pupils are equal and round.  HENT: nose and mouth without ulcers or lesions  RESP: lungs clear to auscultation - no rales, rhonchi or wheezes  CV: regular rates and rhythm, normal S1 S2, no S3 or S4, no murmur  ABDOMEN: soft, nontender, without masses  MS: extremities normal- no gross deformities noted  SKIN: no suspicious lesions or rashes. WOUND left foot is documented but not assessed.  NEURO: Normal strength and tone, mentation intact and speech normal  PSYCH: mentation appears normal and affect is somewhat anxious      Medical Decision Making       75 MINUTES SPENT BY ME on the date of service doing chart review, history, exam, documentation & further activities per the note.      Data     I have personally reviewed the following data over the past 24 hrs:    8.8  \   13.4   / 196     125 (L) 92 (L) 17.3 /  200 (H)   3.7 15 (L) 0.71 \     ALT: 29 AST: 28 AP: 124 TBILI: 1.1   ALB: 4.3 TOT PROTEIN: 7.0 LIPASE: 12 (L)     TSH: N/A T4: N/A A1C: 8.3 (H)      Procal: N/A CRP: 4.31 Lactic Acid: 1.5

## 2024-06-04 NOTE — PLAN OF CARE
Goal Outcome Evaluation:    Plan of Care Reviewed With: patient    Overall Patient Progress: no change    Outcome Evaluation: Pt's VSS. Afebrile Tele shows SR. Pt reporting abdominal pain 10/10 most of the night. Also reported nausea. Pt given zofran and compazine with no change to nausea. Pt given toradol, lidocaine patches, and oxydocone with no change in pain level. Pt stated he needed a GI cocktail to swallow ice chips. Dr. Delgado was notified and a GI cocktail and IV pepcid were given. Pt thrashing in bed, yelling, and dry heaving. Pt yelling at this nurse that all he needed was one dose of IV dilaudid and he would feel better. Dr. Delgado was notified and IV ativan was ordered. Pt able to sleep in between cares after IV ativan. Blood sugars have been 90-120s. Insulin gtt currently on algorithm 2 at 1 unit/hr. Phos and K+ replaced this am.

## 2024-06-04 NOTE — PROGRESS NOTES
4 hour shift note: Pt. A&Ox 4, VSS on RA. C/o headache and pain to abdomen, refusing all oral medications offered to help with pain. Using warm packs, reports them helping with the pain. IV compazine given x 1 for nausea, reported effective. Insulin titrated per algorithm; currently alg. 2 @ 5 unit(s)/hr. D5 1/2 NS continues @ 250 mls/hr. Potassium and phosphorus protocols initiated, replaced per protocol and will recheck in AM. Anion gap and ketones improving. Plan for abdominal ultrasound in AM.

## 2024-06-04 NOTE — PROGRESS NOTES
Patient has decided to leave Against Medical Advice even with advice from the provider. Patient was educated on the risks of leaving AMA by both RN and Provider. Patient still decided to leave AMA. Insulin gtt stopped and IV removed. Patient was A&OX4 at time of leaving the facility.

## 2024-06-04 NOTE — DISCHARGE SUMMARY
[facility]  Hospitalist Discharge Summary      Date of Admission:  6/3/2024  Date of Discharge:  6/4/2024  Discharging Provider: Madeleine Goode MD  Discharge Service: Hospitalist Service    Discharge Diagnoses   Active Problems:    Cannabinoid hyperemesis syndrome    Cannabis abuse    Diabetic gastroparesis (H)    Gastroesophageal reflux disease without esophagitis    Paranoid schizophrenia (H)    DKA (diabetic ketoacidosis) (H)    Chronic abdominal pain    Hypokalemia    Hypophosphatemia      Clinically Significant Risk Factors     # DMII: A1C = 8.3 % (Ref range: <5.7 %) within past 6 months       Follow-ups Needed After Discharge       Unresulted Labs Ordered in the Past 30 Days of this Admission       No orders found for last 31 day(s).        These results will be followed up by     Discharge Disposition   Patient leaving AMA   Condition at discharge: Stable    Hospital Course   Reji Monahan is a 34 year old male admitted on 6/3/2024. He has history of type 1 diabetes with frequent DKA, chronic abdominal pain, asthma, endocarditis, schizophrenia who comes to the hospital with complaint of abdominal pain and is admitted for DKA.     diabetic ketoacidosis   - was on insulin drip per protocol and planned to transition to subcutaneous insulin TID with meals and Lantus, but patient refusing to stay and demanding to be unhooked and discharged. Reviewed with patient we need to give him the subcutaneous insulin and feed him iun order to monitor his BG prior to sending him home. He is refusing and states he will eat at home    Hypophos and hypokalemia   - patient refusing to stay to complete replacement protocol and for rechecks  - demending to be unhooked and allowed to leave.   - despite recommendation on staying until completed he requests AMA paperwork      acute on chronic abdominal pain  gastroparesis  - small frequent meals discussed with patient  - he was demanding IV  dilaudid all evening per nursing and refusing orals   - reviewed his oxycodone refill and last was 5/31 and for 2 days would recommend pain team referral by primary  - also reviewed with him + cannabis  and likely contributing to his symptoms, patient states it is not and that he has stopped cannabis  in the past and no change but instructed on cessation.   - US negative and     -continue duloxetine 30mg daily for anxiety and atypical pain after 1 week place to increase to 60mg daily which would be today and can do at home     -continue famotidine 20mg BID    -continue metoclopramide tab TID before meals  - continue home zofran      schizophrenia    -continue PTA olanzapine 10 mg nightly  - risperidone patient stopped on his own     Consultations This Hospital Stay   None    Code Status   Full Code    Time Spent on this Encounter          Madeleine Goode MD  [facility]  [address] [place] [address]  Phone: [phone]  ______________________________________________________________________    Physical Exam   Vital Signs: Temp: 98  F (36.7  C) Temp src: Oral BP: (!) 157/90 Pulse: 90   Resp: 18 SpO2: 99 % O2 Device: None (Room air)    Weight: 181 lbs 4.8 oz         Primary Care Physician   Bogdan Jhaveri MD    Discharge Orders   No discharge procedures on file.    Significant Results and Procedures   Most Recent 3 CBC's:  Recent Labs   Lab Test 06/03/24  1432 09/12/23  0511 09/11/23  1427   WBC 8.8 5.6 9.3   HGB 13.4 11.8* 11.1*   MCV 83 80 79    190 201     Most Recent 3 BMP's:  Recent Labs   Lab Test 06/04/24  0855 06/04/24  0802 06/04/24  0701 06/04/24  0602 06/04/24  0515 06/04/24  0304 06/04/24  0207 06/03/24  2257 06/03/24  2201 06/03/24  1857 06/03/24  1843   NA  --   --   --   --   --   --  130*  --  126*  --  125*   POTASSIUM  --   --   --   --  3.3*  --  3.7  --  3.8  --  3.7   CHLORIDE  --   --   --   --   --   --  100  --  96*  --  92*   CO2  --   --    --   --   --   --  22  --  17*  --  15*   BUN  --   --   --   --   --   --  13.1  --  15.1  --  17.3   CR  --   --   --   --   --   --  0.76  --  0.74  --  0.71   ANIONGAP  --   --   --   --   --   --  8  --  13  --  18*   LYNN  --   --   --   --   --   --  8.2*  --  8.1*  --  8.4*   * 198* 152*   < >  --    < > 124*   < > 218*   < > 199*    < > = values in this interval not displayed.     Most Recent 2 LFT's:  Recent Labs   Lab Test 06/03/24  1432 08/03/23  0822   AST 28 22   ALT 29 24   ALKPHOS 124 116   BILITOTAL 1.1 0.9     Most Recent Hemoglobin A1c:  Recent Labs   Lab Test 06/03/24  1432   A1C 8.3*     Most Recent 6 glucoses:  Recent Labs   Lab Test 06/04/24  0855 06/04/24  0802 06/04/24  0701 06/04/24  0602 06/04/24  0513 06/04/24  0408   * 198* 152* 120* 92 131*     Most Recent Urinalysis:  Recent Labs   Lab Test 06/03/24  1506   COLOR Yellow   APPEARANCE Clear   URINEGLC >499*   URINEBILI Negative   URINEKETONE 80*   SG 1.023   UBLD Negative   URINEPH 5.0   PROTEIN Negative   NITRITE Negative   LEUKEST Trace*   RBCU 1   WBCU 5   ,   Results for orders placed or performed during the hospital encounter of 06/03/24   US abdomen complete    Narrative    US ABDOMEN COMPLETE 6/4/2024 8:27 AM    CLINICAL HISTORY: Abdominal pain.    TECHNIQUE: Complete abdominal ultrasound.    COMPARISON: None.    FINDINGS:    GALLBLADDER: Normal. No gallstones, wall thickening, or  pericholecystic fluid. Negative sonographic Cabrera's sign.    BILE DUCTS: No biliary dilatation. The common duct measures 3 mm.    LIVER: Steatosis. No focal mass or hepatic surface nodularity.    RIGHT KIDNEY: 11.5 cm Normal echogenicity. No hydronephrosis.     LEFT KIDNEY: 11.9 cm Normal echogenicity. No hydronephrosis.     SPLEEN: Normal.    PANCREAS: The visualized portions are normal.    AORTA: Normal in caliber.     IVC: Normal where visualized.    No ascites.      Impression    IMPRESSION:  1.  No evidence for cholelithiasis or  cholecystitis.  2.  Hepatic steatosis.    SONY MONTERROSO MD         SYSTEM ID:  [id number]       Discharge Medications   Discharge Medication List as of 6/4/2024  9:47 AM        CONTINUE these medications which have NOT CHANGED    Details   B-D U/F 31G X 8 MM insulin pen needle Inject Subcutaneous 5 times dailyDAWHistorical      bisacodyl (DULCOLAX) 5 MG EC tablet Take 5-10 mg by mouth daily as needed for constipation, Historical      dorzolamide-timolol (COSOPT) 2-0.5 % ophthalmic solution Place 1 drop into the right eye 2 times daily, Historical       MG capsule Take 100 mg by mouth 2 times daily, ANDREW, Historical      DULoxetine (CYMBALTA) 30 MG capsule Take 30 mg by mouth daily For 7 days, then 60mg daily, Historical      famotidine (PEPCID) 20 MG tablet Take 20 mg by mouth 2 times daily, Historical      insulin aspart (NOVOLOG PEN) 100 UNIT/ML pen Inject 8 UNITS subcutaneously with each meal and 4 UNITS with each snack. Skip dose if not eating. Inject 2 UNITS if -200, 4 UNITS if -250, 6 UNITS if -300, 8 UNITS if -350, 10 UNITS if -400, 12 UNITS if -450, 14 UNITS  if -500 with each meal. Max daily dose: 40 units, Historical      insulin glargine (LANTUS PEN) 100 UNIT/ML pen Inject 30 Units Subcutaneous every morning, Historical      metoclopramide (REGLAN) 10 MG tablet Take 10 mg by mouth 3 times daily (before meals), Historical      OLANZapine (ZYPREXA) 10 MG tablet Take 10 mg by mouth daily, Historical      ondansetron (ZOFRAN ODT) 4 MG ODT tab Take 1 tablet by mouth 3 times daily, Historical      Polyethylene Glycol 400 (VISINE DRY EYE RELIEF) 1 % SOLN Apply 1-2 drops to eye every hour as needed (dry eye), Historical      risperiDONE (RISPERDAL) 2 MG tablet Take 2 mg by mouth At Bedtime, Historical      senna (SENOKOT) 8.6 MG tablet Take 2 tablets by mouth 2 times daily, Historical           STOP taking these medications       oxyCODONE (ROXICODONE) 5 MG  tablet Comments:   Reason for Stopping:             Allergies   Allergies   Allergen Reactions    Bees Anaphylaxis

## 2024-06-04 NOTE — UTILIZATION REVIEW
Admission Status; Secondary Review Determination       Under the authority of the Utilization Management Committee, the utilization review process indicated a secondary review on the above patient. The review outcome is based on review of the medical records, discussions with staff, and applying clinical experience noted on the date of the review.     (x) Inpatient Status Appropriate - This patient's medical care is consistent with medical management for inpatient care and reasonable inpatient medical practice.     RATIONALE FOR DETERMINATION   34-year-old male with a history of type 1 diabetes, chronic abdominal pain, asthma, endocarditis, and schizophrenia was admitted with complaints of abdominal pain and diagnosed with diabetic ketoacidosis (DKA). Despite the recommended treatment protocol, the patient is refusing to stay for subcutaneous insulin administration and hypophosphatemia and hypokalemia replacement. He is demanding discharge against medical advice (AMA). He also demands IV dilaudid for his gastroparesis-related pain and refuses oral medication. The patient has also been recommended to stop cannabis use, which may contribute to his symptoms. His current medications include duloxetine for anxiety and atypical pain, famotidine, metoclopramide before meals, zofran, and olanzapine for schizophrenia.      The need for inpatient admission is critical due to the patient's unstable condition with uncontrolled DKA, a high-risk condition which can lead to severe complications like cerebral edema, acute kidney injury, or even death if not properly managed in a hospital setting. The patient's refusal to stay for essential treatment and his desire for discharge against medical advice significantly increase his risk of adverse outcomes.  The expected length of stay at the time of admission was more than 2 nights because of the severity of illness, intensity of service provided, and risk for adverse outcome.  Inpatient admission is appropriate.         This document was produced using voice recognition software       The information on this document is developed by the utilization review team in order for the business office to ensure compliance. This only denotes the appropriateness of proper admission status and does not reflect the quality of care rendered.   The definitions of Inpatient Status and Observation Status used in making the determination above are those provided in the CMS Coverage Manual, Chapter 1 and Chapter 6, section 70.4.   Sincerely,   KAYLAH WILLIAMSON MD   System Medical Director   Utilization Management   [facility].

## 2024-07-02 ENCOUNTER — HOSPITAL ENCOUNTER (INPATIENT)
Facility: CLINIC | Age: 34
LOS: 1 days | Discharge: LEFT BEFORE DISCHARGE | DRG: 638 | End: 2024-07-03
Attending: STUDENT IN AN ORGANIZED HEALTH CARE EDUCATION/TRAINING PROGRAM | Admitting: FAMILY MEDICINE
Payer: MEDICARE

## 2024-07-02 DIAGNOSIS — G89.29 CHRONIC ABDOMINAL PAIN: ICD-10-CM

## 2024-07-02 DIAGNOSIS — E10.10 DIABETIC KETOACIDOSIS WITHOUT COMA ASSOCIATED WITH TYPE 1 DIABETES MELLITUS (H): ICD-10-CM

## 2024-07-02 DIAGNOSIS — R79.89 ELEVATED LACTIC ACID LEVEL: ICD-10-CM

## 2024-07-02 DIAGNOSIS — R10.9 CHRONIC ABDOMINAL PAIN: ICD-10-CM

## 2024-07-02 PROBLEM — Z87.898 HISTORY OF INTRAVENOUS DRUG USE IN REMISSION: Status: ACTIVE | Noted: 2024-07-02

## 2024-07-02 PROBLEM — E11.65 UNCONTROLLED DIABETES MELLITUS WITH HYPERGLYCEMIA (H): Status: ACTIVE | Noted: 2023-09-11

## 2024-07-02 PROBLEM — Z86.79 HISTORY OF ENDOCARDITIS: Status: ACTIVE | Noted: 2024-07-02

## 2024-07-02 PROBLEM — R11.2 NAUSEA WITH VOMITING: Status: ACTIVE | Noted: 2021-09-17

## 2024-07-02 LAB
ALBUMIN SERPL BCG-MCNC: 4.5 G/DL (ref 3.5–5.2)
ALBUMIN UR-MCNC: NEGATIVE MG/DL
ALP SERPL-CCNC: 133 U/L (ref 40–150)
ALT SERPL W P-5'-P-CCNC: 23 U/L (ref 0–70)
ANION GAP SERPL CALCULATED.3IONS-SCNC: 14 MMOL/L (ref 7–15)
ANION GAP SERPL CALCULATED.3IONS-SCNC: 30 MMOL/L (ref 7–15)
ANION GAP SERPL CALCULATED.3IONS-SCNC: 9 MMOL/L (ref 7–15)
ANION GAP SERPL CALCULATED.3IONS-SCNC: 9 MMOL/L (ref 7–15)
APPEARANCE UR: CLEAR
AST SERPL W P-5'-P-CCNC: 20 U/L (ref 0–45)
B-OH-BUTYR SERPL-SCNC: 0.3 MMOL/L
B-OH-BUTYR SERPL-SCNC: 1.3 MMOL/L
B-OH-BUTYR SERPL-SCNC: 4.5 MMOL/L
B-OH-BUTYR SERPL-SCNC: <0.18 MMOL/L
BASE EXCESS BLDV CALC-SCNC: -1.4 MMOL/L (ref -3–3)
BASE EXCESS BLDV CALC-SCNC: -9.7 MMOL/L (ref -3–3)
BASE EXCESS BLDV CALC-SCNC: 0.7 MMOL/L (ref -3–3)
BASE EXCESS BLDV CALC-SCNC: 1.1 MMOL/L (ref -3–3)
BASOPHILS # BLD AUTO: 0 10E3/UL (ref 0–0.2)
BASOPHILS NFR BLD AUTO: 0 %
BILIRUB SERPL-MCNC: 1 MG/DL
BILIRUB UR QL STRIP: NEGATIVE
BUN SERPL-MCNC: 18.8 MG/DL (ref 6–20)
BUN SERPL-MCNC: 19.3 MG/DL (ref 6–20)
BUN SERPL-MCNC: 19.6 MG/DL (ref 6–20)
BUN SERPL-MCNC: 20.3 MG/DL (ref 6–20)
CALCIUM SERPL-MCNC: 8.2 MG/DL (ref 8.6–10)
CALCIUM SERPL-MCNC: 8.4 MG/DL (ref 8.6–10)
CALCIUM SERPL-MCNC: 8.6 MG/DL (ref 8.6–10)
CALCIUM SERPL-MCNC: 9.8 MG/DL (ref 8.6–10)
CHLORIDE SERPL-SCNC: 102 MMOL/L (ref 98–107)
CHLORIDE SERPL-SCNC: 103 MMOL/L (ref 98–107)
CHLORIDE SERPL-SCNC: 104 MMOL/L (ref 98–107)
CHLORIDE SERPL-SCNC: 93 MMOL/L (ref 98–107)
COLOR UR AUTO: YELLOW
CREAT SERPL-MCNC: 0.8 MG/DL (ref 0.67–1.17)
CREAT SERPL-MCNC: 0.83 MG/DL (ref 0.67–1.17)
CREAT SERPL-MCNC: 0.87 MG/DL (ref 0.67–1.17)
CREAT SERPL-MCNC: 0.91 MG/DL (ref 0.67–1.17)
DEPRECATED HCO3 PLAS-SCNC: 13 MMOL/L (ref 22–29)
DEPRECATED HCO3 PLAS-SCNC: 22 MMOL/L (ref 22–29)
DEPRECATED HCO3 PLAS-SCNC: 23 MMOL/L (ref 22–29)
DEPRECATED HCO3 PLAS-SCNC: 23 MMOL/L (ref 22–29)
EGFRCR SERPLBLD CKD-EPI 2021: >90 ML/MIN/1.73M2
EOSINOPHIL # BLD AUTO: 0 10E3/UL (ref 0–0.7)
EOSINOPHIL NFR BLD AUTO: 0 %
ERYTHROCYTE [DISTWIDTH] IN BLOOD BY AUTOMATED COUNT: 14.2 % (ref 10–15)
GLUCOSE BLDC GLUCOMTR-MCNC: 100 MG/DL (ref 70–99)
GLUCOSE BLDC GLUCOMTR-MCNC: 101 MG/DL (ref 70–99)
GLUCOSE BLDC GLUCOMTR-MCNC: 102 MG/DL (ref 70–99)
GLUCOSE BLDC GLUCOMTR-MCNC: 119 MG/DL (ref 70–99)
GLUCOSE BLDC GLUCOMTR-MCNC: 127 MG/DL (ref 70–99)
GLUCOSE BLDC GLUCOMTR-MCNC: 138 MG/DL (ref 70–99)
GLUCOSE BLDC GLUCOMTR-MCNC: 141 MG/DL (ref 70–99)
GLUCOSE BLDC GLUCOMTR-MCNC: 157 MG/DL (ref 70–99)
GLUCOSE BLDC GLUCOMTR-MCNC: 207 MG/DL (ref 70–99)
GLUCOSE BLDC GLUCOMTR-MCNC: 215 MG/DL (ref 70–99)
GLUCOSE BLDC GLUCOMTR-MCNC: 322 MG/DL (ref 70–99)
GLUCOSE BLDC GLUCOMTR-MCNC: 338 MG/DL (ref 70–99)
GLUCOSE BLDC GLUCOMTR-MCNC: 353 MG/DL (ref 70–99)
GLUCOSE BLDC GLUCOMTR-MCNC: 379 MG/DL (ref 70–99)
GLUCOSE BLDC GLUCOMTR-MCNC: 453 MG/DL (ref 70–99)
GLUCOSE BLDC GLUCOMTR-MCNC: 480 MG/DL (ref 70–99)
GLUCOSE SERPL-MCNC: 115 MG/DL (ref 70–99)
GLUCOSE SERPL-MCNC: 223 MG/DL (ref 70–99)
GLUCOSE SERPL-MCNC: 505 MG/DL (ref 70–99)
GLUCOSE SERPL-MCNC: 96 MG/DL (ref 70–99)
GLUCOSE UR STRIP-MCNC: >499 MG/DL
HBA1C MFR BLD: 8.4 %
HCO3 BLDV-SCNC: 15 MMOL/L (ref 21–28)
HCO3 BLDV-SCNC: 24 MMOL/L (ref 21–28)
HCO3 BLDV-SCNC: 26 MMOL/L (ref 21–28)
HCO3 BLDV-SCNC: 26 MMOL/L (ref 21–28)
HCT VFR BLD AUTO: 39.7 % (ref 40–53)
HGB BLD-MCNC: 13.2 G/DL (ref 13.3–17.7)
HGB UR QL STRIP: NEGATIVE
HOLD SPECIMEN: NORMAL
IMM GRANULOCYTES # BLD: 0.1 10E3/UL
IMM GRANULOCYTES NFR BLD: 1 %
KETONES UR STRIP-MCNC: 80 MG/DL
LACTATE SERPL-SCNC: 2.8 MMOL/L (ref 0.7–2)
LACTATE SERPL-SCNC: 7.1 MMOL/L (ref 0.7–2)
LEUKOCYTE ESTERASE UR QL STRIP: NEGATIVE
LIPASE SERPL-CCNC: 13 U/L (ref 13–60)
LYMPHOCYTES # BLD AUTO: 0.9 10E3/UL (ref 0.8–5.3)
LYMPHOCYTES NFR BLD AUTO: 7 %
MAGNESIUM SERPL-MCNC: 2.2 MG/DL (ref 1.7–2.3)
MCH RBC QN AUTO: 27.2 PG (ref 26.5–33)
MCHC RBC AUTO-ENTMCNC: 33.2 G/DL (ref 31.5–36.5)
MCV RBC AUTO: 82 FL (ref 78–100)
MONOCYTES # BLD AUTO: 0.7 10E3/UL (ref 0–1.3)
MONOCYTES NFR BLD AUTO: 5 %
MRSA DNA SPEC QL NAA+PROBE: NEGATIVE
MUCOUS THREADS #/AREA URNS LPF: PRESENT /LPF
NEUTROPHILS # BLD AUTO: 11.5 10E3/UL (ref 1.6–8.3)
NEUTROPHILS NFR BLD AUTO: 88 %
NITRATE UR QL: NEGATIVE
NRBC # BLD AUTO: 0 10E3/UL
NRBC BLD AUTO-RTO: 0 /100
O2/TOTAL GAS SETTING VFR VENT: 21 %
OSMOLALITY SERPL: 317 MMOL/KG (ref 275–295)
OXYHGB MFR BLDV: 52 % (ref 70–75)
OXYHGB MFR BLDV: 87 % (ref 70–75)
OXYHGB MFR BLDV: 91 % (ref 70–75)
OXYHGB MFR BLDV: 92 % (ref 70–75)
PCO2 BLDV: 27 MM HG (ref 40–50)
PCO2 BLDV: 41 MM HG (ref 40–50)
PCO2 BLDV: 41 MM HG (ref 40–50)
PCO2 BLDV: 44 MM HG (ref 40–50)
PH BLDV: 7.34 [PH] (ref 7.32–7.43)
PH BLDV: 7.35 [PH] (ref 7.32–7.43)
PH BLDV: 7.41 [PH] (ref 7.32–7.43)
PH BLDV: 7.41 [PH] (ref 7.32–7.43)
PH UR STRIP: 5 [PH] (ref 5–7)
PHOSPHATE SERPL-MCNC: 2.5 MG/DL (ref 2.5–4.5)
PHOSPHATE SERPL-MCNC: 2.5 MG/DL (ref 2.5–4.5)
PHOSPHATE SERPL-MCNC: 2.9 MG/DL (ref 2.5–4.5)
PHOSPHATE SERPL-MCNC: 4.7 MG/DL (ref 2.5–4.5)
PLATELET # BLD AUTO: 238 10E3/UL (ref 150–450)
PO2 BLDV: 27 MM HG (ref 25–47)
PO2 BLDV: 51 MM HG (ref 25–47)
PO2 BLDV: 61 MM HG (ref 25–47)
PO2 BLDV: 71 MM HG (ref 25–47)
POTASSIUM SERPL-SCNC: 4 MMOL/L (ref 3.4–5.3)
POTASSIUM SERPL-SCNC: 4.1 MMOL/L (ref 3.4–5.3)
POTASSIUM SERPL-SCNC: 4.3 MMOL/L (ref 3.4–5.3)
POTASSIUM SERPL-SCNC: 4.4 MMOL/L (ref 3.4–5.3)
PROT SERPL-MCNC: 7.5 G/DL (ref 6.4–8.3)
RBC # BLD AUTO: 4.85 10E6/UL (ref 4.4–5.9)
RBC URINE: 1 /HPF
SA TARGET DNA: NEGATIVE
SAO2 % BLDV: 52.6 % (ref 70–75)
SAO2 % BLDV: 87.4 % (ref 70–75)
SAO2 % BLDV: 92.5 % (ref 70–75)
SAO2 % BLDV: 93.3 % (ref 70–75)
SODIUM SERPL-SCNC: 135 MMOL/L (ref 135–145)
SODIUM SERPL-SCNC: 136 MMOL/L (ref 135–145)
SODIUM SERPL-SCNC: 136 MMOL/L (ref 135–145)
SODIUM SERPL-SCNC: 138 MMOL/L (ref 135–145)
SP GR UR STRIP: 1.02 (ref 1–1.03)
UROBILINOGEN UR STRIP-MCNC: NORMAL MG/DL
WBC # BLD AUTO: 13.1 10E3/UL (ref 4–11)
WBC URINE: 4 /HPF

## 2024-07-02 PROCEDURE — 81001 URINALYSIS AUTO W/SCOPE: CPT | Performed by: STUDENT IN AN ORGANIZED HEALTH CARE EDUCATION/TRAINING PROGRAM

## 2024-07-02 PROCEDURE — 83735 ASSAY OF MAGNESIUM: CPT | Performed by: FAMILY MEDICINE

## 2024-07-02 PROCEDURE — 83036 HEMOGLOBIN GLYCOSYLATED A1C: CPT | Performed by: STUDENT IN AN ORGANIZED HEALTH CARE EDUCATION/TRAINING PROGRAM

## 2024-07-02 PROCEDURE — 83930 ASSAY OF BLOOD OSMOLALITY: CPT | Performed by: FAMILY MEDICINE

## 2024-07-02 PROCEDURE — 250N000013 HC RX MED GY IP 250 OP 250 PS 637: Performed by: FAMILY MEDICINE

## 2024-07-02 PROCEDURE — 36415 COLL VENOUS BLD VENIPUNCTURE: CPT | Performed by: FAMILY MEDICINE

## 2024-07-02 PROCEDURE — 99291 CRITICAL CARE FIRST HOUR: CPT | Mod: 25

## 2024-07-02 PROCEDURE — 96365 THER/PROPH/DIAG IV INF INIT: CPT

## 2024-07-02 PROCEDURE — 82805 BLOOD GASES W/O2 SATURATION: CPT | Performed by: FAMILY MEDICINE

## 2024-07-02 PROCEDURE — 99291 CRITICAL CARE FIRST HOUR: CPT | Mod: 25 | Performed by: STUDENT IN AN ORGANIZED HEALTH CARE EDUCATION/TRAINING PROGRAM

## 2024-07-02 PROCEDURE — 82010 KETONE BODYS QUAN: CPT | Performed by: FAMILY MEDICINE

## 2024-07-02 PROCEDURE — 250N000011 HC RX IP 250 OP 636: Performed by: STUDENT IN AN ORGANIZED HEALTH CARE EDUCATION/TRAINING PROGRAM

## 2024-07-02 PROCEDURE — 84100 ASSAY OF PHOSPHORUS: CPT | Performed by: FAMILY MEDICINE

## 2024-07-02 PROCEDURE — 82962 GLUCOSE BLOOD TEST: CPT

## 2024-07-02 PROCEDURE — 36415 COLL VENOUS BLD VENIPUNCTURE: CPT | Performed by: STUDENT IN AN ORGANIZED HEALTH CARE EDUCATION/TRAINING PROGRAM

## 2024-07-02 PROCEDURE — 200N000001 HC R&B ICU

## 2024-07-02 PROCEDURE — 96367 TX/PROPH/DG ADDL SEQ IV INF: CPT

## 2024-07-02 PROCEDURE — 83605 ASSAY OF LACTIC ACID: CPT | Performed by: STUDENT IN AN ORGANIZED HEALTH CARE EDUCATION/TRAINING PROGRAM

## 2024-07-02 PROCEDURE — 87641 MR-STAPH DNA AMP PROBE: CPT | Performed by: FAMILY MEDICINE

## 2024-07-02 PROCEDURE — 99222 1ST HOSP IP/OBS MODERATE 55: CPT | Mod: AI | Performed by: FAMILY MEDICINE

## 2024-07-02 PROCEDURE — 82374 ASSAY BLOOD CARBON DIOXIDE: CPT | Performed by: FAMILY MEDICINE

## 2024-07-02 PROCEDURE — 258N000003 HC RX IP 258 OP 636: Performed by: STUDENT IN AN ORGANIZED HEALTH CARE EDUCATION/TRAINING PROGRAM

## 2024-07-02 PROCEDURE — 96361 HYDRATE IV INFUSION ADD-ON: CPT

## 2024-07-02 PROCEDURE — 82010 KETONE BODYS QUAN: CPT | Performed by: STUDENT IN AN ORGANIZED HEALTH CARE EDUCATION/TRAINING PROGRAM

## 2024-07-02 PROCEDURE — 82805 BLOOD GASES W/O2 SATURATION: CPT | Performed by: STUDENT IN AN ORGANIZED HEALTH CARE EDUCATION/TRAINING PROGRAM

## 2024-07-02 PROCEDURE — 80053 COMPREHEN METABOLIC PANEL: CPT | Performed by: STUDENT IN AN ORGANIZED HEALTH CARE EDUCATION/TRAINING PROGRAM

## 2024-07-02 PROCEDURE — 83690 ASSAY OF LIPASE: CPT | Performed by: STUDENT IN AN ORGANIZED HEALTH CARE EDUCATION/TRAINING PROGRAM

## 2024-07-02 PROCEDURE — 250N000011 HC RX IP 250 OP 636: Performed by: FAMILY MEDICINE

## 2024-07-02 PROCEDURE — 96375 TX/PRO/DX INJ NEW DRUG ADDON: CPT

## 2024-07-02 PROCEDURE — 258N000003 HC RX IP 258 OP 636: Performed by: FAMILY MEDICINE

## 2024-07-02 PROCEDURE — 85025 COMPLETE CBC W/AUTO DIFF WBC: CPT | Performed by: STUDENT IN AN ORGANIZED HEALTH CARE EDUCATION/TRAINING PROGRAM

## 2024-07-02 PROCEDURE — 250N000009 HC RX 250: Performed by: STUDENT IN AN ORGANIZED HEALTH CARE EDUCATION/TRAINING PROGRAM

## 2024-07-02 PROCEDURE — 87640 STAPH A DNA AMP PROBE: CPT | Performed by: FAMILY MEDICINE

## 2024-07-02 PROCEDURE — 87040 BLOOD CULTURE FOR BACTERIA: CPT | Performed by: STUDENT IN AN ORGANIZED HEALTH CARE EDUCATION/TRAINING PROGRAM

## 2024-07-02 RX ORDER — HALOPERIDOL 5 MG/ML
5 INJECTION INTRAMUSCULAR EVERY 6 HOURS PRN
Status: DISCONTINUED | OUTPATIENT
Start: 2024-07-02 | End: 2024-07-03 | Stop reason: HOSPADM

## 2024-07-02 RX ORDER — NICOTINE POLACRILEX 4 MG
15-30 LOZENGE BUCCAL
Status: DISCONTINUED | OUTPATIENT
Start: 2024-07-02 | End: 2024-07-03 | Stop reason: HOSPADM

## 2024-07-02 RX ORDER — ONDANSETRON 2 MG/ML
4 INJECTION INTRAMUSCULAR; INTRAVENOUS EVERY 6 HOURS PRN
Status: DISCONTINUED | OUTPATIENT
Start: 2024-07-02 | End: 2024-07-03 | Stop reason: HOSPADM

## 2024-07-02 RX ORDER — OXYCODONE HYDROCHLORIDE 5 MG/1
5 TABLET ORAL EVERY 6 HOURS PRN
COMMUNITY
Start: 2024-07-01

## 2024-07-02 RX ORDER — DEXTROSE MONOHYDRATE, SODIUM CHLORIDE, AND POTASSIUM CHLORIDE 50; 1.49; 4.5 G/1000ML; G/1000ML; G/1000ML
INJECTION, SOLUTION INTRAVENOUS CONTINUOUS
Status: DISCONTINUED | OUTPATIENT
Start: 2024-07-02 | End: 2024-07-03 | Stop reason: HOSPADM

## 2024-07-02 RX ORDER — ERYTHROMYCIN ETHYLSUCCINATE 200 MG/5ML
100 SUSPENSION ORAL
COMMUNITY
Start: 2024-07-01 | End: 2024-07-21

## 2024-07-02 RX ORDER — DEXTROSE MONOHYDRATE 25 G/50ML
25-50 INJECTION, SOLUTION INTRAVENOUS
Status: DISCONTINUED | OUTPATIENT
Start: 2024-07-02 | End: 2024-07-02

## 2024-07-02 RX ORDER — NALOXONE HYDROCHLORIDE 0.4 MG/ML
0.4 INJECTION, SOLUTION INTRAMUSCULAR; INTRAVENOUS; SUBCUTANEOUS
Status: DISCONTINUED | OUTPATIENT
Start: 2024-07-02 | End: 2024-07-03 | Stop reason: HOSPADM

## 2024-07-02 RX ORDER — OXYCODONE HYDROCHLORIDE 5 MG/1
5 TABLET ORAL EVERY 6 HOURS PRN
Status: DISCONTINUED | OUTPATIENT
Start: 2024-07-02 | End: 2024-07-03 | Stop reason: HOSPADM

## 2024-07-02 RX ORDER — DULOXETIN HYDROCHLORIDE 30 MG/1
60 CAPSULE, DELAYED RELEASE ORAL DAILY
Status: DISCONTINUED | OUTPATIENT
Start: 2024-07-02 | End: 2024-07-03 | Stop reason: HOSPADM

## 2024-07-02 RX ORDER — ONDANSETRON 4 MG/1
4 TABLET, FILM COATED ORAL EVERY 8 HOURS PRN
COMMUNITY
Start: 2024-07-01

## 2024-07-02 RX ORDER — LORAZEPAM 0.5 MG/1
0.5 TABLET ORAL DAILY PRN
COMMUNITY
Start: 2024-06-05 | End: 2024-07-17

## 2024-07-02 RX ORDER — HALOPERIDOL 5 MG/ML
5 INJECTION INTRAMUSCULAR ONCE
Status: COMPLETED | OUTPATIENT
Start: 2024-07-02 | End: 2024-07-02

## 2024-07-02 RX ORDER — SODIUM CHLORIDE 9 MG/ML
INJECTION, SOLUTION INTRAVENOUS CONTINUOUS
Status: DISCONTINUED | OUTPATIENT
Start: 2024-07-02 | End: 2024-07-03 | Stop reason: HOSPADM

## 2024-07-02 RX ORDER — CAPSAICIN 0.75 MG/G
CREAM TOPICAL 3 TIMES DAILY PRN
Status: DISCONTINUED | OUTPATIENT
Start: 2024-07-02 | End: 2024-07-03 | Stop reason: HOSPADM

## 2024-07-02 RX ORDER — BISACODYL 5 MG
5 TABLET, DELAYED RELEASE (ENTERIC COATED) ORAL DAILY PRN
Status: DISCONTINUED | OUTPATIENT
Start: 2024-07-02 | End: 2024-07-03 | Stop reason: HOSPADM

## 2024-07-02 RX ORDER — DOCUSATE SODIUM 100 MG/1
100 CAPSULE, LIQUID FILLED ORAL 2 TIMES DAILY
Status: DISCONTINUED | OUTPATIENT
Start: 2024-07-02 | End: 2024-07-03 | Stop reason: HOSPADM

## 2024-07-02 RX ORDER — PROCHLORPERAZINE 25 MG
25 SUPPOSITORY, RECTAL RECTAL EVERY 12 HOURS PRN
Status: DISCONTINUED | OUTPATIENT
Start: 2024-07-02 | End: 2024-07-03 | Stop reason: HOSPADM

## 2024-07-02 RX ORDER — DORZOLAMIDE HYDROCHLORIDE AND TIMOLOL MALEATE 20; 5 MG/ML; MG/ML
1 SOLUTION/ DROPS OPHTHALMIC 2 TIMES DAILY
Status: DISCONTINUED | OUTPATIENT
Start: 2024-07-03 | End: 2024-07-03 | Stop reason: HOSPADM

## 2024-07-02 RX ORDER — ENOXAPARIN SODIUM 100 MG/ML
40 INJECTION SUBCUTANEOUS EVERY 24 HOURS
Status: DISCONTINUED | OUTPATIENT
Start: 2024-07-02 | End: 2024-07-03 | Stop reason: HOSPADM

## 2024-07-02 RX ORDER — NALOXONE HYDROCHLORIDE 0.4 MG/ML
0.2 INJECTION, SOLUTION INTRAMUSCULAR; INTRAVENOUS; SUBCUTANEOUS
Status: DISCONTINUED | OUTPATIENT
Start: 2024-07-02 | End: 2024-07-03 | Stop reason: HOSPADM

## 2024-07-02 RX ORDER — OLANZAPINE 2.5 MG/1
10 TABLET, FILM COATED ORAL DAILY
Status: DISCONTINUED | OUTPATIENT
Start: 2024-07-02 | End: 2024-07-03 | Stop reason: HOSPADM

## 2024-07-02 RX ORDER — DEXTROSE MONOHYDRATE 25 G/50ML
25-50 INJECTION, SOLUTION INTRAVENOUS
Status: DISCONTINUED | OUTPATIENT
Start: 2024-07-02 | End: 2024-07-03 | Stop reason: HOSPADM

## 2024-07-02 RX ORDER — LORAZEPAM 0.5 MG/1
0.5 TABLET ORAL DAILY PRN
Status: DISCONTINUED | OUTPATIENT
Start: 2024-07-02 | End: 2024-07-03 | Stop reason: HOSPADM

## 2024-07-02 RX ORDER — METOCLOPRAMIDE 5 MG/1
10 TABLET ORAL
Status: DISCONTINUED | OUTPATIENT
Start: 2024-07-02 | End: 2024-07-03 | Stop reason: HOSPADM

## 2024-07-02 RX ORDER — GABAPENTIN 300 MG/1
300 CAPSULE ORAL 3 TIMES DAILY
COMMUNITY
Start: 2024-06-08

## 2024-07-02 RX ORDER — DORZOLAMIDE HYDROCHLORIDE AND TIMOLOL MALEATE 20; 5 MG/ML; MG/ML
1 SOLUTION/ DROPS OPHTHALMIC 2 TIMES DAILY
Status: DISCONTINUED | OUTPATIENT
Start: 2024-07-02 | End: 2024-07-02

## 2024-07-02 RX ORDER — ERYTHROMYCIN ETHYLSUCCINATE 200 MG/5ML
100 SUSPENSION ORAL
Status: DISCONTINUED | OUTPATIENT
Start: 2024-07-02 | End: 2024-07-03 | Stop reason: HOSPADM

## 2024-07-02 RX ORDER — SENNOSIDES 8.6 MG
2 TABLET ORAL 2 TIMES DAILY
Status: DISCONTINUED | OUTPATIENT
Start: 2024-07-02 | End: 2024-07-03 | Stop reason: HOSPADM

## 2024-07-02 RX ORDER — ONDANSETRON 4 MG/1
4 TABLET, ORALLY DISINTEGRATING ORAL EVERY 6 HOURS PRN
Status: DISCONTINUED | OUTPATIENT
Start: 2024-07-02 | End: 2024-07-03 | Stop reason: HOSPADM

## 2024-07-02 RX ORDER — FAMOTIDINE 20 MG/1
20 TABLET, FILM COATED ORAL 2 TIMES DAILY
Status: DISCONTINUED | OUTPATIENT
Start: 2024-07-02 | End: 2024-07-03 | Stop reason: HOSPADM

## 2024-07-02 RX ORDER — POTASSIUM CHLORIDE 7.45 MG/ML
10 INJECTION INTRAVENOUS ONCE
Status: COMPLETED | OUTPATIENT
Start: 2024-07-02 | End: 2024-07-02

## 2024-07-02 RX ORDER — SODIUM CHLORIDE AND POTASSIUM CHLORIDE 150; 450 MG/100ML; MG/100ML
INJECTION, SOLUTION INTRAVENOUS CONTINUOUS
Status: DISCONTINUED | OUTPATIENT
Start: 2024-07-02 | End: 2024-07-02

## 2024-07-02 RX ORDER — GABAPENTIN 300 MG/1
300 CAPSULE ORAL 3 TIMES DAILY
Status: DISCONTINUED | OUTPATIENT
Start: 2024-07-02 | End: 2024-07-03 | Stop reason: HOSPADM

## 2024-07-02 RX ORDER — PROCHLORPERAZINE MALEATE 5 MG
10 TABLET ORAL EVERY 6 HOURS PRN
Status: DISCONTINUED | OUTPATIENT
Start: 2024-07-02 | End: 2024-07-03 | Stop reason: HOSPADM

## 2024-07-02 RX ADMIN — SODIUM CHLORIDE 1000 ML: 9 INJECTION, SOLUTION INTRAVENOUS at 08:31

## 2024-07-02 RX ADMIN — POTASSIUM CHLORIDE, DEXTROSE MONOHYDRATE AND SODIUM CHLORIDE: 150; 5; 450 INJECTION, SOLUTION INTRAVENOUS at 15:11

## 2024-07-02 RX ADMIN — ONDANSETRON 4 MG: 4 TABLET, ORALLY DISINTEGRATING ORAL at 22:11

## 2024-07-02 RX ADMIN — LORAZEPAM 0.5 MG: 0.5 TABLET ORAL at 12:35

## 2024-07-02 RX ADMIN — SODIUM CHLORIDE: 9 INJECTION, SOLUTION INTRAVENOUS at 09:10

## 2024-07-02 RX ADMIN — POTASSIUM CHLORIDE 10 MEQ: 7.46 INJECTION, SOLUTION INTRAVENOUS at 09:11

## 2024-07-02 RX ADMIN — POTASSIUM CHLORIDE, DEXTROSE MONOHYDRATE AND SODIUM CHLORIDE: 150; 5; 450 INJECTION, SOLUTION INTRAVENOUS at 22:11

## 2024-07-02 RX ADMIN — GABAPENTIN 300 MG: 300 CAPSULE ORAL at 20:29

## 2024-07-02 RX ADMIN — GABAPENTIN 300 MG: 300 CAPSULE ORAL at 14:12

## 2024-07-02 RX ADMIN — SENNOSIDES 2 TABLET: 8.6 TABLET, FILM COATED ORAL at 20:29

## 2024-07-02 RX ADMIN — SODIUM CHLORIDE 1000 ML: 9 INJECTION, SOLUTION INTRAVENOUS at 08:08

## 2024-07-02 RX ADMIN — HALOPERIDOL LACTATE 5 MG: 5 INJECTION, SOLUTION INTRAMUSCULAR at 08:08

## 2024-07-02 RX ADMIN — INSULIN HUMAN 5.5 UNITS/HR: 1 INJECTION, SOLUTION INTRAVENOUS at 09:15

## 2024-07-02 RX ADMIN — DOCUSATE SODIUM 100 MG: 100 CAPSULE, LIQUID FILLED ORAL at 20:29

## 2024-07-02 RX ADMIN — FAMOTIDINE 20 MG: 20 TABLET, FILM COATED ORAL at 20:29

## 2024-07-02 RX ADMIN — FAMOTIDINE 20 MG: 20 TABLET, FILM COATED ORAL at 14:12

## 2024-07-02 RX ADMIN — POTASSIUM CHLORIDE AND SODIUM CHLORIDE: 450; 150 INJECTION, SOLUTION INTRAVENOUS at 12:45

## 2024-07-02 ASSESSMENT — ACTIVITIES OF DAILY LIVING (ADL)
ADLS_ACUITY_SCORE: 25
DIFFICULTY_COMMUNICATING: NO
ADLS_ACUITY_SCORE: 27
ADLS_ACUITY_SCORE: 38
ADLS_ACUITY_SCORE: 38
WALKING_OR_CLIMBING_STAIRS_DIFFICULTY: NO
CONCENTRATING,_REMEMBERING_OR_MAKING_DECISIONS_DIFFICULTY: NO
EQUIPMENT_CURRENTLY_USED_AT_HOME: CANE, STRAIGHT
WEAR_GLASSES_OR_BLIND: YES
CHANGE_IN_FUNCTIONAL_STATUS_SINCE_ONSET_OF_CURRENT_ILLNESS/INJURY: NO
ADLS_ACUITY_SCORE: 25
HEARING_DIFFICULTY_OR_DEAF: NO
ADLS_ACUITY_SCORE: 27
DIFFICULTY_EATING/SWALLOWING: NO
ADLS_ACUITY_SCORE: 38
ADLS_ACUITY_SCORE: 25
ADLS_ACUITY_SCORE: 25
DOING_ERRANDS_INDEPENDENTLY_DIFFICULTY: YES
NUMBER_OF_TIMES_PATIENT_HAS_FALLEN_WITHIN_LAST_SIX_MONTHS: 9
DRESSING/BATHING_DIFFICULTY: NO
ADLS_ACUITY_SCORE: 25
ADLS_ACUITY_SCORE: 25
ADLS_ACUITY_SCORE: 38
ADLS_ACUITY_SCORE: 25
FALL_HISTORY_WITHIN_LAST_SIX_MONTHS: YES
TOILETING_ISSUES: NO
ADLS_ACUITY_SCORE: 38
ADLS_ACUITY_SCORE: 25
ADLS_ACUITY_SCORE: 25

## 2024-07-02 ASSESSMENT — COLUMBIA-SUICIDE SEVERITY RATING SCALE - C-SSRS
6. HAVE YOU EVER DONE ANYTHING, STARTED TO DO ANYTHING, OR PREPARED TO DO ANYTHING TO END YOUR LIFE?: NO
2. HAVE YOU ACTUALLY HAD ANY THOUGHTS OF KILLING YOURSELF IN THE PAST MONTH?: NO
1. IN THE PAST MONTH, HAVE YOU WISHED YOU WERE DEAD OR WISHED YOU COULD GO TO SLEEP AND NOT WAKE UP?: NO
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
## 2024-07-02 NOTE — MEDICATION SCRIBE - ADMISSION MEDICATION HISTORY
Medication Scribe Admission Medication History    Admission medication history is complete. The information provided in this note is only as accurate as the sources available at the time of the update.    Information Source(s): Patient and CareEverywhere/SureScripts via  with patient by cell phone in room.    Pertinent Information: He took all of his regular medicines yesterday morning.  Oxycodone newly prescribed.  He had one dose yesterday at noon of 2 tabs.  Erythromycin was not picked up.  Not available when he went to get it.  Has not taken Lorazepam for a while.  Duloxetine script is finished for how long it was written for and there are no refills.    Changes made to PTA medication list:  Added: Oxycodone, Erythromycin, Gabapentin 300 mg, Lorazepam 0.5 mg, Ondansetron 4 mg, Motegrity(brand) 2 mg.  Deleted: Risperidone 2 mg.  Changed: None    Allergies reviewed with patient and updates made in EHR: yes, no change.    Medication History Completed By: Jodi Ahumada 7/2/2024 10:35 AM    PTA Med List   Medication Sig Last Dose    B-D U/F 31G X 8 MM insulin pen needle Inject Subcutaneous 5 times daily 7/1/2024 at am    bisacodyl (DULCOLAX) 5 MG EC tablet Take 5-10 mg by mouth daily as needed for constipation Unknown at prn    dorzolamide-timolol (COSOPT) 2-0.5 % ophthalmic solution Place 1 drop into the right eye 2 times daily 7/1/2024 at am     MG capsule Take 100 mg by mouth 2 times daily 7/1/2024 at am    DULoxetine (CYMBALTA) 30 MG capsule Take 30 mg by mouth daily For 7 days, then 60mg daily 6/28/2024 at am, ran out, no refills    erythromycin ethylsuccinate (ERYPED) 200 MG/5ML suspension Take 100 mg by mouth 3 times daily (before meals) new med at not picked up yet    famotidine (PEPCID) 20 MG tablet Take 20 mg by mouth 2 times daily 7/1/2024 at am    gabapentin (NEURONTIN) 300 MG capsule Take 300 mg by mouth 3 times daily 7/1/2024 at am    insulin aspart (NOVOLOG PEN) 100 UNIT/ML pen Inject 8 UNITS  subcutaneously with each meal and 4 UNITS with each snack. Skip dose if not eating. Inject 2 UNITS if -200, 4 UNITS if -250, 6 UNITS if -300, 8 UNITS if -350, 10 UNITS if -400, 12 UNITS if -450, 14 UNITS if -500 with each meal. Max daily dose: 40 units 7/1/2024 at am 6 units    insulin glargine (LANTUS PEN) 100 UNIT/ML pen Inject 30 Units Subcutaneous every morning 7/1/2024 at am    LORazepam (ATIVAN) 0.5 MG tablet Take 0.5 mg by mouth daily as needed for sleep, anxiety or vomiting Past Month at prn    metoclopramide (REGLAN) 10 MG tablet Take 10 mg by mouth 3 times daily (before meals) 7/1/2024 at am    OLANZapine (ZYPREXA) 10 MG tablet Take 10 mg by mouth daily 7/1/2024 at am    ondansetron (ZOFRAN ODT) 4 MG ODT tab Take 1 tablet by mouth 3 times daily 7/1/2024 at am    ondansetron (ZOFRAN) 4 MG tablet Take 4 mg by mouth every 8 hours as needed for vomiting or nausea 7/1/2024 at am    oxyCODONE (ROXICODONE) 5 MG tablet Take 5 mg by mouth every 6 hours as needed for pain 7/1/2024 at noon, only had 1 dose of this    Polyethylene Glycol 400 (VISINE DRY EYE RELIEF) 1 % SOLN Apply 1-2 drops to eye every hour as needed (dry eye) Unknown at prn    prucalopride succinate (MOTEGRITY) 2 MG tablet Take 1 tablet by mouth daily 7/1/2024 at am    senna (SENOKOT) 8.6 MG tablet Take 2 tablets by mouth 2 times daily 7/1/2024 at am

## 2024-07-02 NOTE — PROGRESS NOTES
".S-(situation): Patient arrives to room 218 via cart from ED    B-(background): DKA    A-(assessment): A&Ox4. Anxious and ambulated from cart to bed and started crying and rocking stated \"movement makes my nausea and pain worse\". Reviewed medications and patient requested his home ativan now \"before anxiety goes through roof and I have to leave\".  Reassurance provided.   on arrival, insulin drip infusing.    R-(recommendations): Orders reviewed with patient. Will monitor patient per MD orders.     Inpatient nursing criteria listed below were met:    Health care directives status obtained and documented: Yes  VTE ordered/documented: Yes  Skin issues/needs documented:Yes  Isolation addressed and Signage used: Yes  Fall Prevention: Care plan updated Yes Education given and documented Yes  Care Plan initiated and Co-Morbidities added: Yes  Education Assessment documented:Yes  Admission Education Documented: Yes  If present CAUTI/CLABI Education done: NA  New medication patient education completed and documented (Possible Side Effects of Common Medications handout): Yes  Allergies Reviewed: Yes  Admission Medication Reconciliation completed: Yes  Home medications if not able to send immediately home with family stored here: NA  Reminder note placed in discharge instructions regarding home meds: NA  Individualized care needs/preferences addressed and charted: Yes  Provider Notified that patient has arrived to the unit:  marley Perez RN          "

## 2024-07-02 NOTE — ED PROVIDER NOTES
"  History     Chief Complaint   Patient presents with    Nausea & Vomiting     HPI  Reji Monahan is a 34 year old male with complex medical history including type 1 diabetes with frequent flares of DKA, IV drug use with history of associated endocarditis/septic emboli, marijuana use with cyclic vomiting and chronic abdominal pain, schizophrenia who presents for evaluation of nausea, vomiting, diffuse abdominal pain.  Symptoms started 3 days ago and have gradually worsened over time.  He describes countless episodes of nonbloody emesis and being unable to keep down anything by mouth.  EMS was called and he was given 2 mg of IM Dilaudid and also Zofran during transport.  Patient is screaming/writhing on arrival, immediately bargaining for additional IV narcotics.  He denies any fevers or urinary symptoms, melena or hematochezia.  When discussing his care plan detailed elsewhere (essentially avoiding CT scans and IV narcotics for chronic abdominal pain), patient became very irritable and stated if we do not give him what he wants then he \"just wants to end it\".  No other complaints today.    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      Nausea with " vomiting 09/17/2021     Priority: Medium    DKA related to DM type 1 12/23/2020     Priority: Medium    MRSA carrier 08/28/2020     Priority: Medium    DM type 1, Hgb A1C 11.9 on 9/8/21 08/04/2020     Priority: Medium    ADD (attention deficit disorder) 04/06/2020     Priority: Medium    Gastroesophageal reflux disease without esophagitis 04/06/2020     Priority: Medium    Cannabinoid hyperemesis syndrome 03/24/2020     Priority: Medium    Chest pain 01/26/2020     Priority: Medium    Splenomegaly 01/22/2020     Priority: Medium    Acute on chronic abdominal pain 01/21/2020     Priority: Medium    Moderate malnutrition (H24) 01/20/2018     Priority: Medium    Cognitive impairment 01/19/2018     Priority: Medium    Paroxysmal SVT (supraventricular tachycardia) (H24) 11/12/2017     Priority: Medium     Last Assessment & Plan:   Formatting of this note might be different from the original.  Started on diltiazem on d/c from [facility]. Tachycardic today: 117. He reports compliance with this medication. Declined ablation while seen by [facility] but will be seen as an outpatient- prefers to be seen at [facility] scheduled.  Last Assessment & Plan:   Started on diltiazem on d/c from [facility]. Tachycardic today: 117. He reports compliance with this medication. Declined ablation while seen by [facility] but will be seen as an outpatient- prefers to be seen at [facility] scheduled.      Phimosis 11/01/2017     Priority: Medium     Formatting of this note might be different from the original.  Penis entrapped with recurrent balanitis      Balanitis 12/04/2016     Priority: Medium     Last Assessment & Plan:   Formatting of this note might be different from the original.  Reji is here today requesting additional antibiotics as he continues to have penile pain and phimosis. He was seen at [facility] ED 9/7, left upset and went to [facility] and was treated with percocet, lotrimin,  and levaquin. Per care everywhere, he then went to [facility] 9/12 and was seen by urology:  Urology was consulted for balanitis and phimosis, recommended a 10 day course of augmentin, 14 day course of fluconazole, and Lotrisone cream BID to help with symtpoms. Urology recommended to follow up in clinic in 1 month to reassess phimosis and reevaluate need for circumcision.    Reji is a poor historian, but it sounds like he took the meds given by [facility] and not others. Discussed with PharmD in our clinic. Will give doxycycline, fluconazone and lotrisone cream as recommended by urology (14 day course). Also referring to urology. Giving ibuprofen for pain. Recommended that he return next week to see his PCP.      Homelessness 06/09/2016     Priority: Medium     Last Assessment & Plan:   Formatting of this note might be different from the original.  Per [place] notes, was living in a hotel, though patient tells me today that he lives in a house in [place]- living with his brother and caring for him.  Last Assessment & Plan:   Per [place] notes, was living in a hotel, though patient tells me today that he lives in a house in [place]- living with his brother and caring for him.      Diabetic gastroparesis (H) 05/06/2016     Priority: Medium    Diabetic neuropathy (H) 05/06/2016     Priority: Medium    ACP (advance care planning) 01/08/2016     Priority: Medium     Patient has identified Health Care Agent(s): No  Add Health Care Agents: No  Patient has Advance Care Plan Documents (Health Care Directive, POLST): No, Pt declined.    Patient has identified Specific Treatment Preferences: Yes   Specific Treatment Preferences: a.) Code Status:  CPR/Attempt Resuscitation      SW met with Pt 1/7/2106 - Pt states if he's unable to communicate his healthcare wishes his brother, Raj Monahan - C: [phone], would be his primary spokesperson. LEXY Barreto, [facility]...Pager [phone]... ext. [phone], contact  via anwpaging      Asthma 01/06/2016     Priority: Medium    Cannabis abuse 12/16/2015     Priority: Medium    Poor dentition 11/04/2015     Priority: Medium    Dental caries 08/12/2015     Priority: Medium    Paranoid schizophrenia (H) 11/25/2014     Priority: Medium    Mild intermittent asthma 02/13/2014     Priority: Medium    Charcot foot due to diabetes mellitus (H) 11/22/2013     Priority: Medium    Borderline intellectual functioning 09/27/2010     Priority: Medium    Dyslipidemia 08/30/2010     Priority: Medium     Formatting of this note might be different from the original.  Last LDL- 105 on 6/11/13  Last LDL- 105 on 6/11/13      Asperger's disorder 08/05/2009     Priority: Medium    Insomnia 06/30/2009     Priority: Medium        Past Medical History:    Past Medical History:   Diagnosis Date    Cannabis abuse     DKA (diabetic ketoacidoses)     Endocarditis 04/2021    Homeless     Schizophrenia (H)     Sepsis, due to unspecified organism, unspecified whether acute organ dysfunction present (H) 02/06/2021    Type 1 diabetes mellitus (H)     Uncomplicated asthma     Urinary tract infection without hematuria, site unspecified 02/06/2021       Past Surgical History:    No past surgical history on file.    Family History:    Family History   Problem Relation Age of Onset    Cerebrovascular Disease Father     Diabetes Brother        Social History:  Marital Status:  Single [1]  Social History     Tobacco Use    Smoking status: Never   Substance Use Topics    Alcohol use: No    Drug use: Yes     Types: Marijuana        Medications:    B-D U/F 31G X 8 MM insulin pen needle  bisacodyl (DULCOLAX) 5 MG EC tablet  dorzolamide-timolol (COSOPT) 2-0.5 % ophthalmic solution   MG capsule  DULoxetine (CYMBALTA) 30 MG capsule  erythromycin ethylsuccinate (ERYPED) 200 MG/5ML suspension  famotidine (PEPCID) 20 MG tablet  gabapentin (NEURONTIN) 300 MG capsule  insulin aspart (NOVOLOG PEN) 100 UNIT/ML pen  insulin  "glargine (LANTUS PEN) 100 UNIT/ML pen  LORazepam (ATIVAN) 0.5 MG tablet  metoclopramide (REGLAN) 10 MG tablet  OLANZapine (ZYPREXA) 10 MG tablet  ondansetron (ZOFRAN ODT) 4 MG ODT tab  ondansetron (ZOFRAN) 4 MG tablet  oxyCODONE (ROXICODONE) 5 MG tablet  Polyethylene Glycol 400 (VISINE DRY EYE RELIEF) 1 % SOLN  prucalopride succinate (MOTEGRITY) 2 MG tablet  senna (SENOKOT) 8.6 MG tablet      Review of Systems   All other systems reviewed and are negative.  See HPI.    Physical Exam   BP: (!) 143/85  Pulse: 112  Temp: 97.8  F (36.6  C)  Resp: 24  Height: 185.4 cm (6' 1\")  Weight: 78.9 kg (174 lb)  SpO2: 100 %    Physical Exam  Vitals and nursing note reviewed.   Constitutional:       General: He is in acute distress.      Appearance: Normal appearance. He is not toxic-appearing.      Comments: Very anxious, tearful.  Irritable.   HENT:      Head: Normocephalic and atraumatic.      Nose: Nose normal.      Mouth/Throat:      Comments: Tacky mucous membranes.  Otherwise unremarkable.  Eyes:      General: No scleral icterus.     Conjunctiva/sclera: Conjunctivae normal.   Cardiovascular:      Rate and Rhythm: Regular rhythm. Tachycardia present.      Pulses: Normal pulses.      Heart sounds: Normal heart sounds.      Comments: Borderline tachycardia in the setting of irritability.  Pulmonary:      Effort: Pulmonary effort is normal. No respiratory distress.      Breath sounds: Normal breath sounds.   Abdominal:      General: Abdomen is flat.      Palpations: Abdomen is soft.      Tenderness: There is abdominal tenderness. There is guarding.      Comments: Describes diffuse mild tenderness and has voluntary guarding.  No objective rebound.   Musculoskeletal:         General: No deformity. Normal range of motion.      Cervical back: Neck supple.   Skin:     General: Skin is warm.      Capillary Refill: Capillary refill takes less than 2 seconds.      Coloration: Skin is pale.   Neurological:      General: No focal deficit " present.      Mental Status: He is alert and oriented to person, place, and time.      Comments: Moving all extremity spontaneously with equal strength throughout.   Psychiatric:      Comments: Extremely anxious, labile.  Reports passive suicidality specifically in the setting of bargaining for narcotics.         ED Course     ED Course as of 07/02/24 1132 Tue Jul 02, 2024   2717 Poke with  who agrees to admit the patient to the ICU.     Procedures       Critical Care time: 35 minutes       Results for orders placed or performed during the hospital encounter of 07/02/24 (from the past 24 hour(s))   Glucose by meter   Result Value Ref Range    GLUCOSE BY METER POCT 480 (H) 70 - 99 mg/dL   CBC with platelets differential    Narrative    The following orders were created for panel order CBC with platelets differential.  Procedure                               Abnormality         Status                     ---------                               -----------         ------                     CBC with platelets and d...[[id number]]  Abnormal            Final result                 Please view results for these tests on the individual orders.   Comprehensive metabolic panel   Result Value Ref Range    Sodium 136 135 - 145 mmol/L    Potassium 4.4 3.4 - 5.3 mmol/L    Carbon Dioxide (CO2) 13 (L) 22 - 29 mmol/L    Anion Gap 30 (H) 7 - 15 mmol/L    Urea Nitrogen 19.6 6.0 - 20.0 mg/dL    Creatinine 0.91 0.67 - 1.17 mg/dL    GFR Estimate >90 >60 mL/min/1.73m2    Calcium 9.8 8.6 - 10.0 mg/dL    Chloride 93 (L) 98 - 107 mmol/L    Glucose 505 (HH) 70 - 99 mg/dL    Alkaline Phosphatase 133 40 - 150 U/L    AST 20 0 - 45 U/L    ALT 23 0 - 70 U/L    Protein Total 7.5 6.4 - 8.3 g/dL    Albumin 4.5 3.5 - 5.2 g/dL    Bilirubin Total 1.0 <=1.2 mg/dL   Lipase   Result Value Ref Range    Lipase 13 13 - 60 U/L   Lactic Acid Whole Blood with 1X Repeat in 2 HR when >2   Result Value Ref Range    Lactic Acid, Initial 7.1 (HH) 0.7 -  2.0 mmol/L   Blood gas venous   Result Value Ref Range    pH Venous 7.34 7.32 - 7.43    pCO2 Venous 27 (L) 40 - 50 mm Hg    pO2 Venous 71 (H) 25 - 47 mm Hg    Bicarbonate Venous 15 (L) 21 - 28 mmol/L    Base Excess/Deficit Venous -9.7 (L) -3.0 - 3.0 mmol/L    FIO2 21     Oxyhemoglobin Venous 91 (H) 70 - 75 %    O2 Sat, Venous 92.5 (H) 70.0 - 75.0 %    Narrative    In healthy individuals, oxyhemoglobin (O2Hb) and oxygen saturation (SO2) are approximately equal. In the presence of dyshemoglobins, oxyhemoglobin can be considerably lower than oxygen saturation.   CBC with platelets and differential   Result Value Ref Range    WBC Count 13.1 (H) 4.0 - 11.0 10e3/uL    RBC Count 4.85 4.40 - 5.90 10e6/uL    Hemoglobin 13.2 (L) 13.3 - 17.7 g/dL    Hematocrit 39.7 (L) 40.0 - 53.0 %    MCV 82 78 - 100 fL    MCH 27.2 26.5 - 33.0 pg    MCHC 33.2 31.5 - 36.5 g/dL    RDW 14.2 10.0 - 15.0 %    Platelet Count 238 150 - 450 10e3/uL    % Neutrophils 88 %    % Lymphocytes 7 %    % Monocytes 5 %    % Eosinophils 0 %    % Basophils 0 %    % Immature Granulocytes 1 %    NRBCs per 100 WBC 0 <1 /100    Absolute Neutrophils 11.5 (H) 1.6 - 8.3 10e3/uL    Absolute Lymphocytes 0.9 0.8 - 5.3 10e3/uL    Absolute Monocytes 0.7 0.0 - 1.3 10e3/uL    Absolute Eosinophils 0.0 0.0 - 0.7 10e3/uL    Absolute Basophils 0.0 0.0 - 0.2 10e3/uL    Absolute Immature Granulocytes 0.1 <=0.4 10e3/uL    Absolute NRBCs 0.0 10e3/uL   Extra Tube    Narrative    The following orders were created for panel order Extra Tube.  Procedure                               Abnormality         Status                     ---------                               -----------         ------                     Extra Blue Top Tube[[id number]]                                                         Extra Red Top Tube[[id number]]                               Final result                 Please view results for these tests on the individual orders.   Extra Red Top Tube   Result Value Ref  Range    Hold Specimen [facility]    Hemoglobin A1c   Result Value Ref Range    Hemoglobin A1C 8.4 (H) <5.7 %   Ketone Beta-Hydroxybutyrate Quantitative   Result Value Ref Range    Ketone (Beta-Hydroxybutyrate) Quantitative 4.50 (HH) <=0.30 mmol/L   UA with Microscopic reflex to Culture    Specimen: Urine, NOS   Result Value Ref Range    Color Urine Yellow Colorless, Straw, Light Yellow, Yellow    Appearance Urine Clear Clear    Glucose Urine >499 (A) Negative mg/dL    Bilirubin Urine Negative Negative    Ketones Urine 80 (A) Negative mg/dL    Specific Gravity Urine 1.024 1.003 - 1.035    Blood Urine Negative Negative    pH Urine 5.0 5.0 - 7.0    Protein Albumin Urine Negative Negative mg/dL    Urobilinogen Urine Normal Normal, 2.0 mg/dL    Nitrite Urine Negative Negative    Leukocyte Esterase Urine Negative Negative    Mucus Urine Present (A) None Seen /LPF    RBC Urine 1 <=2 /HPF    WBC Urine 4 <=5 /HPF    Narrative    Urine Culture not indicated   Glucose by meter   Result Value Ref Range    GLUCOSE BY METER POCT 453 (H) 70 - 99 mg/dL   Lactic acid whole blood   Result Value Ref Range    Lactic Acid 2.8 (H) 0.7 - 2.0 mmol/L   Glucose by meter   Result Value Ref Range    GLUCOSE BY METER POCT 379 (H) 70 - 99 mg/dL       Medications   dextrose 50 % injection 25-50 mL (has no administration in time range)   insulin regular (MYXREDLIN) 1 unit/mL infusion (5 Units/hr Intravenous Rate/Dose Change 7/2/24 1112)   sodium chloride 0.9 % infusion ( Intravenous $New Bag 7/2/24 0910)   sodium chloride 0.9% BOLUS 1,000 mL (0 mLs Intravenous Stopped 7/2/24 0830)   haloperidol lactate (HALDOL) injection 5 mg (5 mg Intravenous $Given 7/2/24 0808)   sodium chloride 0.9% BOLUS 1,000 mL (0 mLs Intravenous Stopped 7/2/24 0901)   potassium chloride 10 mEq in 100 mL sterile water infusion (0 mEq Intravenous Stopped 7/2/24 1017)       Assessments & Plan (with Medical Decision Making)     I have reviewed the nursing notes.    I have reviewed  the findings, diagnosis, plan and need for follow up with the patient.  Medical Decision Making  Reji Monahan is a 34 year old male with complex medical history including type 1 diabetes with frequent flares of DKA, IV drug use with history of associated endocarditis/septic emboli, marijuana use with cyclic vomiting and chronic abdominal pain, schizophrenia who presents for evaluation of nausea, vomiting, diffuse abdominal pain.  Borderline tachycardic and tachypneic on arrival, but also very anxious and irritable.  Overall appears nontoxic.  Exam also significant for mild to moderate diffuse abdominal pain with voluntary guarding, no rebound.  Mucous membranes are tacky.  Presentation most concerning for DKA and dehydration in the setting of intractable vomiting.  Reviewed his care plan at length and his presentation seems consistent with multiple prior visits.  He was given 2 L of fluids initially.  Patient had already received 2 mg of Dilaudid IM from EMS and we held off on additional narcotics per instructions from his care plan.  He actually had dramatic improvement in abdominal pain, nausea, and anxiety with the Haldol.  Minimal tenderness after that.  Labs were consistent with DKA.  Lactic acid was 7.1, white count 13.1, though likely reactive and due to DKA.  Blood glucose was 505, anion gap 30, bicarb 13 on metabolic panel.  Ketones 4.5.  Repeat lactic acid was downtrending to 2.8.  Patient has been placed on DKA protocol and will require admission to the ICU for further management.  Case was discussed with  who agrees to admit.    Critical care time: 35 minutes.  Patient critically ill due to DKA requiring insulin drip.    New Prescriptions    No medications on file     Final diagnoses:   Diabetic ketoacidosis without coma associated with type 1 diabetes mellitus (H)   Chronic abdominal pain   Elevated lactic acid level     7/2/2024   [facility] EMERGENCY DEPT       Vicky  Marcelino Martines MD  07/02/24 1137

## 2024-07-02 NOTE — PROGRESS NOTES
S-(situation): patient continuous glucose monitor alarming low blood glucose. BG checked within last 15 minutes for 119. BG per patient's meter 67     B-(background): DKA    A-(assessment): Patient not feeling hypoglycemic. Last . Rechecked to verify and . Patient aware he will need to change and caliberate his meter prior to use.    R-(recommendations): Continue to monitor BG hourly and adjust per protocol.  Aamir Perez RN

## 2024-07-02 NOTE — ED TRIAGE NOTES
Abdominal pain with nausea/vomiting and high blood sugars.  EMS reports dialudid 2mg IM and zofran 2mg IM.

## 2024-07-02 NOTE — ED NOTES
"Patient is raising his voice, asking for pain meds, \"just one shot to get the pain under control, please!\". Dr. Perry at bedside discussing care plan [place] [facility]. He states he uses \"oxy at home, why can't I have opiates here?\" Kendal Brown RN    "

## 2024-07-02 NOTE — H&P
[facility]    History and Physical - Hospitalist Service       Date of Admission:  7/2/2024    Assessment & Plan      Reji Monahan is a 34 year old male with PMHx of uncontrolled type 1 diabetes with frequent DKA, schizophrenia, marijuana use with cyclical vomiting and chronic abdominal pain, previous history of IV drug use with history of endocarditis and septic embolism status posttreatment patient presented with 3-day history of nausea, vomiting, admitted on 7/2/2024 for worsening nausea, vomiting, acute on chronic abdominal pain, hyperglycemia over the past 3 days and patient has been found to have DKA and is being admitted to the ICU for management.      Principal Problem:    Diabetic ketoacidosis in patient with uncontrolled Type 1 DM with hyperglycemia     Assessment: Acute on chronic abdominal pain and worsening blood sugars most recently in the 5-600 range.  Anion gap was 30 with pH of 7.34 but bicarb of 15 and pCO2 of 27, lactic acid of 7.1, ketones 4.5 consistent with DKA with patient having ongoing nausea  And blood sugars in the 500 range.      Plan:   -admit patient to ICU with initiation of DKA protocol   -monitor VBG, ketones, BMP every 4 hours  -N.p.o. except ice chips and medications  -Continue as needed antiemetics  -Once patient's nausea has resolved and DKA has improved we will attempt trial of clear liquids before transitioning to subcutaneous insulin     Active Problems:    Acute on chronic abdominal pain    Nausea with vomiting    Assessment: occurring for the past 3 days.  Patient is unclear of any etiology and does not feel that his  daily marijuana is contributing.  Patient was given Zofran and Compazine in the emergency department with minimal benefit but did have significant improvement after IV Haldol 5 mg IV given x 1    Plan:   -Continue with as needed Zofran and Compazine as first-line treatment  -Will allow Haldol 5 mg every 6 hours as needed  breakthrough nausea or vomiting  -provide capsaicin cream topically to abdomen  -Will resume patient's home regimen of oxycodone 5 mg every 6 hours but will not provide IV narcotics at this time      Elevated lactic acid level    Assessment: Lactic acid of 7.1 on initial evaluation however clinically patient does not show any signs of infection and elevation is thought secondary to DKA.  Blood cultures are obtained and currently pending but no antibiotics have been given and repeat lactic acid will occur shortly and is expected to rapidly improve    Plan:  -Monitor for repeat lactic acid  -Hold off on antibiotics  -Monitor blood culture results and initiate antibiotics if bacterial growth is noted.      Cannabinoid hyperemesis syndrome    Cannabis abuse    Assessment: Documented history of patient's chart the patient today does not feel this is an issue for him.  Did find benefit with Haldol administration in the ED     Plan:   -proceed with plan as outlined above   -Encourage consideration of cessation of marijuana use going forward      Diabetic gastroparesis (H)    Assessment: Known history.  Patient is normally supposed to be taking Reglan and erythromycin before meals as well as prucalopride succinate, senna, colace scheduled. On review patient has not been taking his erythromycin scheduled for many days.  He has also been unable to take most of his other medication in recent days due to severity of nausea and vomiting    Plan:   -Will restart erythromycin, prucalopride succinate, senna, and colace as per home regimen  -given possible need for ongoing Haldol and risk with use of antipsychotic agent with Reglan will hold Reglan for now but restart when able      Diabetic neuropathy (H)    Assessment: Patient is on gabapentin 300 mg 3 times a day has not been taking consistently lately due to GI symptoms and does have worsening of his neuropathic pain    Plan:   -Will restart home regimen as patient can  tolerate        Paranoid schizophrenia (H)    Asperger's disorder    Borderline intellectual functioning    Assessment: Patient is normally supposed to be taking Zyprexa 10 mg daily however he has not been taking the Cymbalta at all recently  and Cymbalta 60 mg daily as he ran out of the medication and cannot get a refill without being seen.  He also reports he only takes Zyprexa on an as needed basis    Plan:   -Restart Cymbalta 60 mg daily as sudden discontinuation could contribute to patient's GI symptoms  -restart scheduled Zyprexa       Mild intermittent asthma    Assessment: previous history but patient reports he has not had issues in years and doesn't have an inhaler anymore    Plan:   -monitor for respiratory stability       History of paroxysmal SVT (supraventricular tachycardia) (H24)    Assessment: previous documented history but patient is no longer taking the diltiazem prescribed in 2020 by cardiology and has not taken it in years    Plan:   -will monitor continuous cardiac monitor for any SVT during this stay       History of hypophosphatemia    Assessment: previous history needing supplementation.    Plan:   -will add on phosphorous to labs previously performed and start high protocol replacement as appropriate   -monitor closely as phosphorous may be artificially elevated during early DKA but require supplementation as DKA improves       History of intravenous drug use in remission      History of 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    Assessment: previous history in 2021.  Patient denies IV drug use     Plan:   -no acute intervention      MRSA carrier    Assessment: patient had previous MRSA infections, thought secondary to IV drug use    Plan:   -start contact precautions and perform MRSA swab for ongoing colonization evaluation during this stay           Diet: NPO for Medical/Clinical Reasons Except for: Meds    DVT Prophylaxis: Enoxaparin (Lovenox) SQ  Wright Catheter: Not  present  Lines: None     Cardiac Monitoring: None  Code Status:  Full code    Clinically Significant Risk Factors Present on Admission             # Anion Gap Metabolic Acidosis: Highest Anion Gap = 30 mmol/L in last 2 days, will monitor and treat as appropriate                 # DMII: A1C = 8.4 % (Ref range: <5.7 %) within past 6 months       # Asthma: noted on problem list        Disposition Plan     Medically Ready for Discharge: Anticipated in 2-4 Days           Ruthy Peterson MD  Hospitalist Service  [facility]  Securely message with [employer] (more info)  Text page via [employer] Paging/Directory     ______________________________________________________________________    Chief Complaint   Nausea, vomiting, worsening belly pain and high blood sugars     History is obtained from the patient    History of Present Illness   Reji Monahan is a 34 year old male who having nausea, vomiting, and worsening acute on chronic abdominal pain 3 days ago and he was unable to keep down most of his medications.  He notices blood sugars were increasing into the 5-600 range and today his symptoms became so severe he called EMS and was brought to the emergency room for assessment.  No fever, no diarrhea.  Patient continues to use marijuana regularly but denies recent IV drug use.  No ill contacts.  Patient has been found to have DKA and is being admitted as outlined above      Past Medical History    Past Medical History:   Diagnosis Date    Cannabis abuse     DKA (diabetic ketoacidoses)     Frequent episodes related to non-compliance with insulin    Endocarditis 04/2021    MRSA, thought 2nd to IV drug use    Homeless     Schizophrenia (H)     Sepsis, due to unspecified organism, unspecified whether acute organ dysfunction present (H) 02/06/2021    Type 1 diabetes mellitus (H)     Uncomplicated asthma     Urinary tract infection without hematuria, site unspecified 02/06/2021       Past  Surgical History   No past surgical history on file.    Prior to Admission Medications   Prior to Admission Medications   Prescriptions Last Dose Informant Patient Reported? Taking?   B-D U/F 31G X 8 MM insulin pen needle 7/1/2024 at am Self Yes Yes   Sig: Inject Subcutaneous 5 times daily    MG capsule 7/1/2024 at am Self Yes Yes   Sig: Take 100 mg by mouth 2 times daily   DULoxetine (CYMBALTA) 30 MG capsule 6/28/2024 at am, ran out, no refills Self Yes Yes   Sig: Take 30 mg by mouth daily For 7 days, then 60mg daily   LORazepam (ATIVAN) 0.5 MG tablet Past Month at prn Self Yes Yes   Sig: Take 0.5 mg by mouth daily as needed for sleep, anxiety or vomiting   OLANZapine (ZYPREXA) 10 MG tablet 7/1/2024 at am Self Yes Yes   Sig: Take 10 mg by mouth daily   Polyethylene Glycol 400 (VISINE DRY EYE RELIEF) 1 % SOLN Unknown at prn Self Yes Yes   Sig: Apply 1-2 drops to eye every hour as needed (dry eye)   bisacodyl (DULCOLAX) 5 MG EC tablet Unknown at prn Self Yes Yes   Sig: Take 5-10 mg by mouth daily as needed for constipation   dorzolamide-timolol (COSOPT) 2-0.5 % ophthalmic solution 7/1/2024 at am Self Yes Yes   Sig: Place 1 drop into the right eye 2 times daily   erythromycin ethylsuccinate (ERYPED) 200 MG/5ML suspension new med at not picked up yet Self Yes Yes   Sig: Take 100 mg by mouth 3 times daily (before meals)   famotidine (PEPCID) 20 MG tablet 7/1/2024 at am Self Yes Yes   Sig: Take 20 mg by mouth 2 times daily   gabapentin (NEURONTIN) 300 MG capsule 7/1/2024 at am Self Yes Yes   Sig: Take 300 mg by mouth 3 times daily   insulin aspart (NOVOLOG PEN) 100 UNIT/ML pen 7/1/2024 at am 6 units Self Yes Yes   Sig: Inject 8 UNITS subcutaneously with each meal and 4 UNITS with each snack. Skip dose if not eating. Inject 2 UNITS if -200, 4 UNITS if -250, 6 UNITS if -300, 8 UNITS if -350, 10 UNITS if -400, 12 UNITS if -450, 14 UNITS if -500 with each meal. Max daily dose:  40 units   insulin glargine (LANTUS PEN) 100 UNIT/ML pen 7/1/2024 at am Self Yes Yes   Sig: Inject 30 Units Subcutaneous every morning   metoclopramide (REGLAN) 10 MG tablet 7/1/2024 at am Self Yes Yes   Sig: Take 10 mg by mouth 3 times daily (before meals)   ondansetron (ZOFRAN ODT) 4 MG ODT tab 7/1/2024 at am Self Yes Yes   Sig: Take 1 tablet by mouth 3 times daily   ondansetron (ZOFRAN) 4 MG tablet 7/1/2024 at am Self Yes Yes   Sig: Take 4 mg by mouth every 8 hours as needed for vomiting or nausea   oxyCODONE (ROXICODONE) 5 MG tablet 7/1/2024 at noon, only had 1 dose of this Self Yes Yes   Sig: Take 5 mg by mouth every 6 hours as needed for pain   prucalopride succinate (MOTEGRITY) 2 MG tablet 7/1/2024 at am Self Yes Yes   Sig: Take 1 tablet by mouth daily Brand name   senna (SENOKOT) 8.6 MG tablet 7/1/2024 at am Self Yes Yes   Sig: Take 2 tablets by mouth 2 times daily      Facility-Administered Medications: None        Physical Exam   Vital Signs: Temp: 97.8  F (36.6  C) Temp src: Oral BP: 90/52 Pulse: 86   Resp: 13 SpO2: 98 % O2 Device: None (Room air)    Weight: 174 lbs 0 oz    Constitutional: awake, alert, cooperative, no apparent distress, and appears stated age  Respiratory: No increased work of breathing, good air exchange without wheezing or crackles  Cardiovascular: Sinus tachycardia in the 100-110 range   GI: Bowel sounds present, abdomen is soft and nondistended but tender to palpation, especially over the epigastric region  Musculoskeletal: no lower extremity pitting edema present  Neurologic: Awake, alert, oriented to name, place and situation    Medical Decision Making       55 MINUTES SPENT BY ME on the date of service doing chart review, history, exam, documentation & further activities per the note.      Data     I have personally reviewed the following data over the past 24 hrs:    13.1 (H)  \   13.2 (L)   / 238     136 93 19.6 /  505 (H)   4.4 13 0.91 \     ALT: 23 AST: 20 AP: 133 TBILI: 1.0    ALB: 4.5 TOT PROTEIN: 7.5 LIPASE: 13     TSH: N/A T4: N/A A1C: 8.4 (H)     Procal: N/A CRP: N/A Lactic Acid: 7.1 (H)

## 2024-07-03 VITALS
RESPIRATION RATE: 20 BRPM | HEART RATE: 87 BPM | WEIGHT: 174.8 LBS | HEIGHT: 73 IN | DIASTOLIC BLOOD PRESSURE: 100 MMHG | SYSTOLIC BLOOD PRESSURE: 151 MMHG | BODY MASS INDEX: 23.17 KG/M2 | OXYGEN SATURATION: 98 % | TEMPERATURE: 98.3 F

## 2024-07-03 LAB
ANION GAP SERPL CALCULATED.3IONS-SCNC: 11 MMOL/L (ref 7–15)
ANION GAP SERPL CALCULATED.3IONS-SCNC: 9 MMOL/L (ref 7–15)
ANION GAP SERPL CALCULATED.3IONS-SCNC: 9 MMOL/L (ref 7–15)
B-OH-BUTYR SERPL-SCNC: 0.8 MMOL/L
B-OH-BUTYR SERPL-SCNC: 0.9 MMOL/L
B-OH-BUTYR SERPL-SCNC: <0.18 MMOL/L
BASE EXCESS BLDV CALC-SCNC: -0.9 MMOL/L (ref -3–3)
BASE EXCESS BLDV CALC-SCNC: -1.1 MMOL/L (ref -3–3)
BASE EXCESS BLDV CALC-SCNC: 0.1 MMOL/L (ref -3–3)
BUN SERPL-MCNC: 15.6 MG/DL (ref 6–20)
BUN SERPL-MCNC: 17.4 MG/DL (ref 6–20)
CALCIUM SERPL-MCNC: 8.2 MG/DL (ref 8.6–10)
CALCIUM SERPL-MCNC: 8.4 MG/DL (ref 8.6–10)
CHLORIDE SERPL-SCNC: 103 MMOL/L (ref 98–107)
CHLORIDE SERPL-SCNC: 103 MMOL/L (ref 98–107)
CHLORIDE SERPL-SCNC: 105 MMOL/L (ref 98–107)
CREAT SERPL-MCNC: 0.83 MG/DL (ref 0.67–1.17)
CREAT SERPL-MCNC: 0.84 MG/DL (ref 0.67–1.17)
DEPRECATED HCO3 PLAS-SCNC: 21 MMOL/L (ref 22–29)
DEPRECATED HCO3 PLAS-SCNC: 23 MMOL/L (ref 22–29)
DEPRECATED HCO3 PLAS-SCNC: 24 MMOL/L (ref 22–29)
EGFRCR SERPLBLD CKD-EPI 2021: >90 ML/MIN/1.73M2
EGFRCR SERPLBLD CKD-EPI 2021: >90 ML/MIN/1.73M2
GLUCOSE BLDC GLUCOMTR-MCNC: 126 MG/DL (ref 70–99)
GLUCOSE BLDC GLUCOMTR-MCNC: 136 MG/DL (ref 70–99)
GLUCOSE BLDC GLUCOMTR-MCNC: 136 MG/DL (ref 70–99)
GLUCOSE BLDC GLUCOMTR-MCNC: 148 MG/DL (ref 70–99)
GLUCOSE BLDC GLUCOMTR-MCNC: 160 MG/DL (ref 70–99)
GLUCOSE BLDC GLUCOMTR-MCNC: 162 MG/DL (ref 70–99)
GLUCOSE BLDC GLUCOMTR-MCNC: 167 MG/DL (ref 70–99)
GLUCOSE SERPL-MCNC: 134 MG/DL (ref 70–99)
GLUCOSE SERPL-MCNC: 139 MG/DL (ref 70–99)
HCO3 BLDV-SCNC: 24 MMOL/L (ref 21–28)
HCO3 BLDV-SCNC: 24 MMOL/L (ref 21–28)
HCO3 BLDV-SCNC: 25 MMOL/L (ref 21–28)
HOLD SPECIMEN: NORMAL
LACTATE SERPL-SCNC: 0.8 MMOL/L (ref 0.7–2)
MAGNESIUM SERPL-MCNC: 2.1 MG/DL (ref 1.7–2.3)
O2/TOTAL GAS SETTING VFR VENT: 21 %
OXYHGB MFR BLDV: 75 % (ref 70–75)
OXYHGB MFR BLDV: 88 % (ref 70–75)
OXYHGB MFR BLDV: 93 % (ref 70–75)
PCO2 BLDV: 40 MM HG (ref 40–50)
PCO2 BLDV: 41 MM HG (ref 40–50)
PCO2 BLDV: 42 MM HG (ref 40–50)
PH BLDV: 7.38 [PH] (ref 7.32–7.43)
PH BLDV: 7.39 [PH] (ref 7.32–7.43)
PH BLDV: 7.39 [PH] (ref 7.32–7.43)
PHOSPHATE SERPL-MCNC: 2.4 MG/DL (ref 2.5–4.5)
PHOSPHATE SERPL-MCNC: 2.7 MG/DL (ref 2.5–4.5)
PO2 BLDV: 40 MM HG (ref 25–47)
PO2 BLDV: 56 MM HG (ref 25–47)
PO2 BLDV: 74 MM HG (ref 25–47)
POTASSIUM SERPL-SCNC: 3.9 MMOL/L (ref 3.4–5.3)
POTASSIUM SERPL-SCNC: 4 MMOL/L (ref 3.4–5.3)
POTASSIUM SERPL-SCNC: 4.3 MMOL/L (ref 3.4–5.3)
SAO2 % BLDV: 76.6 % (ref 70–75)
SAO2 % BLDV: 89.3 % (ref 70–75)
SAO2 % BLDV: 94.9 % (ref 70–75)
SODIUM SERPL-SCNC: 135 MMOL/L (ref 135–145)
SODIUM SERPL-SCNC: 136 MMOL/L (ref 135–145)
SODIUM SERPL-SCNC: 137 MMOL/L (ref 135–145)

## 2024-07-03 PROCEDURE — 36415 COLL VENOUS BLD VENIPUNCTURE: CPT | Performed by: FAMILY MEDICINE

## 2024-07-03 PROCEDURE — 80048 BASIC METABOLIC PNL TOTAL CA: CPT | Performed by: FAMILY MEDICINE

## 2024-07-03 PROCEDURE — 82805 BLOOD GASES W/O2 SATURATION: CPT | Performed by: FAMILY MEDICINE

## 2024-07-03 PROCEDURE — 250N000013 HC RX MED GY IP 250 OP 250 PS 637: Performed by: PEDIATRICS

## 2024-07-03 PROCEDURE — 84100 ASSAY OF PHOSPHORUS: CPT | Performed by: FAMILY MEDICINE

## 2024-07-03 PROCEDURE — 80051 ELECTROLYTE PANEL: CPT | Performed by: PEDIATRICS

## 2024-07-03 PROCEDURE — 82010 KETONE BODYS QUAN: CPT | Performed by: FAMILY MEDICINE

## 2024-07-03 PROCEDURE — 83605 ASSAY OF LACTIC ACID: CPT | Performed by: FAMILY MEDICINE

## 2024-07-03 PROCEDURE — 83735 ASSAY OF MAGNESIUM: CPT | Performed by: FAMILY MEDICINE

## 2024-07-03 PROCEDURE — 250N000013 HC RX MED GY IP 250 OP 250 PS 637: Performed by: INTERNAL MEDICINE

## 2024-07-03 PROCEDURE — 250N000011 HC RX IP 250 OP 636: Performed by: FAMILY MEDICINE

## 2024-07-03 PROCEDURE — 99232 SBSQ HOSP IP/OBS MODERATE 35: CPT | Performed by: PEDIATRICS

## 2024-07-03 PROCEDURE — 250N000013 HC RX MED GY IP 250 OP 250 PS 637: Performed by: FAMILY MEDICINE

## 2024-07-03 PROCEDURE — 250N000012 HC RX MED GY IP 250 OP 636 PS 637: Performed by: PEDIATRICS

## 2024-07-03 PROCEDURE — 258N000003 HC RX IP 258 OP 636: Performed by: FAMILY MEDICINE

## 2024-07-03 RX ORDER — LABETALOL HYDROCHLORIDE 5 MG/ML
10 INJECTION, SOLUTION INTRAVENOUS EVERY 4 HOURS PRN
Status: DISCONTINUED | OUTPATIENT
Start: 2024-07-03 | End: 2024-07-03 | Stop reason: HOSPADM

## 2024-07-03 RX ORDER — LORAZEPAM 0.5 MG/1
0.5 TABLET ORAL
Status: COMPLETED | OUTPATIENT
Start: 2024-07-03 | End: 2024-07-03

## 2024-07-03 RX ADMIN — GABAPENTIN 300 MG: 300 CAPSULE ORAL at 08:11

## 2024-07-03 RX ADMIN — POTASSIUM & SODIUM PHOSPHATES POWDER PACK 280-160-250 MG 1 PACKET: 280-160-250 PACK at 11:54

## 2024-07-03 RX ADMIN — PROCHLORPERAZINE MALEATE 10 MG: 5 TABLET ORAL at 00:17

## 2024-07-03 RX ADMIN — INSULIN GLARGINE 20 UNITS: 100 INJECTION, SOLUTION SUBCUTANEOUS at 08:43

## 2024-07-03 RX ADMIN — SENNOSIDES 2 TABLET: 8.6 TABLET, FILM COATED ORAL at 08:11

## 2024-07-03 RX ADMIN — FAMOTIDINE 20 MG: 20 TABLET, FILM COATED ORAL at 08:12

## 2024-07-03 RX ADMIN — DORZOLAMIDE HYDROCHLORIDE AND TIMOLOL MALEATE 1 DROP: 20; 5 SOLUTION OPHTHALMIC at 08:43

## 2024-07-03 RX ADMIN — LORAZEPAM 0.5 MG: 0.5 TABLET ORAL at 00:29

## 2024-07-03 RX ADMIN — POTASSIUM CHLORIDE, DEXTROSE MONOHYDRATE AND SODIUM CHLORIDE: 150; 5; 450 INJECTION, SOLUTION INTRAVENOUS at 08:11

## 2024-07-03 RX ADMIN — METOCLOPRAMIDE 10 MG: 5 TABLET ORAL at 11:37

## 2024-07-03 RX ADMIN — POTASSIUM & SODIUM PHOSPHATES POWDER PACK 280-160-250 MG 1 PACKET: 280-160-250 PACK at 08:12

## 2024-07-03 RX ADMIN — HALOPERIDOL LACTATE 5 MG: 5 INJECTION, SOLUTION INTRAMUSCULAR at 03:46

## 2024-07-03 RX ADMIN — INSULIN ASPART 1 UNITS: 100 INJECTION, SOLUTION INTRAVENOUS; SUBCUTANEOUS at 11:54

## 2024-07-03 RX ADMIN — DOCUSATE SODIUM 100 MG: 100 CAPSULE, LIQUID FILLED ORAL at 08:12

## 2024-07-03 ASSESSMENT — ACTIVITIES OF DAILY LIVING (ADL)
ADLS_ACUITY_SCORE: 27

## 2024-07-03 NOTE — SIGNIFICANT EVENT
Significant Event Note    Time of event: 12:39 PM July 3, 2024    Description of event:  Notified by nursing that the patient expressed desire and intent to leave the hospital today AGAINST MEDICAL ADVICE.  Met with the patient in his room.  He indicated that he planned to leave the hospital today.  He reported that he had tolerated breakfast without nausea or vomiting and that his abdominal pain had improved.  Writer recommended that the patient stay for additional lab monitoring to reassess whether his medical condition had improved enough that he could safely discharge later in the day today.  Patient declined additional lab testing at this time.  He again reiterated his plan to leave AGAINST MEDICAL ADVICE.    Discussed risk of leaving the hospital AGAINST MEDICAL ADVICE as he was recovering from DKA including risk of recurrent DKA and its potential complications including death.  Patient appeared to have a clear understanding and expressed willingness to accept the risk of recurrent DKA and complications including death.      Plan:  Advised that the patient remain hospitalized for ongoing monitoring and treatment of DKA as it resolves    Recommended that nursing complete the discharge AGAINST MEDICAL ADVICE paperwork as appropriate if the patient chooses to leave the hospital today AGAINST MEDICAL ADVICE    Discussed with: bedside nurse and patient    Alonso Lai MD

## 2024-07-03 NOTE — PROGRESS NOTES
Patient stated that he wants to leave against medical advice. Writer talked with him about the importance for his health and wellbeing to continue to stay at hospital to receive treatment for DKA. Patient continues to be adamant about leaving. Dr Lai and charge nurse updated. Dr Lai came in to see patient to discuss importance of staying at the hospital at this time to continue with treatment and lab work. Provider talked with patient about potential risks to his health and safety if he were to leave now. Up to the potential risk of death. Pt verbalized understanding that he could die from  DKA. Patient states that he takes responsibility for his health and his decision to leave. Patient continues to want to leave now and refuses to stay even to later today. Provider told patient that if he leaves now it will be considered leaving against medical advice. Patient verbalized understanding. Patient is alert and orientated x4. Patient called brother's SO to provide a ride home. This was reviewed again with patient and pt verbalized understanding and signed form to leave AMA. Pt declined wanting to stay in room until ride came stating he wants to sit outside to wait for ride.

## 2024-07-03 NOTE — PROGRESS NOTES
[facility]    Medicine Progress Note - Hospitalist Service    Date of Admission:  7/2/2024    Assessment & Plan      Reji Monahan is a 34 year old male with longstanding uncontrolled type 1 diabetes with frequent episodes of DKA, schizophrenia, marijuana use with history of cyclical vomiting and chronic abdominal pain, and previous history of IV drug use with history of previous treatment for endocarditis and septic embolism who presented with 3-day history of nausea, vomiting, acute on chronic abdominal pain, and hyperglycemia and patient was admitted due to concern for DKA.      Diabetic ketoacidosis superimposed upon uncontrolled Type 1 DM with hyperglycemia  Lactic acidosis   Increased anion gap metabolic acidosis with compensatory respiratory alkalosis  A1c 8.4 and presented with severe hyperglycemia blood sugars 505, increased anion gap 30 with venous pH of 7.34, bicarb of 15 with pCO2 27, lactic acid of 7.1, and ketones 4.5 consistent with DKA and lactic acidosis with compensatory respiratory alkalosis.  Hyperglycemia has improved after initiation of treatment with insulin infusion, anion gap has normalized, metabolic acidosis has resolved, lactic acid has normalized, and serum ketones are improved.    -Resume subcutaneous insulin currently starting with Lantus 20 units in the morning (baseline dose varies 20 to 30 units depending on oral intake), correction scale NovoLog, and prandial dose of NovoLog 8 units with meals and 4 units with snacks once he is able to reliably tolerate oral intake  -Advance to diabetic diet as tolerated  -Anticipate stopping insulin infusion and IV fluids after advancing oral diet and after administration of subcutaneous insulin  -Ordered recheck VBG, ketones, and electrolytes every 4 hours but anticipate de-escalating lab monitoring  -Switch blood sugar monitoring from every 1-2 hours to 4 times daily once no longer requiring insulin infusion  and tolerating oral intake    Acute on chronic abdominal pain  Nausea with vomiting  Diabetic gastroparesis  Probable cannabis hyperemesis syndrome (cannabis abuse)  Known chronic abdominal pain and advanced diabetic gastroparesis treated regularly with Reglan and erythromycin.  Previous evaluations for causes of abdominal plain included nondiagnostic abdominal CT in April 2024 and had nondiagnostic abdominal ultrasound in June 2024.  Patient uses oxycodone to treat his abdominal pain and also uses cannabis routinely which can cause hyperemesis syndrome and could exacerbate diabetic gastroparesis.  Presented with increased GI symptoms of abdominal pain, nausea, and vomiting for the 3 days prior to admission.  Patient did not feel that his daily marijuana was contributing.  GI symptoms have not improved months with typical antiemetic therapy including Zofran and Compazine, but he did have significant improvement after IV Haldol.  -Will attempt to start advancing diet today  -Resume scheduled dosing of Reglan and erythromycin for gastroparesis  -Continue with as needed Zofran and Compazine as first-line treatment for recurrent nausea  -May continue IV Haldol 5 mg every 6 hours as needed breakthrough nausea or vomiting  -Continue capsaicin cream topically to abdomen  -Continue chronic regimen of oxycodone 5 mg every 6 hours as needed for severe abdominal pain but treating with not provide IV narcotics unless there is increasing concern for new acute reason for abdominal pain  -Encourage consideration of cessation of marijuana use  -Patient reports upcoming appointment with gastroparesis specialist on July 11, encouraged outpatient follow-up with that specialist  -Continue chronic doses of prucalopride succinate, senna, and colace    Diabetic neuropathy  Known chronic diabetic neuropathy treated with gabapentin 300 mg 3 times a day which she has not been taking consistently lately due to GI symptoms and did report  worsening of his neuropathic pain at admission.  -restarted chronic dose of gabapentin as patient can tolerate      Paranoid schizophrenia (H)  Asperger's disorder  Borderline intellectual functioning  Per medical record, patient carries previous diagnoses of paranoid schizophrenia, Asperger's disorder, and borderline intellectual functioning.  Patient has been prescribed Zyprexa 10 mg daily and Cymbalta 60 mg daily.  However he reported no taking the Cymbalta at all recently because he ran out of the medication and was unable to obtain a refill without being seen.  Patient also reported he only takes Zyprexa on an as needed basis rather than a daily basis.  -Restarted Cymbalta 60 mg daily   -restarted scheduled daily Zyprexa     Mild intermittent asthma  Previous history of intermittent asthma although patient reported he has not had issues in years and doesn't have an inhaler anymore  -monitor respiratory status clinically, add albuterol if indicated    History of paroxysmal SVT (supraventricular tachycardia)  Previous documented history of SVT but patient is no longer taking the diltiazem prescribed in 2020 by cardiology and has not taken it in years.  No episodes of SVT during hospitalization thus far.  -Continue cardiac monitoring while in ICU for DKA but anticipate stopping cardiac monitoring once he is no longer requiring ICU level of care for DKA unless he starts to have episodes of SVT    Hypophosphatemia  Previous history of hypophosphatemia needing supplementation and has not developed hypophosphatemia after treatment of DKA and lactic acidosis with resolution of metabolic acidosis.  -Continue phosphorus supplementation protocol as indicated and monitor phosphorus as indicated    History of intravenous drug use in remission    History of 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  Previous history of IV drug use and associated endocarditis with septic embolism in 2021.  Patient denies  recent IV drug use.   -no acute intervention    History of MRSA infections  Patient had previous MRSA infections attributed to IV drug use.  MRSA nasal testing during this hospital stay was negative.  -Continue contact isolation precautions         Diet: NPO for Medical/Clinical Reasons Except for: Meds, Ice Chips    DVT Prophylaxis: Enoxaparin (Lovenox) SQ  Wright Catheter: Not present  Lines: None     Cardiac Monitoring: ACTIVE order. Indication: ICU  Code Status: Full Code      Clinically Significant Risk Factors Present on Admission          # Hypocalcemia: Lowest Ca = 8.2 mg/dL in last 2 days, will monitor and replace as appropriate    # Anion Gap Metabolic Acidosis: Highest Anion Gap = 30 mmol/L in last 2 days, will monitor and treat as appropriate                 # DMII: A1C = 8.4 % (Ref range: <5.7 %) within past 6 months       # Asthma: noted on problem list        Disposition Plan     Medically Ready for Discharge: Anticipated in 2-4 Days             Alonso Lai MD  Hospitalist Service  [facility]  Securely message with [employer] (more info)  Text page via [employer] Paging/Directory   ______________________________________________________________________    Interval History   There were no significant overnight events.  Patient says he feels tired today because he slept poorly last night in the ICU.  Although he does not have much nausea or abdominal pain currently, he is very afraid to eat because eating typically precipitates increased nausea and vomiting as well as hyperglycemia.  He describes adjusting his diet to try to minimize hypoglycemia after eating.  He has been afebrile and hemodynamically stable.  Initial tachycardia has resolved and initial hypotension has resolved.  He has had good urine output.    Physical Exam   Vital Signs: Temp: 98.4  F (36.9  C) Temp src: Oral BP: (!) 129/90 Pulse: 82   Resp: 12 SpO2: 94 % (Pt refuses contious pulse ox.) O2 Device: None  "(Room air)    Patient Vitals for the past 24 hrs:   BP Temp Temp src Pulse Resp SpO2 Height Weight   07/03/24 0815 (!) 137/93 97.6  F (36.4  C) Oral 93 14 96 % -- 79.3 kg (174 lb 12.8 oz)   07/03/24 0700 -- -- -- 82 12 94 % -- --   07/03/24 0310 (!) 129/90 98.4  F (36.9  C) Oral 92 22 95 % -- --   07/02/24 2300 112/70 98.5  F (36.9  C) Oral 77 20 97 % -- --   07/02/24 2030 117/82 -- -- 92 24 99 % -- --   07/02/24 1900 92/62 98.9  F (37.2  C) Oral 80 14 97 % -- --   07/02/24 1850 -- -- -- 84 11 96 % -- --   07/02/24 1840 -- -- -- 83 11 96 % -- --   07/02/24 1830 -- -- -- 82 11 96 % -- --   07/02/24 1820 -- -- -- 84 12 97 % -- --   07/02/24 1810 -- -- -- 84 11 97 % -- --   07/02/24 1800 116/82 -- -- 89 12 97 % -- --   07/02/24 1700 94/60 -- -- 97 12 99 % -- --   07/02/24 1500 (!) 87/53 98.2  F (36.8  C) Oral 89 16 98 % -- --   07/02/24 1400 95/61 -- -- 92 -- 99 % -- --   07/02/24 1300 95/58 98.2  F (36.8  C) Oral 92 12 100 % -- --   07/02/24 1244 114/65 -- -- 101 (!) 9 99 % -- --   07/02/24 1204 129/80 -- -- 106 13 100 % 1.854 m (6' 1\") 79.3 kg (174 lb 14.4 oz)   07/02/24 1130 (!) 87/50 -- -- 97 16 99 % -- --   07/02/24 1115 96/66 -- -- 94 14 100 % -- --   07/02/24 1100 114/80 -- -- 106 11 99 % -- --   07/02/24 1045 115/69 -- -- 99 16 100 % -- --   07/02/24 0930 90/52 -- -- 86 13 98 % -- --   07/02/24 0915 -- -- -- 88 (!) 9 97 % -- --     Weight: 174 lbs 14.4 oz Body mass index is 23.06 kg/m .    Wt Readings from Last 4 Encounters:   07/03/24 79.3 kg (174 lb 12.8 oz)   06/03/24 82.2 kg (181 lb 4.8 oz)   02/14/24 84.8 kg (187 lb)   09/11/23 89.3 kg (196 lb 13.9 oz)     Intake/Output Summary (Last 24 hours) at 7/3/2024 0900  Last data filed at 7/3/2024 0700  Gross per 24 hour   Intake 2077.75 ml   Output 1350 ml   Net 727.75 ml       General Appearance: Somewhat anxious appearing man without signs of acute distress resting in bed  Respiratory: Normal respiratory effort  Cardiovascular: Regular rate and rhythm  GI: " Nondistended abdomen, soft, mild diffuse tenderness without peritoneal signs  Neuro: Alert and maintains wakefulness and attention, no confusion evident    Medical Decision Making             Data     I have personally reviewed the following data over the past 24 hrs:    N/A  \   N/A   / N/A     137 105 15.6 /  167 (H)   3.9 21 (L) 0.84 \     Procal: N/A CRP: N/A Lactic Acid: 0.8         Blood sugars ranged  overnight while receiving continuous insulin infusion  Magnesium 2.1, phosphorus 2.4 this morning (previous phosphorus level was normal yesterday)  Lactic acid normal at 0.8 this morning.  Ketones 0.9 (0.3 is upper limit of normal), improved from yesterday    EKG performed yesterday at admission demonstrated normal sinus rhythm with normal QT interval    Venous Blood Gas  Recent Labs   Lab 07/03/24  0515 07/03/24  0201 07/02/24 2208 07/02/24  1757   PHV 7.38 7.39 7.41 7.41   PCO2V 41 40 41 41   PO2V 56* 74* 61* 51*   HCO3V 24 24 26 26   JOSE MANUEL -1.1 -0.9 1.1 0.7   O2PER 21 21 21 21     Cardiac monitoring results reviewed over the past 24 hrs: Sinus tachycardia and normal sinus rhythm, no arrhythmias    Recent Labs   Lab 07/03/24  0810 07/03/24  0515 07/03/24  0421 07/03/24  0201 07/03/24  0012 07/02/24  2208 07/02/24  1010 07/02/24  0801   WBC  --   --   --   --   --   --   --  13.1*   HGB  --   --   --   --   --   --   --  13.2*   MCV  --   --   --   --   --   --   --  82   PLT  --   --   --   --   --   --   --  238   NA  --  137  --  135  --  135   < > 136   POTASSIUM  --  3.9  --  4.0  --  4.0   < > 4.4   CHLORIDE  --  105  --  103  --  103   < > 93*   CO2  --  21*  --  23  --  23   < > 13*   BUN  --  15.6  --  17.4  --  18.8   < > 19.6   CR  --  0.84  --  0.83  --  0.83   < > 0.91   ANIONGAP  --  11  --  9  --  9   < > 30*   LYNN  --  8.4*  --  8.2*  --  8.2*   < > 9.8   * 139* 136* 134*   < > 96   < > 505*   ALBUMIN  --   --   --   --   --   --   --  4.5   PROTTOTAL  --   --   --   --   --   --    --  7.5   BILITOTAL  --   --   --   --   --   --   --  1.0   ALKPHOS  --   --   --   --   --   --   --  133   ALT  --   --   --   --   --   --   --  23   AST  --   --   --   --   --   --   --  20   LIPASE  --   --   --   --   --   --   --  13    < > = values in this interval not displayed.

## 2024-07-03 NOTE — PLAN OF CARE
Goal Outcome Evaluation:      Plan of Care Reviewed With: patient    Overall Patient Progress: no changeOverall Patient Progress: no change    Outcome Evaluation: Pt A&Ox4, Extremely anxious, restless and frustrated with any cares or interruption of sleep. PRN 0.5 mg PO ativan and 5 mg IV haldol each given x1. VSS on RA. Tele NSR. BG remains 102-136 throughout night on insulin gtt (alg 2, 1 unit/hr) with D5 20 K+ @ 125 mL/hr. PRN zofran and compazine each given x1 for complaints of nausea. Ambulated to BR x2 to attempt BM with no success.

## 2024-07-03 NOTE — CONSULTS
Care Management Note:     Care Management team received referral due to elevated unplanned re-admission risk score.       Per IDT rounds, EMR review, and discussion with pt's hospital medical provider, it has been determined that pt will discharge to home with no identifiable discharge needs.       Patient has PCP through another care system, [facility]. Recommendation is for patient to see his PCP within 5-7 days of hospital discharge.  CM will place an external handoff at discharge.       Care Management will close referral at this time, but please place new consult should pt develop new needs during this stay.     COOPER Meza  [facility]   [phone]

## 2024-07-03 NOTE — PROGRESS NOTES
Patient appears to be more anxious  and frustrated this afternoon. Writer talked with pt on ways he helps to cope with anxiety at homes (if he likes to walk, talk with family/friends, rest, tv, music or other distractions) and if there is anything writer could do to assist. Pt declined. Patient asked if he would like anti-anxiety medication and pt declined. Writer talked with patient about his frustration and validated his feelings while talking with patient about medical treatments/plan and the importance of them. Patient verbalized understanding. Patient states he just wants to eat lunch at this time.

## 2024-07-03 NOTE — CONSULTS
SPIRITUAL HEALTH SERVICES Progress Note    Medical Surgical Unit at [facility].      SUMMARY - I checked about patient, intending to visit, but learned that he had been quite agitated overnight with anyone who disturbed his sleep.  So, I did not visit.         Plan - I will continue to follow patient and be available for any ongoing support needs until his discharge.     Dre Coffman, Ph.D,   Spiritual Health Services  [address] [place], [place] [address] [phone]  [email or web address]    Assessment -     Meaning, Beliefs, and Spirituality - Patient did not report a Lutheran preference at admission.

## 2024-07-07 LAB
BACTERIA BLD CULT: NO GROWTH
BACTERIA BLD CULT: NO GROWTH

## 2024-07-11 NOTE — DISCHARGE SUMMARY
[facility]  Hospitalist Summary      Date of Admission:  7/2/2024  Date of leaving AMA:  7/3/2024 12:45 PM  Provider: Alonso Lai MD  Service: Hospitalist Service    Principal Problem:    DKA related to DM type 1  Active Problems:    Acute on chronic abdominal pain    Asperger's disorder    Borderline intellectual functioning    Cannabinoid hyperemesis syndrome    Cannabis abuse    Diabetic gastroparesis (H)    Diabetic neuropathy (H)    Mild intermittent asthma    MRSA carrier    Paranoid schizophrenia (H)    Paroxysmal SVT (supraventricular tachycardia) (H24)    Nausea with vomiting    Uncontrolled diabetes mellitus with hyperglycemia (H)    Chronic abdominal pain    Hypophosphatemia    Elevated lactic acid level    History of intravenous drug use in remission    History of 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        Clinically Significant Risk Factors     # DMII: A1C = 8.4 % (Ref range: <5.7 %) within past 6 months           Disposition:  Patient left AMA  Condition at discharge: Stable    Hospital Course   Reji Monahan is a 34 year old male with longstanding uncontrolled type 1 diabetes with frequent episodes of DKA, schizophrenia, marijuana use with history of cyclical vomiting and chronic abdominal pain, and previous history of IV drug use with history of previous treatment for endocarditis and septic embolism who presented with 3-day history of nausea, vomiting, acute on chronic abdominal pain, and hyperglycemia and patient was admitted due to concern for DKA.       Diabetic ketoacidosis superimposed upon uncontrolled Type 1 DM with hyperglycemia  Lactic acidosis   Increased anion gap metabolic acidosis with compensatory respiratory alkalosis  A1c 8.4 and presented with severe hyperglycemia blood sugars 505, increased anion gap 30 with venous pH of 7.34, bicarb of 15 with pCO2 27, lactic acid of 7.1, and ketones 4.5 consistent with DKA and lactic  acidosis with compensatory respiratory alkalosis.  Hyperglycemia improved after initiation of treatment with insulin infusion, anion gap normalized, metabolic acidosis resolved, lactic acid normalized, and serum ketones are improved.  Diet was advanced and subcutaneous insulin was restarted.  Additional monitoring and adjustment of insulin dosing was advised but the patient chose to leave AGAINST MEDICAL ADVICE.     Acute on chronic abdominal pain  Nausea with vomiting  Diabetic gastroparesis  Probable cannabis hyperemesis syndrome (cannabis abuse)  Known chronic abdominal pain and advanced diabetic gastroparesis treated regularly with Reglan and erythromycin.  Previous evaluations for causes of abdominal plain included nondiagnostic abdominal CT in April 2024 and had nondiagnostic abdominal ultrasound in June 2024.  Patient uses oxycodone to treat his abdominal pain and also uses cannabis routinely which can cause hyperemesis syndrome and could exacerbate diabetic gastroparesis.  Presented with increased GI symptoms of abdominal pain, nausea, and vomiting for the 3 days prior to admission.  Patient did not feel that his daily marijuana was contributing.  GI symptoms have not improved months with typical antiemetic therapy including Zofran and Compazine, but he did have significant improvement after IV Haldol.  On the day that he left the hospital AGAINST MEDICAL ADVICE, he was starting to attempt to advance diet.  Chronic doses of prucalopride succinate, senna, and colace were continued.  Cessation of marijuana use was suggested.  Patient reported upcoming appointment with gastroparesis specialist on July 11 and was encouraged to keep that outpatient follow-up appointment with that specialist.     Diabetic neuropathy  Known chronic diabetic neuropathy treated with gabapentin 300 mg 3 times a day which he had not been taking consistently lately due to GI symptoms.  He did report worsening of his neuropathic pain at  admission.  Chronic dose of gabapentin was restarted as patient could tolerate.     Paranoid schizophrenia  Asperger's disorder  Borderline intellectual functioning  Per medical record, patient carries previous diagnoses of paranoid schizophrenia, Asperger's disorder, and borderline intellectual functioning.  Patient had been prescribed Zyprexa 10 mg daily and Cymbalta 60 mg daily.  However he reported not taking the Cymbalta at all recently because he ran out of the medication and was unable to obtain a refill without being seen.  Patient also reported he only took Zyprexa on an as needed basis rather than a daily basis.  He was advised to restart Cymbalta and Zyprexa and to follow-up with his mental health provider.  He was not known to have a legal alternative decision maker.     Mild intermittent asthma  Previous history of intermittent asthma although patient reported he has not had issues in years and doesn't have an inhaler anymore.  Asthma was stable during hospitalization.     History of paroxysmal SVT (supraventricular tachycardia)  Previous documented history of SVT but patient was no longer taking the diltiazem prescribed in 2020 by cardiology and had not taken it in years.  He had no episodes of SVT during hospitalization.     Hypophosphatemia  Previous history of hypophosphatemia needing supplementation and developed mild hypophosphatemia after treatment of DKA and lactic acidosis with resolution of metabolic acidosis.  Phosphorus supplementation protocol was recommended.     History of intravenous drug use in remission    History of 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  Previous history of IV drug use and associated endocarditis with septic embolism in 2021.  Patient denied recent IV drug use.      History of MRSA infections  Patient had previous MRSA infections attributed to IV drug use.  MRSA nasal testing during this hospital stay was negative.    Consultations This Hospital  Stay   NUTRITION SERVICES ADULT IP CONSULT  CARE MANAGEMENT / SOCIAL WORK IP CONSULT    Code Status   Prior    Time Spent on this Encounter   I, Alonso Lai MD, personally saw the patient today who chose to leave the hospital today AGAINST MEDICAL ADVICE.       Alonso Lai MD  [facility]  [address]   [place] [address]  Phone: [phone]  ______________________________________________________________________    Physical Exam   Vital Signs:                    Weight: 174 lbs 12.8 oz  General Appearance: Emotionally upset and somewhat angry appearing man without other signs of acute distress positive       Primary Care Physician   Bogdan Jhaveri MD      Significant Results and Procedures   Sebastián  Review discharge patient is most Recent 3 CBC's:  Recent Labs   Lab Test 07/02/24  0801 06/03/24  1432 09/12/23  0511   WBC 13.1* 8.8 5.6   HGB 13.2* 13.4 11.8*   MCV 82 83 80    196 190     Most Recent 3 BMP's:  Recent Labs   Lab Test 07/03/24  1136 07/03/24  0958 07/03/24  0946 07/03/24  0912 07/03/24  0810 07/03/24  0515 07/03/24  0421 07/03/24  0201 07/03/24  0012 07/02/24  2208   NA  --   --  136  --   --  137  --  135  --  135   POTASSIUM  --   --  4.3  --   --  3.9  --  4.0  --  4.0   CHLORIDE  --   --  103  --   --  105  --  103  --  103   CO2  --   --  24  --   --  21*  --  23  --  23   BUN  --   --   --   --   --  15.6  --  17.4  --  18.8   CR  --   --   --   --   --  0.84  --  0.83  --  0.83   ANIONGAP  --   --  9  --   --  11  --  9  --  9   LYNN  --   --   --   --   --  8.4*  --  8.2*  --  8.2*   * 148*  --  162*   < > 139*   < > 134*   < > 96    < > = values in this interval not displayed.     Most Recent Hemoglobin A1c:  Recent Labs   Lab Test 07/02/24  0801   A1C 8.4*     Most Recent 6 glucoses:  Recent Labs   Lab Test 07/03/24  1136 07/03/24  0958 07/03/24  0912 07/03/24  0810 07/03/24  0515 07/03/24  0421   * 148* 162* 167* 139*  136*      Medication list at the time the patient left AMA:  Discharge Medication List as of 7/3/2024  1:17 PM        CONTINUE these medications which have NOT CHANGED    Details   B-D U/F 31G X 8 MM insulin pen needle Inject Subcutaneous 5 times dailyDAWHistorical      bisacodyl (DULCOLAX) 5 MG EC tablet Take 5-10 mg by mouth daily as needed for constipation, Historical      dorzolamide-timolol (COSOPT) 2-0.5 % ophthalmic solution Place 1 drop into the right eye 2 times daily, Historical       MG capsule Take 100 mg by mouth 2 times daily, ANDREW, Historical      DULoxetine (CYMBALTA) 30 MG capsule Take 30 mg by mouth daily For 7 days, then 60mg daily, Historical      erythromycin ethylsuccinate (ERYPED) 200 MG/5ML suspension Take 100 mg by mouth 3 times daily (before meals), Historical      famotidine (PEPCID) 20 MG tablet Take 20 mg by mouth 2 times daily, Historical      gabapentin (NEURONTIN) 300 MG capsule Take 300 mg by mouth 3 times daily, Historical      insulin aspart (NOVOLOG PEN) 100 UNIT/ML pen Inject 8 UNITS subcutaneously with each meal and 4 UNITS with each snack. Skip dose if not eating. Inject 2 UNITS if -200, 4 UNITS if -250, 6 UNITS if -300, 8 UNITS if -350, 10 UNITS if -400, 12 UNITS if -450, 14 UNITS  if -500 with each meal. Max daily dose: 40 units, Historical      insulin glargine (LANTUS PEN) 100 UNIT/ML pen Inject 30 Units Subcutaneous every morning, Historical      LORazepam (ATIVAN) 0.5 MG tablet Take 0.5 mg by mouth daily as needed for sleep, anxiety or vomiting, Historical      metoclopramide (REGLAN) 10 MG tablet Take 10 mg by mouth 3 times daily (before meals), Historical      OLANZapine (ZYPREXA) 10 MG tablet Take 10 mg by mouth daily, Historical      ondansetron (ZOFRAN ODT) 4 MG ODT tab Take 1 tablet by mouth 3 times daily, Historical      ondansetron (ZOFRAN) 4 MG tablet Take 4 mg by mouth every 8 hours as needed for vomiting or  nausea, Historical      oxyCODONE (ROXICODONE) 5 MG tablet Take 5 mg by mouth every 6 hours as needed for pain, Historical      Polyethylene Glycol 400 (VISINE DRY EYE RELIEF) 1 % SOLN Apply 1-2 drops to eye every hour as needed (dry eye), Historical      prucalopride succinate (MOTEGRITY) 2 MG tablet Take 1 tablet by mouth daily Brand name, Historical      senna (SENOKOT) 8.6 MG tablet Take 2 tablets by mouth 2 times daily, Historical           Allergies   Allergies   Allergen Reactions    Bees Anaphylaxis

## 2024-07-17 ENCOUNTER — HOSPITAL ENCOUNTER (EMERGENCY)
Facility: CLINIC | Age: 34
Discharge: HOME | End: 2024-07-17
Attending: EMERGENCY MEDICINE | Admitting: EMERGENCY MEDICINE
Payer: MEDICARE

## 2024-07-17 VITALS
OXYGEN SATURATION: 98 % | DIASTOLIC BLOOD PRESSURE: 81 MMHG | TEMPERATURE: 97.8 F | SYSTOLIC BLOOD PRESSURE: 141 MMHG | HEART RATE: 91 BPM | RESPIRATION RATE: 18 BRPM

## 2024-07-17 DIAGNOSIS — K31.84 GASTROPARESIS: ICD-10-CM

## 2024-07-17 DIAGNOSIS — R10.9 CHRONIC ABDOMINAL PAIN: ICD-10-CM

## 2024-07-17 DIAGNOSIS — R11.2 NAUSEA AND VOMITING, UNSPECIFIED VOMITING TYPE: ICD-10-CM

## 2024-07-17 DIAGNOSIS — G89.29 CHRONIC ABDOMINAL PAIN: ICD-10-CM

## 2024-07-17 LAB
ALBUMIN SERPL BCG-MCNC: 4.6 G/DL (ref 3.5–5.2)
ALBUMIN UR-MCNC: NEGATIVE MG/DL
ALP SERPL-CCNC: 137 U/L (ref 40–150)
ALT SERPL W P-5'-P-CCNC: 22 U/L (ref 0–70)
ANION GAP SERPL CALCULATED.3IONS-SCNC: 16 MMOL/L (ref 7–15)
APPEARANCE UR: CLEAR
AST SERPL W P-5'-P-CCNC: 22 U/L (ref 0–45)
B-OH-BUTYR SERPL-SCNC: 2.69 MMOL/L
BASE EXCESS BLDV CALC-SCNC: -3.9 MMOL/L (ref -3–3)
BASOPHILS # BLD AUTO: 0 10E3/UL (ref 0–0.2)
BASOPHILS NFR BLD AUTO: 0 %
BILIRUB SERPL-MCNC: 0.9 MG/DL
BILIRUB UR QL STRIP: NEGATIVE
BUN SERPL-MCNC: 15 MG/DL (ref 6–20)
CALCIUM SERPL-MCNC: 9.6 MG/DL (ref 8.8–10.4)
CHLORIDE SERPL-SCNC: 97 MMOL/L (ref 98–107)
COLOR UR AUTO: YELLOW
CREAT SERPL-MCNC: 0.72 MG/DL (ref 0.67–1.17)
EGFRCR SERPLBLD CKD-EPI 2021: >90 ML/MIN/1.73M2
EOSINOPHIL # BLD AUTO: 0 10E3/UL (ref 0–0.7)
EOSINOPHIL NFR BLD AUTO: 0 %
ERYTHROCYTE [DISTWIDTH] IN BLOOD BY AUTOMATED COUNT: 14.6 % (ref 10–15)
GLUCOSE BLDC GLUCOMTR-MCNC: 326 MG/DL (ref 70–99)
GLUCOSE SERPL-MCNC: 421 MG/DL (ref 70–99)
GLUCOSE UR STRIP-MCNC: >499 MG/DL
HCO3 BLDV-SCNC: 22 MMOL/L (ref 21–28)
HCO3 SERPL-SCNC: 21 MMOL/L (ref 22–29)
HCT VFR BLD AUTO: 43.5 % (ref 40–53)
HGB BLD-MCNC: 14.2 G/DL (ref 13.3–17.7)
HGB UR QL STRIP: NEGATIVE
IMM GRANULOCYTES # BLD: 0 10E3/UL
IMM GRANULOCYTES NFR BLD: 0 %
KETONES UR STRIP-MCNC: 20 MG/DL
LACTATE SERPL-SCNC: 1.2 MMOL/L (ref 0.7–2)
LEUKOCYTE ESTERASE UR QL STRIP: NEGATIVE
LIPASE SERPL-CCNC: 31 U/L (ref 13–60)
LYMPHOCYTES # BLD AUTO: 0.9 10E3/UL (ref 0.8–5.3)
LYMPHOCYTES NFR BLD AUTO: 10 %
MAGNESIUM SERPL-MCNC: 2.2 MG/DL (ref 1.7–2.3)
MCH RBC QN AUTO: 27.4 PG (ref 26.5–33)
MCHC RBC AUTO-ENTMCNC: 32.6 G/DL (ref 31.5–36.5)
MCV RBC AUTO: 84 FL (ref 78–100)
MONOCYTES # BLD AUTO: 0.3 10E3/UL (ref 0–1.3)
MONOCYTES NFR BLD AUTO: 3 %
NEUTROPHILS # BLD AUTO: 7.9 10E3/UL (ref 1.6–8.3)
NEUTROPHILS NFR BLD AUTO: 86 %
NITRATE UR QL: NEGATIVE
NRBC # BLD AUTO: 0 10E3/UL
NRBC BLD AUTO-RTO: 0 /100
O2/TOTAL GAS SETTING VFR VENT: 21 %
OSMOLALITY SERPL: 306 MMOL/KG (ref 275–295)
OXYHGB MFR BLDV: 83 % (ref 70–75)
PCO2 BLDV: 42 MM HG (ref 40–50)
PH BLDV: 7.33 [PH] (ref 7.32–7.43)
PH UR STRIP: 7 [PH] (ref 5–7)
PHOSPHATE SERPL-MCNC: 2.7 MG/DL (ref 2.5–4.5)
PLATELET # BLD AUTO: 197 10E3/UL (ref 150–450)
PO2 BLDV: 55 MM HG (ref 25–47)
POTASSIUM SERPL-SCNC: 4.9 MMOL/L (ref 3.4–5.3)
PROT SERPL-MCNC: 7.8 G/DL (ref 6.4–8.3)
RBC # BLD AUTO: 5.18 10E6/UL (ref 4.4–5.9)
RBC URINE: <1 /HPF
SAO2 % BLDV: 85.4 % (ref 70–75)
SODIUM SERPL-SCNC: 134 MMOL/L (ref 135–145)
SP GR UR STRIP: 1.02 (ref 1–1.03)
SQUAMOUS EPITHELIAL: <1 /HPF
UROBILINOGEN UR STRIP-MCNC: NORMAL MG/DL
WBC # BLD AUTO: 9.2 10E3/UL (ref 4–11)
WBC URINE: 3 /HPF

## 2024-07-17 PROCEDURE — 258N000003 HC RX IP 258 OP 636: Performed by: EMERGENCY MEDICINE

## 2024-07-17 PROCEDURE — 83605 ASSAY OF LACTIC ACID: CPT | Performed by: EMERGENCY MEDICINE

## 2024-07-17 PROCEDURE — 84100 ASSAY OF PHOSPHORUS: CPT | Performed by: EMERGENCY MEDICINE

## 2024-07-17 PROCEDURE — 82805 BLOOD GASES W/O2 SATURATION: CPT | Performed by: EMERGENCY MEDICINE

## 2024-07-17 PROCEDURE — 250N000013 HC RX MED GY IP 250 OP 250 PS 637: Performed by: EMERGENCY MEDICINE

## 2024-07-17 PROCEDURE — 36415 COLL VENOUS BLD VENIPUNCTURE: CPT | Performed by: EMERGENCY MEDICINE

## 2024-07-17 PROCEDURE — 96361 HYDRATE IV INFUSION ADD-ON: CPT | Performed by: EMERGENCY MEDICINE

## 2024-07-17 PROCEDURE — 99284 EMERGENCY DEPT VISIT MOD MDM: CPT | Performed by: EMERGENCY MEDICINE

## 2024-07-17 PROCEDURE — 83930 ASSAY OF BLOOD OSMOLALITY: CPT | Performed by: EMERGENCY MEDICINE

## 2024-07-17 PROCEDURE — 250N000012 HC RX MED GY IP 250 OP 636 PS 637: Performed by: EMERGENCY MEDICINE

## 2024-07-17 PROCEDURE — 80053 COMPREHEN METABOLIC PANEL: CPT | Performed by: EMERGENCY MEDICINE

## 2024-07-17 PROCEDURE — 99284 EMERGENCY DEPT VISIT MOD MDM: CPT | Mod: 25 | Performed by: EMERGENCY MEDICINE

## 2024-07-17 PROCEDURE — 96374 THER/PROPH/DIAG INJ IV PUSH: CPT | Performed by: EMERGENCY MEDICINE

## 2024-07-17 PROCEDURE — 82962 GLUCOSE BLOOD TEST: CPT

## 2024-07-17 PROCEDURE — 250N000009 HC RX 250: Performed by: EMERGENCY MEDICINE

## 2024-07-17 PROCEDURE — 250N000011 HC RX IP 250 OP 636: Performed by: EMERGENCY MEDICINE

## 2024-07-17 PROCEDURE — 85025 COMPLETE CBC W/AUTO DIFF WBC: CPT | Performed by: EMERGENCY MEDICINE

## 2024-07-17 PROCEDURE — 81001 URINALYSIS AUTO W/SCOPE: CPT | Performed by: EMERGENCY MEDICINE

## 2024-07-17 PROCEDURE — 96375 TX/PRO/DX INJ NEW DRUG ADDON: CPT | Performed by: EMERGENCY MEDICINE

## 2024-07-17 PROCEDURE — 82010 KETONE BODYS QUAN: CPT | Performed by: EMERGENCY MEDICINE

## 2024-07-17 PROCEDURE — 83690 ASSAY OF LIPASE: CPT | Performed by: EMERGENCY MEDICINE

## 2024-07-17 PROCEDURE — 83735 ASSAY OF MAGNESIUM: CPT | Performed by: EMERGENCY MEDICINE

## 2024-07-17 RX ORDER — METHOCARBAMOL 500 MG/1
500 TABLET, FILM COATED ORAL 3 TIMES DAILY PRN
COMMUNITY
Start: 2024-07-12

## 2024-07-17 RX ORDER — LIDOCAINE HYDROCHLORIDE 20 MG/ML
5 SOLUTION OROPHARYNGEAL ONCE
Status: COMPLETED | OUTPATIENT
Start: 2024-07-17 | End: 2024-07-17

## 2024-07-17 RX ORDER — MAGNESIUM HYDROXIDE/ALUMINUM HYDROXICE/SIMETHICONE 120; 1200; 1200 MG/30ML; MG/30ML; MG/30ML
15 SUSPENSION ORAL ONCE
Status: COMPLETED | OUTPATIENT
Start: 2024-07-17 | End: 2024-07-17

## 2024-07-17 RX ORDER — ONDANSETRON 2 MG/ML
4 INJECTION INTRAMUSCULAR; INTRAVENOUS EVERY 30 MIN PRN
Status: DISCONTINUED | OUTPATIENT
Start: 2024-07-17 | End: 2024-07-17 | Stop reason: HOSPADM

## 2024-07-17 RX ORDER — LORAZEPAM 2 MG/ML
2 INJECTION INTRAMUSCULAR ONCE
Status: COMPLETED | OUTPATIENT
Start: 2024-07-17 | End: 2024-07-17

## 2024-07-17 RX ORDER — METOCLOPRAMIDE HYDROCHLORIDE 5 MG/ML
10 INJECTION INTRAMUSCULAR; INTRAVENOUS ONCE
Status: COMPLETED | OUTPATIENT
Start: 2024-07-17 | End: 2024-07-17

## 2024-07-17 RX ORDER — OMEPRAZOLE 40 MG/1
40 CAPSULE, DELAYED RELEASE ORAL
COMMUNITY
Start: 2024-07-11

## 2024-07-17 RX ORDER — LINACLOTIDE 145 UG/1
145 CAPSULE, GELATIN COATED ORAL
COMMUNITY
Start: 2024-07-11

## 2024-07-17 RX ORDER — METOCLOPRAMIDE HYDROCHLORIDE 5 MG/5ML
10 SOLUTION ORAL
COMMUNITY
Start: 2024-07-11

## 2024-07-17 RX ADMIN — LIDOCAINE HYDROCHLORIDE 5 ML: 20 SOLUTION ORAL at 14:02

## 2024-07-17 RX ADMIN — ALUMINUM HYDROXIDE, MAGNESIUM HYDROXIDE, AND SIMETHICONE 15 ML: 1200; 120; 1200 SUSPENSION ORAL at 14:02

## 2024-07-17 RX ADMIN — ONDANSETRON 4 MG: 2 INJECTION INTRAMUSCULAR; INTRAVENOUS at 13:18

## 2024-07-17 RX ADMIN — SODIUM CHLORIDE 1000 ML: 9 INJECTION, SOLUTION INTRAVENOUS at 13:22

## 2024-07-17 RX ADMIN — HUMAN INSULIN 10 UNITS: 100 INJECTION, SOLUTION SUBCUTANEOUS at 14:14

## 2024-07-17 RX ADMIN — SODIUM CHLORIDE 1000 ML: 9 INJECTION, SOLUTION INTRAVENOUS at 12:45

## 2024-07-17 ASSESSMENT — ACTIVITIES OF DAILY LIVING (ADL)
ADLS_ACUITY_SCORE: 38

## 2024-07-17 ASSESSMENT — COLUMBIA-SUICIDE SEVERITY RATING SCALE - C-SSRS
2. HAVE YOU ACTUALLY HAD ANY THOUGHTS OF KILLING YOURSELF IN THE PAST MONTH?: NO
1. IN THE PAST MONTH, HAVE YOU WISHED YOU WERE DEAD OR WISHED YOU COULD GO TO SLEEP AND NOT WAKE UP?: NO
6. HAVE YOU EVER DONE ANYTHING, STARTED TO DO ANYTHING, OR PREPARED TO DO ANYTHING TO END YOUR LIFE?: NO

## 2024-07-17 NOTE — ED TRIAGE NOTES
Pt presents by EMS with concerns of abdominal pain and elevated blood sugars.  EMS had blood sugar of 389 and then 406.  EMS had three unsuccessful IV attempts.  EMS gave 2 mg of dilaudid IM and Zofran IM.  Abdominal pain started this am.  Pt has a plan of care on record.      Triage Assessment (Adult)       Row Name 07/17/24 1121          Triage Assessment    Airway WDL WDL        Respiratory WDL    Respiratory WDL WDL        Skin Circulation/Temperature WDL    Skin Circulation/Temperature WDL WDL        Cardiac WDL    Cardiac WDL WDL        Peripheral/Neurovascular WDL    Peripheral Neurovascular WDL WDL        Cognitive/Neuro/Behavioral WDL    Cognitive/Neuro/Behavioral WDL WDL

## 2024-07-17 NOTE — ED NOTES
Addressed PT about nausea, because he had stopped vomiting and seemed comfortable, PT stated that he is very Nauseous , so I offered him Zofran as ordered.

## 2024-07-17 NOTE — DISCHARGE INSTRUCTIONS
Follow-up with [facility].  Start the prescription that was sent to [facility] by them.    Contact your provider at [facility] for any further narcotic medications.

## 2024-07-17 NOTE — ED NOTES
PT started screaming and asking for pain medications, for abdominal pain.  Stating the Zofran caused him abdominal pain.  MD notified.

## 2024-07-17 NOTE — MEDICATION SCRIBE - ADMISSION MEDICATION HISTORY
Medication Scribe Admission Medication History    Admission medication history is complete. The information provided in this note is only as accurate as the sources available at the time of the update.    Information Source(s): Patient's pharmacy and [employer] via phone    Pertinent Information: PTA med list updated per dispense report, spoke with patient's pharmacy ([facility]) for compound info. Patient refused to disclose last doses at this time.    Changes made to PTA medication list:  Added: Linzess, Methocarbamol, omeprazole, compounded Prochlorperazine suspension  Deleted: DSS capsule, ativan  Changed: reglan tablet to solution, updated Cymbalta dose to 60mg daily (unsure if patient is taking)    Allergies reviewed with patient and updates made in EHR: no    Medication History Completed By: ERA EATON 7/17/2024 2:37 PM    PTA Med List   Medication Sig Note Last Dose    B-D U/F 31G X 8 MM insulin pen needle Inject Subcutaneous 5 times daily      bisacodyl (DULCOLAX) 5 MG EC tablet Take 5-10 mg by mouth daily as needed for constipation      COMPOUNDED NON-CONTROLLED SUBSTANCE (CMPD RX) - PHARMACY TO MIX COMPOUNDED MEDICATION Take 5 mg by mouth 2 times daily Proclorperazine tablets, ora-plus, ora-sweet mixed for 1mg/1ml 7/17/2024: From [employer] Dispense Report  Last Dispensed: ready at pharmacy now (approved by ins)  Quantity/Days supply: 450/30  Ordering Provider: MD Fabián  Pharmacy: [facility]         dorzolamide-timolol (COSOPT) 2-0.5 % ophthalmic solution Place 1 drop into the right eye 2 times daily      DULoxetine (CYMBALTA) 30 MG capsule Take 60 mg by mouth daily      erythromycin ethylsuccinate (ERYPED) 200 MG/5ML suspension Take 100 mg by mouth 3 times daily (before meals)      famotidine (PEPCID) 20 MG tablet Take 20 mg by mouth 2 times daily      gabapentin (NEURONTIN) 300 MG capsule Take 300 mg by mouth 3 times daily      insulin aspart  (NOVOLOG PEN) 100 UNIT/ML pen Inject 8 UNITS subcutaneously with each meal and 4 UNITS with each snack. Skip dose if not eating. Inject 2 UNITS if -200, 4 UNITS if -250, 6 UNITS if -300, 8 UNITS if -350, 10 UNITS if -400, 12 UNITS if -450, 14 UNITS if -500 with each meal. Max daily dose: 40 units      insulin glargine (LANTUS PEN) 100 UNIT/ML pen Inject 30 Units Subcutaneous every morning      LINZESS 145 MCG capsule Take 145 mcg by mouth every morning (before breakfast)      methocarbamol (ROBAXIN) 500 MG tablet Take 500 mg by mouth 3 times daily as needed (muscle pain (musculoskeletal))      metoclopramide (REGLAN) 10 MG/10ML SOLN solution Take 10 mg by mouth 4 times daily (before meals and nightly)      OLANZapine (ZYPREXA) 10 MG tablet Take 10 mg by mouth daily      omeprazole (PRILOSEC) 40 MG DR capsule Take 40 mg by mouth 2 times daily (before meals)      ondansetron (ZOFRAN ODT) 4 MG ODT tab Take 1 tablet by mouth 3 times daily      ondansetron (ZOFRAN) 4 MG tablet Take 4 mg by mouth every 8 hours as needed for vomiting or nausea      oxyCODONE (ROXICODONE) 5 MG tablet Take 5 mg by mouth every 6 hours as needed for pain      prucalopride succinate (MOTEGRITY) 2 MG tablet Take 1 tablet by mouth daily Brand name      senna (SENOKOT) 8.6 MG tablet Take 2 tablets by mouth 2 times daily
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
## 2024-08-06 ENCOUNTER — TRANSCRIBE ORDERS (OUTPATIENT)
Dept: OTHER | Age: 34
End: 2024-08-06

## 2024-08-06 DIAGNOSIS — L97.522 ULCER OF LEFT FOOT WITH FAT LAYER EXPOSED (H): ICD-10-CM

## 2024-08-06 DIAGNOSIS — E10.69 TYPE 1 DIABETES MELLITUS WITH OTHER SPECIFIED COMPLICATION (H): Primary | ICD-10-CM

## 2024-08-06 DIAGNOSIS — K31.84 GASTROPARESIS: ICD-10-CM

## 2024-08-19 ENCOUNTER — HOSPITAL ENCOUNTER (EMERGENCY)
Facility: CLINIC | Age: 34
Discharge: HOME | End: 2024-08-19
Attending: STUDENT IN AN ORGANIZED HEALTH CARE EDUCATION/TRAINING PROGRAM | Admitting: STUDENT IN AN ORGANIZED HEALTH CARE EDUCATION/TRAINING PROGRAM
Payer: MEDICARE

## 2024-08-19 VITALS
DIASTOLIC BLOOD PRESSURE: 114 MMHG | HEART RATE: 74 BPM | OXYGEN SATURATION: 97 % | TEMPERATURE: 98.8 F | RESPIRATION RATE: 18 BRPM | SYSTOLIC BLOOD PRESSURE: 160 MMHG

## 2024-08-19 DIAGNOSIS — F12.90 CANNABINOID HYPEREMESIS SYNDROME: ICD-10-CM

## 2024-08-19 DIAGNOSIS — R10.9 CHRONIC ABDOMINAL PAIN: ICD-10-CM

## 2024-08-19 DIAGNOSIS — R11.2 CANNABINOID HYPEREMESIS SYNDROME: ICD-10-CM

## 2024-08-19 DIAGNOSIS — G89.29 CHRONIC ABDOMINAL PAIN: ICD-10-CM

## 2024-08-19 PROCEDURE — 99282 EMERGENCY DEPT VISIT SF MDM: CPT | Performed by: STUDENT IN AN ORGANIZED HEALTH CARE EDUCATION/TRAINING PROGRAM

## 2024-08-19 PROCEDURE — 99283 EMERGENCY DEPT VISIT LOW MDM: CPT | Performed by: STUDENT IN AN ORGANIZED HEALTH CARE EDUCATION/TRAINING PROGRAM

## 2024-08-19 RX ORDER — METOCLOPRAMIDE HYDROCHLORIDE 5 MG/ML
10 INJECTION INTRAMUSCULAR; INTRAVENOUS ONCE
Status: DISCONTINUED | OUTPATIENT
Start: 2024-08-19 | End: 2024-08-19 | Stop reason: HOSPADM

## 2024-08-19 ASSESSMENT — ENCOUNTER SYMPTOMS
CARDIOVASCULAR NEGATIVE: 1
EYES NEGATIVE: 1
MUSCULOSKELETAL NEGATIVE: 1
APPETITE CHANGE: 1
NEUROLOGICAL NEGATIVE: 1
ABDOMINAL PAIN: 1
RESPIRATORY NEGATIVE: 1
ENDOCRINE NEGATIVE: 1

## 2024-08-19 ASSESSMENT — COLUMBIA-SUICIDE SEVERITY RATING SCALE - C-SSRS
2. HAVE YOU ACTUALLY HAD ANY THOUGHTS OF KILLING YOURSELF IN THE PAST MONTH?: NO
1. IN THE PAST MONTH, HAVE YOU WISHED YOU WERE DEAD OR WISHED YOU COULD GO TO SLEEP AND NOT WAKE UP?: NO

## 2024-08-19 NOTE — ED NOTES
"Found patient rummaging through medical cabinets - states \"I told you I needed hospital socks!\"  Pt continues to be frustrated as \"no one is comprehending what I am saying! I have a care plan!\" Pt refuses interventions for medications, lab work etc. States \"it is going to be normal! What is the point?! I just want to leave!\"  MD updated. AMA document signed as patient wants to leave.     "

## 2024-08-19 NOTE — DISCHARGE INSTRUCTIONS
You are brought to the ER today for abdominal pain.  This seems to be a chronic issue.  In regards to your type 1 diabetes this does put you at risk for things like diabetic ketoacidosis.  He also said from associated cannabis induced hyperemesis that could be secondary to the attributing to your abdominal pain.  It is reassuring that you saw GI in regards to your chronic abdominal pain and further evaluation as this could be from worsening diabetic gastroparesis.  However without a full workup and lab workup and possible imaging cannot fully get to the bottom of your abdominal pain to see there is anything actively new happening.  If any becomes concerning can always follow-up with your primary team and return for reevaluation.

## 2024-08-19 NOTE — ED TRIAGE NOTES
"Pt arrived via EMS. Irritated, aggressive and hostile.  MD in room.  EMS insterted 20g PIV. Gave 4mg Zofran.  Blood sugar per pt was 242 before EMS arrival. Took \"oxy\" before EMS arrived as well.   C/o abd pain with n/v       Triage Assessment (Adult)       Row Name 08/19/24 0922          Triage Assessment    Airway WDL WDL        Respiratory WDL    Respiratory WDL WDL        Cognitive/Neuro/Behavioral WDL    Cognitive/Neuro/Behavioral WDL WDL                     "

## 2024-08-19 NOTE — ED PROVIDER NOTES
History     Chief Complaint   Patient presents with    Abdominal Pain     HPI  Reji Monahan is a 34 year old male who presents with abdominal pain.  Patient has a history of chronic abdominal pain, type 1 diabetes, schizophrenia, cannabinoid hyperemesis syndrome.  Patient was seen on the 16th at [facility] for abdominal pain.  He was worked up and discharged with no acute findings being evident.  He recently had an endoscopy performed which he is currently pending the results on however this was otherwise unremarkable.  He explains that feeling the better right now sitting in a hot bathtub.  He took an oxycodone prior to arrival however has been vomiting up everything else.  EMS was brought him in and provide him with some Zofran.  He noted that his blood glucose was 240 at home.  Patient is initially nonverbal refusing to talk and then became disgruntled and swearing when attempting to decipher the abdominal pain concerns.  He continues to note that we do not ever do anything even though we have yet to discuss the pain.  He notes everywhere and that we have a care plan in place that we never follow-up.    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      Nausea with vomiting 09/17/2021     Priority: Medium    DKA related to DM type 1 12/23/2020     Priority: Medium    MRSA carrier 08/28/2020     Priority: Medium    DM type 1, Hgb A1C 11.9 on 9/8/21 08/04/2020     Priority: Medium    ADD (attention deficit disorder) 04/06/2020     Priority: Medium    Gastroesophageal reflux disease without esophagitis 04/06/2020     Priority: Medium    Cannabinoid hyperemesis syndrome 03/24/2020     Priority: Medium    Chest pain 01/26/2020     Priority: Medium    Splenomegaly 01/22/2020     Priority: Medium    Acute on chronic abdominal pain 01/21/2020     Priority: Medium    Moderate malnutrition (H24) 01/20/2018     Priority: Medium    Cognitive impairment 01/19/2018     Priority: Medium    Paroxysmal SVT (supraventricular tachycardia) (H24) 11/12/2017     Priority: Medium     Last Assessment & Plan:   Formatting of this note might be different from the original.  Started on diltiazem on d/c from [facility]. Tachycardic today: 117. He reports compliance with this medication. Declined ablation while seen by [facility] [place] but will be seen as an outpatient- prefers to be seen at [facility] scheduled.  Last Assessment & Plan:   Started on diltiazem on d/c from [facility]. Tachycardic today: 117. He reports compliance with this medication. Declined ablation while seen by [facility] [place] but will be seen as an outpatient- prefers to be seen at [facility] scheduled.      Phimosis 11/01/2017     Priority: Medium     Formatting of this note might be different from the original.  Penis entrapped with recurrent balanitis      Balanitis 12/04/2016     Priority: Medium     Last Assessment & Plan:   Formatting of this note might be different from the original.  Reji is here today requesting additional antibiotics as he continues to have penile pain and phimosis. He was seen at [facility] 9/7, left upset and went to [facility] and was treated  with percocet, lotrimin, and levaquin. Per care everywhere, he then went to [facility] 9/12 and was seen by urology:  Urology was consulted for balanitis and phimosis, recommended a 10 day course of augmentin, 14 day course of fluconazole, and Lotrisone cream BID to help with symtpoms. Urology recommended to follow up in clinic in 1 month to reassess phimosis and reevaluate need for circumcision.    Reji is a poor historian, but it sounds like he took the meds given by [facility] and not others. Discussed with PharmD in our clinic. Will give doxycycline, fluconazone and lotrisone cream as recommended by urology (14 day course). Also referring to urology. Giving ibuprofen for pain. Recommended that he return next week to see his PCP.      Homelessness 06/09/2016     Priority: Medium     Last Assessment & Plan:   Formatting of this note might be different from the original.  Per [place] notes, was living in a hotel, though patient tells me today that he lives in a house in [place]- living with his brother and caring for him.  Last Assessment & Plan:   Per [place] notes, was living in a hotel, though patient tells me today that he lives in a house in [place]- living with his brother and caring for him.      Diabetic gastroparesis (H) 05/06/2016     Priority: Medium    Diabetic neuropathy (H) 05/06/2016     Priority: Medium    ACP (advance care planning) 01/08/2016     Priority: Medium     Patient has identified Health Care Agent(s): No  Add Health Care Agents: No  Patient has Advance Care Plan Documents (Health Care Directive, POLST): No, Pt declined.    Patient has identified Specific Treatment Preferences: Yes   Specific Treatment Preferences: a.) Code Status:  CPR/Attempt Resuscitation      SW met with Pt 1/7/2106 - Pt states if he's unable to communicate his healthcare wishes his brother, Raj Monahan - C: [phone], would be his primary spokesperson. LEXY Barreto, [facility]...Pager  [phone]...VM ext. [phone], contact via anwpaging      Asthma 01/06/2016     Priority: Medium    Cannabis abuse 12/16/2015     Priority: Medium    Poor dentition 11/04/2015     Priority: Medium    Dental caries 08/12/2015     Priority: Medium    Paranoid schizophrenia (H) 11/25/2014     Priority: Medium    Mild intermittent asthma 02/13/2014     Priority: Medium    Charcot foot due to diabetes mellitus (H) 11/22/2013     Priority: Medium    Borderline intellectual functioning 09/27/2010     Priority: Medium    Dyslipidemia 08/30/2010     Priority: Medium     Formatting of this note might be different from the original.  Last LDL- 105 on 6/11/13  Last LDL- 105 on 6/11/13      Asperger's disorder 08/05/2009     Priority: Medium    Insomnia 06/30/2009     Priority: Medium        Past Medical History:    Past Medical History:   Diagnosis Date    Cannabis abuse     DKA (diabetic ketoacidoses)     Endocarditis 04/2021    Homeless     Schizophrenia (H)     Sepsis, due to unspecified organism, unspecified whether acute organ dysfunction present (H) 02/06/2021    Type 1 diabetes mellitus (H)     Uncomplicated asthma     Urinary tract infection without hematuria, site unspecified 02/06/2021       Past Surgical History:    No past surgical history on file.    Family History:    Family History   Problem Relation Age of Onset    Cerebrovascular Disease Father     Diabetes Brother        Social History:  Marital Status:  Single [1]  Social History     Tobacco Use    Smoking status: Never   Substance Use Topics    Alcohol use: No    Drug use: Yes     Types: Marijuana        Medications:    B-D U/F 31G X 8 MM insulin pen needle  bisacodyl (DULCOLAX) 5 MG EC tablet  COMPOUNDED NON-CONTROLLED SUBSTANCE (CMPD RX) - PHARMACY TO MIX COMPOUNDED MEDICATION  dorzolamide-timolol (COSOPT) 2-0.5 % ophthalmic solution  DULoxetine (CYMBALTA) 30 MG capsule  famotidine (PEPCID) 20 MG tablet  gabapentin (NEURONTIN) 300 MG capsule  insulin aspart  (NOVOLOG PEN) 100 UNIT/ML pen  insulin glargine (LANTUS PEN) 100 UNIT/ML pen  LINZESS 145 MCG capsule  methocarbamol (ROBAXIN) 500 MG tablet  metoclopramide (REGLAN) 10 MG/10ML SOLN solution  OLANZapine (ZYPREXA) 10 MG tablet  omeprazole (PRILOSEC) 40 MG DR capsule  ondansetron (ZOFRAN ODT) 4 MG ODT tab  ondansetron (ZOFRAN) 4 MG tablet  oxyCODONE (ROXICODONE) 5 MG tablet  prucalopride succinate (MOTEGRITY) 2 MG tablet  senna (SENOKOT) 8.6 MG tablet          Review of Systems   Constitutional:  Positive for appetite change.   HENT: Negative.     Eyes: Negative.    Respiratory: Negative.     Cardiovascular: Negative.    Gastrointestinal:  Positive for abdominal pain.   Endocrine: Negative.    Genitourinary: Negative.    Musculoskeletal: Negative.    Skin: Negative.    Neurological: Negative.        Physical Exam   BP: (!) 160/114  Pulse: 74  Temp: 98.8  F (37.1  C)  Resp: 18  SpO2: 97 %      Physical Exam  Vitals and nursing note reviewed.   Constitutional:       Appearance: He is normal weight.      Comments: Patient is aggressive from the start of the discussion.  Refusing to talk initially and then became swearing noting that does understand why we need lab work as it always comes back normal and does not understand why I need to examine his abdomen as it is always painful   HENT:      Head: Normocephalic.   Cardiovascular:      Rate and Rhythm: Normal rate and regular rhythm.   Abdominal:      General: Abdomen is flat. Bowel sounds are normal.      Palpations: Abdomen is soft.      Tenderness: There is generalized abdominal tenderness.   Skin:     General: Skin is warm and dry.   Neurological:      Mental Status: He is alert.   Psychiatric:      Comments: Agitated and aggressive         ED Course        Procedures             No results found for this or any previous visit (from the past 24 hour(s)).    Medications   sodium chloride 0.9% BOLUS 1,000 mL (has no administration in time range)   metoclopramide  (REGLAN) injection 10 mg (has no administration in time range)       Assessments & Plan (with Medical Decision Making)     I have reviewed the nursing notes.    I have reviewed the findings, diagnosis, plan and need for follow up with the patient.      Medical Decision Making  34-year-old male presenting with abdominal pain via EMS.  Known history of chronic abdominal pain type 1 diabetes with a history of DKA, Asperger syndrome, cannabis hyperemesis syndrome.  There is a remote history of possible IV drug use but is currently in remission.  His vitals on arrival are otherwise unremarkable aside from hypertension with a blood pressure of 160/114 with a temperature of 98.8 and pulse of 74.  No signs of respite distress with oxygen saturation of 97%.  Patient's blood pressure normally runs between 150/110.  Spine to the patient that we need to complete lab work and have a full evaluation to have a clear understanding of admission requirements if needed.  Patient became distraught and angry noted to does not want to provide any blood and he just wants to be admitted at this point.  At this time we tried to explain to him any reason to admit him and chronic abdominal pain is not a reason to admit him into a hospital as we need to understand what we are treating to improve his symptoms and her pain that he is currently undergoing.  Again became agitated and aggressive with myself as well as the nurse.  He notes that he has a care plan in place and he never followed up.  We explained that he has got numerous comorbidities that could put him at various risks even though he has had normal workups previously.  This would include IV fluids with associated pain medication starting with Reglan due to patient's history of gastroparesis and noted abdominal discomfort as well as lab work and imaging patient again refused this and noted he would like to leave [place] because were not following his care plan.  Explained that even though  there is were negative workups in the past there could be some going on that is more dangerous and could be surgical or medical emergency that could result in patient's worsening of his chronic medical conditions and or possibly even death.  He again notes he just want to be admitted out the workup.  Discharge papers provided patient signed AMA paperwork and left.    Discharge Medication List as of 8/19/2024  9:56 AM          Final diagnoses:   Chronic abdominal pain   Cannabinoid hyperemesis syndrome       8/19/2024   [facility] EMERGENCY DEPT       Omar Humphrey MD  08/19/24 0958

## 2024-10-13 ENCOUNTER — HEALTH MAINTENANCE LETTER (OUTPATIENT)
Age: 34
End: 2024-10-13

## 2024-10-23 NOTE — PLAN OF CARE
----- Message from Carmen sent at 10/23/2024  7:05 AM CDT -----  Call Patient to see how is back pain is doing   A/OX4. Tachycardiac, other VSS on RA. Abdominal pain is managed with IV Toradol. Bisacodyl suppository given, refusing miralax. Pt report two small BM's, not seen by writer or any staff. Small emesis x2, Zofran given w/ relief. Independent in the room. Not able to tolerate diet.

## 2025-01-11 ENCOUNTER — HOSPITAL ENCOUNTER (EMERGENCY)
Facility: CLINIC | Age: 35
Discharge: HOME | End: 2025-01-11
Attending: NURSE PRACTITIONER | Admitting: NURSE PRACTITIONER
Payer: MEDICARE

## 2025-01-11 VITALS
OXYGEN SATURATION: 98 % | SYSTOLIC BLOOD PRESSURE: 128 MMHG | TEMPERATURE: 99.1 F | RESPIRATION RATE: 12 BRPM | DIASTOLIC BLOOD PRESSURE: 90 MMHG | HEART RATE: 93 BPM

## 2025-01-11 DIAGNOSIS — R11.2 NAUSEA AND VOMITING, UNSPECIFIED VOMITING TYPE: ICD-10-CM

## 2025-01-11 DIAGNOSIS — E87.29 HIGH ANION GAP METABOLIC ACIDOSIS: ICD-10-CM

## 2025-01-11 DIAGNOSIS — E10.65 TYPE 1 DIABETES MELLITUS WITH HYPERGLYCEMIA (H): ICD-10-CM

## 2025-01-11 LAB
ALBUMIN SERPL BCG-MCNC: 4.3 G/DL (ref 3.5–5.2)
ALBUMIN UR-MCNC: 20 MG/DL
ALP SERPL-CCNC: 124 U/L (ref 40–150)
ALT SERPL W P-5'-P-CCNC: 26 U/L (ref 0–70)
ANION GAP SERPL CALCULATED.3IONS-SCNC: 18 MMOL/L (ref 7–15)
APPEARANCE UR: CLEAR
AST SERPL W P-5'-P-CCNC: 19 U/L (ref 0–45)
B-OH-BUTYR SERPL-SCNC: 3.24 MMOL/L
BASE EXCESS BLDV CALC-SCNC: -2.1 MMOL/L (ref -3–3)
BASOPHILS # BLD AUTO: 0 10E3/UL (ref 0–0.2)
BASOPHILS NFR BLD AUTO: 0 %
BILIRUB SERPL-MCNC: 0.9 MG/DL
BILIRUB UR QL STRIP: NEGATIVE
BUN SERPL-MCNC: 39.8 MG/DL (ref 6–20)
CALCIUM SERPL-MCNC: 8.8 MG/DL (ref 8.8–10.4)
CHLORIDE SERPL-SCNC: 98 MMOL/L (ref 98–107)
COLOR UR AUTO: YELLOW
CREAT SERPL-MCNC: 0.97 MG/DL (ref 0.67–1.17)
EGFRCR SERPLBLD CKD-EPI 2021: >90 ML/MIN/1.73M2
EOSINOPHIL # BLD AUTO: 0 10E3/UL (ref 0–0.7)
EOSINOPHIL NFR BLD AUTO: 0 %
ERYTHROCYTE [DISTWIDTH] IN BLOOD BY AUTOMATED COUNT: 15.3 % (ref 10–15)
EST. AVERAGE GLUCOSE BLD GHB EST-MCNC: 214 MG/DL
GLUCOSE BLDC GLUCOMTR-MCNC: 277 MG/DL (ref 70–99)
GLUCOSE BLDC GLUCOMTR-MCNC: 332 MG/DL (ref 70–99)
GLUCOSE SERPL-MCNC: 325 MG/DL (ref 70–99)
GLUCOSE UR STRIP-MCNC: >1000 MG/DL
HBA1C MFR BLD: 9.1 %
HCO3 BLDV-SCNC: 23 MMOL/L (ref 21–28)
HCO3 SERPL-SCNC: 20 MMOL/L (ref 22–29)
HCT VFR BLD AUTO: 36.6 % (ref 40–53)
HGB BLD-MCNC: 12.1 G/DL (ref 13.3–17.7)
HGB UR QL STRIP: NEGATIVE
HYALINE CASTS: 3 /LPF
IMM GRANULOCYTES # BLD: 0.1 10E3/UL
IMM GRANULOCYTES NFR BLD: 0 %
KETONES UR STRIP-MCNC: 60 MG/DL
LACTATE SERPL-SCNC: 1.1 MMOL/L (ref 0.7–2)
LEUKOCYTE ESTERASE UR QL STRIP: ABNORMAL
LIPASE SERPL-CCNC: 7 U/L (ref 13–60)
LYMPHOCYTES # BLD AUTO: 1.2 10E3/UL (ref 0.8–5.3)
LYMPHOCYTES NFR BLD AUTO: 9 %
MAGNESIUM SERPL-MCNC: 2.1 MG/DL (ref 1.7–2.3)
MCH RBC QN AUTO: 26.8 PG (ref 26.5–33)
MCHC RBC AUTO-ENTMCNC: 33.1 G/DL (ref 31.5–36.5)
MCV RBC AUTO: 81 FL (ref 78–100)
MONOCYTES # BLD AUTO: 0.9 10E3/UL (ref 0–1.3)
MONOCYTES NFR BLD AUTO: 7 %
MUCOUS THREADS #/AREA URNS LPF: PRESENT /LPF
NEUTROPHILS # BLD AUTO: 11.1 10E3/UL (ref 1.6–8.3)
NEUTROPHILS NFR BLD AUTO: 84 %
NITRATE UR QL: NEGATIVE
NRBC # BLD AUTO: 0 10E3/UL
NRBC BLD AUTO-RTO: 0 /100
O2/TOTAL GAS SETTING VFR VENT: 21 %
OXYHGB MFR BLDV: 85 % (ref 70–75)
PCO2 BLDV: 39 MM HG (ref 40–50)
PH BLDV: 7.38 [PH] (ref 7.32–7.43)
PH UR STRIP: 5.5 [PH] (ref 5–7)
PLATELET # BLD AUTO: 168 10E3/UL (ref 150–450)
PO2 BLDV: 56 MM HG (ref 25–47)
POTASSIUM SERPL-SCNC: 4.2 MMOL/L (ref 3.4–5.3)
PROT SERPL-MCNC: 6.7 G/DL (ref 6.4–8.3)
RBC # BLD AUTO: 4.52 10E6/UL (ref 4.4–5.9)
RBC URINE: 0 /HPF
SAO2 % BLDV: 87 % (ref 70–75)
SODIUM SERPL-SCNC: 136 MMOL/L (ref 135–145)
SP GR UR STRIP: 1.03 (ref 1–1.03)
SQUAMOUS EPITHELIAL: <1 /HPF
UROBILINOGEN UR STRIP-MCNC: NORMAL MG/DL
WBC # BLD AUTO: 13.2 10E3/UL (ref 4–11)
WBC URINE: 2 /HPF

## 2025-01-11 PROCEDURE — 250N000012 HC RX MED GY IP 250 OP 636 PS 637: Performed by: NURSE PRACTITIONER

## 2025-01-11 PROCEDURE — 83735 ASSAY OF MAGNESIUM: CPT | Performed by: NURSE PRACTITIONER

## 2025-01-11 PROCEDURE — 96361 HYDRATE IV INFUSION ADD-ON: CPT | Performed by: NURSE PRACTITIONER

## 2025-01-11 PROCEDURE — 81001 URINALYSIS AUTO W/SCOPE: CPT | Performed by: NURSE PRACTITIONER

## 2025-01-11 PROCEDURE — 250N000011 HC RX IP 250 OP 636: Performed by: NURSE PRACTITIONER

## 2025-01-11 PROCEDURE — 258N000003 HC RX IP 258 OP 636: Performed by: NURSE PRACTITIONER

## 2025-01-11 PROCEDURE — 99285 EMERGENCY DEPT VISIT HI MDM: CPT | Performed by: NURSE PRACTITIONER

## 2025-01-11 PROCEDURE — 82010 KETONE BODYS QUAN: CPT | Performed by: NURSE PRACTITIONER

## 2025-01-11 PROCEDURE — 96374 THER/PROPH/DIAG INJ IV PUSH: CPT | Performed by: NURSE PRACTITIONER

## 2025-01-11 PROCEDURE — 82040 ASSAY OF SERUM ALBUMIN: CPT | Performed by: NURSE PRACTITIONER

## 2025-01-11 PROCEDURE — 80053 COMPREHEN METABOLIC PANEL: CPT | Performed by: NURSE PRACTITIONER

## 2025-01-11 PROCEDURE — 83690 ASSAY OF LIPASE: CPT | Performed by: NURSE PRACTITIONER

## 2025-01-11 PROCEDURE — 82805 BLOOD GASES W/O2 SATURATION: CPT | Performed by: NURSE PRACTITIONER

## 2025-01-11 PROCEDURE — 82962 GLUCOSE BLOOD TEST: CPT

## 2025-01-11 PROCEDURE — 83036 HEMOGLOBIN GLYCOSYLATED A1C: CPT | Performed by: NURSE PRACTITIONER

## 2025-01-11 PROCEDURE — 99284 EMERGENCY DEPT VISIT MOD MDM: CPT | Mod: 25 | Performed by: NURSE PRACTITIONER

## 2025-01-11 PROCEDURE — 83605 ASSAY OF LACTIC ACID: CPT | Performed by: NURSE PRACTITIONER

## 2025-01-11 PROCEDURE — 85025 COMPLETE CBC W/AUTO DIFF WBC: CPT | Performed by: NURSE PRACTITIONER

## 2025-01-11 PROCEDURE — 87086 URINE CULTURE/COLONY COUNT: CPT | Performed by: NURSE PRACTITIONER

## 2025-01-11 PROCEDURE — 36415 COLL VENOUS BLD VENIPUNCTURE: CPT | Performed by: NURSE PRACTITIONER

## 2025-01-11 RX ORDER — PSEUDOEPHEDRINE HCL 30 MG
100 TABLET ORAL 2 TIMES DAILY
COMMUNITY
Start: 2025-01-03

## 2025-01-11 RX ORDER — ONDANSETRON 2 MG/ML
4 INJECTION INTRAMUSCULAR; INTRAVENOUS ONCE
Status: COMPLETED | OUTPATIENT
Start: 2025-01-11 | End: 2025-01-11

## 2025-01-11 RX ORDER — SODIUM CHLORIDE 9 MG/ML
INJECTION, SOLUTION INTRAVENOUS CONTINUOUS
Status: DISCONTINUED | OUTPATIENT
Start: 2025-01-11 | End: 2025-01-11 | Stop reason: HOSPADM

## 2025-01-11 RX ORDER — METOCLOPRAMIDE 10 MG/1
10 TABLET ORAL 4 TIMES DAILY
COMMUNITY
Start: 2025-01-10

## 2025-01-11 RX ORDER — FERROUS SULFATE 325(65) MG
1 TABLET ORAL DAILY
COMMUNITY
Start: 2024-11-05

## 2025-01-11 RX ORDER — LORAZEPAM 2 MG/ML
1 INJECTION INTRAMUSCULAR ONCE
Status: COMPLETED | OUTPATIENT
Start: 2025-01-11 | End: 2025-01-11

## 2025-01-11 RX ORDER — DEXTROSE MONOHYDRATE AND SODIUM CHLORIDE 5; .45 G/100ML; G/100ML
1000 INJECTION, SOLUTION INTRAVENOUS CONTINUOUS PRN
Status: DISCONTINUED | OUTPATIENT
Start: 2025-01-11 | End: 2025-01-11 | Stop reason: HOSPADM

## 2025-01-11 RX ORDER — OXYCODONE HCL 5 MG/5 ML
5 SOLUTION, ORAL ORAL DAILY PRN
COMMUNITY
Start: 2024-08-16

## 2025-01-11 RX ORDER — DEXTROSE MONOHYDRATE 25 G/50ML
25-50 INJECTION, SOLUTION INTRAVENOUS
Status: DISCONTINUED | OUTPATIENT
Start: 2025-01-11 | End: 2025-01-11 | Stop reason: HOSPADM

## 2025-01-11 RX ORDER — GABAPENTIN 400 MG/1
400 CAPSULE ORAL 3 TIMES DAILY
COMMUNITY
Start: 2025-01-05

## 2025-01-11 RX ADMIN — SODIUM CHLORIDE 1000 ML: 9 INJECTION, SOLUTION INTRAVENOUS at 12:50

## 2025-01-11 RX ADMIN — SODIUM CHLORIDE: 9 INJECTION, SOLUTION INTRAVENOUS at 14:05

## 2025-01-11 RX ADMIN — ONDANSETRON 4 MG: 2 INJECTION, SOLUTION INTRAMUSCULAR; INTRAVENOUS at 12:50

## 2025-01-11 RX ADMIN — SODIUM CHLORIDE 1000 ML: 9 INJECTION, SOLUTION INTRAVENOUS at 14:25

## 2025-01-11 RX ADMIN — INSULIN ASPART 10 UNITS: 100 INJECTION, SOLUTION INTRAVENOUS; SUBCUTANEOUS at 14:24

## 2025-01-11 ASSESSMENT — ACTIVITIES OF DAILY LIVING (ADL)
ADLS_ACUITY_SCORE: 52

## 2025-01-11 ASSESSMENT — COLUMBIA-SUICIDE SEVERITY RATING SCALE - C-SSRS
2. HAVE YOU ACTUALLY HAD ANY THOUGHTS OF KILLING YOURSELF IN THE PAST MONTH?: NO
6. HAVE YOU EVER DONE ANYTHING, STARTED TO DO ANYTHING, OR PREPARED TO DO ANYTHING TO END YOUR LIFE?: NO
1. IN THE PAST MONTH, HAVE YOU WISHED YOU WERE DEAD OR WISHED YOU COULD GO TO SLEEP AND NOT WAKE UP?: NO

## 2025-01-11 NOTE — MEDICATION SCRIBE - ADMISSION MEDICATION HISTORY
Medication Scribe Admission Medication History    Admission medication history is complete. The information provided in this note is only as accurate as the sources available at the time of the update.    Information Source(s): Patient, Clinic records, and [employer] via in-person    Pertinent Information: OF NOTE: Provider stopped olanzapine to start abilify. Patient states he stopped abilify due to side effects. Not taking either. Verified no use of pain pump or inhalers currently.      Changes made to PTA medication list:  Added: docusate, iron, test strips  Deleted: anti-nausea compound, cymbalta, zyprexa, motegrity  Changed: gabapentin from 300mg to 400mg dose, lantus from 30u to 24u daily, reglan from liquid to tablet, oxycodone from tablet to liquid.    Allergies reviewed with patient and updates made in EHR: yes    Medication History Completed By: ERA EATON 1/11/2025 1:25 PM    PTA Med List   Medication Sig Note Last Dose/Taking    B-D U/F 31G X 8 MM insulin pen needle Inject Subcutaneous 5 times daily  1/11/2025 Morning    CONTOUR NEXT TEST test strip 1 strip by In Vitro route 4 times daily.  1/11/2025 Morning    dorzolamide-timolol (COSOPT) 2-0.5 % ophthalmic solution Place 1 drop into the right eye 2 times daily  Past Week     MG capsule Take 100 mg by mouth 2 times daily. Docusate taken with senna  1/9/2025 Morning    famotidine (PEPCID) 20 MG tablet Take 20 mg by mouth 2 times daily  1/9/2025 Morning    FEROSUL 325 (65 Fe) MG tablet Take 1 tablet by mouth daily.  1/9/2025 Morning    gabapentin (NEURONTIN) 400 MG capsule Take 400 mg by mouth 3 times daily. 1/11/2025: From [employer] Dispense Report  Last Dispensed: 01/05/25  Quantity/Days supply: 270/90  Ordering Provider: Stephanie Mosqueda  Pharmacy: [facility]   1/9/2025 Morning    insulin aspart (NOVOLOG PEN) 100 UNIT/ML pen Inject 8 UNITS subcutaneously with each meal and 4 UNITS with each snack. Skip dose if  not eating. Inject 2 UNITS if -200, 4 UNITS if -250, 6 UNITS if -300, 8 UNITS if -350, 10 UNITS if -400, 12 UNITS if -450, 14 UNITS if -500 with each meal. Max daily dose: 40 units 1/11/2025: 5u 1/11/2025 Morning    insulin glargine (LANTUS PEN) 100 UNIT/ML pen Inject 24 Units subcutaneously every morning.  1/11/2025 Morning    LINZESS 145 MCG capsule Take 145 mcg by mouth every morning (before breakfast)  1/9/2025 Morning    methocarbamol (ROBAXIN) 500 MG tablet Take 500 mg by mouth 3 times daily as needed (muscle pain (musculoskeletal))  1/9/2025 Morning    metoclopramide (REGLAN) 10 MG tablet Take 10 mg by mouth 4 times daily. Before meals and nightly  1/9/2025 Morning    omeprazole (PRILOSEC) 40 MG DR capsule Take 40 mg by mouth 2 times daily (before meals)  1/9/2025 Morning    ondansetron (ZOFRAN ODT) 4 MG ODT tab Take 1 tablet by mouth 3 times daily 1/11/2025: From [employer] Dispense Report  Last Dispensed: 01/10/25  Quantity/Days supply: 30/6  Ordering Provider: Stephanie Mosqueda NP  Pharmacy: [facility] [place]   1/6/2025    oxyCODONE (ROXICODONE) 5 MG/5ML solution Take 5 mg by mouth daily as needed for breakthrough pain.  1/11/2025 at 12:00 AM    senna (SENOKOT) 8.6 MG tablet Take 2 tablets by mouth 2 times daily  1/9/2025 Morning

## 2025-01-11 NOTE — DISCHARGE INSTRUCTIONS
Your blood sugar was elevated, probably related to your vomiting.    You were given subcutaneous insulin-novolog 10 units here.  Your blood sugar is coming down, and is 277 at time of discharge.    Resume your home medications as prescribed.    Follow-up with your clinic provider this week for recheck.

## 2025-01-11 NOTE — ED TRIAGE NOTES
Brought in by EMS with high blood sugar. Reported to be 314 via EMS check. Hypertensive at this time.

## 2025-01-11 NOTE — ED PROVIDER NOTES
History     Chief Complaint   Patient presents with    Hyperglycemia     HPI  Reji Monahan is a 34 year old male with history of uncontrolled T1DM (on 24 units of Lantus every morning), diabetic neuropathy, polysubstance abuse, chronic pain disorder, cyclic vomiting/cannabinoid gastroparesis, paroxysmal SVT, Asperger's, mild asthma, schizophrenia, autism spectrum disorder, and celiac disease who presents via EMS to the emergency department for evaluation of hyperglycemia, nausea, vomiting, and chronic pain.  Patient reports vomiting all day yesterday.  His blood sugar last evening read high twice which is >600 on his meter, and on his sensor was high this morning which is >400.   He reports feeling hot and cold.  No objective fevers.  He has vomited once during the night, but has had no further vomiting.  He has been eating crushed ice.  Denies diarrhea or constipation.  Denies any known ill contacts.  He complains of pain all over, mostly in his back and is requesting that I give him IV Dilaudid.  He took his oxycodone at home this morning and states it did not help.  Patient was given IV Toradol by EMS prior to arrival. He reports he is not here for his chronic abdominal pain and it is his back pain that he needs the IV dilaudid.  He has had low back pain for the last couple months.    Patient is currently on the Lantus 24 units daily which she took this morning.  He is also on NovoLog sliding scale, he does not eat regular meals so has needed to use a sliding scale based on when he eats.    Patient has an emergency care plan regarding opiate use in emergency department when presenting in DKA, chronic pain, and polysubstance abuse.  I have reviewed the treatment plan and also reviewed it with the patient.      Patient doctors through the [facility] system, normally goes to [place] emergency department and his PCP is [facility] (Dr. Bogdan Jhaveri) and Stephanie Mosqueda CNP. Endocrinologist is  Dr. Hdz ([facility])      Follow-up from his recent hospitilization with [facility] visit 12/30/2024, 1/3/2025, and yesterday.  --Received prescription for Ativan, oxycodone, and Reglan.  --increased his gabapentin.     Hospitalized 12/9-12/10/2024 at [facility] for vomiting, DKA, and sepsis related to right thigh abscess with cellulitis.  He was treated with IV vancomycin and then was adamant about discharge next day and was discharged on Augmentin.  Hospitalized 12/19-12/20/2024 at [facility] for DKA, left AMA. I have reviewed that discharge summary.    Hospital Problem List   Active Problems:  Diabetic ketoacidosis without coma associated with type 1 diabetes mellitus (HC)  Cannabinoid hyperemesis syndrome  Gastroesophageal reflux disease without esophagitis  Diabetic peripheral neuropathy (HC)    Diagnoses impacting complexity: none to add     Hospital Course     Reji Monahan is a(n) 34 y.o. with a history of type 1 diabetes with recurrent diabetic ketoacidosis due to noncompliance, asthma, cannabinoid gastroparesis paranoid schizophrenia, Asperger's syndrome, who was admitted on 12/19/2024 with elevated blood sugars back pain nausea vomiting.     Denies having any chest pain shortness of breath cough fever chills. He states that he has an abscess in his right thigh and he was given antibiotics last time. When he takes antibiotics it causes him nausea and vomiting. He states that he was taking his insulin He was recently discharged from [facility] after getting admitted for DKA.    In the ED his vital signs were stable except for sinus tachycardia. His CBC was normal his basic metabolic panel showed a sodium 132 potassium 4.8 chloride of 93 bicarb of 17 anion gap of 22 glucose of 328 calcium 9.5 and a creatinine of 0.84. His liver enzymes showed an ALT of 78 and AST of 57 with normal bilirubin. His UA showed a glucose of more than 1000 with more than 80 ketones.  His lactate was 1.6. His VBG showed a pH of 7.38 pCO2 of 35 and a bicarbonate of 21. With his beta hydroxybutyrate was elevated at 4.8. Patient was started on Endo tool is being admitted for further care and treatment in the CCU    Patient gap started mproving an his Blood glucose came down and he was transitioned to lantus insulin. He was supposed to be seen by the day provider but he did not wait and went home against medial advise.  Recommendations for Outpatient Provider PCP: CARIE FERNANDEZ MD         PDMP Review         Value Time User    State PDMP site checked  Yes 1/11/2025 12:22 PM Ramona Oakes APRN CNP                Allergies:  Allergies   Allergen Reactions    Bees Anaphylaxis       Problem List:    Patient Active Problem List    Diagnosis Date Noted    Elevated lactic acid level 07/02/2024     Priority: Medium    History of intravenous drug use in remission 07/02/2024     Priority: Medium    History of 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 07/02/2024     Priority: Medium    Hypokalemia 06/04/2024     Priority: Medium    Hypophosphatemia 06/04/2024     Priority: Medium    DKA (diabetic ketoacidosis) (H) 06/03/2024     Priority: Medium    Chronic abdominal pain 06/03/2024     Priority: Medium    Uncontrolled diabetes mellitus with hyperglycemia (H) 09/11/2023     Priority: Medium    Vision loss, right eye 09/11/2023     Priority: Medium     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      Nausea with vomiting 09/17/2021     Priority: Medium    DKA related to DM type 1 12/23/2020     Priority: Medium    MRSA carrier 08/28/2020     Priority: Medium    DM type 1, Hgb A1C 11.9 on 9/8/21 08/04/2020     Priority: Medium    ADD (attention deficit disorder) 04/06/2020     Priority: Medium    Gastroesophageal reflux disease without esophagitis 04/06/2020     Priority: Medium    Cannabinoid hyperemesis syndrome 03/24/2020     Priority: Medium    Chest pain  01/26/2020     Priority: Medium    Splenomegaly 01/22/2020     Priority: Medium    Acute on chronic abdominal pain 01/21/2020     Priority: Medium    Moderate malnutrition 01/20/2018     Priority: Medium    Cognitive impairment 01/19/2018     Priority: Medium    Paroxysmal SVT (supraventricular tachycardia) 11/12/2017     Priority: Medium     Last Assessment & Plan:   Formatting of this note might be different from the original.  Started on diltiazem on d/c from [facility]. Tachycardic today: 117. He reports compliance with this medication. Declined ablation while seen by [facility] at [place] but will be seen as an outpatient- prefers to be seen at [facility] scheduled.  Last Assessment & Plan:   Started on diltiazem on d/c from [facility]. Tachycardic today: 117. He reports compliance with this medication. Declined ablation while seen by [facility] at [place] but will be seen as an outpatient- prefers to be seen at [facility] scheduled.      Phimosis 11/01/2017     Priority: Medium     Formatting of this note might be different from the original.  Penis entrapped with recurrent balanitis      Balanitis 12/04/2016     Priority: Medium     Last Assessment & Plan:   Formatting of this note might be different from the original.  Reji is here today requesting additional antibiotics as he continues to have penile pain and phimosis. He was seen at [facility] ED 9/7, left upset and went to [facility] instead and was treated with percocet, lotrimin, and levaquin. Per care everywhere, he then went to [facility] 9/12 and was seen by urology:  Urology was consulted for balanitis and phimosis, recommended a 10 day course of augmentin, 14 day course of fluconazole, and Lotrisone cream BID to help with symtpoms. Urology recommended to follow up in clinic in 1 month to reassess phimosis and reevaluate need for circumcision.    Reji is a poor historian, but it sounds like he took the meds given by [facility] and  not others. Discussed with PharmD in our clinic. Will give doxycycline, fluconazone and lotrisone cream as recommended by urology (14 day course). Also referring to urology. Giving ibuprofen for pain. Recommended that he return next week to see his PCP.      Homelessness 06/09/2016     Priority: Medium     Last Assessment & Plan:   Formatting of this note might be different from the original.  Per [place] notes, was living in a hotel, though patient tells me today that he lives in a house in [place]- living with his brother and caring for him.  Last Assessment & Plan:   Per [place] notes, was living in a hotel, though patient tells me today that he lives in a house in [place]- living with his brother and caring for him.      Diabetic gastroparesis (H) 05/06/2016     Priority: Medium    Diabetic neuropathy (H) 05/06/2016     Priority: Medium    ACP (advance care planning) 01/08/2016     Priority: Medium     Patient has identified Health Care Agent(s): No  Add Health Care Agents: No  Patient has Advance Care Plan Documents (Health Care Directive, POLST): No, Pt declined.    Patient has identified Specific Treatment Preferences: Yes   Specific Treatment Preferences: a.) Code Status:  CPR/Attempt Resuscitation      SW met with Pt 1/7/2106 - Pt states if he's unable to communicate his healthcare wishes his brother, Raj Monahan - C: [phone], would be his primary spokesperson. LEXY Barreto, [facility]...Pager [phone]... ext. [phone], contact via anwpaging      Asthma 01/06/2016     Priority: Medium    Cannabis abuse 12/16/2015     Priority: Medium    Poor dentition 11/04/2015     Priority: Medium    Dental caries 08/12/2015     Priority: Medium    Paranoid schizophrenia (H) 11/25/2014     Priority: Medium    Mild intermittent asthma 02/13/2014     Priority: Medium    Charcot foot due to diabetes mellitus (H) 11/22/2013     Priority: Medium    Borderline intellectual functioning 09/27/2010      Priority: Medium    Dyslipidemia 08/30/2010     Priority: Medium     Formatting of this note might be different from the original.  Last LDL- 105 on 6/11/13  Last LDL- 105 on 6/11/13      Asperger's disorder 08/05/2009     Priority: Medium    Insomnia 06/30/2009     Priority: Medium        Past Medical History:    Past Medical History:   Diagnosis Date    Cannabis abuse     DKA (diabetic ketoacidoses)     Endocarditis 04/2021    Homeless     Schizophrenia (H)     Sepsis, due to unspecified organism, unspecified whether acute organ dysfunction present (H) 02/06/2021    Type 1 diabetes mellitus (H)     Uncomplicated asthma     Urinary tract infection without hematuria, site unspecified 02/06/2021       Past Surgical History:    History reviewed. No pertinent surgical history.    Family History:    Family History   Problem Relation Age of Onset    Cerebrovascular Disease Father     Diabetes Brother        Social History:  Marital Status:  Single [1]  Social History     Tobacco Use    Smoking status: Never   Substance Use Topics    Alcohol use: No    Drug use: Yes     Types: Marijuana        Medications:    B-D U/F 31G X 8 MM insulin pen needle  CONTOUR NEXT TEST test strip  dorzolamide-timolol (COSOPT) 2-0.5 % ophthalmic solution   MG capsule  famotidine (PEPCID) 20 MG tablet  FEROSUL 325 (65 Fe) MG tablet  gabapentin (NEURONTIN) 400 MG capsule  insulin aspart (NOVOLOG PEN) 100 UNIT/ML pen  insulin glargine (LANTUS PEN) 100 UNIT/ML pen  LINZESS 145 MCG capsule  methocarbamol (ROBAXIN) 500 MG tablet  metoclopramide (REGLAN) 10 MG tablet  omeprazole (PRILOSEC) 40 MG DR capsule  ondansetron (ZOFRAN ODT) 4 MG ODT tab  oxyCODONE (ROXICODONE) 5 MG/5ML solution  senna (SENOKOT) 8.6 MG tablet          Review of Systems  As mentioned above in the history present illness. All other systems were reviewed and are negative.    Physical Exam   BP: (!) 172/104  Pulse: 105  Temp: 99.1  F (37.3  C)  Resp: 22  SpO2: 100  %      Physical Exam  Constitutional:       General: He is not in acute distress.     Appearance: He is well-developed. He is not ill-appearing.      Comments: Patient is very aggressive during entire interaction starting immediately when I enter the room due to wanting IV opioid medication.   HENT:      Head: Normocephalic and atraumatic.      Right Ear: External ear normal.      Left Ear: External ear normal.      Nose: Nose normal.      Mouth/Throat:      Mouth: Mucous membranes are moist.   Eyes:      Conjunctiva/sclera: Conjunctivae normal.   Cardiovascular:      Rate and Rhythm: Normal rate and regular rhythm.      Heart sounds: Normal heart sounds. No murmur heard.  Pulmonary:      Effort: Pulmonary effort is normal. No respiratory distress.      Breath sounds: Normal breath sounds.   Musculoskeletal:         General: Normal range of motion.   Skin:     General: Skin is warm and dry.      Findings: No rash.   Neurological:      General: No focal deficit present.      Mental Status: He is alert and oriented to person, place, and time.         ED Course     ED Course as of 01/11/25 1516   Sat Jan 11, 2025   1250 Patient frequently asking for IV pain medication such as Dilaudid for his pain.  I explained to patient that he has a emergency department treatment plan and the use of IV opioids should be avoided for his chronic pain.  He states he is not here for his chronic pain, but is having horrible back pain and wants something just to take the edge off.  I have offered him a IV Ativan, and once his nausea is improved I am willing to give him a dose of oxycodone which she was prescribed a small number at his most recent clinic visit.  Patient seemed very frustrated by this.  I did review his treatment plan that was in his record with him so that he was made aware.  I informed patient that I will not give him any IV opioid medication for his chronic pain.   1302 Nursing reports  patient refused the IV Ativan that  I ordered for his nausea and discomfort. He told nurse that it won't help with his pain and that is why he needs IV Dilaudid.   1355 I discussed the lab results with patient.  Labs concerning for anion gap 18, blood glucose 325, and serum ketones 3.24. No acidosis. Patient has received IV normal saline 1.5 L bolus.  We will give another NS 1L bolus.  We discussed possible admission if his blood sugars are not quickly correcting, awaiting remainder of labs.    He does express frustration because he wants IV Dilaudid which I again told him I am not going to give him IV opioid medication.  I did offer him a dose of oral oxycodone because his clinic provider ordered this yesterday.  He has a treatment plan on file regarding use of opioid medications for his chronic pains.   1504 I discussed patient's work-up with Dr. Quinteros, emergency physician, who also saw patient to discuss plan of care to get  his blood sugar down. Patient became upset about discussion of not getting IV pain medications.   Given he has normal pH and is no longer having any vomiting we will treat him with subcutaenous insulin (aspart-fast acting novolog) 10 units which he has been given. He received total of 2.5 L normal saline. Recheck of his blood sugar 277.       Procedures            Results for orders placed or performed during the hospital encounter of 01/11/25 (from the past 24 hours)   CBC with platelets differential    Narrative    The following orders were created for panel order CBC with platelets differential.  Procedure                               Abnormality         Status                     ---------                               -----------         ------                     CBC with platelets and d...[[id number]]  Abnormal            Final result                 Please view results for these tests on the individual orders.   Comprehensive metabolic panel   Result Value Ref Range    Sodium 136 135 - 145 mmol/L    Potassium 4.2 3.4 -  5.3 mmol/L    Carbon Dioxide (CO2) 20 (L) 22 - 29 mmol/L    Anion Gap 18 (H) 7 - 15 mmol/L    Urea Nitrogen 39.8 (H) 6.0 - 20.0 mg/dL    Creatinine 0.97 0.67 - 1.17 mg/dL    GFR Estimate >90 >60 mL/min/1.73m2    Calcium 8.8 8.8 - 10.4 mg/dL    Chloride 98 98 - 107 mmol/L    Glucose 325 (H) 70 - 99 mg/dL    Alkaline Phosphatase 124 40 - 150 U/L    AST 19 0 - 45 U/L    ALT 26 0 - 70 U/L    Protein Total 6.7 6.4 - 8.3 g/dL    Albumin 4.3 3.5 - 5.2 g/dL    Bilirubin Total 0.9 <=1.2 mg/dL   Lipase   Result Value Ref Range    Lipase 7 (L) 13 - 60 U/L   Lactic acid whole blood with 1x repeat in 2 hr when >2   Result Value Ref Range    Lactic Acid, Initial 1.1 0.7 - 2.0 mmol/L   Blood gas venous   Result Value Ref Range    pH Venous 7.38 7.32 - 7.43    pCO2 Venous 39 (L) 40 - 50 mm Hg    pO2 Venous 56 (H) 25 - 47 mm Hg    Bicarbonate Venous 23 21 - 28 mmol/L    Base Excess/Deficit Venous -2.1 -3.0 - 3.0 mmol/L    FIO2 21     Oxyhemoglobin Venous 85 (H) 70 - 75 %    O2 Sat, Venous 87.0 (H) 70.0 - 75.0 %    Narrative    In healthy individuals, oxyhemoglobin (O2Hb) and oxygen saturation (SO2) are approximately equal. In the presence of dyshemoglobins, oxyhemoglobin can be considerably lower than oxygen saturation.   Ketone Beta-Hydroxybutyrate Quantitative   Result Value Ref Range    Ketone (Beta-Hydroxybutyrate) Quantitative 3.24 (HH) <=0.30 mmol/L   Magnesium   Result Value Ref Range    Magnesium 2.1 1.7 - 2.3 mg/dL   CBC with platelets and differential   Result Value Ref Range    WBC Count 13.2 (H) 4.0 - 11.0 10e3/uL    RBC Count 4.52 4.40 - 5.90 10e6/uL    Hemoglobin 12.1 (L) 13.3 - 17.7 g/dL    Hematocrit 36.6 (L) 40.0 - 53.0 %    MCV 81 78 - 100 fL    MCH 26.8 26.5 - 33.0 pg    MCHC 33.1 31.5 - 36.5 g/dL    RDW 15.3 (H) 10.0 - 15.0 %    Platelet Count 168 150 - 450 10e3/uL    % Neutrophils 84 %    % Lymphocytes 9 %    % Monocytes 7 %    % Eosinophils 0 %    % Basophils 0 %    % Immature Granulocytes 0 %    NRBCs per 100  WBC 0 <1 /100    Absolute Neutrophils 11.1 (H) 1.6 - 8.3 10e3/uL    Absolute Lymphocytes 1.2 0.8 - 5.3 10e3/uL    Absolute Monocytes 0.9 0.0 - 1.3 10e3/uL    Absolute Eosinophils 0.0 0.0 - 0.7 10e3/uL    Absolute Basophils 0.0 0.0 - 0.2 10e3/uL    Absolute Immature Granulocytes 0.1 <=0.4 10e3/uL    Absolute NRBCs 0.0 10e3/uL   Hemoglobin A1c   Result Value Ref Range    Estimated Average Glucose 214 (H) <117 mg/dL    Hemoglobin A1C 9.1 (H) <5.7 %   Glucose by meter   Result Value Ref Range    GLUCOSE BY METER POCT 332 (H) 70 - 99 mg/dL   UA with Microscopic reflex to Culture    Specimen: Urine, Clean Catch   Result Value Ref Range    Color Urine Yellow Colorless, Straw, Light Yellow, Yellow    Appearance Urine Clear Clear    Glucose Urine >1000 (A) Negative mg/dL    Bilirubin Urine Negative Negative    Ketones Urine 60 (A) Negative mg/dL    Specific Gravity Urine 1.032 1.003 - 1.035    Blood Urine Negative Negative    pH Urine 5.5 5.0 - 7.0    Protein Albumin Urine 20 (A) Negative mg/dL    Urobilinogen Urine Normal Normal, 2.0 mg/dL    Nitrite Urine Negative Negative    Leukocyte Esterase Urine Moderate (A) Negative    Mucus Urine Present (A) None Seen /LPF    RBC Urine 0 <=2 /HPF    WBC Urine 2 <=5 /HPF    Squamous Epithelials Urine <1 <=1 /HPF    Hyaline Casts Urine 3 (H) <=2 /LPF    Narrative    Urine Culture ordered based on laboratory criteria   Glucose by meter   Result Value Ref Range    GLUCOSE BY METER POCT 277 (H) 70 - 99 mg/dL       Medications   dextrose 5% and 0.45% NaCl infusion (has no administration in time range)   dextrose 50 % injection 25-50 mL (has no administration in time range)   sodium chloride 0.9 % infusion (0 mLs Intravenous Stopped 1/11/25 1422)   sodium chloride 0.9% BOLUS 1,000 mL (0 mLs Intravenous Stopped 1/11/25 1356)   LORazepam (ATIVAN) injection 1 mg (1 mg Intravenous Not Given 1/11/25 1258)   ondansetron (ZOFRAN) injection 4 mg (4 mg Intravenous $Given 1/11/25 1250)   sodium  chloride 0.9% BOLUS 1,000 mL (0 mLs Intravenous Stopped 1/11/25 1500)   insulin aspart (NovoLOG) injection (RAPID ACTING) (10 Units Subcutaneous $Given 1/11/25 7736)       Assessments & Plan (with Medical Decision Making)      34 year old male with history of uncontrolled T1DM (on 24 units of Lantus every morning), diabetic neuropathy, polysubstance abuse, chronic pain disorder, cyclic vomiting/cannabinoid gastroparesis, paroxysmal SVT, Asperger's, mild asthma, schizophrenia, autism spectrum disorder, and celiac disease who presents via EMS to the emergency department for evaluation of hyperglycemia, nausea, vomiting, and chronic pain.  Patient reports vomiting all day yesterday.   Today he has been keeping down ice chips.  His blood sugar was reading high on his monitor prompting him to call EMS and present here.  He normally doctors through [facility] and had recent hospitalization twice last month due to DKA.  The first thing he requested when I entered the room was IV Dilaudid for his back pain, which as been ongoing for a few months. I informed patient that I would not given him IV opioid medication for his chronic pain. This was very upsetting to him and I did discuss his ED treatment plan that is on file in his chart that he said he knew nothing about.    No concerning exam findings.  Work-up with with blood sugar 325, Serum Ketones 3.24, anion gap 18, bicarb 20, and pH 7.38 (normal, not acidotic). Normal lactic acid.  WBC 13.2  BUN 39.8, normal creat.    Patient given IV NS 2.5 L bolus, IV Zofran. He refused the IV Ativan for nausea.  I did offer him oral oxycodone because this was prescribed by his clinic provider yesterday.  He states he took a dose today and it did not help and refused medication here.  He frequently requested IV Dilaudid for me, but I have declined to give him any IV opiate medication and following his ED treatment plan.    He has blood sugar is now at 277.  He is not  tachycardic.  Blood pressure remains mildly elevated 169/112.  I did discuss patient's workup with Dr. Quinteros, emergency physician, who also saw the patient briefly.  Patient wanted to discharge home immediately after talking with Dr. Quinteros.  He is angry about the discussion of his pain management, as we will not give him IV opioid medication.  At this point, he appears stable for discharge home.  Patient was instructed to follow-up with his clinic provider this week for recheck.  He should return to the emergency department if he worsens in any way including unable to keep fluids down, running blood sugars that are reading high on his monitor.  Patient became angry    Discharge vital signs:  Temp: 99.1  F (37.3  C) Temp src: Temporal BP: (!) 128/90 Pulse: 93   Resp: 12 SpO2: 98 %            Discharge Medication List as of 1/11/2025  3:01 PM          Final diagnoses:   Nausea and vomiting, unspecified vomiting type   High anion gap metabolic acidosis   Type 1 diabetes mellitus with hyperglycemia (H)       1/11/2025   [facility] EMERGENCY DEPT       Ramona Oakes APRN CNP  01/11/25 1516       Ramona Oakes APRN CNP  01/12/25 0118

## 2025-01-13 LAB — BACTERIA UR CULT: NORMAL

## 2025-01-25 ENCOUNTER — HEALTH MAINTENANCE LETTER (OUTPATIENT)
Age: 35
End: 2025-01-25

## 2025-03-05 NOTE — PROGRESS NOTES
MD Notification    Notified Person: MD Mejia  Notification Date/Time: 1- 9906  Notification Interaction: Page  Purpose of Notification: DKA VBG order clarification    Orders Received: Monitoring CO2 from BMP, current result 22, insulin gtt alg 3 at 3 units/hr, , continue treatment as ordered, continue to monitor.  Comments:       no numbness/no tingling

## 2025-04-12 ENCOUNTER — HEALTH MAINTENANCE LETTER (OUTPATIENT)
Age: 35
End: 2025-04-12

## 2025-06-11 ENCOUNTER — APPOINTMENT (OUTPATIENT)
Dept: CT IMAGING | Facility: CLINIC | Age: 35
DRG: 638 | End: 2025-06-11
Attending: FAMILY MEDICINE
Payer: MEDICARE

## 2025-06-11 ENCOUNTER — HOSPITAL ENCOUNTER (INPATIENT)
Facility: CLINIC | Age: 35
DRG: 638 | End: 2025-06-11
Attending: FAMILY MEDICINE | Admitting: INTERNAL MEDICINE
Payer: MEDICARE

## 2025-06-11 DIAGNOSIS — E10.10 DIABETIC KETOACIDOSIS WITHOUT COMA ASSOCIATED WITH TYPE 1 DIABETES MELLITUS (H): ICD-10-CM

## 2025-06-11 DIAGNOSIS — F41.9 ANXIETY: ICD-10-CM

## 2025-06-11 DIAGNOSIS — E11.43 DIABETIC GASTROPARESIS (H): ICD-10-CM

## 2025-06-11 DIAGNOSIS — J93.11 PRIMARY SPONTANEOUS PNEUMOTHORAX: ICD-10-CM

## 2025-06-11 DIAGNOSIS — R52 PAIN: ICD-10-CM

## 2025-06-11 DIAGNOSIS — R11.2 NAUSEA AND VOMITING, UNSPECIFIED VOMITING TYPE: ICD-10-CM

## 2025-06-11 DIAGNOSIS — E10.69 TYPE 1 DIABETES MELLITUS WITH OTHER SPECIFIED COMPLICATION (H): Primary | ICD-10-CM

## 2025-06-11 DIAGNOSIS — I10 BENIGN ESSENTIAL HYPERTENSION: ICD-10-CM

## 2025-06-11 DIAGNOSIS — K31.84 DIABETIC GASTROPARESIS (H): ICD-10-CM

## 2025-06-11 DIAGNOSIS — E11.10 KETOSIS DUE TO DIABETES (H): ICD-10-CM

## 2025-06-11 LAB
ALBUMIN SERPL BCG-MCNC: 4.5 G/DL (ref 3.5–5.2)
ALP SERPL-CCNC: 146 U/L (ref 40–150)
ALT SERPL W P-5'-P-CCNC: 20 U/L (ref 0–70)
ANION GAP SERPL CALCULATED.3IONS-SCNC: 25 MMOL/L (ref 7–15)
AST SERPL W P-5'-P-CCNC: 25 U/L (ref 0–45)
B-OH-BUTYR SERPL-SCNC: 5.61 MMOL/L
BASE EXCESS BLDV CALC-SCNC: -3 MMOL/L (ref -3–3)
BASOPHILS # BLD AUTO: 0 10E3/UL (ref 0–0.2)
BASOPHILS NFR BLD AUTO: 0 %
BILIRUB DIRECT SERPL-MCNC: 0.51 MG/DL (ref 0–0.3)
BILIRUB SERPL-MCNC: 1.4 MG/DL
BUN SERPL-MCNC: 22.9 MG/DL (ref 6–20)
CALCIUM SERPL-MCNC: 9.5 MG/DL (ref 8.8–10.4)
CHLORIDE SERPL-SCNC: 86 MMOL/L (ref 98–107)
CREAT SERPL-MCNC: 1.15 MG/DL (ref 0.67–1.17)
EGFRCR SERPLBLD CKD-EPI 2021: 84 ML/MIN/1.73M2
EOSINOPHIL # BLD AUTO: 0.2 10E3/UL (ref 0–0.7)
EOSINOPHIL NFR BLD AUTO: 3 %
ERYTHROCYTE [DISTWIDTH] IN BLOOD BY AUTOMATED COUNT: 13.2 % (ref 10–15)
GLUCOSE SERPL-MCNC: 398 MG/DL (ref 70–99)
HCO3 BLDV-SCNC: 23 MMOL/L (ref 21–28)
HCO3 SERPL-SCNC: 19 MMOL/L (ref 22–29)
HCT VFR BLD AUTO: 42.6 % (ref 40–53)
HGB BLD-MCNC: 14.4 G/DL (ref 13.3–17.7)
IMM GRANULOCYTES # BLD: 0 10E3/UL
IMM GRANULOCYTES NFR BLD: 0 %
LIPASE SERPL-CCNC: 16 U/L (ref 13–60)
LYMPHOCYTES # BLD AUTO: 2.3 10E3/UL (ref 0.8–5.3)
LYMPHOCYTES NFR BLD AUTO: 28 %
MCH RBC QN AUTO: 27.5 PG (ref 26.5–33)
MCHC RBC AUTO-ENTMCNC: 33.8 G/DL (ref 31.5–36.5)
MCV RBC AUTO: 81 FL (ref 78–100)
MONOCYTES # BLD AUTO: 0.6 10E3/UL (ref 0–1.3)
MONOCYTES NFR BLD AUTO: 8 %
NEUTROPHILS # BLD AUTO: 5.1 10E3/UL (ref 1.6–8.3)
NEUTROPHILS NFR BLD AUTO: 61 %
NRBC # BLD AUTO: 0 10E3/UL
NRBC BLD AUTO-RTO: 0 /100
O2/TOTAL GAS SETTING VFR VENT: 0 %
OXYHGB MFR BLDV: 63 % (ref 70–75)
PCO2 BLDV: 43 MM HG (ref 40–50)
PH BLDV: 7.33 [PH] (ref 7.32–7.43)
PLATELET # BLD AUTO: 219 10E3/UL (ref 150–450)
PO2 BLDV: 35 MM HG (ref 25–47)
POTASSIUM SERPL-SCNC: 3.7 MMOL/L (ref 3.4–5.3)
PROT SERPL-MCNC: 7.8 G/DL (ref 6.4–8.3)
RBC # BLD AUTO: 5.24 10E6/UL (ref 4.4–5.9)
SAO2 % BLDV: 63.8 % (ref 70–75)
SODIUM SERPL-SCNC: 130 MMOL/L (ref 135–145)
WBC # BLD AUTO: 8.4 10E3/UL (ref 4–11)

## 2025-06-11 PROCEDURE — 250N000011 HC RX IP 250 OP 636: Performed by: FAMILY MEDICINE

## 2025-06-11 PROCEDURE — 36415 COLL VENOUS BLD VENIPUNCTURE: CPT | Performed by: FAMILY MEDICINE

## 2025-06-11 PROCEDURE — 85025 COMPLETE CBC W/AUTO DIFF WBC: CPT | Performed by: FAMILY MEDICINE

## 2025-06-11 PROCEDURE — 99291 CRITICAL CARE FIRST HOUR: CPT | Mod: 25

## 2025-06-11 PROCEDURE — 250N000013 HC RX MED GY IP 250 OP 250 PS 637: Performed by: FAMILY MEDICINE

## 2025-06-11 PROCEDURE — 96374 THER/PROPH/DIAG INJ IV PUSH: CPT | Mod: 59

## 2025-06-11 PROCEDURE — 83690 ASSAY OF LIPASE: CPT | Performed by: FAMILY MEDICINE

## 2025-06-11 PROCEDURE — 82248 BILIRUBIN DIRECT: CPT | Performed by: FAMILY MEDICINE

## 2025-06-11 PROCEDURE — 250N000009 HC RX 250: Performed by: FAMILY MEDICINE

## 2025-06-11 PROCEDURE — 82805 BLOOD GASES W/O2 SATURATION: CPT | Performed by: FAMILY MEDICINE

## 2025-06-11 PROCEDURE — 82010 KETONE BODYS QUAN: CPT | Performed by: FAMILY MEDICINE

## 2025-06-11 PROCEDURE — 74177 CT ABD & PELVIS W/CONTRAST: CPT

## 2025-06-11 PROCEDURE — 99291 CRITICAL CARE FIRST HOUR: CPT | Mod: 25 | Performed by: FAMILY MEDICINE

## 2025-06-11 RX ORDER — IOPAMIDOL 755 MG/ML
500 INJECTION, SOLUTION INTRAVASCULAR ONCE
Status: COMPLETED | OUTPATIENT
Start: 2025-06-11 | End: 2025-06-11

## 2025-06-11 RX ORDER — MAGNESIUM HYDROXIDE/ALUMINUM HYDROXICE/SIMETHICONE 120; 1200; 1200 MG/30ML; MG/30ML; MG/30ML
15 SUSPENSION ORAL ONCE
Status: COMPLETED | OUTPATIENT
Start: 2025-06-11 | End: 2025-06-11

## 2025-06-11 RX ORDER — HYDROMORPHONE HYDROCHLORIDE 1 MG/ML
0.5 INJECTION, SOLUTION INTRAMUSCULAR; INTRAVENOUS; SUBCUTANEOUS
Refills: 0 | Status: COMPLETED | OUTPATIENT
Start: 2025-06-11 | End: 2025-06-12

## 2025-06-11 RX ORDER — LIDOCAINE HYDROCHLORIDE 20 MG/ML
5 SOLUTION OROPHARYNGEAL ONCE
Status: COMPLETED | OUTPATIENT
Start: 2025-06-11 | End: 2025-06-11

## 2025-06-11 RX ADMIN — LIDOCAINE HYDROCHLORIDE 5 ML: 20 SOLUTION ORAL at 23:19

## 2025-06-11 RX ADMIN — IOPAMIDOL 93 ML: 755 INJECTION, SOLUTION INTRAVENOUS at 23:49

## 2025-06-11 RX ADMIN — SODIUM CHLORIDE 60 ML: 9 INJECTION, SOLUTION INTRAVENOUS at 23:49

## 2025-06-11 RX ADMIN — ALUMINUM HYDROXIDE, MAGNESIUM HYDROXIDE, AND SIMETHICONE 15 ML: 200; 200; 20 SUSPENSION ORAL at 23:19

## 2025-06-11 ASSESSMENT — ACTIVITIES OF DAILY LIVING (ADL): ADLS_ACUITY_SCORE: 52

## 2025-06-12 ENCOUNTER — APPOINTMENT (OUTPATIENT)
Dept: GENERAL RADIOLOGY | Facility: CLINIC | Age: 35
DRG: 638 | End: 2025-06-12
Attending: STUDENT IN AN ORGANIZED HEALTH CARE EDUCATION/TRAINING PROGRAM
Payer: MEDICARE

## 2025-06-12 ENCOUNTER — APPOINTMENT (OUTPATIENT)
Dept: CT IMAGING | Facility: CLINIC | Age: 35
DRG: 638 | End: 2025-06-12
Attending: FAMILY MEDICINE
Payer: MEDICARE

## 2025-06-12 ENCOUNTER — APPOINTMENT (OUTPATIENT)
Dept: GENERAL RADIOLOGY | Facility: CLINIC | Age: 35
DRG: 638 | End: 2025-06-12
Attending: FAMILY MEDICINE
Payer: MEDICARE

## 2025-06-12 PROBLEM — J93.11 PRIMARY SPONTANEOUS PNEUMOTHORAX: Status: ACTIVE | Noted: 2025-06-12

## 2025-06-12 PROBLEM — E87.20 LACTIC ACID ACIDOSIS: Status: ACTIVE | Noted: 2025-06-12

## 2025-06-12 PROBLEM — R11.2 NAUSEA AND VOMITING, UNSPECIFIED VOMITING TYPE: Status: ACTIVE | Noted: 2025-06-12

## 2025-06-12 PROBLEM — E87.1 HYPONATREMIA: Status: ACTIVE | Noted: 2025-06-12

## 2025-06-12 PROBLEM — H54.61 VISION LOSS, RIGHT EYE: Status: ACTIVE | Noted: 2023-09-11

## 2025-06-12 PROBLEM — E11.10 KETOSIS DUE TO DIABETES (H): Status: ACTIVE | Noted: 2025-06-12

## 2025-06-12 PROBLEM — Z22.322 MRSA CARRIER: Status: RESOLVED | Noted: 2020-08-28 | Resolved: 2025-06-12

## 2025-06-12 PROBLEM — R00.0 SINUS TACHYCARDIA: Status: ACTIVE | Noted: 2025-06-12

## 2025-06-12 PROBLEM — E87.29 METABOLIC ACIDOSIS, INCREASED ANION GAP: Status: ACTIVE | Noted: 2025-06-12

## 2025-06-12 LAB
ALBUMIN UR-MCNC: NEGATIVE MG/DL
AMPHETAMINES UR QL SCN: ABNORMAL
ANION GAP SERPL CALCULATED.3IONS-SCNC: 12 MMOL/L (ref 7–15)
ANION GAP SERPL CALCULATED.3IONS-SCNC: 13 MMOL/L (ref 7–15)
ANION GAP SERPL CALCULATED.3IONS-SCNC: 19 MMOL/L (ref 7–15)
ANION GAP SERPL CALCULATED.3IONS-SCNC: 20 MMOL/L (ref 7–15)
APPEARANCE UR: CLEAR
B-OH-BUTYR SERPL-SCNC: 2.2 MMOL/L
B-OH-BUTYR SERPL-SCNC: 2.44 MMOL/L
B-OH-BUTYR SERPL-SCNC: 5.31 MMOL/L
B-OH-BUTYR SERPL-SCNC: 5.73 MMOL/L
BARBITURATES UR QL SCN: ABNORMAL
BASE EXCESS BLDV CALC-SCNC: -4.4 MMOL/L (ref -3–3)
BENZODIAZ UR QL SCN: ABNORMAL
BILIRUB UR QL STRIP: NEGATIVE
BUN SERPL-MCNC: 15.6 MG/DL (ref 6–20)
BUN SERPL-MCNC: 18.3 MG/DL (ref 6–20)
BUN SERPL-MCNC: 20.5 MG/DL (ref 6–20)
BUN SERPL-MCNC: 21.9 MG/DL (ref 6–20)
BZE UR QL SCN: ABNORMAL
CALCIUM SERPL-MCNC: 8.3 MG/DL (ref 8.8–10.4)
CALCIUM SERPL-MCNC: 8.4 MG/DL (ref 8.8–10.4)
CALCIUM SERPL-MCNC: 8.4 MG/DL (ref 8.8–10.4)
CALCIUM SERPL-MCNC: 8.8 MG/DL (ref 8.8–10.4)
CANNABINOIDS UR QL SCN: ABNORMAL
CHLORIDE SERPL-SCNC: 88 MMOL/L (ref 98–107)
CHLORIDE SERPL-SCNC: 89 MMOL/L (ref 98–107)
CHLORIDE SERPL-SCNC: 93 MMOL/L (ref 98–107)
CHLORIDE SERPL-SCNC: 95 MMOL/L (ref 98–107)
COLOR UR AUTO: ABNORMAL
CREAT SERPL-MCNC: 0.93 MG/DL (ref 0.67–1.17)
CREAT SERPL-MCNC: 0.94 MG/DL (ref 0.67–1.17)
CREAT SERPL-MCNC: 0.96 MG/DL (ref 0.67–1.17)
CREAT SERPL-MCNC: 0.96 MG/DL (ref 0.67–1.17)
EGFRCR SERPLBLD CKD-EPI 2021: >90 ML/MIN/1.73M2
FENTANYL UR QL: ABNORMAL
GLUCOSE BLDC GLUCOMTR-MCNC: 122 MG/DL (ref 70–99)
GLUCOSE BLDC GLUCOMTR-MCNC: 123 MG/DL (ref 70–99)
GLUCOSE BLDC GLUCOMTR-MCNC: 141 MG/DL (ref 70–99)
GLUCOSE BLDC GLUCOMTR-MCNC: 147 MG/DL (ref 70–99)
GLUCOSE BLDC GLUCOMTR-MCNC: 151 MG/DL (ref 70–99)
GLUCOSE BLDC GLUCOMTR-MCNC: 163 MG/DL (ref 70–99)
GLUCOSE BLDC GLUCOMTR-MCNC: 181 MG/DL (ref 70–99)
GLUCOSE BLDC GLUCOMTR-MCNC: 246 MG/DL (ref 70–99)
GLUCOSE BLDC GLUCOMTR-MCNC: 360 MG/DL (ref 70–99)
GLUCOSE BLDC GLUCOMTR-MCNC: 420 MG/DL (ref 70–99)
GLUCOSE SERPL-MCNC: 114 MG/DL (ref 70–99)
GLUCOSE SERPL-MCNC: 153 MG/DL (ref 70–99)
GLUCOSE SERPL-MCNC: 226 MG/DL (ref 70–99)
GLUCOSE SERPL-MCNC: 244 MG/DL (ref 70–99)
GLUCOSE UR STRIP-MCNC: >1000 MG/DL
HCO3 BLDV-SCNC: 21 MMOL/L (ref 21–28)
HCO3 SERPL-SCNC: 17 MMOL/L (ref 22–29)
HCO3 SERPL-SCNC: 18 MMOL/L (ref 22–29)
HCO3 SERPL-SCNC: 22 MMOL/L (ref 22–29)
HCO3 SERPL-SCNC: 23 MMOL/L (ref 22–29)
HGB UR QL STRIP: NEGATIVE
KETONES UR STRIP-MCNC: >150 MG/DL
LACTATE SERPL-SCNC: 1.4 MMOL/L (ref 0.7–2)
LEUKOCYTE ESTERASE UR QL STRIP: ABNORMAL
MAGNESIUM SERPL-MCNC: 2.4 MG/DL (ref 1.7–2.3)
MUCOUS THREADS #/AREA URNS LPF: PRESENT /LPF
NITRATE UR QL: NEGATIVE
O2/TOTAL GAS SETTING VFR VENT: 21 %
OPIATES UR QL SCN: ABNORMAL
OXYHGB MFR BLDV: 82 % (ref 70–75)
PCO2 BLDV: 41 MM HG (ref 40–50)
PCP QUAL URINE (ROCHE): ABNORMAL
PH BLDV: 7.33 [PH] (ref 7.32–7.43)
PH UR STRIP: 5.5 [PH] (ref 5–7)
PHOSPHATE SERPL-MCNC: 1.9 MG/DL (ref 2.5–4.5)
PHOSPHATE SERPL-MCNC: 2 MG/DL (ref 2.5–4.5)
PHOSPHATE SERPL-MCNC: 2.6 MG/DL (ref 2.5–4.5)
PO2 BLDV: 50 MM HG (ref 25–47)
POTASSIUM SERPL-SCNC: 3.7 MMOL/L (ref 3.4–5.3)
POTASSIUM SERPL-SCNC: 4.1 MMOL/L (ref 3.4–5.3)
POTASSIUM SERPL-SCNC: 4.1 MMOL/L (ref 3.4–5.3)
POTASSIUM SERPL-SCNC: 4.4 MMOL/L (ref 3.4–5.3)
RADIOLOGIST FLAGS: ABNORMAL
RADIOLOGIST FLAGS: ABNORMAL
RBC URINE: 0 /HPF
SAO2 % BLDV: 83.6 % (ref 70–75)
SODIUM SERPL-SCNC: 125 MMOL/L (ref 135–145)
SODIUM SERPL-SCNC: 126 MMOL/L (ref 135–145)
SODIUM SERPL-SCNC: 128 MMOL/L (ref 135–145)
SODIUM SERPL-SCNC: 130 MMOL/L (ref 135–145)
SP GR UR STRIP: 1.03 (ref 1–1.03)
SQUAMOUS EPITHELIAL: <1 /HPF
UROBILINOGEN UR STRIP-MCNC: NORMAL MG/DL
WBC URINE: 4 /HPF

## 2025-06-12 PROCEDURE — 96375 TX/PRO/DX INJ NEW DRUG ADDON: CPT

## 2025-06-12 PROCEDURE — 250N000009 HC RX 250: Performed by: FAMILY MEDICINE

## 2025-06-12 PROCEDURE — 71250 CT THORAX DX C-: CPT

## 2025-06-12 PROCEDURE — 36415 COLL VENOUS BLD VENIPUNCTURE: CPT | Performed by: INTERNAL MEDICINE

## 2025-06-12 PROCEDURE — 82962 GLUCOSE BLOOD TEST: CPT

## 2025-06-12 PROCEDURE — 250N000009 HC RX 250: Performed by: INTERNAL MEDICINE

## 2025-06-12 PROCEDURE — 250N000011 HC RX IP 250 OP 636: Mod: JZ | Performed by: FAMILY MEDICINE

## 2025-06-12 PROCEDURE — 250N000013 HC RX MED GY IP 250 OP 250 PS 637: Performed by: STUDENT IN AN ORGANIZED HEALTH CARE EDUCATION/TRAINING PROGRAM

## 2025-06-12 PROCEDURE — 84100 ASSAY OF PHOSPHORUS: CPT | Performed by: INTERNAL MEDICINE

## 2025-06-12 PROCEDURE — 82010 KETONE BODYS QUAN: CPT | Performed by: INTERNAL MEDICINE

## 2025-06-12 PROCEDURE — 96376 TX/PRO/DX INJ SAME DRUG ADON: CPT

## 2025-06-12 PROCEDURE — 36415 COLL VENOUS BLD VENIPUNCTURE: CPT | Performed by: FAMILY MEDICINE

## 2025-06-12 PROCEDURE — 200N000001 HC R&B ICU

## 2025-06-12 PROCEDURE — 99223 1ST HOSP IP/OBS HIGH 75: CPT | Mod: AI | Performed by: INTERNAL MEDICINE

## 2025-06-12 PROCEDURE — 96361 HYDRATE IV INFUSION ADD-ON: CPT

## 2025-06-12 PROCEDURE — 258N000003 HC RX IP 258 OP 636: Performed by: FAMILY MEDICINE

## 2025-06-12 PROCEDURE — 80048 BASIC METABOLIC PNL TOTAL CA: CPT | Performed by: INTERNAL MEDICINE

## 2025-06-12 PROCEDURE — 250N000013 HC RX MED GY IP 250 OP 250 PS 637: Performed by: FAMILY MEDICINE

## 2025-06-12 PROCEDURE — 82010 KETONE BODYS QUAN: CPT | Performed by: FAMILY MEDICINE

## 2025-06-12 PROCEDURE — 258N000003 HC RX IP 258 OP 636: Performed by: INTERNAL MEDICINE

## 2025-06-12 PROCEDURE — 250N000012 HC RX MED GY IP 250 OP 636 PS 637: Performed by: FAMILY MEDICINE

## 2025-06-12 PROCEDURE — 71045 X-RAY EXAM CHEST 1 VIEW: CPT

## 2025-06-12 PROCEDURE — 83735 ASSAY OF MAGNESIUM: CPT | Performed by: INTERNAL MEDICINE

## 2025-06-12 PROCEDURE — G0378 HOSPITAL OBSERVATION PER HR: HCPCS

## 2025-06-12 PROCEDURE — 250N000013 HC RX MED GY IP 250 OP 250 PS 637: Performed by: INTERNAL MEDICINE

## 2025-06-12 PROCEDURE — 82805 BLOOD GASES W/O2 SATURATION: CPT | Performed by: FAMILY MEDICINE

## 2025-06-12 PROCEDURE — 71046 X-RAY EXAM CHEST 2 VIEWS: CPT

## 2025-06-12 PROCEDURE — 83605 ASSAY OF LACTIC ACID: CPT | Performed by: INTERNAL MEDICINE

## 2025-06-12 PROCEDURE — 81001 URINALYSIS AUTO W/SCOPE: CPT | Performed by: INTERNAL MEDICINE

## 2025-06-12 PROCEDURE — 250N000011 HC RX IP 250 OP 636: Performed by: INTERNAL MEDICINE

## 2025-06-12 PROCEDURE — 80048 BASIC METABOLIC PNL TOTAL CA: CPT | Performed by: FAMILY MEDICINE

## 2025-06-12 PROCEDURE — 96374 THER/PROPH/DIAG INJ IV PUSH: CPT | Mod: 59

## 2025-06-12 PROCEDURE — 250N000012 HC RX MED GY IP 250 OP 636 PS 637: Performed by: STUDENT IN AN ORGANIZED HEALTH CARE EDUCATION/TRAINING PROGRAM

## 2025-06-12 PROCEDURE — 80307 DRUG TEST PRSMV CHEM ANLYZR: CPT | Performed by: INTERNAL MEDICINE

## 2025-06-12 PROCEDURE — 99221 1ST HOSP IP/OBS SF/LOW 40: CPT | Performed by: SURGERY

## 2025-06-12 RX ORDER — OMEPRAZOLE 40 MG/1
40 CAPSULE, DELAYED RELEASE ORAL
Status: DISCONTINUED | OUTPATIENT
Start: 2025-06-12 | End: 2025-06-12

## 2025-06-12 RX ORDER — NALOXONE HYDROCHLORIDE 0.4 MG/ML
0.2 INJECTION, SOLUTION INTRAMUSCULAR; INTRAVENOUS; SUBCUTANEOUS
Status: DISCONTINUED | OUTPATIENT
Start: 2025-06-12 | End: 2025-06-12

## 2025-06-12 RX ORDER — MAGNESIUM HYDROXIDE/ALUMINUM HYDROXICE/SIMETHICONE 120; 1200; 1200 MG/30ML; MG/30ML; MG/30ML
15 SUSPENSION ORAL ONCE
Status: COMPLETED | OUTPATIENT
Start: 2025-06-12 | End: 2025-06-12

## 2025-06-12 RX ORDER — ONDANSETRON 4 MG/1
4 TABLET, ORALLY DISINTEGRATING ORAL EVERY 6 HOURS PRN
Status: DISCONTINUED | OUTPATIENT
Start: 2025-06-12 | End: 2025-06-14 | Stop reason: HOSPADM

## 2025-06-12 RX ORDER — NALOXONE HYDROCHLORIDE 0.4 MG/ML
0.4 INJECTION, SOLUTION INTRAMUSCULAR; INTRAVENOUS; SUBCUTANEOUS
Status: DISCONTINUED | OUTPATIENT
Start: 2025-06-12 | End: 2025-06-14 | Stop reason: HOSPADM

## 2025-06-12 RX ORDER — BISACODYL 5 MG
TABLET, DELAYED RELEASE (ENTERIC COATED) ORAL
Status: ON HOLD | COMMUNITY
Start: 2025-05-02 | End: 2025-06-12

## 2025-06-12 RX ORDER — POTASSIUM CHLORIDE 7.45 MG/ML
10 INJECTION INTRAVENOUS
Status: COMPLETED | OUTPATIENT
Start: 2025-06-12 | End: 2025-06-12

## 2025-06-12 RX ORDER — SODIUM CHLORIDE 9 MG/ML
INJECTION, SOLUTION INTRAVENOUS CONTINUOUS
Status: DISCONTINUED | OUTPATIENT
Start: 2025-06-12 | End: 2025-06-12

## 2025-06-12 RX ORDER — ONDANSETRON 2 MG/ML
4 INJECTION INTRAMUSCULAR; INTRAVENOUS EVERY 6 HOURS PRN
Status: DISCONTINUED | OUTPATIENT
Start: 2025-06-12 | End: 2025-06-14 | Stop reason: HOSPADM

## 2025-06-12 RX ORDER — DEXTROSE MONOHYDRATE 25 G/50ML
25-50 INJECTION, SOLUTION INTRAVENOUS
Status: DISCONTINUED | OUTPATIENT
Start: 2025-06-12 | End: 2025-06-13

## 2025-06-12 RX ORDER — ONDANSETRON 2 MG/ML
4 INJECTION INTRAMUSCULAR; INTRAVENOUS EVERY 30 MIN PRN
Status: DISCONTINUED | OUTPATIENT
Start: 2025-06-12 | End: 2025-06-12

## 2025-06-12 RX ORDER — HYDROMORPHONE HCL IN WATER/PF 6 MG/30 ML
0.2 PATIENT CONTROLLED ANALGESIA SYRINGE INTRAVENOUS
Status: DISCONTINUED | OUTPATIENT
Start: 2025-06-12 | End: 2025-06-14

## 2025-06-12 RX ORDER — HYDROMORPHONE HYDROCHLORIDE 1 MG/ML
0.5 INJECTION, SOLUTION INTRAMUSCULAR; INTRAVENOUS; SUBCUTANEOUS EVERY 30 MIN PRN
Refills: 0 | Status: COMPLETED | OUTPATIENT
Start: 2025-06-12 | End: 2025-06-12

## 2025-06-12 RX ORDER — NALOXONE HYDROCHLORIDE 0.4 MG/ML
0.4 INJECTION, SOLUTION INTRAMUSCULAR; INTRAVENOUS; SUBCUTANEOUS
Status: DISCONTINUED | OUTPATIENT
Start: 2025-06-12 | End: 2025-06-12

## 2025-06-12 RX ORDER — BISACODYL 10 MG
10 SUPPOSITORY, RECTAL RECTAL DAILY PRN
COMMUNITY
Start: 2025-06-09

## 2025-06-12 RX ORDER — METOCLOPRAMIDE HYDROCHLORIDE 5 MG/ML
10 INJECTION INTRAMUSCULAR; INTRAVENOUS EVERY 6 HOURS
Status: DISCONTINUED | OUTPATIENT
Start: 2025-06-12 | End: 2025-06-14

## 2025-06-12 RX ORDER — SENNOSIDES 8.6 MG/1
2 TABLET ORAL 2 TIMES DAILY
Status: DISCONTINUED | OUTPATIENT
Start: 2025-06-12 | End: 2025-06-12

## 2025-06-12 RX ORDER — NICOTINE POLACRILEX 4 MG
15-30 LOZENGE BUCCAL
Status: DISCONTINUED | OUTPATIENT
Start: 2025-06-12 | End: 2025-06-13

## 2025-06-12 RX ORDER — AMOXICILLIN 250 MG
2 CAPSULE ORAL 2 TIMES DAILY PRN
Status: DISCONTINUED | OUTPATIENT
Start: 2025-06-12 | End: 2025-06-14 | Stop reason: HOSPADM

## 2025-06-12 RX ORDER — PROCHLORPERAZINE MALEATE 5 MG/1
10 TABLET ORAL EVERY 6 HOURS PRN
Status: DISCONTINUED | OUTPATIENT
Start: 2025-06-12 | End: 2025-06-14 | Stop reason: HOSPADM

## 2025-06-12 RX ORDER — ALBUTEROL SULFATE 90 UG/1
1-2 INHALANT RESPIRATORY (INHALATION) EVERY 6 HOURS PRN
Status: DISCONTINUED | OUTPATIENT
Start: 2025-06-12 | End: 2025-06-14 | Stop reason: HOSPADM

## 2025-06-12 RX ORDER — DEXTROSE MONOHYDRATE 25 G/50ML
25-50 INJECTION, SOLUTION INTRAVENOUS
Status: DISCONTINUED | OUTPATIENT
Start: 2025-06-12 | End: 2025-06-12

## 2025-06-12 RX ORDER — MAGNESIUM HYDROXIDE/ALUMINUM HYDROXICE/SIMETHICONE 120; 1200; 1200 MG/30ML; MG/30ML; MG/30ML
15 SUSPENSION ORAL
COMMUNITY
Start: 2025-01-14

## 2025-06-12 RX ORDER — ACETAMINOPHEN 500 MG
500 TABLET ORAL
Status: ON HOLD | COMMUNITY
Start: 2025-02-28 | End: 2025-06-14

## 2025-06-12 RX ORDER — NALOXONE HYDROCHLORIDE 0.4 MG/ML
0.2 INJECTION, SOLUTION INTRAMUSCULAR; INTRAVENOUS; SUBCUTANEOUS
Status: DISCONTINUED | OUTPATIENT
Start: 2025-06-12 | End: 2025-06-14 | Stop reason: HOSPADM

## 2025-06-12 RX ORDER — SODIUM CHLORIDE AND POTASSIUM CHLORIDE 150; 900 MG/100ML; MG/100ML
INJECTION, SOLUTION INTRAVENOUS CONTINUOUS
Status: DISCONTINUED | OUTPATIENT
Start: 2025-06-12 | End: 2025-06-13

## 2025-06-12 RX ORDER — DORZOLAMIDE HYDROCHLORIDE AND TIMOLOL MALEATE 20; 5 MG/ML; MG/ML
1 SOLUTION/ DROPS OPHTHALMIC 2 TIMES DAILY
Status: DISCONTINUED | OUTPATIENT
Start: 2025-06-12 | End: 2025-06-14 | Stop reason: HOSPADM

## 2025-06-12 RX ORDER — MAGNESIUM HYDROXIDE/ALUMINUM HYDROXICE/SIMETHICONE 120; 1200; 1200 MG/30ML; MG/30ML; MG/30ML
15 SUSPENSION ORAL EVERY 4 HOURS PRN
Status: DISCONTINUED | OUTPATIENT
Start: 2025-06-12 | End: 2025-06-14 | Stop reason: HOSPADM

## 2025-06-12 RX ORDER — METHOCARBAMOL 500 MG/1
500 TABLET, FILM COATED ORAL 3 TIMES DAILY PRN
Status: DISCONTINUED | OUTPATIENT
Start: 2025-06-12 | End: 2025-06-14 | Stop reason: HOSPADM

## 2025-06-12 RX ORDER — OXYCODONE HYDROCHLORIDE 5 MG/1
5-10 TABLET ORAL
Status: DISCONTINUED | OUTPATIENT
Start: 2025-06-12 | End: 2025-06-14

## 2025-06-12 RX ORDER — LORAZEPAM 0.5 MG/1
TABLET ORAL
Status: ON HOLD | COMMUNITY
Start: 2025-05-23 | End: 2025-06-12

## 2025-06-12 RX ORDER — METOCLOPRAMIDE 5 MG/1
10 TABLET ORAL 4 TIMES DAILY PRN
Status: DISCONTINUED | OUTPATIENT
Start: 2025-06-12 | End: 2025-06-13

## 2025-06-12 RX ORDER — METOCLOPRAMIDE HYDROCHLORIDE 5 MG/5ML
10 SOLUTION ORAL 4 TIMES DAILY PRN
Status: ON HOLD | COMMUNITY
Start: 2025-06-09 | End: 2025-06-14

## 2025-06-12 RX ORDER — NICOTINE POLACRILEX 4 MG
15-30 LOZENGE BUCCAL
Status: DISCONTINUED | OUTPATIENT
Start: 2025-06-12 | End: 2025-06-12

## 2025-06-12 RX ORDER — ACETAMINOPHEN 325 MG/1
650 TABLET ORAL EVERY 4 HOURS PRN
Status: DISCONTINUED | OUTPATIENT
Start: 2025-06-12 | End: 2025-06-14 | Stop reason: HOSPADM

## 2025-06-12 RX ORDER — CALCIUM CARBONATE 500 MG/1
1000 TABLET, CHEWABLE ORAL 4 TIMES DAILY PRN
Status: DISCONTINUED | OUTPATIENT
Start: 2025-06-12 | End: 2025-06-14 | Stop reason: HOSPADM

## 2025-06-12 RX ORDER — LIDOCAINE HYDROCHLORIDE 20 MG/ML
5 SOLUTION OROPHARYNGEAL ONCE
Status: COMPLETED | OUTPATIENT
Start: 2025-06-12 | End: 2025-06-12

## 2025-06-12 RX ORDER — DEXTROSE MONOHYDRATE, SODIUM CHLORIDE, AND POTASSIUM CHLORIDE 50; 1.49; 4.5 G/1000ML; G/1000ML; G/1000ML
INJECTION, SOLUTION INTRAVENOUS CONTINUOUS
Status: DISCONTINUED | OUTPATIENT
Start: 2025-06-12 | End: 2025-06-13

## 2025-06-12 RX ORDER — AMOXICILLIN 250 MG
1 CAPSULE ORAL 2 TIMES DAILY PRN
Status: DISCONTINUED | OUTPATIENT
Start: 2025-06-12 | End: 2025-06-14 | Stop reason: HOSPADM

## 2025-06-12 RX ORDER — HYDROMORPHONE HYDROCHLORIDE 1 MG/ML
0.5 INJECTION, SOLUTION INTRAMUSCULAR; INTRAVENOUS; SUBCUTANEOUS
Refills: 0 | Status: COMPLETED | OUTPATIENT
Start: 2025-06-12 | End: 2025-06-12

## 2025-06-12 RX ORDER — METOCLOPRAMIDE 5 MG/1
10 TABLET ORAL 4 TIMES DAILY
Status: DISCONTINUED | OUTPATIENT
Start: 2025-06-12 | End: 2025-06-12

## 2025-06-12 RX ORDER — ALBUTEROL SULFATE 90 UG/1
1-2 INHALANT RESPIRATORY (INHALATION)
COMMUNITY
Start: 2025-01-17

## 2025-06-12 RX ORDER — LIDOCAINE 4 G/G
1 PATCH TOPICAL
Status: ON HOLD | COMMUNITY
Start: 2025-05-09 | End: 2025-06-12

## 2025-06-12 RX ADMIN — SODIUM PHOSPHATE, MONOBASIC, MONOHYDRATE AND SODIUM PHOSPHATE, DIBASIC, ANHYDROUS 9 MMOL: 276; 142 INJECTION, SOLUTION INTRAVENOUS at 21:36

## 2025-06-12 RX ADMIN — SODIUM CHLORIDE: 0.9 INJECTION, SOLUTION INTRAVENOUS at 01:53

## 2025-06-12 RX ADMIN — INSULIN ASPART 8 UNITS: 100 INJECTION, SOLUTION INTRAVENOUS; SUBCUTANEOUS at 01:54

## 2025-06-12 RX ADMIN — HYDROMORPHONE HYDROCHLORIDE 0.2 MG: 0.2 INJECTION, SOLUTION INTRAMUSCULAR; INTRAVENOUS; SUBCUTANEOUS at 11:01

## 2025-06-12 RX ADMIN — DORZOLAMIDE HYDROCHLORIDE AND TIMOLOL MALEATE 1 DROP: 20; 5 SOLUTION OPHTHALMIC at 11:00

## 2025-06-12 RX ADMIN — INSULIN ASPART 3 UNITS: 100 INJECTION, SOLUTION INTRAVENOUS; SUBCUTANEOUS at 13:12

## 2025-06-12 RX ADMIN — ALUMINUM HYDROXIDE, MAGNESIUM HYDROXIDE, AND SIMETHICONE 15 ML: 200; 200; 20 SUSPENSION ORAL at 06:38

## 2025-06-12 RX ADMIN — HYDROMORPHONE HYDROCHLORIDE 0.5 MG: 1 INJECTION, SOLUTION INTRAMUSCULAR; INTRAVENOUS; SUBCUTANEOUS at 03:44

## 2025-06-12 RX ADMIN — METHOCARBAMOL 500 MG: 500 TABLET, FILM COATED ORAL at 20:04

## 2025-06-12 RX ADMIN — ONDANSETRON 4 MG: 2 INJECTION, SOLUTION INTRAMUSCULAR; INTRAVENOUS at 00:49

## 2025-06-12 RX ADMIN — HYDROMORPHONE HYDROCHLORIDE 0.5 MG: 1 INJECTION, SOLUTION INTRAMUSCULAR; INTRAVENOUS; SUBCUTANEOUS at 01:56

## 2025-06-12 RX ADMIN — OXYCODONE HYDROCHLORIDE 5 MG: 5 TABLET ORAL at 20:00

## 2025-06-12 RX ADMIN — OXYCODONE HYDROCHLORIDE 10 MG: 5 TABLET ORAL at 22:54

## 2025-06-12 RX ADMIN — ONDANSETRON 4 MG: 2 INJECTION INTRAMUSCULAR; INTRAVENOUS at 20:15

## 2025-06-12 RX ADMIN — HYDROMORPHONE HYDROCHLORIDE 0.5 MG: 1 INJECTION, SOLUTION INTRAMUSCULAR; INTRAVENOUS; SUBCUTANEOUS at 05:54

## 2025-06-12 RX ADMIN — HYDROMORPHONE HYDROCHLORIDE 0.2 MG: 0.2 INJECTION, SOLUTION INTRAMUSCULAR; INTRAVENOUS; SUBCUTANEOUS at 15:48

## 2025-06-12 RX ADMIN — HYDROMORPHONE HYDROCHLORIDE 0.2 MG: 0.2 INJECTION, SOLUTION INTRAMUSCULAR; INTRAVENOUS; SUBCUTANEOUS at 13:12

## 2025-06-12 RX ADMIN — HYDROMORPHONE HYDROCHLORIDE 0.5 MG: 1 INJECTION, SOLUTION INTRAMUSCULAR; INTRAVENOUS; SUBCUTANEOUS at 00:49

## 2025-06-12 RX ADMIN — ACETAMINOPHEN 650 MG: 325 TABLET ORAL at 20:00

## 2025-06-12 RX ADMIN — INSULIN ASPART 5 UNITS: 100 INJECTION, SOLUTION INTRAVENOUS; SUBCUTANEOUS at 08:51

## 2025-06-12 RX ADMIN — LIDOCAINE HYDROCHLORIDE 5 ML: 20 SOLUTION ORAL at 06:38

## 2025-06-12 RX ADMIN — SODIUM CHLORIDE 1000 ML: 0.9 INJECTION, SOLUTION INTRAVENOUS at 00:13

## 2025-06-12 RX ADMIN — POTASSIUM CHLORIDE, DEXTROSE MONOHYDRATE AND SODIUM CHLORIDE: 150; 5; 450 INJECTION, SOLUTION INTRAVENOUS at 15:52

## 2025-06-12 RX ADMIN — POTASSIUM CHLORIDE 10 MEQ: 7.46 INJECTION, SOLUTION INTRAVENOUS at 20:05

## 2025-06-12 RX ADMIN — INSULIN GLARGINE 15 UNITS: 100 INJECTION, SOLUTION SUBCUTANEOUS at 10:58

## 2025-06-12 RX ADMIN — POTASSIUM CHLORIDE 10 MEQ: 7.46 INJECTION, SOLUTION INTRAVENOUS at 18:59

## 2025-06-12 RX ADMIN — METOCLOPRAMIDE HYDROCHLORIDE 10 MG: 5 INJECTION INTRAMUSCULAR; INTRAVENOUS at 22:52

## 2025-06-12 RX ADMIN — HYDROMORPHONE HYDROCHLORIDE 0.2 MG: 0.2 INJECTION, SOLUTION INTRAMUSCULAR; INTRAVENOUS; SUBCUTANEOUS at 17:50

## 2025-06-12 RX ADMIN — HYDROMORPHONE HYDROCHLORIDE 0.5 MG: 1 INJECTION, SOLUTION INTRAMUSCULAR; INTRAVENOUS; SUBCUTANEOUS at 00:12

## 2025-06-12 RX ADMIN — PANTOPRAZOLE SODIUM 40 MG: 40 INJECTION, POWDER, FOR SOLUTION INTRAVENOUS at 15:48

## 2025-06-12 RX ADMIN — CALCIUM CARBONATE (ANTACID) CHEW TAB 500 MG 1000 MG: 500 CHEW TAB at 21:45

## 2025-06-12 RX ADMIN — INSULIN HUMAN 1.5 UNITS/KG/HR: 1 INJECTION, SOLUTION INTRAVENOUS at 15:53

## 2025-06-12 RX ADMIN — METOCLOPRAMIDE HYDROCHLORIDE 10 MG: 5 INJECTION INTRAMUSCULAR; INTRAVENOUS at 17:50

## 2025-06-12 RX ADMIN — ONDANSETRON 4 MG: 2 INJECTION INTRAMUSCULAR; INTRAVENOUS at 09:02

## 2025-06-12 RX ADMIN — HYDROMORPHONE HYDROCHLORIDE 0.2 MG: 0.2 INJECTION, SOLUTION INTRAMUSCULAR; INTRAVENOUS; SUBCUTANEOUS at 08:44

## 2025-06-12 ASSESSMENT — ACTIVITIES OF DAILY LIVING (ADL)
CONCENTRATING,_REMEMBERING_OR_MAKING_DECISIONS_DIFFICULTY: NO
WALKING_OR_CLIMBING_STAIRS_DIFFICULTY: NO
ADLS_ACUITY_SCORE: 52
DIFFICULTY_EATING/SWALLOWING: NO
ADLS_ACUITY_SCORE: 52
ADLS_ACUITY_SCORE: 52
ADLS_ACUITY_SCORE: 29
ADLS_ACUITY_SCORE: 29
DOING_ERRANDS_INDEPENDENTLY_DIFFICULTY: NO
CHANGE_IN_FUNCTIONAL_STATUS_SINCE_ONSET_OF_CURRENT_ILLNESS/INJURY: NO
ADLS_ACUITY_SCORE: 52
ADLS_ACUITY_SCORE: 52
HEARING_DIFFICULTY_OR_DEAF: NO
ADLS_ACUITY_SCORE: 29
ADLS_ACUITY_SCORE: 29
TOILETING_ISSUES: NO
WEAR_GLASSES_OR_BLIND: YES
ADLS_ACUITY_SCORE: 52
ADLS_ACUITY_SCORE: 29
ADLS_ACUITY_SCORE: 52
DIFFICULTY_COMMUNICATING: NO
ADLS_ACUITY_SCORE: 29
ADLS_ACUITY_SCORE: 52
ADLS_ACUITY_SCORE: 29
ADLS_ACUITY_SCORE: 52
ADLS_ACUITY_SCORE: 52
FALL_HISTORY_WITHIN_LAST_SIX_MONTHS: NO
ADLS_ACUITY_SCORE: 29
ADLS_ACUITY_SCORE: 29
DRESSING/BATHING_DIFFICULTY: NO
ADLS_ACUITY_SCORE: 29
ADLS_ACUITY_SCORE: 52

## 2025-06-12 NOTE — H&P
Admission History and Physical   Reji Monahan, 1990, [id number]  [facility]  Ketosis due to diabetes (H) [E11.10]  Primary spontaneous pneumothorax [J93.11]  Nausea and vomiting, unspecified vomiting type [R11.2]  PCP:No Ref-Primary, Physician, None   Admitting provider: Madeleine Goode MD.    Code status:  Full Code          Extended Emergency Contact Information  Primary Emergency Contact: Mark Beebe  Mobile Phone: [phone]  Relation: Brother  Secondary Emergency Contact: Garcia Matute   [facility]  Mobile Phone: [phone]  Relation: Other       Assessment and Plan  Principal Problem:    DKA related to DM type 1  Active Problems:    Acute on chronic abdominal pain    Cannabinoid hyperemesis syndrome    Diabetic gastroparesis (H)    Mild intermittent asthma    Paranoid schizophrenia (H)    Vision loss, right eye    Ketosis due to diabetes (H)    Primary spontaneous pneumothorax    Nausea and vomiting, unspecified vomiting type    Sinus tachycardia    Hyponatremia    Metabolic acidosis, increased anion gap    Reji Monahan is a 35 year old male presenting with abdominal pain    DKA in type 1 DM  Metabolic acidosis AG  - Beta Hydroxy was 5.61 and AG was 25,   - insulin drip was not started in ED and was given subcutaneous insulin instead  - patient seen on floor and labs repeat with persistent elevated Ketones 5.73, AG 20, -360. And with severe abdominal pain  - transferred to ICU and started on insulin drip  - will be cautious since he did get lantus earlier this am and additional short acting insulin   - NPO for now except meds  - IVF per DKA protocol   - Q4h BMP and lytes check     Acute on chronic abdominal pain  H/o cannabinoid hyperemesis   H/o gastroparesis   - IVF  - treating DKA as above  - CT performed and personally reviewed   - discussed cannabis cessation patient states he's stopped before and didn't get better but this was only 6 months  - IV dilaudid  for now  - IV PPI  - would start Carafate once taking in orals  - continue reglan change to IV and continue scheduled  - per ED provider note: I consulted our general surgeon and spoke to Dr. Mehta.  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  He states that usually these are managed conservatively but would recommend transfer somewhere where thoracic surgery is available.  He does not recommend placing a chest tube for the tiny pneumothorax and can just monitor that.  Patient's vitals are completely stable, not hypoxic, not tachycardic and blood pressures have been normotensive.  Will make the patient n.p.o., will treat his pain with as needed Dilaudid and patient will be given some maintenance fluids and will look at transferring the patient.  1:30 AM: I spoke to the hospitalist and general surgeon at [facility], initially try to get the patient transferred there.  I spoke to the general surgeon there and he states that they do not repair esophagus is and would recommend transfer down to the [facility].  He is also recommending that we get a CT esophagram to look at that area little bit closer.  Will call around to see if we can find a bed somewhere else then.  2:30 AM: I ended up talking to the thoracic surgeon with [facility], Dr. Grant who is highly suspicious that this was actually an esophageal tear, he thinks that this area is just tracking from the pneumothorax that he has.  He agrees with getting the esophagram and would recommend contacting him if there is any abnormalities but otherwise thinks that the patient could stay locally, there is nothing that we would do with the subcu air, patient would just need to be monitored to make sure he does not throw up anymore and make sure that the pneumothorax remained stable  5  AM: CT esophagram came back and there was some question of a defect in the mid part of the esophagus, this could correspond to where there was a tear possibly.  The radiologist was not sure as there was no extravasation from the contrast material.  I contacted the thoracic surgeon, Dr. Grant who personally reviewed the films again and did not think that that was a tear, he thinks that the radiologist is just reaching for possible answers to the subcu air.  He is still in agreement that this is likely from the pneumothorax and there is nothing that we would do about this.  He thinks that the patient can stay here locally.  5:30 AM: I contacted our general surgeon Dr. Mehta again to update on what thoracic surgeon had to say.  He is okay with the patient staying here and we will just follow along.  He would recommend getting a repeat x-ray 8 hours after his last imaging and thinks the patient will likely just need to be here for 24 hours for observation     Right PTX  - no trauma or injury   - see ED provider note and discussion with surgeon above  - General surgery seen here per note IS and repeat CXR  - CXR for am  - O2 sats stable, continuous pulse ox  - having CP was having multiple episodes of emesis        Hyponatremia   - still despite hyperglycemia correction (129 calculated)   - getting NS  - trend labs   - avoid over correction     Sinus tachycardia likely from dehydration and DKA  - getting IVF  - on telemetry    Mild intermittent asthma stable not in exacerbation   - continue home inhalers     Schizophrenia   - currently on no medications for this due to his gastroparesis per patient had been on Zyprexa in the past  - he phillips shave ativan on his list but this ordered for insomnia and anxiety or progressive vomiting holding currently    Clinically Significant Risk Factors Present on Admission         # Hyponatremia: Lowest Na = 125 mmol/L in last 2 days, will monitor as appropriate  # Hypochloremia:  "Lowest Cl = 86 mmol/L in last 2 days, will monitor as appropriate  # Hypocalcemia: Lowest Ca = 8.4 mg/dL in last 2 days, will monitor and replace as appropriate    # Anion Gap Metabolic Acidosis: Highest Anion Gap = 25 mmol/L in last 2 days, will monitor and treat as appropriate               # DMII: A1C = N/A within past 6 months   # Overweight: Estimated body mass index is 25.07 kg/m  as calculated from the following:    Height as of this encounter: 1.854 m (6' 1\").    Weight as of this encounter: 86.2 kg (190 lb).       # Asthma: noted on problem list        COVID-19 PCR Results          9/11/2023    07:12   COVID-19 PCR Results   SARS CoV2 PCR Negative      COVID-19 Antibody Results, Testing for Immunity           No data to display              VTE prophylaxis:  Pneumatic Compression Devices  DIET: Orders Placed This Encounter      NPO for Medical/Clinical Reasons Except for: Meds    Drains/Lines: none  Weight bearing status: WBAT  Code Status:Full Code  Medically Ready for Discharge: Anticipated Tomorrow         Expected Discharge Date: 06/13/2025, 12:00 PM  Discharge Delays: IV Medication - consider oral or Home Infusion  Lab Result Pending (enter specific test & time in comments)  Voiding/Eating Trial needed    Discharge Comments: pending correction of DKA and tolerating diet    # DMII: A1C = N/A within past 6 months    HPI: Reji Monahan is a 35 year old male with severe abdominal pain. Patient has a history of chronic abdominal pain,  cannabinoid hyperemesis syndrome, gastroparesis and uncontrolled diabetes. Patient was just in the ER earlier today and left AGAINST MEDICAL ADVICE after blood work came back and was mostly unremarkable and they wanted to get a CAT scan. Patient has a history of coming in and out of the ER and leaving AGAINST MEDICAL ADVICE. Upon arrival here patient states he just wants us to check labs and get on with things. Patient states he has his general abdominal pain has been " going on for 5 days. He states eating moving anything makes it worse, nothing helps. Patient states he has not had a bowel movement since Saturday but has not been able to really eat anything. Denies any dysuria or hematuria. Denies any fevers or chills.     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  Family History   Problem Relation Age of Onset    Cerebrovascular Disease Father     Diabetes Brother      Social History     Socioeconomic History    Marital status: Single     Spouse name: Not on file    Number of children: Not on file    Years of education: Not on file    Highest education level: Not on file   Occupational History    Not on file   Tobacco Use    Smoking status: Never    Smokeless tobacco: Not on file   Substance and Sexual Activity    Alcohol use: No    Drug use: Yes     Types: Marijuana    Sexual activity: Never   Other Topics Concern    Not on file   Social History Narrative    Single, homeless, currently living in a hotel.  (last updated 9/17/2021)      Social Drivers of Health     Financial Resource Strain: Low Risk  (6/12/2025)    Financial Resource Strain     Within the past 12 months, have you or your family members you live with been unable to get utilities (heat, electricity) when it was really needed?: No   Food Insecurity: Low Risk  (6/12/2025)    Food Insecurity     Within the past 12 months, did you worry that your food would run out before you got money to buy more?: No     Within the past  12 months, did the food you bought just not last and you didn t have money to get more?: No   Transportation Needs: Low Risk  (6/12/2025)    Transportation Needs     Within the past 12 months, has lack of transportation kept you from medical appointments, getting your medicines, non-medical meetings or appointments, work, or from getting things that you need?: No   Physical Activity: Not on file   Stress: Not on file   Social Connections: Socially Integrated (5/6/2025)    [facility]    Social Connections     Do you often feel lonely or isolated from those around you?: 0   Interpersonal Safety: Not At Risk (1/22/2024)    Received from [facility]    Humiliation, Afraid, Rape, and Kick questionnaire     Fear of Current or Ex-Partner: No     Emotionally Abused: No     Physically Abused: No     Sexually Abused: No   Housing Stability: Low Risk  (6/12/2025)    Housing Stability     Do you have housing? : Yes     Are you worried about losing your housing?: No     Allergies   Allergen Reactions    Bees Anaphylaxis       PRIOR TO ADMISSION MEDICATIONS   Prior to Admission medications    Medication Sig Last Dose Taking? Auth Provider Long Term End Date   acetaminophen (TYLENOL) 500 MG tablet Take 500 mg by mouth. Unknown Yes Reported, Patient No    albuterol (PROAIR HFA/PROVENTIL HFA/VENTOLIN HFA) 108 (90 Base) MCG/ACT inhaler Inhale 1-2 puffs into the lungs. Unknown Yes Reported, Patient No    alum & mag hydroxide-simethicone (UMM-LANTA) 200-200-20 MG/5ML SUSP suspension Take 15 mLs by mouth. 6/12/2025 at  6:50 AM Yes Reported, Patient     B-D U/F 31G X 8 MM insulin pen needle Inject Subcutaneous 5 times daily 6/11/2025 Yes Reported, Patient No    bisacodyl (DULCOLAX) 10 MG suppository Place 10 mg rectally. Unknown Yes Reported, Patient     bisacodyl (DULCOLAX) 5 MG EC tablet TAKE 1 TO 2 TABLETS BY MOUTH DAILY AS NEEDED FOR CONSTIPATION. Unknown Yes Reported, Patient      CONTOUR NEXT TEST test strip 1 strip by In Vitro route 4 times daily. 6/11/2025 Yes Reported, Patient No    Lidocaine (LIDOCARE) 4 % Patch Place 1 each onto the skin. Unknown Yes Reported, Patient     LORazepam (ATIVAN) 0.5 MG tablet Indications: Feeling Anxious, Trouble SleepingTake 1 tablet daily as needed for insomnia, progressive vomiting and anxiety Unknown Yes Reported, Patient     ondansetron (ZOFRAN ODT) 4 MG ODT tab Take 1 tablet by mouth 3 times daily 6/11/2025 at  3:00 PM Yes Reported, Patient No    dorzolamide-timolol (COSOPT) 2-0.5 % ophthalmic solution Place 1 drop into the right eye 2 times daily Unknown  Reported, Patient      MG capsule Take 100 mg by mouth 2 times daily. Docusate taken with senna Unknown  Reported, Patient     famotidine (PEPCID) 20 MG tablet Take 20 mg by mouth 2 times daily   Reported, Patient     FEROSUL 325 (65 Fe) MG tablet Take 1 tablet by mouth daily. Unknown  Reported, Patient     gabapentin (NEURONTIN) 400 MG capsule Take 400 mg by mouth 3 times daily. Unknown  Reported, Patient No    insulin aspart (NOVOLOG PEN) 100 UNIT/ML pen Inject 8 UNITS subcutaneously with each meal and 4 UNITS with each snack. Skip dose if not eating. Inject 2 UNITS if -200, 4 UNITS if -250, 6 UNITS if -300, 8 UNITS if -350, 10 UNITS if -400, 12 UNITS if -450, 14 UNITS if -500 with each meal. Max daily dose: 40 units Unknown  Reported, Patient No    insulin glargine (LANTUS PEN) 100 UNIT/ML pen Inject 24 Units subcutaneously every morning. Unknown  Reported, Patient No    LINZESS 145 MCG capsule Take 145 mcg by mouth every morning (before breakfast) Unknown  Reported, Patient     methocarbamol (ROBAXIN) 500 MG tablet Take 500 mg by mouth 3 times daily as needed (muscle pain (musculoskeletal)) Unknown  Reported, Patient     metoclopramide (REGLAN) 10 MG tablet Take 10 mg by mouth 4 times daily. Before meals and nightly Unknown  Reported, Patient      omeprazole (PRILOSEC) 40 MG DR capsule Take 40 mg by mouth 2 times daily (before meals) Unknown  Reported, Patient     oxyCODONE (ROXICODONE) 5 MG/5ML solution Take 5 mg by mouth daily as needed for breakthrough pain. Unknown  Reported, Patient No    senna (SENOKOT) 8.6 MG tablet Take 2 tablets by mouth 2 times daily Unknown  Reported, Patient          REVIEW OF SYSTEMS:  12 point reviewed pertinent negatives and positives in HPI all others negative     PHYSICAL EXAM  Temp:  [97.1  F (36.2  C)-98.1  F (36.7  C)] 98.1  F (36.7  C)  Pulse:  [] 111  Resp:  [16-20] 18  BP: (119-171)/() 159/111  SpO2:  [99 %-100 %] 100 %  Wt Readings from Last 1 Encounters:   06/11/25 86.2 kg (190 lb)     Body mass index is 25.07 kg/m .  Physical Exam  Vitals and nursing note reviewed.   Constitutional:       Comments: Chronically ill-appearing, nontoxic uncomfortable   HENT:      Head: Normocephalic and atraumatic.      Nose: Nose normal.      Mouth/Throat:      Mouth: Mucous membranes are dry.      Comments: edentulous  Eyes:      Comments: Right eye blindness, cloudy, left eye EOM intact and pupil reactive to light   Cardiovascular:      Rate and Rhythm: Regular rhythm. Tachycardia present.      Pulses: Normal pulses.   Pulmonary:      Effort: Pulmonary effort is normal.      Breath sounds: Normal breath sounds.      Comments: Crepitus anteriorly along sternum and right side  Abdominal:      General: Bowel sounds are normal.      Palpations: Abdomen is soft.      Comments: ND, diffuse TTP, +BS, no rigidity   Musculoskeletal:         General: No swelling or tenderness. Normal range of motion.      Cervical back: Normal range of motion and neck supple. No rigidity or tenderness.   Skin:     General: Skin is warm and dry.      Capillary Refill: Capillary refill takes less than 2 seconds.   Neurological:      Mental Status: He is alert and oriented to person, place, and time. Mental status is at baseline.         PERTINENT  LABS and RADIOLOGY   Results for orders placed or performed during the hospital encounter of 06/11/25   CT ABDOMEN PELVIS W CONTRAST   Result Value Ref Range    Radiologist flags Right pneumothorax (AA)     Impression    IMPRESSION:   1.  Extensive pneumomediastinum in the imaged portion of the chest with a right pneumothorax which is incompletely imaged.    2.  Wall thickening of the lower thoracic esophagus may relate to recent emesis. The stomach is fluid filled and mildly distended. Remainder of the bowel is normal.    3.  Extensive retroperitoneal and preperitoneal gas, likely tracking from the retroperitoneum inferiorly.      [Critical Result: Right pneumothorax]    Finding was identified on 6/12/2025 12:04 AM CDT.     1.  Dr. Pineda was contacted by me on 6/12/2025 12:07 AM CDT and verbalized understanding of the critical result.    XR Chest Port 1 View    Impression    IMPRESSION: Small right apical pneumothorax measuring up to 1.5 cm from the apical parietal pleura. No acute airspace disease. Normal heart size. Scattered pneumomediastinum and soft tissue gas in the upper chest.   Chest CT w/o contrast   Result Value Ref Range    Radiologist flags (AA)      Suggestion of a linear defect in the left aspect of the esophagus, possibly site of esophageal tear. No extravasation of enteric contrast. Pneumomediastinum extending superiorly and inferiorly. Moderate right pneumothorax.    Impression    IMPRESSION:   1.  Suggestion of a tiny linear defect in the left aspect of the mid esophagus at the level of the bifurcation of the left mainstem bronchus, on the initial images, possibly site of esophageal tear. No extravasation of endoluminal enteric contrast.   Diffuse thickening of the distal esophagus to the EG junction may be due to edema or infiltration.    2.  Extensive pneumomediastinum extending superiorly into the neck, and inferiorly into the abdomen as well as the chest wall musculature.    3.  Moderate  right pneumothorax. Evidence of remote granulomatous disease.    4.  Findings discussed with the referring Emergency Room physician, Dr. Pineda, immediately following the study at 0445 hours.      [Critical Result: Suggestion of a linear defect in the left aspect of the esophagus, possibly site of esophageal tear. No extravasation of enteric contrast. Pneumomediastinum extending superiorly and inferiorly. Moderate right pneumothorax.]    Finding was identified on 6/12/2025 4:25 AM CDT.     Dr. Pineda was contacted by me on 6/12/2025 4:42 AM CDT and verbalized understanding of the critical result.                    Imaging results reviewed over the past 24 hrs:   Recent Results (from the past 24 hours)   CT ABDOMEN PELVIS W CONTRAST   Result Value    Radiologist flags Right pneumothorax (AA)    Narrative    EXAM: CT ABDOMEN PELVIS W CONTRAST  LOCATION: [facility]  DATE: 6/12/2025    INDICATION: generalized abd pain  COMPARISON: CT from 9/30/2024.  TECHNIQUE: CT scan of the abdomen and pelvis was performed following injection of IV contrast. Multiplanar reformats were obtained. Dose reduction techniques were used.  CONTRAST: 93mL Isovue 370    FINDINGS:   LOWER CHEST: Extensive pneumomediastinum. Right pneumothorax measuring up to 3.6 cm in the anterobasal pleural separation. Wall thickening of the lower thoracic esophagus.    HEPATOBILIARY: Mild hepatic steatosis. Gallbladder within normal limits.    PANCREAS: Normal.    SPLEEN: Normal.    ADRENAL GLANDS: Normal.    KIDNEYS/BLADDER: Normal kidneys. Extensive retroperitoneal gas. Normal bladder..    BOWEL: Fluid-filled, mildly distended stomach. Remainder of the bowel is of normal caliber. Normal appendix. Scattered gas tracking along the peritoneal surfaces though likely extraperitoneal in location.    LYMPH NODES: Normal.    VASCULATURE: Normal.    PELVIC ORGANS: Normal.    MUSCULOSKELETAL: Small amount of gas along the lower thoracic  spinal canal. No acute osseous findings.      Impression    IMPRESSION:   1.  Extensive pneumomediastinum in the imaged portion of the chest with a right pneumothorax which is incompletely imaged.    2.  Wall thickening of the lower thoracic esophagus may relate to recent emesis. The stomach is fluid filled and mildly distended. Remainder of the bowel is normal.    3.  Extensive retroperitoneal and preperitoneal gas, likely tracking from the retroperitoneum inferiorly.      [Critical Result: Right pneumothorax]    Finding was identified on 6/12/2025 12:04 AM CDT.     1.  Dr. Pineda was contacted by me on 6/12/2025 12:07 AM CDT and verbalized understanding of the critical result.    XR Chest Port 1 View    Narrative    EXAM: XR CHEST PORT 1 VIEW  LOCATION: [facility]  DATE: 6/12/2025    INDICATION: eval for right sided pneumothorax  COMPARISON: Abdominal CT from 6/12/2025, chest radiographs from 1/14/2025.      Impression    IMPRESSION: Small right apical pneumothorax measuring up to 1.5 cm from the apical parietal pleura. No acute airspace disease. Normal heart size. Scattered pneumomediastinum and soft tissue gas in the upper chest.   CT Esophagram without Contrast   Result Value    Radiologist flags (AA)     Suggestion of a linear defect in the left aspect of the esophagus, possibly site of esophageal tear. No extravasation of enteric contrast. Pneumomediastinum extending superiorly and inferiorly. Moderate right pneumothorax.    Narrative    EXAM: CT CHEST W/O CONTRAST  LOCATION: [facility]  DATE: 6/12/2025    INDICATION: Concern for Boerhaave syndrome, fair amount of pneumomediastinum. CT esophagram.  COMPARISON:   1. Portable chest single view 6/12/2025 at 0023 hours.  2. CT abdomen and pelvis with IV contrast 6/11/2025.  3. CT pulmonary angiogram 6/9/2023.  TECHNIQUE: CT chest without IV contrast. Multiplanar reformats were obtained. Dose reduction  techniques were used.  CONTRAST: None.    FINDINGS:   LUNGS AND PLEURA: Moderate right pneumothorax. A few strands of linear atelectasis right lower lung. Minor strand of fibrosis right upper lobe, resolved airspace infiltrate in the right upper lobe seen on 6/9/2023. Calcified granuloma left lower lobe.   Tiny insignificant 0.2 cm left lower lobe subpleural nodule (image 138, series 11), no specific follow-up required.    MEDIASTINUM/AXILLAE: Extensive pneumomediastinum extending superiorly into the neck, and inferiorly into the abdomen. Endoluminal enteric contrast within the esophagus. Suggestion of a tiny linear defect in the left aspect of the mid esophagus at the   level of the bifurcation of the left mainstem bronchus, on the initial images (image 123, series 4), possibly site of esophageal tear. No extravasation of endoluminal enteric contrast. Diffuse thickening of the distal esophagus to the EG junction may be   attributable to edema or infiltration. No suspicious adenopathy. Normal cardiac size. No pericardial effusion. Trachea is midline.    CORONARY ARTERY CALCIFICATION: None.    UPPER ABDOMEN: Retroperitoneal foci of free gas.    MUSCULOSKELETAL: Foci of soft tissue gas within the chest wall extending into the neck superiorly. Mild diffuse body wall edema. Mild right convex thoracic curve.      Impression    IMPRESSION:   1.  Suggestion of a tiny linear defect in the left aspect of the mid esophagus at the level of the bifurcation of the left mainstem bronchus, on the initial images, possibly site of esophageal tear. No extravasation of endoluminal enteric contrast.   Diffuse thickening of the distal esophagus to the EG junction may be due to edema or infiltration.    2.  Extensive pneumomediastinum extending superiorly into the neck, and inferiorly into the abdomen as well as the chest wall musculature.    3.  Moderate right pneumothorax. Evidence of remote granulomatous disease.    4.  Findings  discussed with the referring Emergency Room physician, Dr. Pineda, immediately following the study at 0445 hours.      [Critical Result: Suggestion of a linear defect in the left aspect of the esophagus, possibly site of esophageal tear. No extravasation of enteric contrast. Pneumomediastinum extending superiorly and inferiorly. Moderate right pneumothorax.]    Finding was identified on 6/12/2025 4:25 AM CDT.     Dr. Pineda was contacted by me on 6/12/2025 4:42 AM CDT and verbalized understanding of the critical result.                X-ray Chest 2 vws*    Narrative    EXAM: XR CHEST 2 VIEWS  LOCATION: [facility] [facility]  DATE: 6/12/2025    INDICATION: Follow-up for pneumothorax.  COMPARISON: Chest radiograph performed earlier today.      Impression    IMPRESSION: A small right apical pneumothorax has decreased slightly in size, measuring 1.1 cm (previously 1.5 cm). Extensive subcutaneous emphysema is noted in the neck and supraclavicular regions bilaterally. The lungs are otherwise clear. No pleural   effusion.     Most Recent 3 CBC's:  Recent Labs   Lab Test 06/11/25  2305 01/11/25  1302 07/17/24  1243   WBC 8.4 13.2* 9.2   HGB 14.4 12.1* 14.2   MCV 81 81 84    168 197     Most Recent 3 BMP's:  Recent Labs   Lab Test 06/12/25  1536 06/12/25  1306 06/12/25  1134 06/12/25  0850 06/12/25  0605 06/12/25  0126 06/11/25  2305   NA  --  125*  --   --  126*  --  130*   POTASSIUM  --  4.4  --   --  4.1  --  3.7   CHLORIDE  --  88*  --   --  89*  --  86*   CO2  --  17*  --   --  18*  --  19*   BUN  --  21.9*  --   --  20.5*  --  22.9*   CR  --  0.96  --   --  0.96  --  1.15   ANIONGAP  --  20*  --   --  19*  --  25*   LYNN  --  8.4*  --   --  8.8  --  9.5   * 226* 246*   < > 244*   < > 398*    < > = values in this interval not displayed.     Most Recent 2 LFT's:  Recent Labs   Lab Test 06/11/25  2305 01/11/25  1302   AST 25 19   ALT 20 26   ALKPHOS 146 124   BILITOTAL 1.4* 0.9     Most  Recent 3 INR's:No lab results found.  Most Recent 3 BNP's:  Recent Labs   Lab Test 01/25/20  2320   NTBNPI 7     Most Recent D-dimer:  Recent Labs   Lab Test 02/06/21  0921   DD <0.3     Most Recent Hemoglobin A1c:  Recent Labs   Lab Test 01/11/25  1302   A1C 9.1*     Most Recent 6 glucoses:  Recent Labs   Lab Test 06/12/25  1536 06/12/25  1306 06/12/25  1134 06/12/25  0850 06/12/25  0605 06/12/25  0126   * 226* 246* 360* 244* 420*     Most Recent Urinalysis:  Recent Labs   Lab Test 06/12/25  1356   COLOR Light Yellow   APPEARANCE Clear   URINEGLC >1000*   URINEBILI Negative   URINEKETONE >150*   SG 1.027   UBLD Negative   URINEPH 5.5   PROTEIN Negative   NITRITE Negative   LEUKEST Trace*   RBCU 0   WBCU 4     Most Recent ESR & CRP:  Recent Labs   Lab Test 06/03/24  1432 01/01/20  0753   CRP  --  9.2*   CRPI 4.31  --          Madeleine Goode MD  [facility] Medicine Service

## 2025-06-12 NOTE — SIGNIFICANT EVENT
Significant Event Note    Time of event: 6:35 PM June 12, 2025    Description of event:  Abnormal lab    Plan:  Low phos placed on replacement protocol, high  Potassium slightly low placed on protocol   Ketones decreasing now on insulin gtt continue for now  Likely transition off overnight. Made overnight MD aware of this.     Discussed with: bedside nurse    Madeleine Goode MD

## 2025-06-12 NOTE — ED PROVIDER NOTES
History     Chief Complaint   Patient presents with    Abdominal Pain     HPI  Reji Monahan is a 35 year old male who presents with severe abdominal pain.  Patient has a history of chronic abdominal pain.  Patient has a past medical history is significant for cannabinoid hyperemesis syndrome, gastroparesis and uncontrolled diabetes.  Patient was just in the ER earlier today and left AGAINST MEDICAL ADVICE after blood work came back and was mostly unremarkable and they wanted to get a CAT scan.  Patient has a history of coming in and out of the ER and leaving AGAINST MEDICAL ADVICE.  Upon arrival here patient states he just wants us to check labs and get on with things.  Patient states he has his general abdominal pain has been going on for 5 days.  He states eating moving anything makes it worse, nothing helps.  Patient states he has not had a bowel movement since Saturday but has not been able to really eat anything.  Denies any dysuria or hematuria.  Denies any fevers or chills.    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      Nausea with vomiting 09/17/2021     Priority: Medium    DKA related to DM type 1 12/23/2020     Priority: Medium     MRSA carrier 08/28/2020     Priority: Medium    DM type 1, Hgb A1C 11.9 on 9/8/21 08/04/2020     Priority: Medium    ADD (attention deficit disorder) 04/06/2020     Priority: Medium    Gastroesophageal reflux disease without esophagitis 04/06/2020     Priority: Medium    Cannabinoid hyperemesis syndrome 03/24/2020     Priority: Medium    Chest pain 01/26/2020     Priority: Medium    Splenomegaly 01/22/2020     Priority: Medium    Acute on chronic abdominal pain 01/21/2020     Priority: Medium    Moderate malnutrition 01/20/2018     Priority: Medium    Cognitive impairment 01/19/2018     Priority: Medium    Paroxysmal SVT (supraventricular tachycardia) 11/12/2017     Priority: Medium     Last Assessment & Plan:   Formatting of this note might be different from the original.  Started on diltiazem on d/c from [facility]. Tachycardic today: 117. He reports compliance with this medication. Declined ablation while seen by Cards at [place] but will be seen as an outpatient- prefers to be seen at [facility] scheduled.  Last Assessment & Plan:   Started on diltiazem on d/c from [facility]. Tachycardic today: 117. He reports compliance with this medication. Declined ablation while seen by [facility] at [place] but will be seen as an outpatient- prefers to be seen at [facility] scheduled.      Phimosis 11/01/2017     Priority: Medium     Formatting of this note might be different from the original.  Penis entrapped with recurrent balanitis      Balanitis 12/04/2016     Priority: Medium     Last Assessment & Plan:   Formatting of this note might be different from the original.  Reji is here today requesting additional antibiotics as he continues to have penile pain and phimosis. He was seen at [facility] [place]'s ED 9/7, left upset and went to [facility] and was treated with percocet, lotrimin, and levaquin. Per care everywhere, he then went to [facility] 9/12 and was seen by urology:  Urology was consulted for  balanitis and phimosis, recommended a 10 day course of augmentin, 14 day course of fluconazole, and Lotrisone cream BID to help with symtpoms. Urology recommended to follow up in clinic in 1 month to reassess phimosis and reevaluate need for circumcision.    Reji is a poor historian, but it sounds like he took the meds given by [facility] and not others. Discussed with PharmD in our clinic. Will give doxycycline, fluconazone and lotrisone cream as recommended by urology (14 day course). Also referring to urology. Giving ibuprofen for pain. Recommended that he return next week to see his PCP.      Homelessness 06/09/2016     Priority: Medium     Last Assessment & Plan:   Formatting of this note might be different from the original.  Per [place] notes, was living in a hotel, though patient tells me today that he lives in a house in [place]- living with his brother and caring for him.  Last Assessment & Plan:   Per [place] notes, was living in a hotel, though patient tells me today that he lives in a house in [place]- living with his brother and caring for him.      Diabetic gastroparesis (H) 05/06/2016     Priority: Medium    Diabetic neuropathy (H) 05/06/2016     Priority: Medium    ACP (advance care planning) 01/08/2016     Priority: Medium     Patient has identified Health Care Agent(s): No  Add Health Care Agents: No  Patient has Advance Care Plan Documents (Health Care Directive, POLST): No, Pt declined.    Patient has identified Specific Treatment Preferences: Yes   Specific Treatment Preferences: a.) Code Status:  CPR/Attempt Resuscitation      SW met with Pt 1/7/2106 - Pt states if he's unable to communicate his healthcare wishes his brother, Raj Monahan - C: [phone], would be his primary spokesperson. LEXY Barreto, [facility]...Pager [phone]... ext. [phone], contact via anwpaging      Asthma 01/06/2016     Priority: Medium    Cannabis abuse 12/16/2015     Priority: Medium    Poor  dentition 11/04/2015     Priority: Medium    Dental caries 08/12/2015     Priority: Medium    Paranoid schizophrenia (H) 11/25/2014     Priority: Medium    Mild intermittent asthma 02/13/2014     Priority: Medium    Charcot foot due to diabetes mellitus (H) 11/22/2013     Priority: Medium    Borderline intellectual functioning 09/27/2010     Priority: Medium    Dyslipidemia 08/30/2010     Priority: Medium     Formatting of this note might be different from the original.  Last LDL- 105 on 6/11/13  Last LDL- 105 on 6/11/13      Asperger's disorder 08/05/2009     Priority: Medium    Insomnia 06/30/2009     Priority: Medium        Past Medical History:    Past Medical History:   Diagnosis Date    Cannabis abuse     DKA (diabetic ketoacidoses)     Endocarditis 04/2021    Homeless     Schizophrenia (H)     Sepsis, due to unspecified organism, unspecified whether acute organ dysfunction present (H) 02/06/2021    Type 1 diabetes mellitus (H)     Uncomplicated asthma     Urinary tract infection without hematuria, site unspecified 02/06/2021       Past Surgical History:    No past surgical history on file.    Family History:    Family History   Problem Relation Age of Onset    Cerebrovascular Disease Father     Diabetes Brother        Social History:  Marital Status:  Single [1]  Social History     Tobacco Use    Smoking status: Never   Substance Use Topics    Alcohol use: No    Drug use: Yes     Types: Marijuana        Medications:    acetaminophen (TYLENOL) 500 MG tablet  albuterol (PROAIR HFA/PROVENTIL HFA/VENTOLIN HFA) 108 (90 Base) MCG/ACT inhaler  alum & mag hydroxide-simethicone (UMM-LANTA) 200-200-20 MG/5ML SUSP suspension  bisacodyl (DULCOLAX) 10 MG suppository  bisacodyl (DULCOLAX) 5 MG EC tablet  Lidocaine (LIDOCARE) 4 % Patch  LORazepam (ATIVAN) 0.5 MG tablet  B-D U/F 31G X 8 MM insulin pen needle  CONTOUR NEXT TEST test strip  dorzolamide-timolol (COSOPT) 2-0.5 % ophthalmic solution   MG  capsule  famotidine (PEPCID) 20 MG tablet  FEROSUL 325 (65 Fe) MG tablet  gabapentin (NEURONTIN) 400 MG capsule  insulin aspart (NOVOLOG PEN) 100 UNIT/ML pen  insulin glargine (LANTUS PEN) 100 UNIT/ML pen  LINZESS 145 MCG capsule  methocarbamol (ROBAXIN) 500 MG tablet  metoclopramide (REGLAN) 10 MG tablet  omeprazole (PRILOSEC) 40 MG DR capsule  ondansetron (ZOFRAN ODT) 4 MG ODT tab  oxyCODONE (ROXICODONE) 5 MG/5ML solution  senna (SENOKOT) 8.6 MG tablet          Review of Systems   All other systems reviewed and are negative.      Physical Exam   BP: 119/75  Pulse: 95  Temp: 97.1  F (36.2  C)  Resp: 16  Weight: 86.2 kg (190 lb)  SpO2: 99 %      Physical Exam  Vitals and nursing note reviewed.   Constitutional:       General: He is not in acute distress.     Appearance: He is well-developed. He is not diaphoretic.   HENT:      Head: Normocephalic and atraumatic.      Nose: Nose normal.      Mouth/Throat:      Pharynx: No oropharyngeal exudate.   Eyes:      General: No scleral icterus.     Conjunctiva/sclera: Conjunctivae normal.      Pupils: Pupils are equal, round, and reactive to light.   Cardiovascular:      Rate and Rhythm: Normal rate and regular rhythm.      Heart sounds: Normal heart sounds. No murmur heard.     No friction rub.   Pulmonary:      Effort: Pulmonary effort is normal. No respiratory distress.      Breath sounds: Normal breath sounds. No wheezing or rales.   Abdominal:      General: Bowel sounds are normal. There is no distension.      Palpations: Abdomen is soft. There is no mass.      Tenderness: There is abdominal tenderness (generalized). There is no guarding or rebound.   Musculoskeletal:         General: No tenderness. Normal range of motion.      Cervical back: Normal range of motion.   Skin:     General: Skin is warm.      Findings: No rash.   Neurological:      Mental Status: He is alert and oriented to person, place, and time.   Psychiatric:         Judgment: Judgment normal.         ED  Course        Procedures        Critical Care time:  was 40 minutes for this patient excluding procedures.      Results for orders placed or performed during the hospital encounter of 06/11/25 (from the past 24 hours)   CBC with platelets differential    Narrative    The following orders were created for panel order CBC with platelets differential.  Procedure                               Abnormality         Status                     ---------                               -----------         ------                     CBC with platelets and ...[[id number]]                      Final result                 Please view results for these tests on the individual orders.   Basic metabolic panel   Result Value Ref Range    Sodium 130 (L) 135 - 145 mmol/L    Potassium 3.7 3.4 - 5.3 mmol/L    Chloride 86 (L) 98 - 107 mmol/L    Carbon Dioxide (CO2) 19 (L) 22 - 29 mmol/L    Anion Gap 25 (H) 7 - 15 mmol/L    Urea Nitrogen 22.9 (H) 6.0 - 20.0 mg/dL    Creatinine 1.15 0.67 - 1.17 mg/dL    GFR Estimate 84 >60 mL/min/1.73m2    Calcium 9.5 8.8 - 10.4 mg/dL    Glucose 398 (H) 70 - 99 mg/dL   Hepatic function panel   Result Value Ref Range    Protein Total 7.8 6.4 - 8.3 g/dL    Albumin 4.5 3.5 - 5.2 g/dL    Bilirubin Total 1.4 (H) <=1.2 mg/dL    Alkaline Phosphatase 146 40 - 150 U/L    AST 25 0 - 45 U/L    ALT 20 0 - 70 U/L    Bilirubin Direct 0.51 (H) 0.00 - 0.30 mg/dL   Lipase   Result Value Ref Range    Lipase 16 13 - 60 U/L   Blood gas venous   Result Value Ref Range    pH Venous 7.33 7.32 - 7.43    pCO2 Venous 43 40 - 50 mm Hg    pO2 Venous 35 25 - 47 mm Hg    Bicarbonate Venous 23 21 - 28 mmol/L    Base Excess/Deficit Venous -3.0 -3.0 - 3.0 mmol/L    FIO2 0     Oxyhemoglobin Venous 63 (L) 70 - 75 %    O2 Sat, Venous 63.8 (L) 70.0 - 75.0 %    Narrative    In healthy individuals, oxyhemoglobin (O2Hb) and oxygen saturation (SO2) are approximately equal. In the presence of dyshemoglobins, oxyhemoglobin can be considerably lower  than oxygen saturation.   Ketone Beta-Hydroxybutyrate Quantitative   Result Value Ref Range    Ketone (Beta-Hydroxybutyrate) Quantitative 5.61 (HH) <=0.30 mmol/L   CBC with platelets and differential   Result Value Ref Range    WBC Count 8.4 4.0 - 11.0 10e3/uL    RBC Count 5.24 4.40 - 5.90 10e6/uL    Hemoglobin 14.4 13.3 - 17.7 g/dL    Hematocrit 42.6 40.0 - 53.0 %    MCV 81 78 - 100 fL    MCH 27.5 26.5 - 33.0 pg    MCHC 33.8 31.5 - 36.5 g/dL    RDW 13.2 10.0 - 15.0 %    Platelet Count 219 150 - 450 10e3/uL    % Neutrophils 61 %    % Lymphocytes 28 %    % Monocytes 8 %    % Eosinophils 3 %    % Basophils 0 %    % Immature Granulocytes 0 %    NRBCs per 100 WBC 0 <1 /100    Absolute Neutrophils 5.1 1.6 - 8.3 10e3/uL    Absolute Lymphocytes 2.3 0.8 - 5.3 10e3/uL    Absolute Monocytes 0.6 0.0 - 1.3 10e3/uL    Absolute Eosinophils 0.2 0.0 - 0.7 10e3/uL    Absolute Basophils 0.0 0.0 - 0.2 10e3/uL    Absolute Immature Granulocytes 0.0 <=0.4 10e3/uL    Absolute NRBCs 0.0 10e3/uL   CT ABDOMEN PELVIS W CONTRAST   Result Value Ref Range    Radiologist flags Right pneumothorax (AA)     Narrative    EXAM: CT ABDOMEN PELVIS W CONTRAST  LOCATION: [facility]  DATE: 6/12/2025    INDICATION: generalized abd pain  COMPARISON: CT from 9/30/2024.  TECHNIQUE: CT scan of the abdomen and pelvis was performed following injection of IV contrast. Multiplanar reformats were obtained. Dose reduction techniques were used.  CONTRAST: 93mL Isovue 370    FINDINGS:   LOWER CHEST: Extensive pneumomediastinum. Right pneumothorax measuring up to 3.6 cm in the anterobasal pleural separation. Wall thickening of the lower thoracic esophagus.    HEPATOBILIARY: Mild hepatic steatosis. Gallbladder within normal limits.    PANCREAS: Normal.    SPLEEN: Normal.    ADRENAL GLANDS: Normal.    KIDNEYS/BLADDER: Normal kidneys. Extensive retroperitoneal gas. Normal bladder..    BOWEL: Fluid-filled, mildly distended stomach. Remainder  of the bowel is of normal caliber. Normal appendix. Scattered gas tracking along the peritoneal surfaces though likely extraperitoneal in location.    LYMPH NODES: Normal.    VASCULATURE: Normal.    PELVIC ORGANS: Normal.    MUSCULOSKELETAL: Small amount of gas along the lower thoracic spinal canal. No acute osseous findings.      Impression    IMPRESSION:   1.  Extensive pneumomediastinum in the imaged portion of the chest with a right pneumothorax which is incompletely imaged.    2.  Wall thickening of the lower thoracic esophagus may relate to recent emesis. The stomach is fluid filled and mildly distended. Remainder of the bowel is normal.    3.  Extensive retroperitoneal and preperitoneal gas, likely tracking from the retroperitoneum inferiorly.      [Critical Result: Right pneumothorax]    Finding was identified on 6/12/2025 12:04 AM CDT.     1.  Dr. Pineda was contacted by me on 6/12/2025 12:07 AM CDT and verbalized understanding of the critical result.    XR Chest Port 1 View    Narrative    EXAM: XR CHEST PORT 1 VIEW  LOCATION: [facility]  DATE: 6/12/2025    INDICATION: eval for right sided pneumothorax  COMPARISON: Abdominal CT from 6/12/2025, chest radiographs from 1/14/2025.      Impression    IMPRESSION: Small right apical pneumothorax measuring up to 1.5 cm from the apical parietal pleura. No acute airspace disease. Normal heart size. Scattered pneumomediastinum and soft tissue gas in the upper chest.       Medications   sodium chloride 0.9% BOLUS 1,000 mL (1,000 mLs Intravenous $New Bag 6/12/25 0013)   HYDROmorphone (PF) (DILAUDID) injection 0.5 mg (0.5 mg Intravenous $Given 6/12/25 0049)   ondansetron (ZOFRAN) injection 4 mg (4 mg Intravenous $Given 6/12/25 0049)   sodium chloride 0.9 % infusion (has no administration in time range)   sodium chloride 0.9 % bag for CT scan flush (60 mLs As instructed $Given 6/11/25 8820)   iopamidol (ISOVUE-370) solution 500 mL (93 mLs  Intravenous $Given 6/11/25 8751)   alum & mag hydroxide-simethicone (MAALOX) suspension 15 mL (15 mLs Oral $Given 6/11/25 3799)   lidocaine (viscous) (XYLOCAINE) 2 % solution 5 mL (5 mLs Mouth/Throat $Given 6/11/25 2319)   HYDROmorphone (PF) (DILAUDID) injection 0.5 mg (0.5 mg Intravenous $Given 6/12/25 0012)     This is a 35-year-old male who is a type I diabetic, history of hyperemesis secondary to marijuana use, presents with a 4 to 5-day history of abdominal pain.  Lab work came back and just shows some elevation in the ketones but a normal pH.  Glucose is around 300.  CT scan of the belly came back and I was called by radiology and they are concerned with significant pneumo mediastinum that is tracking retroperitoneally.  They also saw what look like was a small right-sided pneumothorax.  I then added on a chest x-ray and patient just has a very tiny apical pneumothorax measuring 1.5 cm.  There is also some free air tracking in the chest wall and into the neck area.  With all of this mediastinal air, this seems to be a lot for just a small pneumothorax.  I consulted our general surgeon and spoke to Dr. Mehta.  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  He states that usually these are managed conservatively but would recommend transfer somewhere where thoracic surgery is available.  He does not recommend placing a chest tube for the tiny pneumothorax and can just monitor that.  Patient's vitals are completely stable, not hypoxic, not tachycardic and blood pressures have been normotensive.  Will make the patient n.p.o., will treat his pain with as needed Dilaudid and patient will be given some maintenance fluids and will look at transferring the patient.  1:30 AM: I spoke to the hospitalist and general surgeon at [facility], initially try to get the patient transferred there.  I spoke to the general surgeon there and he states that they do not repair esophagus is and would recommend transfer down to the [facility].  He is also recommending that we get a CT esophagram to look at that area little bit closer.  Will call around to see if we can find a bed somewhere else then.  2:30 AM: I ended up talking to the thoracic surgeon with [facility], Dr. Grant who is highly suspicious that this was actually an esophageal tear, he thinks that this area is just tracking from the pneumothorax that he has.  He agrees with getting the esophagram and would recommend contacting him if there is any abnormalities but otherwise thinks that the patient could stay locally, there is nothing that we would do with the subcu air, patient would just need to be monitored to make sure he does not throw up anymore and make sure that the pneumothorax remained stable  5 AM: CT esophagram came back and there was some question of a defect in the mid part of the esophagus, this could correspond to where there was a tear possibly.  The radiologist was not sure as there was no extravasation from the contrast material.  I contacted the thoracic surgeon, Dr. Grant who personally reviewed the films again and did not think that that was a tear, he thinks that the radiologist is just reaching for possible answers to the subcu air.  He is still in agreement that this is likely from the pneumothorax and there is nothing that we would do about this.  He thinks that the patient can stay here locally.  5:30 AM: I contacted our general surgeon Dr. Mehta again to update on what thoracic surgeon had to say.  He is okay with the patient staying here and we will just follow along.  He would recommend getting a repeat x-ray 8 hours after his last imaging and thinks the patient will likely just need to be here for 24 hours for observation.      Assessments & Plan (with Medical  Decision Making)  Spontaneous right-sided pneumothorax     I have reviewed the nursing notes.    I have reviewed the findings, diagnosis, plan and need for follow up with the patient.          6/11/2025   [facility] EMERGENCY DEPT

## 2025-06-12 NOTE — ED TRIAGE NOTES
Triage Assessment (Adult)       Row Name 06/11/25 0107          Triage Assessment    Airway WDL WDL        Respiratory WDL    Respiratory WDL WDL        Skin Circulation/Temperature WDL    Skin Circulation/Temperature WDL WDL        Cardiac WDL    Cardiac WDL WDL        Peripheral/Neurovascular WDL    Peripheral Neurovascular WDL WDL        Cognitive/Neuro/Behavioral WDL    Cognitive/Neuro/Behavioral WDL WDL

## 2025-06-12 NOTE — PLAN OF CARE
"Goal Outcome Evaluation:      Plan of Care Reviewed With: patient    Overall Patient Progress: decliningOverall Patient Progress: declining     S- Transfer to  from [facility].    B- DKA, N/V    A- Brief systems assessment: BP (!) 145/95   Pulse 93   Temp 98.1  F (36.7  C) (Oral)   Resp 16   Ht 1.854 m (6' 1\")   Wt 86.2 kg (190 lb)   SpO2 100%   BMI 25.07 kg/m  Patient is A&Ox4. Blood Sugars are improving however ketones increased to 5.73 and anion gap increased to 20. Moved to ICU to start insulin gtt. Pain remains moderate to severe with IV dilaudid given every 2 hours. Diet changed to NPO. Patient is irritable and restless.     R- Transfer to  per physician orders. Continue to monitor pt and update physician as needed.      Code status: Full Code  Skin: WDL  Fall Risk: Yes- Department fall risk interventions implemented.  Isolation and Signage: Contact  Medication drips upon transfer: IV SL  Blue Bin checked and medications transfer out with patientYes             "

## 2025-06-12 NOTE — PROGRESS NOTES
Infection Prevention Progress Note  6/12/2025      Patient Name: Reji Monahan [id number]  Admit Date: 6/11/2025    Infection Status as of 6/12/2025 8:50 AM: MRSA  Isolation Status as of 6/12/2025 8:50 AM: Contact     MDRO Discontinuation  Infection Prevention has reviewed this patient's chart per the MDRO D/C Policy and have taken the following action:    Patient meets all the criteria for discontinuation and Infection Prevention will resolve the MRSA infection status.    Contact Precautions discontinued for the following MDRO(s): MRSA    If you have any questions, please contact Infection Prevention.    Leonie Lehman, Infection Prevention

## 2025-06-12 NOTE — PLAN OF CARE
Goal Outcome Evaluation:      Plan of Care Reviewed With: patient    Overall Patient Progress: decliningOverall Patient Progress: declining     S-(situation): Patient changed to inpatient status    B-(background): Patient status change due to observation goals not being met.     A-(assessment): Patient is currently on an Insulin drip on algorithm 1 and 0.5 units. Blood sugars are in the upper 100s. Last ketone was 2.44 with an anion gap of 13. IV Dilaudid given every 2 hours for abdominal pain. No nausea or vomiting. Scheduled IV Reglan given. Small right pneumothorax decreased in size on last Xray. Crepitus can be felt to the upper mid chest. Remains on room air. Patient is very frustrated and irritable with being in the hospital and diet being changed to NPO. Voiding in urinal with good UOP. Second IV placed. Tele reads Sinus Rhythm. K+ and Phos protocols ran.     R-(recommendations):   Will monitor patient per MD orders.       Inpatient nursing criteria listed below were met:    Adult Profile completedYes  Health care directives status obtained and documented: Yes  VTE prophylaxis orders: SCDs  (FYI: Asprin is not an approved anticoagulation for DVT prophylaxis)  SCD's Documented: Yes  Vaccine assessment done and vaccine ordered if needed. Yes  My Chart patient sign up addressed and documented: No  Care Plan initiated: Yes  Discharge planning review completed (admission navigator) Yes

## 2025-06-12 NOTE — PROGRESS NOTES
"S-(situation): Patient registered to Observation. Patient arrived to room 269 via cart from ED @ 0830    B-(background): Nausea/Vomiting and Abdominal Pain    A-(assessment): BP (!) 140/77 (BP Location: Left arm, Patient Position: Semi-Parra's)   Pulse 105   Temp 97.8  F (36.6  C) (Oral)   Resp 20   Ht 1.854 m (6' 1\")   Wt 86.2 kg (190 lb)   SpO2 100%   BMI 25.07 kg/m    Patient is on room air. Pain located to the abdomen. IV Dilaudid and IV Zofran given. IV SL. No skin issues. Blood Sugar is 360. Tolerating clear liquids.     R-(recommendations): Orders and observation goals reviewed with patient.    Nursing Observation criteria listed below was met:    Skin issues/needs documented:NA  Isolation needs addressed and Signage up: NA  Fall Prevention: Education given and documented: NA  Education Assessment documented:Yes  Admission Education Documented: Yes  New medication patient education completed and documented (Possible Side Effects of Common Medications handout): Yes  OBS video/handout Reviewed & Documented: Yes  Allergies Reviewed: Yes  Medication Reconciliation Complete: Yes  Home medications if not able to send immediately home with family stored here: NA  Reminder note placed in discharge instructions of home meds: NA  Patient has discharge needs (If yes, please explain): Yes  Patient discharge preferences addressed and charted on white board:  Yes  Provider notified that patient has arrived to the unit: Yes         "

## 2025-06-12 NOTE — CONSULTS
Patient seen in consultation for pneumothorax by No Ref-Primary, Physician    HPI:  Patient is a 35 year old male who was evaluated in the emergency department yesterday for abdominal pain and chest pains.  He was found to have pneumomediastinum and pneumo preperitoneum.  Initially thought to potentially have an esophageal injury from chronic vomiting.  Further workup revealed a small pneumothorax and imaging was reviewed by thoracic surgery andall of the radiographic findings were thought to be related to the pneumothorax and not an esophageal issue.  Today, he denies shortness of breath.  He says he has got some pleuritic type chest pain with deep inspiration.  Repeat chest x-ray shows improving small pneumothorax.  Says he is nauseous but this is essentially his baseline.  He has chronic nausea with hyperemesis syndrome thought to be secondary to cannabis use.    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    Family History   Problem Relation Age of Onset    Cerebrovascular Disease Father     Diabetes Brother        Social History     Socioeconomic History    Marital status: Single     Spouse name: Not on file    Number of children:  Not on file    Years of education: Not on file    Highest education level: Not on file   Occupational History    Not on file   Tobacco Use    Smoking status: Never    Smokeless tobacco: Not on file   Substance and Sexual Activity    Alcohol use: No    Drug use: Yes     Types: Marijuana    Sexual activity: Never   Other Topics Concern    Not on file   Social History Narrative    Single, homeless, currently living in a hotel.  (last updated 9/17/2021)      Social Drivers of Health     Financial Resource Strain: Low Risk  (6/12/2025)    Financial Resource Strain     Within the past 12 months, have you or your family members you live with been unable to get utilities (heat, electricity) when it was really needed?: No   Food Insecurity: Low Risk  (6/12/2025)    Food Insecurity     Within the past 12 months, did you worry that your food would run out before you got money to buy more?: No     Within the past 12 months, did the food you bought just not last and you didn t have money to get more?: No   Transportation Needs: Low Risk  (6/12/2025)    Transportation Needs     Within the past 12 months, has lack of transportation kept you from medical appointments, getting your medicines, non-medical meetings or appointments, work, or from getting things that you need?: No   Physical Activity: Not on file   Stress: Not on file   Social Connections: Socially Integrated (5/6/2025)    [facility]    Social Connections     Do you often feel lonely or isolated from those around you?: 0   Interpersonal Safety: Not At Risk (1/22/2024)    Received from [facility]    Humiliation, Afraid, Rape, and Kick questionnaire     Fear of Current or Ex-Partner: No     Emotionally Abused: No     Physically Abused: No     Sexually Abused: No   Housing Stability: Low Risk  (6/12/2025)    Housing Stability     Do you have housing? : Yes     Are you worried about losing your housing?: No       No  "current outpatient medications on file.       Medications and history reviewed    Physical exam:  Vitals: BP (!) 159/111   Pulse 111   Temp 98.1  F (36.7  C) (Oral)   Resp 18   Ht 1.854 m (6' 1\")   Wt 86.2 kg (190 lb)   SpO2 100%   BMI 25.07 kg/m    BMI= Body mass index is 25.07 kg/m .    Constitutional: alert, non-distressed   Head: normo-cephalic, atraumatic   Cardiovascular: RRR  Respiratory: equal chest rise, good respiratory effort, no audible wheeze, no tenderness to percussion.Mild crepitus   Gastrointestinal: Soft, non specific mildly tender  : deferred  Musculoskeletal: moves all extremities   Skin: no suspicious lesions or rashes   Psychiatric: mentation appears normal, affect appropriate     Labs show:  Results for orders placed or performed during the hospital encounter of 06/11/25 (from the past 24 hours)   CBC with platelets differential    Narrative    The following orders were created for panel order CBC with platelets differential.  Procedure                               Abnormality         Status                     ---------                               -----------         ------                     CBC with platelets and ...[[id number]]                      Final result                 Please view results for these tests on the individual orders.   Basic metabolic panel   Result Value Ref Range    Sodium 130 (L) 135 - 145 mmol/L    Potassium 3.7 3.4 - 5.3 mmol/L    Chloride 86 (L) 98 - 107 mmol/L    Carbon Dioxide (CO2) 19 (L) 22 - 29 mmol/L    Anion Gap 25 (H) 7 - 15 mmol/L    Urea Nitrogen 22.9 (H) 6.0 - 20.0 mg/dL    Creatinine 1.15 0.67 - 1.17 mg/dL    GFR Estimate 84 >60 mL/min/1.73m2    Calcium 9.5 8.8 - 10.4 mg/dL    Glucose 398 (H) 70 - 99 mg/dL   Hepatic function panel   Result Value Ref Range    Protein Total 7.8 6.4 - 8.3 g/dL    Albumin 4.5 3.5 - 5.2 g/dL    Bilirubin Total 1.4 (H) <=1.2 mg/dL    Alkaline Phosphatase 146 40 - 150 U/L    AST 25 0 - 45 U/L    ALT 20 0 - 70 " U/L    Bilirubin Direct 0.51 (H) 0.00 - 0.30 mg/dL   Lipase   Result Value Ref Range    Lipase 16 13 - 60 U/L   Blood gas venous   Result Value Ref Range    pH Venous 7.33 7.32 - 7.43    pCO2 Venous 43 40 - 50 mm Hg    pO2 Venous 35 25 - 47 mm Hg    Bicarbonate Venous 23 21 - 28 mmol/L    Base Excess/Deficit Venous -3.0 -3.0 - 3.0 mmol/L    FIO2 0     Oxyhemoglobin Venous 63 (L) 70 - 75 %    O2 Sat, Venous 63.8 (L) 70.0 - 75.0 %    Narrative    In healthy individuals, oxyhemoglobin (O2Hb) and oxygen saturation (SO2) are approximately equal. In the presence of dyshemoglobins, oxyhemoglobin can be considerably lower than oxygen saturation.   Ketone Beta-Hydroxybutyrate Quantitative   Result Value Ref Range    Ketone (Beta-Hydroxybutyrate) Quantitative 5.61 (HH) <=0.30 mmol/L   CBC with platelets and differential   Result Value Ref Range    WBC Count 8.4 4.0 - 11.0 10e3/uL    RBC Count 5.24 4.40 - 5.90 10e6/uL    Hemoglobin 14.4 13.3 - 17.7 g/dL    Hematocrit 42.6 40.0 - 53.0 %    MCV 81 78 - 100 fL    MCH 27.5 26.5 - 33.0 pg    MCHC 33.8 31.5 - 36.5 g/dL    RDW 13.2 10.0 - 15.0 %    Platelet Count 219 150 - 450 10e3/uL    % Neutrophils 61 %    % Lymphocytes 28 %    % Monocytes 8 %    % Eosinophils 3 %    % Basophils 0 %    % Immature Granulocytes 0 %    NRBCs per 100 WBC 0 <1 /100    Absolute Neutrophils 5.1 1.6 - 8.3 10e3/uL    Absolute Lymphocytes 2.3 0.8 - 5.3 10e3/uL    Absolute Monocytes 0.6 0.0 - 1.3 10e3/uL    Absolute Eosinophils 0.2 0.0 - 0.7 10e3/uL    Absolute Basophils 0.0 0.0 - 0.2 10e3/uL    Absolute Immature Granulocytes 0.0 <=0.4 10e3/uL    Absolute NRBCs 0.0 10e3/uL   CT ABDOMEN PELVIS W CONTRAST   Result Value Ref Range    Radiologist flags Right pneumothorax (AA)     Narrative    EXAM: CT ABDOMEN PELVIS W CONTRAST  LOCATION: [facility]  DATE: 6/12/2025    INDICATION: generalized abd pain  COMPARISON: CT from 9/30/2024.  TECHNIQUE: CT scan of the abdomen and pelvis was  performed following injection of IV contrast. Multiplanar reformats were obtained. Dose reduction techniques were used.  CONTRAST: 93mL Isovue 370    FINDINGS:   LOWER CHEST: Extensive pneumomediastinum. Right pneumothorax measuring up to 3.6 cm in the anterobasal pleural separation. Wall thickening of the lower thoracic esophagus.    HEPATOBILIARY: Mild hepatic steatosis. Gallbladder within normal limits.    PANCREAS: Normal.    SPLEEN: Normal.    ADRENAL GLANDS: Normal.    KIDNEYS/BLADDER: Normal kidneys. Extensive retroperitoneal gas. Normal bladder..    BOWEL: Fluid-filled, mildly distended stomach. Remainder of the bowel is of normal caliber. Normal appendix. Scattered gas tracking along the peritoneal surfaces though likely extraperitoneal in location.    LYMPH NODES: Normal.    VASCULATURE: Normal.    PELVIC ORGANS: Normal.    MUSCULOSKELETAL: Small amount of gas along the lower thoracic spinal canal. No acute osseous findings.      Impression    IMPRESSION:   1.  Extensive pneumomediastinum in the imaged portion of the chest with a right pneumothorax which is incompletely imaged.    2.  Wall thickening of the lower thoracic esophagus may relate to recent emesis. The stomach is fluid filled and mildly distended. Remainder of the bowel is normal.    3.  Extensive retroperitoneal and preperitoneal gas, likely tracking from the retroperitoneum inferiorly.      [Critical Result: Right pneumothorax]    Finding was identified on 6/12/2025 12:04 AM CDT.     1.  Dr. Pineda was contacted by me on 6/12/2025 12:07 AM CDT and verbalized understanding of the critical result.    XR Chest Port 1 View    Narrative    EXAM: XR CHEST PORT 1 VIEW  LOCATION: [facility]  DATE: 6/12/2025    INDICATION: eval for right sided pneumothorax  COMPARISON: Abdominal CT from 6/12/2025, chest radiographs from 1/14/2025.      Impression    IMPRESSION: Small right apical pneumothorax measuring up to 1.5 cm from  the apical parietal pleura. No acute airspace disease. Normal heart size. Scattered pneumomediastinum and soft tissue gas in the upper chest.   Glucose by meter   Result Value Ref Range    GLUCOSE BY METER POCT 420 (H) 70 - 99 mg/dL   Chest CT w/o contrast   Result Value Ref Range    Radiologist flags (AA)      Suggestion of a linear defect in the left aspect of the esophagus, possibly site of esophageal tear. No extravasation of enteric contrast. Pneumomediastinum extending superiorly and inferiorly. Moderate right pneumothorax.    Narrative    EXAM: CT CHEST W/O CONTRAST  LOCATION: [facility]  DATE: 6/12/2025    INDICATION: Concern for Boerhaave syndrome, fair amount of pneumomediastinum. CT esophagram.  COMPARISON:   1. Portable chest single view 6/12/2025 at 0023 hours.  2. CT abdomen and pelvis with IV contrast 6/11/2025.  3. CT pulmonary angiogram 6/9/2023.  TECHNIQUE: CT chest without IV contrast. Multiplanar reformats were obtained. Dose reduction techniques were used.  CONTRAST: None.    FINDINGS:   LUNGS AND PLEURA: Moderate right pneumothorax. A few strands of linear atelectasis right lower lung. Minor strand of fibrosis right upper lobe, resolved airspace infiltrate in the right upper lobe seen on 6/9/2023. Calcified granuloma left lower lobe.   Tiny insignificant 0.2 cm left lower lobe subpleural nodule (image 138, series 11), no specific follow-up required.    MEDIASTINUM/AXILLAE: Extensive pneumomediastinum extending superiorly into the neck, and inferiorly into the abdomen. Endoluminal enteric contrast within the esophagus. Suggestion of a tiny linear defect in the left aspect of the mid esophagus at the   level of the bifurcation of the left mainstem bronchus, on the initial images (image 123, series 4), possibly site of esophageal tear. No extravasation of endoluminal enteric contrast. Diffuse thickening of the distal esophagus to the EG junction may be    attributable to edema or infiltration. No suspicious adenopathy. Normal cardiac size. No pericardial effusion. Trachea is midline.    CORONARY ARTERY CALCIFICATION: None.    UPPER ABDOMEN: Retroperitoneal foci of free gas.    MUSCULOSKELETAL: Foci of soft tissue gas within the chest wall extending into the neck superiorly. Mild diffuse body wall edema. Mild right convex thoracic curve.      Impression    IMPRESSION:   1.  Suggestion of a tiny linear defect in the left aspect of the mid esophagus at the level of the bifurcation of the left mainstem bronchus, on the initial images, possibly site of esophageal tear. No extravasation of endoluminal enteric contrast.   Diffuse thickening of the distal esophagus to the EG junction may be due to edema or infiltration.    2.  Extensive pneumomediastinum extending superiorly into the neck, and inferiorly into the abdomen as well as the chest wall musculature.    3.  Moderate right pneumothorax. Evidence of remote granulomatous disease.    4.  Findings discussed with the referring Emergency Room physician, Dr. Pineda, immediately following the study at 0445 hours.      [Critical Result: Suggestion of a linear defect in the left aspect of the esophagus, possibly site of esophageal tear. No extravasation of enteric contrast. Pneumomediastinum extending superiorly and inferiorly. Moderate right pneumothorax.]    Finding was identified on 6/12/2025 4:25 AM CDT.     Dr. Pineda was contacted by me on 6/12/2025 4:42 AM CDT and verbalized understanding of the critical result.                Basic metabolic panel   Result Value Ref Range    Sodium 126 (L) 135 - 145 mmol/L    Potassium 4.1 3.4 - 5.3 mmol/L    Chloride 89 (L) 98 - 107 mmol/L    Carbon Dioxide (CO2) 18 (L) 22 - 29 mmol/L    Anion Gap 19 (H) 7 - 15 mmol/L    Urea Nitrogen 20.5 (H) 6.0 - 20.0 mg/dL    Creatinine 0.96 0.67 - 1.17 mg/dL    GFR Estimate >90 >60 mL/min/1.73m2    Calcium 8.8 8.8 - 10.4 mg/dL    Glucose 244 (H)  70 - 99 mg/dL   Ketone Beta-Hydroxybutyrate Quantitative   Result Value Ref Range    Ketone (Beta-Hydroxybutyrate) Quantitative 5.31 (HH) <=0.30 mmol/L   Blood gas venous   Result Value Ref Range    pH Venous 7.33 7.32 - 7.43    pCO2 Venous 41 40 - 50 mm Hg    pO2 Venous 50 (H) 25 - 47 mm Hg    Bicarbonate Venous 21 21 - 28 mmol/L    Base Excess/Deficit Venous -4.4 (L) -3.0 - 3.0 mmol/L    FIO2 21     Oxyhemoglobin Venous 82 (H) 70 - 75 %    O2 Sat, Venous 83.6 (H) 70.0 - 75.0 %    Narrative    In healthy individuals, oxyhemoglobin (O2Hb) and oxygen saturation (SO2) are approximately equal. In the presence of dyshemoglobins, oxyhemoglobin can be considerably lower than oxygen saturation.   Glucose by meter   Result Value Ref Range    GLUCOSE BY METER POCT 360 (H) 70 - 99 mg/dL   Glucose by meter   Result Value Ref Range    GLUCOSE BY METER POCT 246 (H) 70 - 99 mg/dL   X-ray Chest 2 vws*    Narrative    EXAM: XR CHEST 2 VIEWS  LOCATION: [facility]  DATE: 6/12/2025    INDICATION: Follow-up for pneumothorax.  COMPARISON: Chest radiograph performed earlier today.      Impression    IMPRESSION: A small right apical pneumothorax has decreased slightly in size, measuring 1.1 cm (previously 1.5 cm). Extensive subcutaneous emphysema is noted in the neck and supraclavicular regions bilaterally. The lungs are otherwise clear. No pleural   effusion.   Lactic acid whole blood   Result Value Ref Range    Lactic Acid 1.4 0.7 - 2.0 mmol/L       Imaging shows:  Recent Results (from the past 744 hours)   XR Foot Left G/E 3 Views    Narrative    For Patients:  As a result of the 21st Century Cures Act, medical imaging exams and procedure reports are released immediately into your electronic medical record.  You may view this report before your referring provider.  If you have questions, please contact your health care provider.      INDICATION:  Left big toe fracture.    TECHNIQUE:  Three  views.    COMPARISON:  11/26/2025.    FINDINGS/impression :  Progressive callus/healing about the mildly comminuted intra-articular fracture at proximal-mid proximal phalanx of the 1st toe with no change in position/alignment of fragments. There is mild flattening/sclerosis at the 2nd metatarsal head which appears new since prior study, otherwise, no significant radiographic change. Pes planus re-identified.      Dictated by José Luis Newman MD @ May 21 2025 12:49PM    (Electronically Signed)      [email or web address]   MR Cervical Spine w/o Contrast    Narrative    For Patients:  As a result of the 21st Century Cures Act, medical imaging exams and procedure reports are released immediately into your electronic medical record.  You may view this report before your referring provider.  If you have questions, please contact your health care provider.      Indication:  Chronic cervical myelopathy.    Technique:  Multisequence multiplanar MRI of the cervical spine without the use of intravenous contrast.    Comparison:  None available.    Findings:  Motion artifact limits evaluation on the axial T2 weighted sequence. Normal vertebral alignment, stature, and intrinsic marrow signal intensity. The cervical spinal cord is normal in signal intensity. The prevertebral soft tissues are unremarkable.     C2-C3 through C5-C6: Mild facet joint arthrosis. No significant spinal canal or neural foraminal stenosis.   C6-C7: Shallow symmetric disc bulge. No significant spinal canal or neural foraminal stenosis.   C7-T1: Mild facet arthrosis. No significant spinal canal or neural foraminal stenosis.     Impression:  1. Mildly motion degraded exam.   2. Mild multilevel cervical spondylosis with no significant spinal canal or neural foraminal stenosis   3. Normal signal intensity of the cervical spinal cord.        Dictated by Raj Hoyt MD @ 6/5/2025 6:27:38 PM    (Electronically Signed)   MR HEAD BRAIN WO    Narrative    For  Patients:  As a result of the 21st Century Cures Act, medical imaging exams and procedure reports are released immediately into your electronic medical record.  You may view this report before your referring provider.  If you have questions, please contact your health care provider.      INDICATION:  Neurologic deficit, acute, stroke suspected.    TECHNIQUE:  Multisequence multiplanar MRI of the brain without the use of intravenous contrast.    COMPARISON:  Correlated with CT dated 07/23/2016.     FINDINGS:  No evidence of acute ischemia. Normal signal intensity of the brain parenchyma.    The ventricles are normal in size. Flow voids of the larger intracranial arteries are preserved.    Normal calvarial bone marrow signal intensity. Signal abnormality within the posterior right globe suggestive of retinal detachment. Scattered paranasal sinus mucosal thickening with a few clustered maxillary mucosal polyps.    Impression    1. Unremarkable noncontrast MRI of the brain. No evidence of acute ischemia.   2. Signal abnormality within the right globe suggestive of retinal detachment. Ophthalmology consultation is recommended.   3. Scattered paranasal sinus mucosal thickening and few clustered right maxillary mucosal polyps.        Dictated by Raj Hoyt MD @ 6/5/2025 6:18:55 PM    (Electronically Signed)   CT Head w/o Contrast    Narrative    For Patients:  As a result of the 21st Century Cures Act, medical imaging exams and procedure reports are released immediately into your electronic medical record.  You may view this report before your referring provider.  If you have questions, please contact your health care provider.      INDICATION:  Neurologic dysfunction.    TECHNIQUE:  Noncontrast CT of the head with multiplanar reconstruction utilizing bone and soft tissue algorithms.    COMPARISON:  Correlated with MRI brain dated 06/05/2025.     FINDINGS:  No acute intracranial hemorrhage. The gray-white matter interface  is preserved.    The ventricles are normal in size. No abnormal extra-axial fluid collection is identified.    Normal calvarium and skull base. V-shaped hyperdensity at the posterior right globe consistent with retinal detachment. The paranasal sinuses and mastoid air cells are clear.    Impression    1. No acute intracranial abnormality.   2. Right-sided retinal detachment.      Please note that all CT scans at this facility use dose modulation, iterative reconstruction, and/or weight-based dosing when appropriate to reduce radiation dose to as low as reasonably achievable.    Dictated by Raj Hoyt MD @ 6/5/2025 6:46:35 PM    (Electronically Signed)   CT ABDOMEN PELVIS W CONTRAST   Result Value    Radiologist flags Right pneumothorax (AA)    Narrative    EXAM: CT ABDOMEN PELVIS W CONTRAST  LOCATION: [facility]  DATE: 6/12/2025    INDICATION: generalized abd pain  COMPARISON: CT from 9/30/2024.  TECHNIQUE: CT scan of the abdomen and pelvis was performed following injection of IV contrast. Multiplanar reformats were obtained. Dose reduction techniques were used.  CONTRAST: 93mL Isovue 370    FINDINGS:   LOWER CHEST: Extensive pneumomediastinum. Right pneumothorax measuring up to 3.6 cm in the anterobasal pleural separation. Wall thickening of the lower thoracic esophagus.    HEPATOBILIARY: Mild hepatic steatosis. Gallbladder within normal limits.    PANCREAS: Normal.    SPLEEN: Normal.    ADRENAL GLANDS: Normal.    KIDNEYS/BLADDER: Normal kidneys. Extensive retroperitoneal gas. Normal bladder..    BOWEL: Fluid-filled, mildly distended stomach. Remainder of the bowel is of normal caliber. Normal appendix. Scattered gas tracking along the peritoneal surfaces though likely extraperitoneal in location.    LYMPH NODES: Normal.    VASCULATURE: Normal.    PELVIC ORGANS: Normal.    MUSCULOSKELETAL: Small amount of gas along the lower thoracic spinal canal. No acute osseous findings.       Impression    IMPRESSION:   1.  Extensive pneumomediastinum in the imaged portion of the chest with a right pneumothorax which is incompletely imaged.    2.  Wall thickening of the lower thoracic esophagus may relate to recent emesis. The stomach is fluid filled and mildly distended. Remainder of the bowel is normal.    3.  Extensive retroperitoneal and preperitoneal gas, likely tracking from the retroperitoneum inferiorly.      [Critical Result: Right pneumothorax]    Finding was identified on 6/12/2025 12:04 AM CDT.     1.  Dr. Pineda was contacted by me on 6/12/2025 12:07 AM CDT and verbalized understanding of the critical result.    XR Chest Port 1 View    Narrative    EXAM: XR CHEST PORT 1 VIEW  LOCATION: [facility]  DATE: 6/12/2025    INDICATION: eval for right sided pneumothorax  COMPARISON: Abdominal CT from 6/12/2025, chest radiographs from 1/14/2025.      Impression    IMPRESSION: Small right apical pneumothorax measuring up to 1.5 cm from the apical parietal pleura. No acute airspace disease. Normal heart size. Scattered pneumomediastinum and soft tissue gas in the upper chest.   Chest CT w/o contrast   Result Value    Radiologist flags (AA)     Suggestion of a linear defect in the left aspect of the esophagus, possibly site of esophageal tear. No extravasation of enteric contrast. Pneumomediastinum extending superiorly and inferiorly. Moderate right pneumothorax.    Narrative    EXAM: CT CHEST W/O CONTRAST  LOCATION: [facility]  DATE: 6/12/2025    INDICATION: Concern for Boerhaave syndrome, fair amount of pneumomediastinum. CT esophagram.  COMPARISON:   1. Portable chest single view 6/12/2025 at 0023 hours.  2. CT abdomen and pelvis with IV contrast 6/11/2025.  3. CT pulmonary angiogram 6/9/2023.  TECHNIQUE: CT chest without IV contrast. Multiplanar reformats were obtained. Dose reduction techniques were used.  CONTRAST: None.    FINDINGS:    LUNGS AND PLEURA: Moderate right pneumothorax. A few strands of linear atelectasis right lower lung. Minor strand of fibrosis right upper lobe, resolved airspace infiltrate in the right upper lobe seen on 6/9/2023. Calcified granuloma left lower lobe.   Tiny insignificant 0.2 cm left lower lobe subpleural nodule (image 138, series 11), no specific follow-up required.    MEDIASTINUM/AXILLAE: Extensive pneumomediastinum extending superiorly into the neck, and inferiorly into the abdomen. Endoluminal enteric contrast within the esophagus. Suggestion of a tiny linear defect in the left aspect of the mid esophagus at the   level of the bifurcation of the left mainstem bronchus, on the initial images (image 123, series 4), possibly site of esophageal tear. No extravasation of endoluminal enteric contrast. Diffuse thickening of the distal esophagus to the EG junction may be   attributable to edema or infiltration. No suspicious adenopathy. Normal cardiac size. No pericardial effusion. Trachea is midline.    CORONARY ARTERY CALCIFICATION: None.    UPPER ABDOMEN: Retroperitoneal foci of free gas.    MUSCULOSKELETAL: Foci of soft tissue gas within the chest wall extending into the neck superiorly. Mild diffuse body wall edema. Mild right convex thoracic curve.      Impression    IMPRESSION:   1.  Suggestion of a tiny linear defect in the left aspect of the mid esophagus at the level of the bifurcation of the left mainstem bronchus, on the initial images, possibly site of esophageal tear. No extravasation of endoluminal enteric contrast.   Diffuse thickening of the distal esophagus to the EG junction may be due to edema or infiltration.    2.  Extensive pneumomediastinum extending superiorly into the neck, and inferiorly into the abdomen as well as the chest wall musculature.    3.  Moderate right pneumothorax. Evidence of remote granulomatous disease.    4.  Findings discussed with the referring Emergency Room physician,  Dr. Pineda, immediately following the study at 0445 hours.      [Critical Result: Suggestion of a linear defect in the left aspect of the esophagus, possibly site of esophageal tear. No extravasation of enteric contrast. Pneumomediastinum extending superiorly and inferiorly. Moderate right pneumothorax.]    Finding was identified on 6/12/2025 4:25 AM CDT.     Dr. Pineda was contacted by me on 6/12/2025 4:42 AM CDT and verbalized understanding of the critical result.                X-ray Chest 2 vws*    Narrative    EXAM: XR CHEST 2 VIEWS  LOCATION: [facility]  DATE: 6/12/2025    INDICATION: Follow-up for pneumothorax.  COMPARISON: Chest radiograph performed earlier today.      Impression    IMPRESSION: A small right apical pneumothorax has decreased slightly in size, measuring 1.1 cm (previously 1.5 cm). Extensive subcutaneous emphysema is noted in the neck and supraclavicular regions bilaterally. The lungs are otherwise clear. No pleural   effusion.        Assessment:     ICD-10-CM    1. Primary spontaneous pneumothorax  J93.11       2. Nausea and vomiting, unspecified vomiting type  R11.2       3. 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 Provide incentive spirometer to use and take home. Other medical management per IM. No indication for chest tube at the moment based on improvement of small PTX     45 minutes spent by me on the date of the encounter doing chart review, history and exam, documentation and further activities per the note    Francisco Mehta, DO

## 2025-06-12 NOTE — ED TRIAGE NOTES
"Pt presents via ems from home for complaints of \"whole body pain\" but specifically abdominal pain. Seen at [facility] ER earlier today but left AMA because patient did not get the pain meds he was requesting \" morphine or dilaudid\" and patient also did not want to have labs drawn. Complaints of nausea and belly pain on arrival.         "

## 2025-06-12 NOTE — MEDICATION SCRIBE - ADMISSION MEDICATION HISTORY
Medication Scribe Admission Medication History    Admission medication history is complete. The information provided in this note is only as accurate as the sources available at the time of the update.    Information Source(s): Patient via in-person    Pertinent Information:     Changes made to PTA medication list:  Added: None  Deleted: Dulcolax 5 mg tablet   mg   Ferosul 325 mg  Gabapentin 400 mg   Lidocaine 4% patches   Linzess 145 mcg  Lorazepam 0.5 mg  Methocarbamol 500 mg  Senna 8.6 mg  Pt requested removal from PTA med list; not taking and does not have   Changed: novolog insulin; only taking using sliding scale only previously was sliding scale and 8 Units with each meal and 4 units with snacks     Allergies reviewed with patient and updates made in EHR: patient refused to verify    Medication History Completed By: DELIO MILLER 6/12/2025 4:55 PM    PTA Med List   Medication Sig Note Last Dose/Taking    acetaminophen (TYLENOL) 500 MG tablet Take 500 mg by mouth.  More than a month    albuterol (PROAIR HFA/PROVENTIL HFA/VENTOLIN HFA) 108 (90 Base) MCG/ACT inhaler Inhale 1-2 puffs into the lungs.  Past Month    alum & mag hydroxide-simethicone (UMM-LANTA) 200-200-20 MG/5ML SUSP suspension Take 15 mLs by mouth.  Past Month    B-D U/F 31G X 8 MM insulin pen needle Inject Subcutaneous 5 times daily  6/11/2025 Evening    bisacodyl (DULCOLAX) 10 MG suppository Place 10 mg rectally daily as needed for constipation. 6/12/2025: Has; not started  Taking As Needed    CONTOUR NEXT TEST test strip 1 strip by In Vitro route 4 times daily.  6/11/2025 Evening    dorzolamide-timolol (COSOPT) 2-0.5 % ophthalmic solution Place 1 drop into the right eye 2 times daily  Past Week    famotidine (PEPCID) 20 MG tablet Take 20 mg by mouth 2 times daily  Past Week    insulin aspart (NOVOLOG PEN) 100 UNIT/ML pen Inject 2-14 Units subcutaneously 3 times daily (with meals). Inject 2 UNITS if -200, 4 UNITS if -250, 6  UNITS if -300, 8 UNITS if -350, 10 UNITS if -400, 12 UNITS if -450, 14 UNITS if -500 with each meal. Max daily dose: 40 units  6/11/2025 Evening    metoclopramide (REGLAN) 10 MG tablet Take 10 mg by mouth 4 times daily. Before meals and nightly  More than a month    metoclopramide (REGLAN) 5 MG/5ML solution Take 10 mg by mouth 4 times daily as needed for nausea. 6/12/2025: Has at home; not started  Taking As Needed    ondansetron (ZOFRAN ODT) 4 MG ODT tab Take 1 tablet by mouth 3 times daily  6/11/2025 Evening    oxyCODONE (ROXICODONE) 5 MG/5ML solution Take 5 mg by mouth daily as needed for breakthrough pain.  Past Week

## 2025-06-13 ENCOUNTER — APPOINTMENT (OUTPATIENT)
Dept: GENERAL RADIOLOGY | Facility: CLINIC | Age: 35
DRG: 638 | End: 2025-06-13
Attending: INTERNAL MEDICINE
Payer: MEDICARE

## 2025-06-13 VITALS
WEIGHT: 190 LBS | RESPIRATION RATE: 15 BRPM | TEMPERATURE: 97.9 F | BODY MASS INDEX: 25.18 KG/M2 | HEART RATE: 87 BPM | SYSTOLIC BLOOD PRESSURE: 159 MMHG | DIASTOLIC BLOOD PRESSURE: 87 MMHG | OXYGEN SATURATION: 99 % | HEIGHT: 73 IN

## 2025-06-13 PROBLEM — N17.9 AKI (ACUTE KIDNEY INJURY): Status: ACTIVE | Noted: 2025-06-13

## 2025-06-13 PROBLEM — E10.9 TYPE 1 DIABETES MELLITUS (H): Status: ACTIVE | Noted: 2020-08-04

## 2025-06-13 LAB
ANION GAP SERPL CALCULATED.3IONS-SCNC: 12 MMOL/L (ref 7–15)
ANION GAP SERPL CALCULATED.3IONS-SCNC: 13 MMOL/L (ref 7–15)
ANION GAP SERPL CALCULATED.3IONS-SCNC: 9 MMOL/L (ref 7–15)
B-OH-BUTYR SERPL-SCNC: 1.65 MMOL/L
B-OH-BUTYR SERPL-SCNC: 2.14 MMOL/L
B-OH-BUTYR SERPL-SCNC: 2.94 MMOL/L
BUN SERPL-MCNC: 10.1 MG/DL (ref 6–20)
BUN SERPL-MCNC: 12.5 MG/DL (ref 6–20)
BUN SERPL-MCNC: 8.5 MG/DL (ref 6–20)
CALCIUM SERPL-MCNC: 8.2 MG/DL (ref 8.8–10.4)
CALCIUM SERPL-MCNC: 8.6 MG/DL (ref 8.8–10.4)
CALCIUM SERPL-MCNC: 8.6 MG/DL (ref 8.8–10.4)
CHLORIDE SERPL-SCNC: 96 MMOL/L (ref 98–107)
CHLORIDE SERPL-SCNC: 97 MMOL/L (ref 98–107)
CHLORIDE SERPL-SCNC: 97 MMOL/L (ref 98–107)
CREAT SERPL-MCNC: 0.84 MG/DL (ref 0.67–1.17)
CREAT SERPL-MCNC: 0.88 MG/DL (ref 0.67–1.17)
CREAT SERPL-MCNC: 0.9 MG/DL (ref 0.67–1.17)
EGFRCR SERPLBLD CKD-EPI 2021: >90 ML/MIN/1.73M2
GLUCOSE BLDC GLUCOMTR-MCNC: 100 MG/DL (ref 70–99)
GLUCOSE BLDC GLUCOMTR-MCNC: 103 MG/DL (ref 70–99)
GLUCOSE BLDC GLUCOMTR-MCNC: 105 MG/DL (ref 70–99)
GLUCOSE BLDC GLUCOMTR-MCNC: 106 MG/DL (ref 70–99)
GLUCOSE BLDC GLUCOMTR-MCNC: 108 MG/DL (ref 70–99)
GLUCOSE BLDC GLUCOMTR-MCNC: 109 MG/DL (ref 70–99)
GLUCOSE BLDC GLUCOMTR-MCNC: 113 MG/DL (ref 70–99)
GLUCOSE BLDC GLUCOMTR-MCNC: 134 MG/DL (ref 70–99)
GLUCOSE BLDC GLUCOMTR-MCNC: 195 MG/DL (ref 70–99)
GLUCOSE BLDC GLUCOMTR-MCNC: 54 MG/DL (ref 70–99)
GLUCOSE BLDC GLUCOMTR-MCNC: 84 MG/DL (ref 70–99)
GLUCOSE BLDC GLUCOMTR-MCNC: 93 MG/DL (ref 70–99)
GLUCOSE BLDC GLUCOMTR-MCNC: 97 MG/DL (ref 70–99)
GLUCOSE BLDC GLUCOMTR-MCNC: 98 MG/DL (ref 70–99)
GLUCOSE SERPL-MCNC: 100 MG/DL (ref 70–99)
GLUCOSE SERPL-MCNC: 119 MG/DL (ref 70–99)
GLUCOSE SERPL-MCNC: 99 MG/DL (ref 70–99)
HCO3 SERPL-SCNC: 21 MMOL/L (ref 22–29)
HCO3 SERPL-SCNC: 22 MMOL/L (ref 22–29)
HCO3 SERPL-SCNC: 25 MMOL/L (ref 22–29)
HOLD SPECIMEN: NORMAL
PHOSPHATE SERPL-MCNC: 2.1 MG/DL (ref 2.5–4.5)
PHOSPHATE SERPL-MCNC: 2.9 MG/DL (ref 2.5–4.5)
PHOSPHATE SERPL-MCNC: 2.9 MG/DL (ref 2.5–4.5)
POTASSIUM SERPL-SCNC: 3.5 MMOL/L (ref 3.4–5.3)
POTASSIUM SERPL-SCNC: 3.9 MMOL/L (ref 3.4–5.3)
POTASSIUM SERPL-SCNC: 4 MMOL/L (ref 3.4–5.3)
SODIUM SERPL-SCNC: 130 MMOL/L (ref 135–145)
SODIUM SERPL-SCNC: 131 MMOL/L (ref 135–145)
SODIUM SERPL-SCNC: 131 MMOL/L (ref 135–145)

## 2025-06-13 PROCEDURE — 71046 X-RAY EXAM CHEST 2 VIEWS: CPT

## 2025-06-13 PROCEDURE — 80048 BASIC METABOLIC PNL TOTAL CA: CPT | Performed by: INTERNAL MEDICINE

## 2025-06-13 PROCEDURE — 82010 KETONE BODYS QUAN: CPT | Performed by: INTERNAL MEDICINE

## 2025-06-13 PROCEDURE — 250N000011 HC RX IP 250 OP 636: Performed by: INTERNAL MEDICINE

## 2025-06-13 PROCEDURE — 258N000001 HC RX 258: Performed by: INTERNAL MEDICINE

## 2025-06-13 PROCEDURE — 250N000009 HC RX 250: Performed by: INTERNAL MEDICINE

## 2025-06-13 PROCEDURE — 258N000003 HC RX IP 258 OP 636: Performed by: INTERNAL MEDICINE

## 2025-06-13 PROCEDURE — 84100 ASSAY OF PHOSPHORUS: CPT | Performed by: INTERNAL MEDICINE

## 2025-06-13 PROCEDURE — 250N000011 HC RX IP 250 OP 636: Mod: JZ | Performed by: FAMILY MEDICINE

## 2025-06-13 PROCEDURE — 250N000013 HC RX MED GY IP 250 OP 250 PS 637: Performed by: INTERNAL MEDICINE

## 2025-06-13 PROCEDURE — 120N000001 HC R&B MED SURG/OB

## 2025-06-13 PROCEDURE — 250N000011 HC RX IP 250 OP 636: Mod: JZ | Performed by: INTERNAL MEDICINE

## 2025-06-13 PROCEDURE — 36415 COLL VENOUS BLD VENIPUNCTURE: CPT | Performed by: INTERNAL MEDICINE

## 2025-06-13 PROCEDURE — 99232 SBSQ HOSP IP/OBS MODERATE 35: CPT | Performed by: INTERNAL MEDICINE

## 2025-06-13 PROCEDURE — 250N000013 HC RX MED GY IP 250 OP 250 PS 637: Performed by: FAMILY MEDICINE

## 2025-06-13 RX ORDER — DEXTROSE MONOHYDRATE 25 G/50ML
25-50 INJECTION, SOLUTION INTRAVENOUS
Status: DISCONTINUED | OUTPATIENT
Start: 2025-06-13 | End: 2025-06-14 | Stop reason: HOSPADM

## 2025-06-13 RX ORDER — DEXTROSE MONOHYDRATE AND SODIUM CHLORIDE 5; .9 G/100ML; G/100ML
INJECTION, SOLUTION INTRAVENOUS CONTINUOUS
Status: DISCONTINUED | OUTPATIENT
Start: 2025-06-13 | End: 2025-06-14

## 2025-06-13 RX ORDER — PANTOPRAZOLE SODIUM 40 MG/1
40 TABLET, DELAYED RELEASE ORAL
Status: DISCONTINUED | OUTPATIENT
Start: 2025-06-14 | End: 2025-06-14 | Stop reason: HOSPADM

## 2025-06-13 RX ORDER — POTASSIUM CHLORIDE 7.45 MG/ML
10 INJECTION INTRAVENOUS
Status: COMPLETED | OUTPATIENT
Start: 2025-06-13 | End: 2025-06-13

## 2025-06-13 RX ORDER — SODIUM CHLORIDE 9 MG/ML
INJECTION, SOLUTION INTRAVENOUS CONTINUOUS
Status: DISCONTINUED | OUTPATIENT
Start: 2025-06-13 | End: 2025-06-13

## 2025-06-13 RX ORDER — NICOTINE POLACRILEX 4 MG
15-30 LOZENGE BUCCAL
Status: DISCONTINUED | OUTPATIENT
Start: 2025-06-13 | End: 2025-06-14 | Stop reason: HOSPADM

## 2025-06-13 RX ADMIN — OXYCODONE HYDROCHLORIDE 10 MG: 5 TABLET ORAL at 17:19

## 2025-06-13 RX ADMIN — POTASSIUM CHLORIDE 10 MEQ: 7.46 INJECTION, SOLUTION INTRAVENOUS at 08:29

## 2025-06-13 RX ADMIN — POTASSIUM CHLORIDE 10 MEQ: 7.46 INJECTION, SOLUTION INTRAVENOUS at 10:16

## 2025-06-13 RX ADMIN — METOCLOPRAMIDE HYDROCHLORIDE 10 MG: 5 INJECTION INTRAMUSCULAR; INTRAVENOUS at 10:53

## 2025-06-13 RX ADMIN — DEXTROSE MONOHYDRATE 25 ML: 25 INJECTION, SOLUTION INTRAVENOUS at 15:06

## 2025-06-13 RX ADMIN — DORZOLAMIDE HYDROCHLORIDE AND TIMOLOL MALEATE 1 DROP: 20; 5 SOLUTION OPHTHALMIC at 22:39

## 2025-06-13 RX ADMIN — POTASSIUM CHLORIDE, DEXTROSE MONOHYDRATE AND SODIUM CHLORIDE: 150; 5; 450 INJECTION, SOLUTION INTRAVENOUS at 00:04

## 2025-06-13 RX ADMIN — PROCHLORPERAZINE EDISYLATE 10 MG: 5 INJECTION INTRAMUSCULAR; INTRAVENOUS at 22:06

## 2025-06-13 RX ADMIN — HYDROMORPHONE HYDROCHLORIDE 0.2 MG: 0.2 INJECTION, SOLUTION INTRAMUSCULAR; INTRAVENOUS; SUBCUTANEOUS at 19:48

## 2025-06-13 RX ADMIN — OXYCODONE HYDROCHLORIDE 10 MG: 5 TABLET ORAL at 02:12

## 2025-06-13 RX ADMIN — CALCIUM CARBONATE (ANTACID) CHEW TAB 500 MG 1000 MG: 500 CHEW TAB at 03:19

## 2025-06-13 RX ADMIN — HYDROMORPHONE HYDROCHLORIDE 0.2 MG: 0.2 INJECTION, SOLUTION INTRAMUSCULAR; INTRAVENOUS; SUBCUTANEOUS at 22:06

## 2025-06-13 RX ADMIN — PANTOPRAZOLE SODIUM 40 MG: 40 INJECTION, POWDER, FOR SOLUTION INTRAVENOUS at 04:36

## 2025-06-13 RX ADMIN — DEXTROSE AND SODIUM CHLORIDE: 5; .9 INJECTION, SOLUTION INTRAVENOUS at 22:30

## 2025-06-13 RX ADMIN — ONDANSETRON 4 MG: 2 INJECTION INTRAMUSCULAR; INTRAVENOUS at 19:48

## 2025-06-13 RX ADMIN — SODIUM PHOSPHATE, MONOBASIC, MONOHYDRATE AND SODIUM PHOSPHATE, DIBASIC, ANHYDROUS 15 MMOL: 276; 142 INJECTION, SOLUTION INTRAVENOUS at 08:30

## 2025-06-13 RX ADMIN — ONDANSETRON 4 MG: 2 INJECTION INTRAMUSCULAR; INTRAVENOUS at 02:19

## 2025-06-13 RX ADMIN — OXYCODONE HYDROCHLORIDE 10 MG: 5 TABLET ORAL at 10:53

## 2025-06-13 RX ADMIN — HYDROMORPHONE HYDROCHLORIDE 0.2 MG: 0.2 INJECTION, SOLUTION INTRAMUSCULAR; INTRAVENOUS; SUBCUTANEOUS at 07:00

## 2025-06-13 RX ADMIN — OXYCODONE HYDROCHLORIDE 10 MG: 5 TABLET ORAL at 14:02

## 2025-06-13 RX ADMIN — METOCLOPRAMIDE HYDROCHLORIDE 10 MG: 5 INJECTION INTRAMUSCULAR; INTRAVENOUS at 17:19

## 2025-06-13 RX ADMIN — SODIUM PHOSPHATE, MONOBASIC, MONOHYDRATE AND SODIUM PHOSPHATE, DIBASIC, ANHYDROUS 9 MMOL: 276; 142 INJECTION, SOLUTION INTRAVENOUS at 00:03

## 2025-06-13 RX ADMIN — SODIUM CHLORIDE: 0.9 INJECTION, SOLUTION INTRAVENOUS at 11:27

## 2025-06-13 RX ADMIN — DORZOLAMIDE HYDROCHLORIDE AND TIMOLOL MALEATE 1 DROP: 20; 5 SOLUTION OPHTHALMIC at 10:11

## 2025-06-13 RX ADMIN — METOCLOPRAMIDE HYDROCHLORIDE 10 MG: 5 INJECTION INTRAMUSCULAR; INTRAVENOUS at 04:36

## 2025-06-13 RX ADMIN — METOCLOPRAMIDE HYDROCHLORIDE 10 MG: 5 INJECTION INTRAMUSCULAR; INTRAVENOUS at 21:58

## 2025-06-13 ASSESSMENT — ACTIVITIES OF DAILY LIVING (ADL)
ADLS_ACUITY_SCORE: 28
ADLS_ACUITY_SCORE: 29
ADLS_ACUITY_SCORE: 28

## 2025-06-13 NOTE — PLAN OF CARE
Goal Outcome Evaluation:      Plan of Care Reviewed With: patient    Overall Patient Progress: improvingOverall Patient Progress: improving    Outcome Evaluation: Insulin gtt currently at 0.5 units/hr. Insulin was turned off from 4686-3010 for BG 97 and 93, then was restrted at 0.5 units/hr for  and BG since then have been 103, 100, and 108. BP elevated 160s/110s. Dr. Donato aware, replied no treatment for these values. Pt has pain to L chest and abdomen, generalized, pt states has had pain to L chest since last week Thursday. PRN Oxycodone given and zofran for intermittent nausea, and TUMS and pt stated warm packs and TUMS has been most helpful. Telemetry is NSR. Using incentive spirometry to 2000. Potassium Phosphate replacment given and recheck is this morning with AM labs. Pt is frustrated with NPO status, frequency of labs, BG checks, and BP checks.  CXR this morning to reevaluate R pneumothorax.

## 2025-06-13 NOTE — PLAN OF CARE
Goal Outcome Evaluation:      Plan of Care Reviewed With: patient    Overall Patient Progress: improvingOverall Patient Progress: improving    Outcome Evaluation: Pt remains hypertensive (140-160's/100's), VSS on RA otherwise. Intermittently frustrated with cares and unable to verbalize reasoning. Removed from insulin gtt this afternoon, transitioned to Lantus and Novolog. Diet modified to consistent carb/gluten free. Hypoglycemic episode x1. See provider notification note. Chest xray from this AM shows no changes to pneumo on R side. Still c/o L chest pain related to SOB and IS use. . 10mg PRN oxycodone given x3. Ambulates SBA, voiding adequately with urinal at bedside. Potassium and sodium phos replaced via IV d/t NPO status this AM. Labs will be rechecked tomorrow AM.    S- Transfer to /S 272 from  .    B- DKA    A- Brief systems assessment: See above.     R- Transfer to /S floor per physician orders. Continue to monitor pt and update physician as needed.      Code status: Full Code  Skin: Scattered bruising  Fall Risk: Yes- Department fall risk interventions implemented.  Isolation and Signage: None  Medication drips upon transfer: NS @ 100 mL/hr.   Blue Bin checked and medications transfer out with patient Yes

## 2025-06-13 NOTE — CONSULTS
Care Management Note:     Care Management team received referral due to elevated unplanned re-admission risk score.       Per IDT rounds, EMR review, discussion with pt's hospital medical provider, and after a brief discussion with pt via phone, it has been determined that pt will discharge to home with no identifiable discharge needs.      Patient will inform nursing staff if any other CM needs arise or if he needs assist with a ride home.      No further care management intervention anticipated at this time.  Please re-consult if further needs arise.  Care management signing off.        Ellie Mahoney [facility]  Inpatient Care Coordinator   [facility] [phone]  [facility] [phone]

## 2025-06-13 NOTE — PROVIDER NOTIFICATION
Provider contacted regarding pt's concern over Novolog dosing with meals. About 30 minutes later pt became hypoglycemic with BG of 54. Apple juice given x2. Pt very anxious regarding situation, therefore provider instructed writer to give 25 mL PRN dose of D50 IV. Effective, glucose recheck reads 113. 8 units of novolog with meals now discontinued.

## 2025-06-13 NOTE — CONSULTS
"SPIRITUAL HEALTH SERVICES Consult Note    Medical Surgical Intensive Care Unit at [facility]    REFERRAL SOURCE/REASON FOR VISIT - Saw patient per placement in the ICU.    SUMMARY - I visited Reji, who, according to his documentation, came in from home yesterday with DKA.  He has a history of paranoid schizophrenia.  Today, he has been frustrated with the nurses' interventions and has had pain.  This afternoon, he snarled at my introduction and reported, \"I don't need no spiritual services.\"  He added, \"Thank you, though.\"           Plan - No plan to follow.    Dre Coffman, Ph.D,   Spiritual Health Services  [address] [place] [address] [phone]  [email or web address]    Assessment -     Strengths, Coping, and Resources - family    Meaning, Beliefs, and Spirituality - Patient reported no Sabianist at admission.  "

## 2025-06-13 NOTE — PROGRESS NOTES
[facility]    Hospitalist Progress Note    Assessment & Plan   35 year old male who was admitted on 6/11/2025 with DKA.     Impression:   Principal Problem:    DKA related to DM type 1  Active Problems:    Acute on chronic abdominal pain    Cannabinoid hyperemesis syndrome    Diabetic gastroparesis (H)    Mild intermittent asthma    Paranoid schizophrenia (H)    Vision loss, right eye    Hypophosphatemia    Ketosis due to diabetes (H)    Primary spontaneous pneumothorax    Nausea and vomiting, unspecified vomiting type    Sinus tachycardia    Hyponatremia    Metabolic acidosis, increased anion gap      Plan:  Stop IV insulin and restart Lantus and tid Novolog.  He does not want an Insulin pump.  He does not have a healthy diet.      DVT Prophylaxis: Low Risk/Ambulatory with no VTE prophylaxis indicated  Code Status: Full Code    Disposition: Expected discharge home tomorrow.  Medically Ready for Discharge: Anticipated Tomorrow      Other Diagnoses  Clinically Significant Risk Factors Present on Admission         # Hyponatremia: Lowest Na = 125 mmol/L in last 2 days, will monitor as appropriate  # Hypochloremia: Lowest Cl = 86 mmol/L in last 2 days, will monitor as appropriate  # Hypocalcemia: Lowest Ca = 8.2 mg/dL in last 2 days, will monitor and replace as appropriate    # Anion Gap Metabolic Acidosis: Highest Anion Gap = 25 mmol/L in last 2 days, will monitor and treat as appropriate               # DMII: A1C = N/A within past 6 months       # Asthma: noted on problem list          Reji Dye MD  Pager [phone]  Cell Phone [phone]  Text Page (7am to 6pm)  (50 min total)    Interval History   Hungry, wants regular diet.  At home he does use Lantus, but otherwise uses sliding scale tid.  Throat was sore when first got sick, now better.  Last episode of DKA 1 month ago.     Physical Exam   Temp: 98.3  F (36.8  C) Temp src: Oral BP: (!) 164/116 Pulse: 84   Resp: 24  SpO2: 100 % O2 Device: None (Room air)    Vitals:    06/11/25 2215 06/13/25 0650   Weight: 86.2 kg (190 lb) 83.7 kg (184 lb 9.6 oz)     Vital Signs with Ranges  Temp:  [97.8  F (36.6  C)-98.5  F (36.9  C)] 98.3  F (36.8  C)  Pulse:  [84-99] 84  Resp:  [11-24] 24  BP: (128-174)/() 164/116  SpO2:  [97 %-100 %] 100 %  I/O last 3 completed shifts:  In: 2183.8 [P.O.:240; I.V.:1943.8]  Out: 3800 [Urine:3800]    # Pain Assessment:      6/13/2025     3:34 AM   Current Pain Score   Patient currently in pain? yes   - Reji is experiencing pain due to abd. Pain management was discussed and the plan was created in a collaborative fashion.  Reji's response to the current recommendations: engaged  - Please see the plan for pain management as documented above      Constitutional: Awake, alert, cooperative, moderate distress -- afraid of low blood sugars.   Respiratory: Clear to auscultation bilaterally, no crackles or wheezing  Cardiovascular: Regular rate and rhythm, normal S1 and S2, and no murmur noted  GI: Normal bowel sounds, soft, non-distended, non-tender  Extrem: No calf tenderness, no ankle edema  Neuro: Ox3, no focal motor or sensory deficits, quite anxious     Medications   Current Facility-Administered Medications   Medication Dose Route Frequency Provider Last Rate Last Admin    Patient is already receiving mechanical prophylaxis   Does not apply Continuous PRN Madeleine Goode MD        sodium chloride 0.9 % infusion   Intravenous Continuous Reji Mejia MD         Current Facility-Administered Medications   Medication Dose Route Frequency Provider Last Rate Last Admin    dorzolamide-timolol (COSOPT) ophthalmic solution 1 drop  1 drop Right Eye BID Christa Mazariegos MD   1 drop at 06/13/25 1011    insulin aspart (NovoLOG) injection (RAPID ACTING)  1-10 Units Subcutaneous TID AC Reji Mejia MD        insulin aspart (NovoLOG) injection (RAPID ACTING)  1-7 Units Subcutaneous At  Bedtime Reji Mejia MD        insulin aspart (NovoLOG) injection (RAPID ACTING)  8 Units Subcutaneous TID w/meals Reji Mejia MD        insulin glargine (LANTUS PEN) injection 20 Units  20 Units Subcutaneous QAM AC Reji Mejia MD   20 Units at 06/13/25 1011    metoclopramide (REGLAN) injection 10 mg  10 mg Intravenous Q6H Madeleine Goode MD   10 mg at 06/13/25 1053    [START ON 6/14/2025] pantoprazole (PROTONIX) EC tablet 40 mg  40 mg Oral QAM  Reji Mejia MD        potassium chloride 10 mEq in 100 mL sterile water infusion  10 mEq Intravenous Q1H Madeleine Goode MD 50 mL/hr at 06/13/25 1016 10 mEq at 06/13/25 1016    sodium phosphate 15 mmol in NS 250mL intermittent infusion  15 mmol Intravenous Once Madeleine Goode MD   15 mmol at 06/13/25 0830       Data   Recent Labs   Lab 06/13/25  0954 06/13/25  0834 06/13/25  0623 06/13/25  0226 06/13/25  0212 06/12/25  0126 06/11/25  2305   WBC  --   --   --   --   --   --  8.4   HGB  --   --   --   --   --   --  14.4   MCV  --   --   --   --   --   --  81   PLT  --   --   --   --   --   --  219   *  --  131*  --  131*   < > 130*   POTASSIUM 4.0  --  3.5  --  3.9   < > 3.7   CHLORIDE 96*  --  97*  --  97*   < > 86*   CO2 21*  --  25  --  22   < > 19*   BUN 8.5  --  10.1  --  12.5   < > 22.9*   CR 0.84  --  0.88  --  0.90   < > 1.15   ANIONGAP 13  --  9  --  12   < > 25*   LYNN 8.6*  --  8.6*  --  8.2*   < > 9.5   * 106* 99   < > 100*   < > 398*   ALBUMIN  --   --   --   --   --   --  4.5   PROTTOTAL  --   --   --   --   --   --  7.8   BILITOTAL  --   --   --   --   --   --  1.4*   ALKPHOS  --   --   --   --   --   --  146   ALT  --   --   --   --   --   --  20   AST  --   --   --   --   --   --  25   LIPASE  --   --   --   --   --   --  16    < > = values in this interval not displayed.       Imaging:   Recent Results (from the past 24 hours)   X-ray Chest 2 vws*    Narrative    EXAM: XR CHEST 2  VIEWS  LOCATION: [facility]  DATE: 6/12/2025    INDICATION: Follow-up for pneumothorax.  COMPARISON: Chest radiograph performed earlier today.      Impression    IMPRESSION: A small right apical pneumothorax has decreased slightly in size, measuring 1.1 cm (previously 1.5 cm). Extensive subcutaneous emphysema is noted in the neck and supraclavicular regions bilaterally. The lungs are otherwise clear. No pleural   effusion.   XR Chest 2 Views    Narrative    EXAM: XR CHEST 2 VIEWS  LOCATION: [facility]  DATE: 6/13/2025    INDICATION: follow up right pneumothroax  COMPARISON: 06/12/2025      Impression    IMPRESSION: Stable right apical pneumothorax, pleural separation measures 10 mm, unchanged from one day earlier. Residual subcutaneous emphysema at the right neck base. No mediastinal shift. Stable appearance to the left hemithorax. No pleural effusions.   Heart and mediastinal contours within normal limits. No displaced rib fracture deformity. Improved pneumomediastinum from 06/12/2025.

## 2025-06-14 VITALS
HEIGHT: 73 IN | DIASTOLIC BLOOD PRESSURE: 119 MMHG | SYSTOLIC BLOOD PRESSURE: 179 MMHG | WEIGHT: 184.6 LBS | TEMPERATURE: 97.7 F | BODY MASS INDEX: 24.46 KG/M2 | RESPIRATION RATE: 24 BRPM | OXYGEN SATURATION: 96 % | HEART RATE: 85 BPM

## 2025-06-14 PROBLEM — D69.6 THROMBOCYTOPENIA: Status: ACTIVE | Noted: 2025-06-14

## 2025-06-14 LAB
ANION GAP SERPL CALCULATED.3IONS-SCNC: 9 MMOL/L (ref 7–15)
BUN SERPL-MCNC: 5.6 MG/DL (ref 6–20)
CALCIUM SERPL-MCNC: 8.9 MG/DL (ref 8.8–10.4)
CHLORIDE SERPL-SCNC: 99 MMOL/L (ref 98–107)
CREAT SERPL-MCNC: 0.85 MG/DL (ref 0.67–1.17)
EGFRCR SERPLBLD CKD-EPI 2021: >90 ML/MIN/1.73M2
ERYTHROCYTE [DISTWIDTH] IN BLOOD BY AUTOMATED COUNT: 13.4 % (ref 10–15)
GLUCOSE BLDC GLUCOMTR-MCNC: 148 MG/DL (ref 70–99)
GLUCOSE BLDC GLUCOMTR-MCNC: 166 MG/DL (ref 70–99)
GLUCOSE SERPL-MCNC: 203 MG/DL (ref 70–99)
HCO3 SERPL-SCNC: 27 MMOL/L (ref 22–29)
HCT VFR BLD AUTO: 35.8 % (ref 40–53)
HGB BLD-MCNC: 12.1 G/DL (ref 13.3–17.7)
MCH RBC QN AUTO: 27.3 PG (ref 26.5–33)
MCHC RBC AUTO-ENTMCNC: 33.8 G/DL (ref 31.5–36.5)
MCV RBC AUTO: 81 FL (ref 78–100)
PLATELET # BLD AUTO: 149 10E3/UL (ref 150–450)
POTASSIUM SERPL-SCNC: 3.9 MMOL/L (ref 3.4–5.3)
RBC # BLD AUTO: 4.43 10E6/UL (ref 4.4–5.9)
SODIUM SERPL-SCNC: 135 MMOL/L (ref 135–145)
WBC # BLD AUTO: 4.7 10E3/UL (ref 4–11)

## 2025-06-14 PROCEDURE — 250N000013 HC RX MED GY IP 250 OP 250 PS 637: Performed by: INTERNAL MEDICINE

## 2025-06-14 PROCEDURE — 250N000011 HC RX IP 250 OP 636: Performed by: INTERNAL MEDICINE

## 2025-06-14 PROCEDURE — 250N000011 HC RX IP 250 OP 636: Mod: JZ | Performed by: INTERNAL MEDICINE

## 2025-06-14 PROCEDURE — 80048 BASIC METABOLIC PNL TOTAL CA: CPT | Performed by: INTERNAL MEDICINE

## 2025-06-14 PROCEDURE — 36415 COLL VENOUS BLD VENIPUNCTURE: CPT | Performed by: INTERNAL MEDICINE

## 2025-06-14 PROCEDURE — 99239 HOSP IP/OBS DSCHRG MGMT >30: CPT | Performed by: INTERNAL MEDICINE

## 2025-06-14 PROCEDURE — 85018 HEMOGLOBIN: CPT | Performed by: INTERNAL MEDICINE

## 2025-06-14 RX ORDER — LISINOPRIL 10 MG/1
10 TABLET ORAL DAILY
Qty: 30 TABLET | Refills: 2 | Status: SHIPPED | OUTPATIENT
Start: 2025-06-15

## 2025-06-14 RX ORDER — LORAZEPAM 0.5 MG/1
0.5 TABLET ORAL 3 TIMES DAILY PRN
Status: DISCONTINUED | OUTPATIENT
Start: 2025-06-14 | End: 2025-06-14 | Stop reason: HOSPADM

## 2025-06-14 RX ORDER — LORAZEPAM 0.5 MG/1
0.5 TABLET ORAL 3 TIMES DAILY PRN
Qty: 15 TABLET | Refills: 0 | Status: SHIPPED | OUTPATIENT
Start: 2025-06-14

## 2025-06-14 RX ORDER — METOCLOPRAMIDE 10 MG/1
10 TABLET ORAL 4 TIMES DAILY PRN
Status: SHIPPED
Start: 2025-06-14

## 2025-06-14 RX ORDER — ACETAMINOPHEN 500 MG
1000 TABLET ORAL EVERY 6 HOURS PRN
Status: SHIPPED
Start: 2025-06-14

## 2025-06-14 RX ORDER — OXYCODONE HYDROCHLORIDE 5 MG/1
5 TABLET ORAL
Refills: 0 | Status: DISCONTINUED | OUTPATIENT
Start: 2025-06-14 | End: 2025-06-14 | Stop reason: HOSPADM

## 2025-06-14 RX ORDER — LISINOPRIL 10 MG/1
10 TABLET ORAL DAILY
Status: DISCONTINUED | OUTPATIENT
Start: 2025-06-14 | End: 2025-06-14 | Stop reason: HOSPADM

## 2025-06-14 RX ORDER — METOCLOPRAMIDE 5 MG/1
10 TABLET ORAL 4 TIMES DAILY PRN
Status: DISCONTINUED | OUTPATIENT
Start: 2025-06-14 | End: 2025-06-14 | Stop reason: HOSPADM

## 2025-06-14 RX ADMIN — LISINOPRIL 10 MG: 10 TABLET ORAL at 07:56

## 2025-06-14 RX ADMIN — PANTOPRAZOLE SODIUM 40 MG: 40 TABLET, DELAYED RELEASE ORAL at 07:56

## 2025-06-14 RX ADMIN — DORZOLAMIDE HYDROCHLORIDE AND TIMOLOL MALEATE 1 DROP: 20; 5 SOLUTION OPHTHALMIC at 08:06

## 2025-06-14 RX ADMIN — OXYCODONE HYDROCHLORIDE 10 MG: 5 TABLET ORAL at 04:17

## 2025-06-14 RX ADMIN — SENNOSIDES AND DOCUSATE SODIUM 2 TABLET: 50; 8.6 TABLET ORAL at 07:56

## 2025-06-14 RX ADMIN — LORAZEPAM 0.5 MG: 0.5 TABLET ORAL at 07:56

## 2025-06-14 RX ADMIN — HYDROMORPHONE HYDROCHLORIDE 0.2 MG: 0.2 INJECTION, SOLUTION INTRAMUSCULAR; INTRAVENOUS; SUBCUTANEOUS at 05:32

## 2025-06-14 RX ADMIN — METOCLOPRAMIDE HYDROCHLORIDE 10 MG: 5 INJECTION INTRAMUSCULAR; INTRAVENOUS at 04:12

## 2025-06-14 RX ADMIN — ALUMINUM HYDROXIDE, MAGNESIUM HYDROXIDE, AND SIMETHICONE 15 ML: 200; 200; 20 SUSPENSION ORAL at 05:32

## 2025-06-14 RX ADMIN — OXYCODONE 5 MG: 5 TABLET ORAL at 07:55

## 2025-06-14 RX ADMIN — CALCIUM CARBONATE (ANTACID) CHEW TAB 500 MG 1000 MG: 500 CHEW TAB at 02:26

## 2025-06-14 RX ADMIN — ONDANSETRON 4 MG: 2 INJECTION INTRAMUSCULAR; INTRAVENOUS at 05:32

## 2025-06-14 RX ADMIN — ONDANSETRON 4 MG: 4 TABLET, ORALLY DISINTEGRATING ORAL at 07:56

## 2025-06-14 RX ADMIN — CALCIUM CARBONATE (ANTACID) CHEW TAB 500 MG 1000 MG: 500 CHEW TAB at 07:55

## 2025-06-14 ASSESSMENT — ACTIVITIES OF DAILY LIVING (ADL)
ADLS_ACUITY_SCORE: 30
ADLS_ACUITY_SCORE: 28
ADLS_ACUITY_SCORE: 30
ADLS_ACUITY_SCORE: 28
ADLS_ACUITY_SCORE: 30

## 2025-06-14 NOTE — PLAN OF CARE
Goal Outcome Evaluation:      Plan of Care Reviewed With: patient          Outcome Evaluation: Patient continues to be hypertensive at times. Blood sugars more under control today. Insulins given per sliding scale with meal coverage and lantus given this morning. Script sent to retail pharmacy at his request for his blood pressure medication for discharge. Awaiting cab around 3:45-4pm for transportation for discharge today. IV removed per protocol, IV site looked good, no signs of redness. Intermittent nausea, oral ODT Antwon and TUMS given this morning along with scheduled oral protonix. Tolerating oral food and fluids. Walking around in his room independently. voiding independently.

## 2025-06-14 NOTE — PROGRESS NOTES
Care Management Discharge Note    Discharge Date: 06/14/2025       Discharge Disposition:  Home     Discharge Services:  Transportation     Discharge Transportation:  [facility] [employer] #[phone]    Additional Information:  Care Management was asked to assist patient if finding transportation home today.    Writer called and spoke with Josefa at [employer]. She is available for a transport between 3:45 and 4pm today. She will meet patient at the ED entrance.  She is aware that it is a voucher transport and in agreement.     Writer visited with patient. Patient state his family is not available to provide a ride home today. Patient has friend, etc.   that could drive him home, either.  Discussed [facility] option with  time between 3:45/4pm today and patient in agreement. Patient told writer that he only has a dollar that he can pay. Discussed voucher can be provided to cover cost of the transport. Patient in agreement.     Writer read the transport voucher information to patient, as he reported he really couldn't see it well.  Patient then signed the voucher. Writer provided him with a copy and a copy to provide to the .      COOPER Meza  [facility]   [phone]

## 2025-06-14 NOTE — PROGRESS NOTES
S-(situation): Patient discharged to home via cab at 1540.    B-(background): DKA    A-(assessment): Blood sugars managable today with meal time sliding scale insulin and lantus dosing. Intermittent nausea, antiemetics given.     R-(recommendations): Discharge instructions reviewed with Ronaldo. Listed belongings gathered and returned to patient.          Discharge Nursing Criteria:   Patient Education given and documented: (STROKE, CHF, Diabetes):  {Yes, Diabetes  Care Plan and Patient education resolved: Yes    New Medications- pt has been educated about purpose and side effects: Yes    Vaccines  Influenza status verified at discharge:  Not Applicable    MISC  Prescriptions if needed, hard copies sent with patient  NA  Home medications returned to patient: NA  Medication Bin checked and emptied on discharge Yes  Patient reports post-discharge pain management plan is effective: Yes

## 2025-06-14 NOTE — PLAN OF CARE
"Goal Outcome Evaluation:      Plan of Care Reviewed With: patient    Overall Patient Progress: no change    A & O x4, able to make needs known.  Takes pills whole.  VSS (refusing blood pressure).  On RA w SpO2 > 90%, lungs coarse.  MACY ac IV patent.  Continent BB, peeing constantly throughout the night, SBA to bathroom.  C/o up to 10/10 pain in L lung and abdomen, received IV dilaudid x3, oxcodone, Maalox, scheduled Reglan, partially effecitve.  Pt nauseous, received IV Zofran x2 and compazine, effective.      Pt has been very anxious and irritable throughout shift, often yelling at staff when asking assessment questions or refusing cares.  Pt will be resting quietly when alone but when staff come into room he begins to writhe on bed, crying, hyperventilating, swearing, ect.  Pt only seems to improve behavior when he receives IV dilaudid.  Pt refusing multiple offers to page provider for an anxiety prn despite stating he's having a \"panic attack\" which resolved on its own shortly after receiving IV dilaudid.  Paged to request prn anxiety med for morning anyways but no medication ordered.        Towards morning pt refused IV D5/NS despite previously being anxious about 6/13's afternoon hypoglycemia episode.  Pt's blood sugar has remains within normal range.  Pt states whenever he gets up he becomes dizzy, nauseous, in pain, ect. but then refusing to use urinal and insisting on getting up for bathroom and stating he wants to go on a walk.  Drinking copious amounts of fluids, probably 2 liters from 1900 on.      BP (!) 157/103 (BP Location: Right arm, Patient Position: Semi-Parra's)   Pulse 100   Temp 97  F (36.1  C) (Axillary)   Resp 26   Ht 1.854 m (6' 1\")   Wt 83.7 kg (184 lb 9.6 oz)   SpO2 96%   BMI 24.36 kg/m      Sheree King RN on 6/14/2025 at 6:56 AM                 "

## 2025-06-14 NOTE — PROGRESS NOTES
Patient with intermittent nausea this morning, Zofran given at his request. Also given oral Ativan, prn TUMS, prn 5mg Oxycodone for pain,2 Senna at his request and scheduled oral protonix, along with his insulins.    Of note, our bedside monitor for blood sugar this morning read 166. When he took his reading with his arm monitor to his phone, it read 167. This writer made the comment to him those readings were pretty close. He stated they are not always so close in their readings but did seem pleased these were close this morning.     He was told by the provider if his lunch time sugars remain under control, he may get to discharge this afternoon. He is currently trying to secure a ride home but may need help from . This writer did make  aware, and we will let them know if Ronaldo cannot find a ride to have  help with the situation.

## 2025-06-14 NOTE — DISCHARGE SUMMARY
[facility]    Discharge Summary  Hospitalist    Date of Admission:  6/11/2025  Date of Discharge:  6/14/2025  Discharging Provider: Reji Dye MD, MD    Discharge Diagnoses   Principal Problem:    DKA related to DM type 1      Nausea and Vomiting, suspect related to DKA      Small Right Pneumothorax -- suspect 2nd to vomiting       DM type 1, Hgb A1C 11.9 on 9/8/21      Acute on chronic abdominal pain        Possible Cannabinoid hyperemesis syndrome      Diabetic gastroparesis (H)      Mild intermittent asthma      Paranoid schizophrenia (H)      Vision loss, right eye      Hypophosphatemia      Primary spontaneous pneumothorax    Nausea and vomiting, unspecified vomiting type    Sinus tachycardia    Hyponatremia    Metabolic acidosis, increased anion gap    CODY (acute kidney injury)    Thrombocytopenia      History of Present Illness   35 year old male with DM type 1 and hx of diabetic gastroparesis, and Paranoid Schizophrenia, who has chronic abdominal pain, daily marijuana use, and uncontrolled diabetes. Patient was just in the ER earlier today and left AGAINST MEDICAL ADVICE after blood work came back and was mostly unremarkable and they wanted to get a CAT scan. Patient has a history of coming in and out of the ER and leaving AGAINST MEDICAL ADVICE. Upon arrival here patient states he just wants us to check labs and get on with things. Patient states he has his general abdominal pain has been going on for 5 days. He states eating moving anything makes it worse, nothing helps.  Did experience nausea and vomiting.  Blood sugars have been elevated and he has Lantus insulin but tries to regulate his blood sugars by using TID sliding scale Novolog 2-14 units at a time.     In ER AVSS, but then BP elevated 170's/110's.  [id number], bicarb 19 and dropped to 17, glucose 398 and serum Ketones elevated at 5.61. Also on cxr had a small right apical pneumothorax and  pneumomediastinum.  Chest CT suggested a slight mid-esophageal tear, but esophagan did not show any diet leak.  Urine drug screen was positive only for THC.      Hospital Course   Admitted to ICU for IV insulin, intensive blood sugar monitoring, and IV fluids.  Glcuose came under control quickly, and then resumed Lantus insulin and stopped his IV insulin -- glucose did drop to as low as 54, and corrected with 1/2 amp D50.      Right pneumothorax was clinically asymptomatic, initially NPO then advanced to diabetic diet without difficulties, and follow-up CRX's showed the pneumothorax spontaneously resolving.      At time of discharge he was tolerating a diabetic diet and blood sugars were 98 to 166.   He was somewhat erratic with eating, which is why at home he relies heavily on sliding scale novolog.  It was discussed that his Marijuana use might be contributing to his nausea, but his opinion is that he uses it to help his nausea and he plans to continue his daily use.  He reports stopping marijuana use in past he says he actually felt worse.     He has follow-up with his primary clinic at [facility] ever Friday (virtual visits) and his Endocrinologist is there as well.  We did discuss the option of switching to an Insulin pump for more flexibility but he was totally against the idea, and did not want to deal with the technology.      Also while here he was borderline hypertensive, with readings as high as 179/119.  Discussed with him, and especially given his Diabetes suggested starting Lisinopril 10 mg daily with suggestion on BMP in 1 week and check BP -- hopefully he can do that with his [facility] clinic visit.      Reji Dye MD  Pager: [phone]  Cell Phone:  [phone]       Significant Results and Procedures   As above    Pending Results   These results will be followed up by Dr. Dye  Unresulted Labs Ordered in the Past 30 Days of this Admission       No orders found from 5/12/2025 to 6/12/2025.             Code Status   Full Code       Primary Care Physician   Physician No Ref-Primary    Physical Exam   Temp: 97.7  F (36.5  C) Temp src: Oral BP: (!) 179/119 Pulse: 85   Resp: 24 SpO2: 96 % O2 Device: None (Room air)    Vitals:    06/11/25 2215 06/13/25 0650   Weight: 86.2 kg (190 lb) 83.7 kg (184 lb 9.6 oz)     Vital Signs with Ranges  Temp:  [97  F (36.1  C)-98.2  F (36.8  C)] 97.7  F (36.5  C)  Pulse:  [] 85  Resp:  [11-26] 24  BP: (147-179)/(102-132) 179/119  SpO2:  [96 %-100 %] 96 %  I/O last 3 completed shifts:  In: 1901.7 [P.O.:600; I.V.:1301.7]  Out: 3875 [Urine:3875]    Exam on discharge:   Lungs clear  CV with reg S1S2    Discharge Disposition   Discharged to home  Condition at discharge: Stable    Consultations This Hospital Stay   SURGERY GENERAL IP CONSULT  SURGERY GENERAL IP CONSULT  CARE MANAGEMENT / SOCIAL WORK IP CONSULT    Time Spent on this Encounter   I spent a total of 35 minutes discharging this patient.     Discharge Orders      Hospital to Primary Care - Establish PCP Referral      Reason for your hospital stay    Nausea and vomiting, DKA related to Type 1 diabetes     Activity    Your activity upon discharge: activity as tolerated     Discharge Instructions    Call Dr. Mejia if any medical questions at Cell Phone [phone].     Monitor and record    Blood glucose: 4 times a day, before meals and at bedtime.     Diet    Follow this diet upon discharge: Current Diet:Orders Placed This Encounter      Combination Diet Gluten Free Diet; High Consistent Carb (75 g CHO per Meal) Diet     Discharge Medications   Current Discharge Medication List        START taking these medications    Details   insulin glargine (LANTUS PEN) 100 UNIT/ML pen Inject 20 Units subcutaneously every morning (before breakfast).  Qty: 15 mL, Refills: 2    Comments: If Lantus is not covered by insurance, may substitute Basaglar or Semglee or other insulin glargine product per insurance preference at same  dose and frequency.    Associated Diagnoses: Type 1 diabetes mellitus with other specified complication (H)      lisinopril (ZESTRIL) 10 MG tablet Take 1 tablet (10 mg) by mouth daily.  Qty: 30 tablet, Refills: 2    Associated Diagnoses: Benign essential hypertension      LORazepam (ATIVAN) 0.5 MG tablet Take 1 tablet (0.5 mg) by mouth 3 times daily as needed for anxiety.  Qty: 15 tablet, Refills: 0    Associated Diagnoses: Anxiety           CONTINUE these medications which have CHANGED    Details   acetaminophen (TYLENOL) 500 MG tablet Take 2 tablets (1,000 mg) by mouth every 6 hours as needed for mild pain.    Associated Diagnoses: Pain      metoclopramide (REGLAN) 10 MG tablet Take 1 tablet (10 mg) by mouth 4 times daily as needed for nausea. Before meals and nightly    Associated Diagnoses: Diabetic gastroparesis (H)           CONTINUE these medications which have NOT CHANGED    Details   albuterol (PROAIR HFA/PROVENTIL HFA/VENTOLIN HFA) 108 (90 Base) MCG/ACT inhaler Inhale 1-2 puffs into the lungs.      alum & mag hydroxide-simethicone (UMM-LANTA) 200-200-20 MG/5ML SUSP suspension Take 15 mLs by mouth.      B-D U/F 31G X 8 MM insulin pen needle Inject Subcutaneous 5 times daily      bisacodyl (DULCOLAX) 10 MG suppository Place 10 mg rectally daily as needed for constipation.      CONTOUR NEXT TEST test strip 1 strip by In Vitro route 4 times daily.      dorzolamide-timolol (COSOPT) 2-0.5 % ophthalmic solution Place 1 drop into the right eye 2 times daily      famotidine (PEPCID) 20 MG tablet Take 20 mg by mouth 2 times daily      insulin aspart (NOVOLOG PEN) 100 UNIT/ML pen Inject 2-14 Units subcutaneously 3 times daily (with meals). Inject 2 UNITS if -200, 4 UNITS if -250, 6 UNITS if -300, 8 UNITS if -350, 10 UNITS if -400, 12 UNITS if -450, 14 UNITS if -500 with each meal. Max daily dose: 40 units      ondansetron (ZOFRAN ODT) 4 MG ODT tab Take 1 tablet by mouth 3  times daily      oxyCODONE (ROXICODONE) 5 MG/5ML solution Take 5 mg by mouth daily as needed for breakthrough pain.      omeprazole (PRILOSEC) 40 MG DR capsule Take 40 mg by mouth 2 times daily (before meals)           STOP taking these medications       methocarbamol (ROBAXIN) 500 MG tablet Comments:   Reason for Stopping:         metoclopramide (REGLAN) 5 MG/5ML solution Comments:   Reason for Stopping:             Allergies   Allergies   Allergen Reactions    Bees Anaphylaxis     Data   Most Recent 3 CBC's:  Recent Labs   Lab Test 06/14/25  0514 06/11/25  2305 01/11/25  1302   WBC 4.7 8.4 13.2*   HGB 12.1* 14.4 12.1*   MCV 81 81 81   * 219 168      Most Recent 3 BMP's:  Recent Labs   Lab Test 06/14/25  1131 06/14/25  0739 06/14/25  0514 06/13/25  1208 06/13/25  0954 06/13/25  0834 06/13/25  0623   NA  --   --  135  --  130*  --  131*   POTASSIUM  --   --  3.9  --  4.0  --  3.5   CHLORIDE  --   --  99  --  96*  --  97*   CO2  --   --  27  --  21*  --  25   BUN  --   --  5.6*  --  8.5  --  10.1   CR  --   --  0.85  --  0.84  --  0.88   ANIONGAP  --   --  9  --  13  --  9   LYNN  --   --  8.9  --  8.6*  --  8.6*   * 166* 203*   < > 119*   < > 99    < > = values in this interval not displayed.     Most Recent 2 LFT's:  Recent Labs   Lab Test 06/11/25  2305 01/11/25  1302   AST 25 19   ALT 20 26   ALKPHOS 146 124   BILITOTAL 1.4* 0.9     Most Recent INR's and Anticoagulation Dosing History:  Anticoagulation Dose History           No data to display              Most Recent 3 Troponin's:  Recent Labs   Lab Test 08/02/21  0501 02/06/21  0921 01/16/21  1538 12/23/20  1408 06/23/20  1103   TROPI  --  <0.015 <0.015 <0.015  --    TROPONIN <0.015  --   --   --  0.00     Most Recent Cholesterol Panel:No lab results found.  Most Recent 6 Bacteria Isolates From Any Culture (See EPIC Reports for Culture Details):  Recent Labs   Lab Test 02/06/21  1103 02/06/21  0921 01/16/21  1807 01/16/21  1652 01/16/21  1538  12/23/20  1514   CULT No growth No growth 50,000 to 100,000 colonies/mL  Methicillin resistant Staphylococcus aureus (MRSA)  * No growth No growth >100,000 colonies/mL  Methicillin resistant Staphylococcus aureus (MRSA)  *  >100,000 colonies/mL  Strain 2  Methicillin resistant Staphylococcus aureus (MRSA)  *     Most Recent TSH, T4 and A1c Labs:  Recent Labs   Lab Test 01/11/25  1302   A1C 9.1*

## 2025-06-16 ENCOUNTER — PATIENT OUTREACH (OUTPATIENT)
Dept: CARE COORDINATION | Facility: CLINIC | Age: 35
End: 2025-06-16
Payer: MEDICARE

## 2025-06-16 DIAGNOSIS — Z09 HOSPITAL DISCHARGE FOLLOW-UP: ICD-10-CM

## 2025-06-16 NOTE — PROGRESS NOTES
[facility]: Transitions of Care Outreach  Chief Complaint   Patient presents with    Clinic Care Coordination - Post Hospital       Most Recent Admission Date: 6/11/2025   Most Recent Admission Diagnosis: Ketosis due to diabetes (H) - E11.10  Primary spontaneous pneumothorax - J93.11  Nausea and vomiting, unspecified vomiting type - R11.2     Most Recent Discharge Date: 6/14/2025   Most Recent Discharge Diagnosis: Primary spontaneous pneumothorax - J93.11  Nausea and vomiting, unspecified vomiting type - R11.2  Ketosis due to diabetes (H) - E11.10  Type 1 diabetes mellitus with other specified complication (H) - E10.69  Diabetic gastroparesis (H) - E11.43, K31.84  Pain - R52  Benign essential hypertension - I10  Anxiety - F41.9  Diabetic ketoacidosis without coma associated with type 1 diabetes mellitus (H) - E10.10  Diabetic ketoacidosis without coma associated with type 1 diabetes mellitus (H) - E10.10     Transitions of Care Assessment    Discharge Assessment  How are you doing now that you are home?: I'm doing fine  How are your symptoms? (Red Flag symptoms escalate to triage hotline per guidelines): Improved  Do you know how to contact your clinic care team if you have future questions or changes to your health status? : Yes  Does the patient have their discharge instructions? : Yes  Does the patient have questions regarding their discharge instructions? : No  Were you started on any new medications or were there changes to any of your previous medications? : Yes  Does the patient have all of their medications?: Yes  Do you have questions regarding any of your medications? : No  Do you have all of your needed medical supplies or equipment (DME)?  (i.e. oxygen tank, CPAP, cane, etc.): Yes              [facility] Explained and offered Care Coordination support to eligible patients: N/A    Patient accepted? N/A    Follow up Plan     Discharge Follow-Up  Discharge follow up appointment scheduled in  alignment with recommended follow up timeframe or Transitions of Risk Category? (Low = within 30 days; Moderate= within 14 days; High= within 7 days): Yes  Discharge Follow Up Appointment Date: 06/18/25    Future Appointments   Date Time Provider Department Center   6/18/2025  1:00 PM Russell Perez MD [facility]       Outpatient Plan as outlined on AVS reviewed with patient.    For any urgent concerns, please contact our 24 hour nurse triage line: [phone] ([phone])       Crys Ames  Community Health Worker  Connected [facility]  [phone]

## 2025-06-18 ENCOUNTER — TELEPHONE (OUTPATIENT)
Dept: PHARMACY | Facility: OTHER | Age: 35
End: 2025-06-18

## 2025-06-18 NOTE — TELEPHONE ENCOUNTER
MTM referral from: Transitions of Care (recent hospital discharge, TCU discharge, or ED visit)    MTM referral outreach attempt #2 on June 18, 2025 at 11:26 AM      Outcome: Left Message    Andrés WHITE (sebastian 6/14)-NB   for the carrier/Plan on the flowsheet          SEAN Frost